# Patient Record
Sex: MALE | Race: WHITE | Employment: OTHER | URBAN - METROPOLITAN AREA
[De-identification: names, ages, dates, MRNs, and addresses within clinical notes are randomized per-mention and may not be internally consistent; named-entity substitution may affect disease eponyms.]

---

## 2017-02-01 ENCOUNTER — CLINICAL SUPPORT (OUTPATIENT)
Dept: CARDIOLOGY CLINIC | Age: 82
End: 2017-02-01

## 2017-02-01 DIAGNOSIS — Z79.01 ENCOUNTER FOR MONITORING COUMADIN THERAPY: ICD-10-CM

## 2017-02-01 DIAGNOSIS — Z51.81 ENCOUNTER FOR MONITORING COUMADIN THERAPY: ICD-10-CM

## 2017-02-01 DIAGNOSIS — I48.91 ATRIAL FIBRILLATION, UNSPECIFIED TYPE (HCC): Primary | ICD-10-CM

## 2017-02-01 LAB — INR, EXTERNAL: 1.8 (ref 2–3)

## 2017-02-01 NOTE — PROGRESS NOTES
A full discussion of the nature of anticoagulants has been carried out. A benefit risk analysis has been presented to the patient, so that they understand the justification for choosing anticoagulation at this time. The need for frequent and regular monitoring, precise dosage adjustment and compliance is stressed. Side effects of potential bleeding are discussed. The patient should avoid any OTC items containing aspirin or ibuprofen, and should avoid great swings in general diet. Avoid alcohol consumption. Call if any signs of abnormal bleeding. Next PT/INR test in 8 weeks; telephone follow up thereafter. ANNO Jeff Talavera RN. Mr Jagdish Hernandez will only come every 8 weeks. He no longer drives and has to pay a service to bring him.

## 2017-02-14 ENCOUNTER — OFFICE VISIT (OUTPATIENT)
Dept: FAMILY MEDICINE CLINIC | Age: 82
End: 2017-02-14

## 2017-02-14 VITALS
OXYGEN SATURATION: 96 % | RESPIRATION RATE: 12 BRPM | DIASTOLIC BLOOD PRESSURE: 84 MMHG | HEIGHT: 66 IN | WEIGHT: 234.8 LBS | BODY MASS INDEX: 37.73 KG/M2 | SYSTOLIC BLOOD PRESSURE: 183 MMHG | HEART RATE: 71 BPM | TEMPERATURE: 97.8 F

## 2017-02-14 DIAGNOSIS — Z00.00 MEDICARE ANNUAL WELLNESS VISIT, SUBSEQUENT: ICD-10-CM

## 2017-02-14 DIAGNOSIS — R60.9 PERIPHERAL EDEMA: ICD-10-CM

## 2017-02-14 DIAGNOSIS — Z71.89 ACP (ADVANCE CARE PLANNING): ICD-10-CM

## 2017-02-14 DIAGNOSIS — I48.20 CHRONIC ATRIAL FIBRILLATION (HCC): Primary | ICD-10-CM

## 2017-02-14 DIAGNOSIS — Z12.83 SCREENING FOR SKIN CANCER: ICD-10-CM

## 2017-02-14 DIAGNOSIS — I10 ESSENTIAL HYPERTENSION: ICD-10-CM

## 2017-02-14 DIAGNOSIS — E03.9 HYPOTHYROIDISM, UNSPECIFIED TYPE: ICD-10-CM

## 2017-02-14 RX ORDER — LOSARTAN POTASSIUM 50 MG/1
50 TABLET ORAL DAILY
Qty: 90 TAB | Refills: 1 | Status: SHIPPED | OUTPATIENT
Start: 2017-02-14 | End: 2017-09-26 | Stop reason: SDUPTHER

## 2017-02-14 NOTE — PROGRESS NOTES
HISTORY OF PRESENT ILLNESS  Garth Chavez is a 80 y.o. male. HPI   Cardiovascular Review:  The patient has hypertension, hyperlipidemia and Atrial Fibrillation. Anticoagulated on Warfarin which is managed by cardiology. Patient reports having a h/o TIA in 1997. Diet and Lifestyle: generally follows a low fat low cholesterol diet, generally follows a low sodium diet, sedentary, nonsmoker  Home BP Monitoring: is not measured at home. Pertinent ROS: taking medications as instructed, no medication side effects noted, no TIA's, no chest pain on exertion, no dyspnea on exertion, no swelling of ankles. Patient seen by Dr. Jena Richmond, podiatry recently for foot care. Requests referral to dermatology, Dr Estela Yang. Patient refuses lab work and immunizations. Review of Systems   Constitutional: Negative for weight loss. Cardiovascular: Negative for leg swelling. Gastrointestinal: Negative for heartburn. Musculoskeletal: Negative for back pain, joint pain and myalgias. Neurological: Negative for weakness. Psychiatric/Behavioral: Negative for depression. Physical Exam   Constitutional: He is oriented to person, place, and time. He appears well-developed and well-nourished. Neck: Normal range of motion. Neck supple. No JVD present. Carotid bruit is not present. No thyromegaly present. Cardiovascular: Normal rate and intact distal pulses. An irregularly irregular rhythm present. Exam reveals no gallop and no friction rub. No murmur heard. Pulmonary/Chest: Effort normal and breath sounds normal. No respiratory distress. Musculoskeletal: He exhibits edema (2+ pitting edema). Lymphadenopathy:     He has no cervical adenopathy. Neurological: He is alert and oriented to person, place, and time. Psychiatric: He has a normal mood and affect. His behavior is normal.   Nursing note and vitals reviewed. ASSESSMENT and PLAN  Natalie Green was seen today for hypertension.     Diagnoses and all orders for this visit:    Chronic atrial fibrillation ([facility] [address]  Rate controlled, anticoagulated on Warfarin. Managed by cardiology    Hypothyroidism, unspecified type  TSH presumed stable, labs refused. Essential hypertension  Not to goal, resume Losartan daily. Begin home monitoring and call for reading >150/90  -     losartan (COZAAR) 50 mg tablet; Take 1 Tab by mouth daily. For blood pressure    Peripheral edema  Likely venous insufficiency. Reccommended low sodium diet, elevation of legs and compression stockings. Screening for skin cancer  -     REFERRAL TO DERMATOLOGY    Medicare annual wellness visit, subsequent    ACP (advance care planning)  Discussed importance of living will; paperwork provided on Honoring Choices      I have discussed the diagnosis with the patient and the intended plan as seen in the above orders. The patient has received an after-visit summary along with patient information handout. I have discussed medication side effects and warnings with the patient as well. Follow-up Disposition:  Return in about 6 months (around 8/14/2017) for blood pressure.

## 2017-02-14 NOTE — PROGRESS NOTES
1. Have you been to the ER, urgent care clinic since your last visit? Hospitalized since your last visit? No    2. Have you seen or consulted any other health care providers outside of the [address] [place] [address] since your last visit? Include any pap smears or colon screening.  No       Chief Complaint   Patient presents with    Hypertension     follow up

## 2017-02-14 NOTE — PATIENT INSTRUCTIONS
Advance Directives: Care Instructions  Your Care Instructions  An advance directive is a legal way to state your wishes at the end of your life. It tells your family and your doctor what to do if you can no longer say what you want. There are two main types of advance directives. You can change them any time that your wishes change. · A living will tells your family and your doctor your wishes about life support and other treatment. · A medical power of  lets you name a person to make treatment decisions for you when you can't speak for yourself. This person is called a health care agent. If you do not have an advance directive, decisions about your medical care may be made by a doctor or a  who doesn't know you. It may help to think of an advance directive as a gift to the people who care for you. If you have one, they won't have to make tough decisions by themselves. Follow-up care is a key part of your treatment and safety. Be sure to make and go to all appointments, and call your doctor if you are having problems. It's also a good idea to know your test results and keep a list of the medicines you take. How can you care for yourself at home? · Discuss your wishes with your loved ones and your doctor. This way, there are no surprises. · Many states have a unique form. Or you might use a universal form that has been approved by many states. This kind of form can sometimes be completed and stored online. Your electronic copy will then be available wherever you have a connection to the Internet. In most cases, doctors will respect your wishes even if you have a form from a different state. · You don't need a  to do an advance directive. But you may want to get legal advice. · Think about these questions when you prepare an advance directive:  ¨ Who do you want to make decisions about your medical care if you are not able to?  Many people choose a family member, close friend, or doctor. ¨ Do you know enough about life support methods that might be used? If not, talk to your doctor so you understand. ¨ What are you most afraid of that might happen? You might be afraid of having pain, losing your independence, or being kept alive by machines. ¨ Where would you prefer to die? Choices include your home, a hospital, or a nursing home. ¨ Would you like to have information about hospice care to support you and your family? ¨ Do you want to donate organs when you die? ¨ Do you want certain Yazdanism practices performed before you die? If so, put your wishes in the advance directive. · Read your advance directive every year, and make changes as needed. When should you call for help? Be sure to contact your doctor if you have any questions. Where can you learn more? Go to http://luyc-ramos.info/. Enter R264 in the search box to learn more about \"Advance Directives: Care Instructions. \"  Current as of: February 24, 2016  Content Version: 11.1  © 5448-0387 [employer]. Care instructions adapted under license by [employer] (which disclaims liability or warranty for this information). If you have questions about a medical condition or this instruction, always ask your healthcare professional. [address] any warranty or liability for your use of this information.

## 2017-02-14 NOTE — PROGRESS NOTES
Gray Osorio is a 65 [facility] y.o. male and presents for annual Medicare Wellness Visit. Problem List: Reviewed with patient and discussed risk factors. 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    ACP (advance care planning) Z71.89    Peripheral edema R60.9       Current medical providers:  Patient Care Team:  Jose Davison MD as PCP - General (Family Practice)    PSH: Reviewed with patient  Past Surgical History   Procedure Laterality Date    Hx hip replacement  2008     right hip, [facility]    Hx heent       detached retina        SH: Reviewed with patient  Social History   Substance Use Topics    Smoking status: Light Tobacco Smoker     Packs/day: 0.25     Types: Cigars     Last attempt to quit: 9/27/1978    Smokeless tobacco: Never Used    Alcohol use 8.4 oz/week     14 Shots of liquor per week      Comment: 2 drinks a day. FH: Reviewed with patient  Family History   Problem Relation Age of Onset    Lung Disease Father     Cancer Father      lung    Coronary Artery Disease Neg Hx        Medications/Allergies: Reviewed with patient  Current Outpatient Prescriptions on File Prior to Visit   Medication Sig Dispense Refill    levothyroxine (SYNTHROID) 50 mcg tablet TAKE 1 TABLET EVERY DAY BEFORE BREAKFAST 90 Tab 1    warfarin (COUMADIN) 5 mg tablet TAKE 1 TABLET BY MOUTH DAILY 30 Tab 5    tretinoin (RETIN-A) 0.025 % topical cream Apply  to affected area nightly. 45 g 3    ciclopirox (PENLAC) 8 % solution Apply topically to nails nightly 6.6 mL 2    vit B Cmplx 3-FA-Vit C-Biotin (NEPHRO MANDO RX) 1- mg-mg-mcg tablet Take 1 tablet by mouth daily.  aspirin (ASPIRIN) 325 mg tablet Take 325 mg by mouth daily.  DOCOSAHEXANOIC ACID/EPA (FISH OIL PO) Take  by mouth.       zinc 50 mg capsule Take  by mouth daily.  vitamin E (AQUA GEMS) 400 unit capsule Take  by mouth daily.  ascorbic acid (VITAMIN C) 500 mg tablet Take  by mouth daily.  folic acid (FOLVITE) 1 mg tablet Take  by mouth daily.  LYSINE PO Take  by mouth daily.  FE/DOCUSATE NA/VIT C/B12/FA/MN (MULTI MINERALS-FE-FA-B12-C-DSS PO) Take  by mouth daily.  GLUCOSAMINE HCL/CHONDR MEDLEY A NA (GLUCOSAMINE-CHONDROITIN) 750-600 mg Tab Take  by mouth daily.  SAW PALMETTO PO Take  by mouth daily.  CALCIUM/MAGNESIUM (DOLOMITE PO) Take  by mouth.  multivitamin (ONE A DAY) tablet Take 1 Tab by mouth daily.  diphenhydrAMINE (BENADRYL) 25 mg capsule Take 50 mg by mouth daily.  furosemide (LASIX) 20 mg tablet Take 1 tablet by mouth daily. 30 tablet 0     No current facility-administered medications on file prior to visit. Allergies   Allergen Reactions    Hydrocodone Other (comments)     Unable to void, or have a bowel movement    Amlodipine Swelling and Other (comments)     Severe weeping from leg swelling    Poison Ivy Extract Itching       Objective:  Visit Vitals    /84 (BP 1 Location: Left arm, BP Patient Position: Sitting)    Pulse 71    Temp 97.8 °F (36.6 °C) (Oral)    Resp 12    Ht 5' 6\" (1.676 m)    Wt 234 lb 12.8 oz (106.5 kg)    SpO2 96%    BMI 37.9 kg/m2    Body mass index is 37.9 kg/(m^2). Assessment of cognitive impairment: Alert and oriented x 3    Depression Screen:   PHQ 2 / 9, over the last two weeks 2/14/2017   Little interest or pleasure in doing things Not at all   Feeling down, depressed or hopeless Not at all   Total Score PHQ 2 0       Fall Risk Assessment:    Fall Risk Assessment, last 12 mths 2/14/2017   Able to walk? Yes   Fall in past 12 months? No   Fall with injury? -   Number of falls in past 12 months -   Fall Risk Score -       Functional Ability:   Does the patient exhibit a steady gait?   yes   How long did it take the patient to get up and walk from a sitting position? 5 sec   Is the patient self reliant?  (ie can do own laundry, meals, household chores)  yes     Does the patient handle his/her own medications? yes     Does the patient handle his/her own money? yes     Is the patients home safe (ie good lighting, handrails on stairs and bath, etc.)? yes     Did you notice or did patient express any hearing difficulties? no     Did you notice or did patient express any vision difficulties? yes     Were distance and reading eye charts used? no       Advance Care Planning:   Patient was offered the opportunity to discuss advance care planning:  yes     Does patient have an Advance Directive:  no   If no, did you provide information on Caring Connections? yes       Plan:      Orders Placed This Encounter    REFERRAL TO DERMATOLOGY    losartan (COZAAR) 50 mg tablet       Health Maintenance   Topic Date Due    DTaP/Tdap/Td series (1 - Tdap) 07/11/1951    Pneumococcal 65+ High/Highest Risk (1 of 2 - PCV13) 07/11/1995    MEDICARE YEARLY EXAM  07/11/1995    INFLUENZA AGE 9 TO ADULT  08/01/2016    GLAUCOMA SCREENING Q2Y  08/15/2018    ZOSTER VACCINE AGE 60>  Addressed       *Patient verbalized understanding and agreement with the plan. A copy of the After Visit Summary with personalized health plan was given to the patient today.

## 2017-02-14 NOTE — MR AVS SNAPSHOT
Visit Information Date & Time Provider Department Dept. Phone Encounter #  
 2/14/2017 12:00 PM Zach Salmon NP [facility] [phone] [id number] Follow-up Instructions Return in about 6 months (around 8/14/2017) for blood pressure. Your Appointments 4/5/2017  1:00 PM  
COUMADIN CLINIC with [facility] ([facility] SCHEDULING) Appt Note: 8 weeks [address]  [address]  
[address] [id number] Upcoming Health Maintenance Date Due DTaP/Tdap/Td series (1 - Tdap) 7/11/1951 Pneumococcal 65+ High/Highest Risk (1 of 2 - PCV13) 7/11/1995 MEDICARE YEARLY EXAM 7/11/1995 INFLUENZA AGE 9 TO ADULT 8/1/2016 GLAUCOMA SCREENING Q2Y 8/15/2018 Allergies as of 2/14/2017  Review Complete On: 2/14/2017 By: Zach Salmon NP Severity Noted Reaction Type Reactions Hydrocodone High 08/10/2015    Other (comments) Unable to void, or have a bowel movement Amlodipine  08/10/2015    Swelling, Other (comments) Severe weeping from leg swelling Poison Ivy Extract  12/02/2010   Side Effect Itching Current Immunizations  Reviewed on 12/15/2014 Name Date Influenza Vaccine Whole 10/14/2011 Not reviewed this visit You Were Diagnosed With   
  
 Codes Comments Chronic atrial fibrillation (HCC)    -  Primary ICD-10-CM: I97.8 ICD-9-CM: 427.31 Hypothyroidism, unspecified type     ICD-10-CM: E03.9 ICD-9-CM: 244.9 Essential hypertension     ICD-10-CM: I10 
ICD-9-CM: 401.9 Peripheral edema     ICD-10-CM: R60.9 ICD-9-CM: 174. 3 Screening for skin cancer     ICD-10-CM: Z12.83 ICD-9-CM: V76.43 Medicare annual wellness visit, subsequent     ICD-10-CM: Z00.00 ICD-9-CM: V70.0 ACP (advance care planning)     ICD-10-CM: Z71.89 ICD-9-CM: V65.49 Vitals BP Pulse Temp Resp Height(growth percentile) Weight(growth percentile) 183/84 (BP 1 Location: Left arm, BP Patient Position: Sitting) 71 97.8 °F (36.6 °C) (Oral) 12 5' 6\" (1.676 m) 234 lb 12.8 oz (106.5 kg) SpO2 BMI Smoking Status 96% 37.9 kg/m2 Light Tobacco Smoker Vitals History BMI and BSA Data Body Mass Index Body Surface Area  
 37.9 kg/m 2 2.23 m 2 Preferred Pharmacy Pharmacy Name Phone [facility] [address] [place] [address] [facility] [address] [phone] Your Updated Medication List  
  
   
This list is accurate as of: 2/14/17 12:34 PM.  Always use your most recent med list.  
  
  
  
  
 aspirin 325 mg tablet Commonly known as:  ASPIRIN Take 325 mg by mouth daily. BENADRYL 25 mg capsule Generic drug:  diphenhydrAMINE Take 50 mg by mouth daily. ciclopirox 8 % solution Commonly known as:  PENLAC Apply topically to nails nightly DOLOMITE PO Take  by mouth. FISH OIL PO Take  by mouth. folic acid 1 mg tablet Commonly known as:  [employer] Take  by mouth daily. furosemide 20 mg tablet Commonly known as:  LASIX Take 1 tablet by mouth daily. glucosamine-chondroitin 750-600 mg Tab Take  by mouth daily. levothyroxine 50 mcg tablet Commonly known as:  SYNTHROID  
TAKE 1 TABLET EVERY DAY BEFORE BREAKFAST  
  
 losartan 50 mg tablet Commonly known as:  COZAAR Take 1 Tab by mouth daily. For blood pressure LYSINE PO Take  by mouth daily. MULTI MINERALS-FE-FA-B12-C-DSS PO Take  by mouth daily. multivitamin tablet Commonly known as:  ONE A DAY Take 1 Tab by mouth daily. SAW PALMETTO PO Take  by mouth daily. tretinoin 0.025 % topical cream  
Commonly known as:  RETIN-A Apply  to affected area nightly. vit B Cmplx 3-FA-Vit C-Biotin 1- mg-mg-mcg tablet Commonly known as:  NEPHRO MANDO RX Take 1 tablet by mouth daily. VITAMIN C 500 mg tablet Generic drug:  ascorbic acid (vitamin C) Take  by mouth daily. vitamin E 400 unit capsule Commonly known as:  [address] Take  by mouth daily. warfarin 5 mg tablet Commonly known as:  COUMADIN  
TAKE 1 TABLET BY MOUTH DAILY  
  
 zinc 50 mg capsule Take  by mouth daily. Prescriptions Sent to Pharmacy Refills  
 losartan (COZAAR) 50 mg tablet 1 Sig: Take 1 Tab by mouth daily. For blood pressure Class: Normal  
 Pharmacy: [address] [place] [address], [address] [place] [address] #: [phone] Route: Oral  
  
We Performed the Following REFERRAL TO DERMATOLOGY [REF19 Custom] Comments:  
 Please evaluate patient for skin cancer screening. Follow-up Instructions Return in about 6 months (around 8/14/2017) for blood pressure. Referral Information Referral ID Referred By Referred To  
  
 [id number] Viviane Jorgensen MD   
   [facility] Phone: [phone] Fax: [phone] Visits Status Start Date End Date 1 New Request 2/14/17 2/14/18 If your referral has a status of pending review or denied, additional information will be sent to support the outcome of this decision. Patient Instructions Advance Directives: Care Instructions Your Care Instructions An advance directive is a legal way to state your wishes at the end of your life. It tells your family and your doctor what to do if you can no longer say what you want. There are two main types of advance directives. You can change them any time that your wishes change. · A living will tells your family and your doctor your wishes about life support and other treatment. · A medical power of  lets you name a person to make treatment decisions for you when you can't speak for yourself.  This person is called a health care agent. If you do not have an advance directive, decisions about your medical care may be made by a doctor or a  who doesn't know you. It may help to think of an advance directive as a gift to the people who care for you. If you have one, they won't have to make tough decisions by themselves. Follow-up care is a key part of your treatment and safety. Be sure to make and go to all appointments, and call your doctor if you are having problems. It's also a good idea to know your test results and keep a list of the medicines you take. How can you care for yourself at home? · Discuss your wishes with your loved ones and your doctor. This way, there are no surprises. · Many states have a unique form. Or you might use a universal form that has been approved by many states. This kind of form can sometimes be completed and stored online. Your electronic copy will then be available wherever you have a connection to the Internet. In most cases, doctors will respect your wishes even if you have a form from a different state. · You don't need a  to do an advance directive. But you may want to get legal advice. · Think about these questions when you prepare an advance directive: ¨ Who do you want to make decisions about your medical care if you are not able to? Many people choose a family member, close friend, or doctor. ¨ Do you know enough about life support methods that might be used? If not, talk to your doctor so you understand. ¨ What are you most afraid of that might happen? You might be afraid of having pain, losing your independence, or being kept alive by machines. ¨ Where would you prefer to die? Choices include your home, a hospital, or a nursing home. ¨ Would you like to have information about hospice care to support you and your family? ¨ Do you want to donate organs when you die? ¨ Do you want certain Mu-ism practices performed before you die?  If so, put your wishes in the advance directive. · Read your advance directive every year, and make changes as needed. When should you call for help? Be sure to contact your doctor if you have any questions. Where can you learn more? Go to http://lucy-ramos.info/. Enter R264 in the search box to learn more about \"Advance Directives: Care Instructions. \" Current as of: February 24, 2016 Content Version: 11.1 © 2476-6721 [employer], Incorporated. Care instructions adapted under license by [employer] (which disclaims liability or warranty for this information). If you have questions about a medical condition or this instruction, always ask your healthcare professional. [address] any warranty or liability for your use of this information. Introducing [facility]! Dear Phyllis Mae: 
Thank you for requesting a nubelo account. Our records indicate that you already have an active nubelo account. You can access your account anytime at [email or web address] Did you know that you can access your hospital and ER discharge instructions at any time in nubelo? You can also review all of your test results from your hospital stay or ER visit. Additional Information If you have questions, please visit the Frequently Asked Questions section of the nubelo website at [email or web address] Remember, nubelo is NOT to be used for urgent needs. For medical emergencies, dial 911. Now available from your iPhone and Android! Please provide this summary of care documentation to your next provider. Your primary care clinician is listed as Olu Carreno. If you have any questions after today's visit, please call [phone].

## 2017-03-23 DIAGNOSIS — I48.91 ATRIAL FIBRILLATION, UNSPECIFIED TYPE (HCC): ICD-10-CM

## 2017-03-23 RX ORDER — WARFARIN SODIUM 5 MG/1
TABLET ORAL
Qty: 90 TAB | Refills: 1 | Status: SHIPPED | OUTPATIENT
Start: 2017-03-23 | End: 2017-03-23 | Stop reason: SDUPTHER

## 2017-03-23 RX ORDER — WARFARIN SODIUM 5 MG/1
TABLET ORAL
Qty: 30 TAB | Refills: 5 | Status: SHIPPED | OUTPATIENT
Start: 2017-03-23 | End: 2017-08-15 | Stop reason: SDUPTHER

## 2017-04-17 ENCOUNTER — CLINICAL SUPPORT (OUTPATIENT)
Dept: CARDIOLOGY CLINIC | Age: 82
End: 2017-04-17

## 2017-04-17 DIAGNOSIS — Z79.01 LONG TERM (CURRENT) USE OF ANTICOAGULANTS: ICD-10-CM

## 2017-04-17 DIAGNOSIS — I48.0 PAROXYSMAL ATRIAL FIBRILLATION (HCC): Primary | ICD-10-CM

## 2017-04-17 LAB
INR BLD: NORMAL
INR, EXTERNAL: 1.9 (ref 2–3)
PT POC: NORMAL SEC
VALID INTERNAL CONTROL?: YES

## 2017-04-21 ENCOUNTER — TELEPHONE (OUTPATIENT)
Dept: FAMILY MEDICINE CLINIC | Age: 82
End: 2017-04-21

## 2017-04-21 NOTE — TELEPHONE ENCOUNTER
Referral Request Telephone Call      Insurance Name:     Bart Ornelas ID:  [id number]   Specialist Name: Dr Slim Nuñez   Type of Specialty:  Dermatology    Address of Specialist:  [address], [address]   Phone/Fax Number of Specialist: Phone # [phone]  Fax # [phone]   Diagnosis: Z12.83   Appointment Date: May 10th   NPI    Tax ID

## 2017-06-07 ENCOUNTER — CLINICAL SUPPORT (OUTPATIENT)
Dept: CARDIOLOGY CLINIC | Age: 82
End: 2017-06-07

## 2017-06-07 DIAGNOSIS — Z79.01 LONG TERM (CURRENT) USE OF ANTICOAGULANTS: ICD-10-CM

## 2017-06-07 DIAGNOSIS — I48.0 PAROXYSMAL ATRIAL FIBRILLATION (HCC): Primary | ICD-10-CM

## 2017-06-07 LAB
INR BLD: NORMAL
INR, EXTERNAL: 2 (ref 2–3)
PT POC: NORMAL SECONDS
VALID INTERNAL CONTROL?: YES

## 2017-06-07 NOTE — PROGRESS NOTES

## 2017-07-27 ENCOUNTER — CLINICAL SUPPORT (OUTPATIENT)
Dept: CARDIOLOGY CLINIC | Age: 82
End: 2017-07-27

## 2017-07-27 DIAGNOSIS — Z51.81 ENCOUNTER FOR MONITORING COUMADIN THERAPY: ICD-10-CM

## 2017-07-27 DIAGNOSIS — Z79.01 ENCOUNTER FOR MONITORING COUMADIN THERAPY: ICD-10-CM

## 2017-07-27 DIAGNOSIS — I48.0 PAROXYSMAL ATRIAL FIBRILLATION (HCC): Primary | ICD-10-CM

## 2017-07-27 LAB
INR BLD: NORMAL
INR, EXTERNAL: 1.6 (ref 2–3)
PT POC: NORMAL SECONDS
VALID INTERNAL CONTROL?: YES

## 2017-07-27 NOTE — PROGRESS NOTES

## 2017-08-15 DIAGNOSIS — I48.91 ATRIAL FIBRILLATION, UNSPECIFIED TYPE (HCC): ICD-10-CM

## 2017-08-15 RX ORDER — WARFARIN SODIUM 5 MG/1
TABLET ORAL
Qty: 90 TAB | Refills: 1 | Status: SHIPPED | OUTPATIENT
Start: 2017-08-15 | End: 2017-09-26 | Stop reason: SDUPTHER

## 2017-09-14 ENCOUNTER — CLINICAL SUPPORT (OUTPATIENT)
Dept: CARDIOLOGY CLINIC | Age: 82
End: 2017-09-14

## 2017-09-14 DIAGNOSIS — I48.0 PAROXYSMAL ATRIAL FIBRILLATION (HCC): ICD-10-CM

## 2017-09-14 DIAGNOSIS — Z79.01 LONG TERM (CURRENT) USE OF ANTICOAGULANTS: Primary | ICD-10-CM

## 2017-09-14 LAB
INR BLD: NORMAL
INR, EXTERNAL: 2.6 (ref 2–3)
PT POC: NORMAL SECONDS
VALID INTERNAL CONTROL?: YES

## 2017-09-25 ENCOUNTER — TELEPHONE (OUTPATIENT)
Dept: CARDIOLOGY CLINIC | Age: 82
End: 2017-09-25

## 2017-09-25 NOTE — TELEPHONE ENCOUNTER
Spoke to patient. Identifiers x 2. Inquired regarding scheduled INR appointment this week. Per our discussion at last INR check, patient to start having INR checked at the office of Delisa Dudley NP. Patient has not been seen by Dr. Munira Diggs since 2014 and plans to be seen by PCP office on regular basis. Next INR check appointment canceled. Encouraged patient to call our office if he would like for us to follow INRs. Would need to be seen by Dr. Munira Diggs, also.

## 2017-09-26 ENCOUNTER — PATIENT MESSAGE (OUTPATIENT)
Dept: FAMILY MEDICINE CLINIC | Age: 82
End: 2017-09-26

## 2017-09-26 DIAGNOSIS — I48.91 ATRIAL FIBRILLATION, UNSPECIFIED TYPE (HCC): ICD-10-CM

## 2017-09-26 DIAGNOSIS — I10 ESSENTIAL HYPERTENSION: ICD-10-CM

## 2017-09-26 NOTE — TELEPHONE ENCOUNTER
From: Danielito Hernandez  To: Coreen Jin NP  Sent: 9/26/2017 3:01 PM EDT  Subject: Prescription Question    qian,  I AM OUT OF LOSARTAN, LOW ON WARFARIN, HAVE ENOUGH LEVOTHYROXIN. MIRZA IS OK ON Amlodipine, donepezil,and thyroid.   bill andrea

## 2017-09-27 RX ORDER — WARFARIN SODIUM 5 MG/1
TABLET ORAL
Qty: 90 TAB | Refills: 1 | Status: SHIPPED | OUTPATIENT
Start: 2017-09-27 | End: 2019-04-19 | Stop reason: ALTCHOICE

## 2017-09-27 RX ORDER — LEVOTHYROXINE SODIUM 50 UG/1
50 TABLET ORAL
Qty: 90 TAB | Refills: 1 | Status: SHIPPED | OUTPATIENT
Start: 2017-09-27 | End: 2018-08-23 | Stop reason: SDUPTHER

## 2017-09-27 RX ORDER — LOSARTAN POTASSIUM 50 MG/1
50 TABLET ORAL DAILY
Qty: 90 TAB | Refills: 1 | Status: SHIPPED | OUTPATIENT
Start: 2017-09-27 | End: 2018-08-23 | Stop reason: SDUPTHER

## 2018-03-20 ENCOUNTER — TELEPHONE (OUTPATIENT)
Dept: CARDIOLOGY CLINIC | Age: 83
End: 2018-03-20

## 2018-03-20 NOTE — TELEPHONE ENCOUNTER
ronnie called, they need to know the patients coumadin history.  They are checking his coumadin today   Phone: [phone]  Fax: [phone]

## 2018-03-20 NOTE — TELEPHONE ENCOUNTER
Spoke with representative at [email or web address] regarding Starling Ng. Advised per documentation, patient did not have INR check x 6 months. Also, he stated at that time that the PCP was following INR checks. I have advised the [employer] to check with PCP office eGovanni FISHMAN. The representative verbalized understanding and will call our office with any further questions or concerns.

## 2018-03-28 ENCOUNTER — OFFICE VISIT (OUTPATIENT)
Dept: FAMILY MEDICINE CLINIC | Age: 83
End: 2018-03-28

## 2018-03-28 VITALS
RESPIRATION RATE: 17 BRPM | SYSTOLIC BLOOD PRESSURE: 164 MMHG | BODY MASS INDEX: 38.25 KG/M2 | HEART RATE: 55 BPM | OXYGEN SATURATION: 97 % | WEIGHT: 238 LBS | DIASTOLIC BLOOD PRESSURE: 67 MMHG | TEMPERATURE: 98.4 F | HEIGHT: 66 IN

## 2018-03-28 DIAGNOSIS — I10 ESSENTIAL HYPERTENSION: ICD-10-CM

## 2018-03-28 DIAGNOSIS — E66.01 SEVERE OBESITY (BMI 35.0-39.9) WITH COMORBIDITY (HCC): ICD-10-CM

## 2018-03-28 DIAGNOSIS — I48.0 PAROXYSMAL ATRIAL FIBRILLATION (HCC): Primary | ICD-10-CM

## 2018-03-28 DIAGNOSIS — Z00.00 MEDICARE ANNUAL WELLNESS VISIT, SUBSEQUENT: ICD-10-CM

## 2018-03-28 DIAGNOSIS — E03.9 ACQUIRED HYPOTHYROIDISM: ICD-10-CM

## 2018-03-28 DIAGNOSIS — Z71.89 ACP (ADVANCE CARE PLANNING): ICD-10-CM

## 2018-03-28 NOTE — PROGRESS NOTES
Chief Complaint   Patient presents with   Garrick Mcneal Annual Wellness Visit     1. Have you been to the ER, urgent care clinic since your last visit? Hospitalized since your last visit? No    2. Have you seen or consulted any other health care providers outside of the [facility] [place] since your last visit? Include any pap smears or colon screening.  No

## 2018-03-28 NOTE — PATIENT INSTRUCTIONS
Advance Directives: Care Instructions  Your Care Instructions  An advance directive is a legal way to state your wishes at the end of your life. It tells your family and your doctor what to do if you can no longer say what you want. There are two main types of advance directives. You can change them any time that your wishes change. · A living will tells your family and your doctor your wishes about life support and other treatment. · A durable power of  for health care lets you name a person to make treatment decisions for you when you can't speak for yourself. This person is called a health care agent. If you do not have an advance directive, decisions about your medical care may be made by a doctor or a  who doesn't know you. It may help to think of an advance directive as a gift to the people who care for you. If you have one, they won't have to make tough decisions by themselves. Follow-up care is a key part of your treatment and safety. Be sure to make and go to all appointments, and call your doctor if you are having problems. It's also a good idea to know your test results and keep a list of the medicines you take. How can you care for yourself at home? · Discuss your wishes with your loved ones and your doctor. This way, there are no surprises. · Many states have a unique form. Or you might use a universal form that has been approved by many states. This kind of form can sometimes be completed and stored online. Your electronic copy will then be available wherever you have a connection to the Internet. In most cases, doctors will respect your wishes even if you have a form from a different state. · You don't need a  to do an advance directive. But you may want to get legal advice. · Think about these questions when you prepare an advance directive:  ¨ Who do you want to make decisions about your medical care if you are not able to?  Many people choose a family member or close friend. ¨ Do you know enough about life support methods that might be used? If not, talk to your doctor so you understand. ¨ What are you most afraid of that might happen? You might be afraid of having pain, losing your independence, or being kept alive by machines. ¨ Where would you prefer to die? Choices include your home, a hospital, or a nursing home. ¨ Would you like to have information about hospice care to support you and your family? ¨ Do you want to donate organs when you die? ¨ Do you want certain Orthodoxy practices performed before you die? If so, put your wishes in the advance directive. · Read your advance directive every year, and make changes as needed. When should you call for help? Be sure to contact your doctor if you have any questions. Where can you learn more? Go to http://lucy-ramos.info/. Enter R264 in the search box to learn more about \"Advance Directives: Care Instructions. \"  Current as of: September 24, 2016  Content Version: 11.4  © 5045-3275 Healthwise, Incorporated. Care instructions adapted under license by [employer] (which disclaims liability or warranty for this information). If you have questions about a medical condition or this instruction, always ask your healthcare professional. [address] any warranty or liability for your use of this information.

## 2018-03-28 NOTE — MR AVS SNAPSHOT
[address] 
[phone] Patient: Robert Ware MRN: [id number] Visit Information Date & Time Provider Department Dept. Phone Encounter #  
 3/28/2018  3:30 PM Diane Jimenez, [facility] [phone] [id number] Follow-up Instructions Return in about 6 months (around 9/28/2018) for blood pressure, disease management. Upcoming Health Maintenance Date Due Pneumococcal 65+ Low/Medium Risk (1 of 2 - PCV13) 7/11/1995 Influenza Age 5 to Adult 8/1/2017 MEDICARE YEARLY EXAM 3/14/2018 GLAUCOMA SCREENING Q2Y 8/21/2019 DTaP/Tdap/Td series (2 - Td) 2/14/2027 Allergies as of 3/28/2018  Review Complete On: 3/28/2018 By: Diane Jimenez NP Severity Noted Reaction Type Reactions Hydrocodone High 08/10/2015    Other (comments) Unable to void, or have a bowel movement Amlodipine  08/10/2015    Swelling, Other (comments) Severe weeping from leg swelling Poison Ivy Extract  12/02/2010   Side Effect Itching Current Immunizations  Reviewed on 3/28/2018 Name Date Influenza Vaccine Whole 10/14/2011 Reviewed by Diane Jimenez NP on 3/28/2018 at  4:06 PM  
You Were Diagnosed With   
  
 Codes Comments Paroxysmal atrial fibrillation (HCC)    -  Primary ICD-10-CM: I48.0 ICD-9-CM: 427.31 Essential hypertension     ICD-10-CM: I10 
ICD-9-CM: 401.9 Acquired hypothyroidism     ICD-10-CM: E03.9 ICD-9-CM: 244.9 Medicare annual wellness visit, subsequent     ICD-10-CM: Z00.00 ICD-9-CM: V70.0 ACP (advance care planning)     ICD-10-CM: Z71.89 ICD-9-CM: V65.49 Vitals BP Pulse Temp Resp Height(growth percentile) Weight(growth percentile) 164/67 (BP 1 Location: Left arm, BP Patient Position: Sitting) (!) 55 98.4 °F (36.9 °C) (Oral) 17 5' 6\" (1.676 m) 238 lb (108 kg) SpO2 BMI Smoking Status 97% 38.41 kg/m2 Light Tobacco Smoker Vitals History BMI and BSA Data Body Mass Index Body Surface Area  
 38.41 kg/m 2 2.24 m 2 Preferred Pharmacy Pharmacy Name Phone [facility] [phone] Your Updated Medication List  
  
   
This list is accurate as of 3/28/18  4:09 PM.  Always use your most recent med list.  
  
  
  
  
 aspirin 325 mg tablet Commonly known as:  ASPIRIN Take 325 mg by mouth daily. BENADRYL 25 mg capsule Generic drug:  diphenhydrAMINE Take 50 mg by mouth daily. ciclopirox 8 % solution Commonly known as:  PENLAC Apply topically to nails nightly DOLOMITE PO Take  by mouth. FISH OIL PO Take  by mouth. folic acid 1 mg tablet Commonly known as:  [employer] Take  by mouth daily. furosemide 20 mg tablet Commonly known as:  LASIX Take 1 tablet by mouth daily. glucosamine-chondroitin 750-600 mg Tab Take  by mouth daily. levothyroxine 50 mcg tablet Commonly known as:  SYNTHROID Take 1 Tab by mouth Daily (before breakfast). losartan 50 mg tablet Commonly known as:  COZAAR Take 1 Tab by mouth daily. For blood pressure LYSINE PO Take  by mouth daily. MULTI MINERALS-FE-FA-B12-C-DSS PO Take  by mouth daily. multivitamin tablet Commonly known as:  ONE A DAY Take 1 Tab by mouth daily. SAW PALMETTO PO Take  by mouth daily. tretinoin 0.025 % topical cream  
Commonly known as:  RETIN-A Apply  to affected area nightly. vit B Cmplx 3-FA-Vit C-Biotin 1- mg-mg-mcg tablet Commonly known as:  NEPHRO MANDO RX Take 1 tablet by mouth daily. VITAMIN C 500 mg tablet Generic drug:  ascorbic acid (vitamin C) Take  by mouth daily. vitamin E 400 unit capsule Commonly known as:  [address] Take  by mouth daily. warfarin 5 mg tablet Commonly known as:  COUMADIN  
 TAKE 1 TABLET BY MOUTH DAILY  
  
 zinc 50 mg capsule Take  by mouth daily. Follow-up Instructions Return in about 6 months (around 9/28/2018) for blood pressure, disease management. Patient Instructions Advance Directives: Care Instructions Your Care Instructions An advance directive is a legal way to state your wishes at the end of your life. It tells your family and your doctor what to do if you can no longer say what you want. There are two main types of advance directives. You can change them any time that your wishes change. · A living will tells your family and your doctor your wishes about life support and other treatment. · A durable power of  for health care lets you name a person to make treatment decisions for you when you can't speak for yourself. This person is called a health care agent. If you do not have an advance directive, decisions about your medical care may be made by a doctor or a  who doesn't know you. It may help to think of an advance directive as a gift to the people who care for you. If you have one, they won't have to make tough decisions by themselves. Follow-up care is a key part of your treatment and safety. Be sure to make and go to all appointments, and call your doctor if you are having problems. It's also a good idea to know your test results and keep a list of the medicines you take. How can you care for yourself at home? · Discuss your wishes with your loved ones and your doctor. This way, there are no surprises. · Many states have a unique form. Or you might use a universal form that has been approved by many states. This kind of form can sometimes be completed and stored online. Your electronic copy will then be available wherever you have a connection to the Internet. In most cases, doctors will respect your wishes even if you have a form from a different state. · You don't need a  to do an advance directive. But you may want to get legal advice. · Think about these questions when you prepare an advance directive: ¨ Who do you want to make decisions about your medical care if you are not able to? Many people choose a family member or close friend. ¨ Do you know enough about life support methods that might be used? If not, talk to your doctor so you understand. ¨ What are you most afraid of that might happen? You might be afraid of having pain, losing your independence, or being kept alive by machines. ¨ Where would you prefer to die? Choices include your home, a hospital, or a nursing home. ¨ Would you like to have information about hospice care to support you and your family? ¨ Do you want to donate organs when you die? ¨ Do you want certain Anabaptism practices performed before you die? If so, put your wishes in the advance directive. · Read your advance directive every year, and make changes as needed. When should you call for help? Be sure to contact your doctor if you have any questions. Where can you learn more? Go to http://lucy-ramos.info/. Enter R264 in the search box to learn more about \"Advance Directives: Care Instructions. \" Current as of: September 24, 2016 Content Version: 11.4 © 6294-0012 Healthwise, Incorporated. Care instructions adapted under license by [employer] (which disclaims liability or warranty for this information). If you have questions about a medical condition or this instruction, always ask your healthcare professional. [place] [address] any warranty or liability for your use of this information. Introducing [facility]! Dear Lauraine Brittle: 
Thank you for requesting a [employer] account. Our records indicate that you already have an active [employer] account. You can access your account anytime at [email or web address] Did you know that you can access your hospital and ER discharge instructions at any time in [employer]? You can also review all of your test results from your hospital stay or ER visit. Additional Information If you have questions, please visit the Frequently Asked Questions section of the [employer] website at [email or web address] Remember, [employer] is NOT to be used for urgent needs. For medical emergencies, dial 911. Now available from your iPhone and Android! Please provide this summary of care documentation to your next provider. Your primary care clinician is listed as Rachelle Chakraborty. If you have any questions after today's visit, please call [phone].

## 2018-03-28 NOTE — PROGRESS NOTES
[facility] Note    Gaurav Roland is a 80 y.o. male who was seen in clinic today (3/28/2018). Subjective:  Cardiovascular Review:  The patient has hypertension, hyperlipidemia and Atrial Fibrillation. Anticoagulated on Warfarin which is managed by cardiology. Patient reports having a h/o TIA in 1997. Diet and Lifestyle: generally follows a low fat low cholesterol diet, generally follows a low sodium diet, sedentary, nonsmoker  Home BP Monitoring: well controlled at 140/70s  Pertinent ROS: taking medications as instructed, no medication side effects noted, no TIA's, no chest pain on exertion, no dyspnea on exertion, no swelling of ankles. Patient is under [facility] plan where he receives foot care and labs. Seen by dermatology, Dr Billy Jay. Patient continues to refuse lab work and immunizations. Patient is considering switching to Xarelto if his insurance will cover the medication. Prior to Admission medications    Medication Sig Start Date End Date Taking? Authorizing Provider   warfarin (COUMADIN) 5 mg tablet TAKE 1 TABLET BY MOUTH DAILY 9/27/17  Yes Dannie Mark NP   levothyroxine (SYNTHROID) 50 mcg tablet Take 1 Tab by mouth Daily (before breakfast). 9/27/17  Yes Dannie Mark NP   losartan (COZAAR) 50 mg tablet Take 1 Tab by mouth daily. For blood pressure 9/27/17  Yes Dannie Mark NP   tretinoin (RETIN-A) 0.025 % topical cream Apply  to affected area nightly. 5/18/16  Yes Dannie Mark NP   ciclopirox (PENLAC) 8 % solution Apply topically to nails nightly 4/6/16  Yes Dannie Mark NP   vit B Cmplx 3-FA-Vit C-Biotin (NEPHRO MANDO RX) 1- mg-mg-mcg tablet Take 1 tablet by mouth daily. Yes Historical Provider   aspirin (ASPIRIN) 325 mg tablet Take 325 mg by mouth daily. Yes Historical Provider   DOCOSAHEXANOIC ACID/EPA (FISH OIL PO) Take  by mouth.    Yes Historical Provider   zinc 50 mg capsule Take  by mouth daily. Yes Historical Provider   vitamin E (AQUA GEMS) 400 unit capsule Take  by mouth daily. Yes Historical Provider   ascorbic acid (VITAMIN C) 500 mg tablet Take  by mouth daily. Yes Historical Provider   folic acid (FOLVITE) 1 mg tablet Take  by mouth daily. Yes Historical Provider   LYSINE PO Take  by mouth daily. Yes Historical Provider   FE/DOCUSATE NA/VIT C/B12/FA/MN (MULTI MINERALS-FE-FA-B12-C-DSS PO) Take  by mouth daily. Yes Historical Provider   GLUCOSAMINE HCL/CHONDR MEDLEY A NA (GLUCOSAMINE-CHONDROITIN) 750-600 mg Tab Take  by mouth daily. Yes Historical Provider   SAW PALMETTO PO Take  by mouth daily. Yes Historical Provider   CALCIUM/MAGNESIUM (DOLOMITE PO) Take  by mouth. Yes Historical Provider   multivitamin (ONE A DAY) tablet Take 1 Tab by mouth daily. Yes Historical Provider   diphenhydrAMINE (BENADRYL) 25 mg capsule Take 50 mg by mouth daily. 12/2/10  Yes Historical Provider   furosemide (LASIX) 20 mg tablet Take 1 tablet by mouth daily. 9/2/14   Mayuri Cordero MD          Allergies   Allergen Reactions    Hydrocodone Other (comments)     Unable to void, or have a bowel movement    Amlodipine Swelling and Other (comments)     Severe weeping from leg swelling    Poison Ivy Extract Itching        ROS  See HPI    Objective:   Physical Exam   Constitutional: He is oriented to person, place, and time. He appears well-developed and well-nourished. Neck: Normal range of motion. Neck supple. No JVD present. Carotid bruit is not present. No thyromegaly present. Cardiovascular: Normal rate and intact distal pulses. An irregularly irregular rhythm present. Exam reveals no gallop and no friction rub. No murmur heard. Pulmonary/Chest: Effort normal and breath sounds normal. No respiratory distress. Musculoskeletal: He exhibits no edema. Lymphadenopathy:     He has no cervical adenopathy. Neurological: He is alert and oriented to person, place, and time. Psychiatric: He has a normal mood and affect. His behavior is normal.   Nursing note and vitals reviewed. Visit Vitals    /67 (BP 1 Location: Left arm, BP Patient Position: Sitting)    Pulse (!) 55    Temp 98.4 °F (36.9 °C) (Oral)    Resp 17    Ht 5' 6\" (1.676 m)    Wt 238 lb (108 kg)    SpO2 97%    BMI 38.41 kg/m2       Assessment & Plan:  Diagnoses and all orders for this visit:    1. Paroxysmal atrial fibrillation (HCC)  Rate controlled. Managed by cardiology. No changes. 2. Essential hypertension  Not to goal. Medication change deferred by patient. Continue home monitoring and call for reading >140/90    3. Severe obesity (BMI 35.0-39. 9) with comorbidity ([address]  Discussed need for weight loss through diet and exercise. Reviewed decreased caloric intake and increased activity. 4. Acquired hypothyroidism  Presumed therapeutic. Patient refuses lab testing. 5. Medicare annual wellness visit, subsequent    6. ACP (advance care planning)  Discussed importance of living will; paperwork provided on [employer] Cho[employer]      I have discussed the diagnosis with the patient and the intended plan as seen in the above orders. The patient has received an after-visit summary along with patient information handout. I have discussed medication side effects and warnings with the patient as well. Follow-up Disposition:  Return in about 6 months (around 9/28/2018) for blood pressure, disease management.         Lam Silverio NP

## 2018-03-28 NOTE — PROGRESS NOTES
Agatha Kennedy is a 80 y.o. male and presents for annual Medicare Wellness Visit. Problem List: Reviewed with patient and discussed risk factors. Patient Active Problem List   Diagnosis Code    Essential hypertension I10    Hypothyroidism E03.9    Paroxysmal atrial fibrillation (HCC) I48.0    Encounter for monitoring coumadin therapy Z51.81, Z79.01    Right knee DJD M17.11    ACP (advance care planning) Z71.89    Peripheral edema R60.9       Current medical providers:  Patient Care Team:  Hahn Castaneda NP as PCP - General (Nurse Practitioner)    PSH: Reviewed with patient  Past Surgical History:   Procedure Laterality Date    HX HEENT      detached retina    HX HIP REPLACEMENT  2008    right hip, [facility]: Reviewed with patient  Social History   Substance Use Topics    Smoking status: Light Tobacco Smoker     Packs/day: 0.25     Types: Cigars     Last attempt to quit: 9/27/1978    Smokeless tobacco: Never Used    Alcohol use 8.4 oz/week     14 Shots of liquor per week      Comment: 2 drinks a day. FH: Reviewed with patient  Family History   Problem Relation Age of Onset    Lung Disease Father     Cancer Father      lung    Coronary Artery Disease Neg Hx        Medications/Allergies: Reviewed with patient  Current Outpatient Prescriptions on File Prior to Visit   Medication Sig Dispense Refill    warfarin (COUMADIN) 5 mg tablet TAKE 1 TABLET BY MOUTH DAILY 90 Tab 1    levothyroxine (SYNTHROID) 50 mcg tablet Take 1 Tab by mouth Daily (before breakfast). 90 Tab 1    losartan (COZAAR) 50 mg tablet Take 1 Tab by mouth daily. For blood pressure 90 Tab 1    tretinoin (RETIN-A) 0.025 % topical cream Apply  to affected area nightly. 45 g 3    ciclopirox (PENLAC) 8 % solution Apply topically to nails nightly 6.6 mL 2    vit B Cmplx 3-FA-Vit C-Biotin (NEPHRO MANDO RX) 1- mg-mg-mcg tablet Take 1 tablet by mouth daily.       furosemide (LASIX) 20 mg tablet Take 1 tablet by mouth daily. 30 tablet 0    aspirin (ASPIRIN) 325 mg tablet Take 325 mg by mouth daily.  DOCOSAHEXANOIC ACID/EPA (FISH OIL PO) Take  by mouth.  zinc 50 mg capsule Take  by mouth daily.  vitamin E (AQUA GEMS) 400 unit capsule Take  by mouth daily.  ascorbic acid (VITAMIN C) 500 mg tablet Take  by mouth daily.  folic acid (FOLVITE) 1 mg tablet Take  by mouth daily.  LYSINE PO Take  by mouth daily.  FE/DOCUSATE NA/VIT C/B12/FA/MN (MULTI MINERALS-FE-FA-B12-C-DSS PO) Take  by mouth daily.  GLUCOSAMINE HCL/CHONDR MEDLEY A NA (GLUCOSAMINE-CHONDROITIN) 750-600 mg Tab Take  by mouth daily.  SAW PALMETTO PO Take  by mouth daily.  CALCIUM/MAGNESIUM (DOLOMITE PO) Take  by mouth.  multivitamin (ONE A DAY) tablet Take 1 Tab by mouth daily.  diphenhydrAMINE (BENADRYL) 25 mg capsule Take 50 mg by mouth daily. No current facility-administered medications on file prior to visit. Allergies   Allergen Reactions    Hydrocodone Other (comments)     Unable to void, or have a bowel movement    Amlodipine Swelling and Other (comments)     Severe weeping from leg swelling    Poison Ivy Extract Itching       Objective:  Visit Vitals    Ht 5' 6\" (1.676 m)    There is no height or weight on file to calculate BMI. Assessment of cognitive impairment: Alert and oriented x 3    Depression Screen:   PHQ over the last two weeks 2/14/2017   Little interest or pleasure in doing things Not at all   Feeling down, depressed or hopeless Not at all   Total Score PHQ 2 0       Fall Risk Assessment:    Fall Risk Assessment, last 12 mths 2/14/2017   Able to walk? Yes   Fall in past 12 months? No   Fall with injury? -   Number of falls in past 12 months -   Fall Risk Score -       Functional Ability:   Does the patient exhibit a steady gait? yes   How long did it take the patient to get up and walk from a sitting position?  ***   Is the patient self reliant?  (ie can do own laundry, meals, household chores)  {gen no default/yes/free text:967338::\"yes\"}     Does the patient handle his/her own medications? {gen no default/yes/free text:093837::\"yes\"}     Does the patient handle his/her own money? {gen no default/yes/free text:736963::\"yes\"}     Is the patients home safe (ie good lighting, handrails on stairs and bath, etc.)? {gen no default/yes/free text:976852::\"yes\"}     Did you notice or did patient express any hearing difficulties? {gen no default/yes/free text:336987::\"yes\"}     Did you notice or did patient express any vision difficulties? {gen no default/yes/free text:330229::\"no\"}     Were distance and reading eye charts used? {gen no default/yes/free text:680257::\"yes\"}       Advance Care Planning:   Patient was offered the opportunity to discuss advance care planning:  {gen no default/yes/free text:755235::\"yes\"}     Does patient have an Advance Directive:  {gen no default/yes/free text:411384::\"yes\"}   If no, did you provide information on Caring Connections? {gen no default/yes/free text:785946::\"yes\"}       Plan:      No orders of the defined types were placed in this encounter. Health Maintenance   Topic Date Due    Pneumococcal 65+ Low/Medium Risk (1 of 2 - PCV13) 07/11/1995    Influenza Age 5 to Adult  08/01/2017    MEDICARE YEARLY EXAM  03/14/2018    GLAUCOMA SCREENING Q2Y  08/21/2019    DTaP/Tdap/Td series (2 - Td) 02/14/2027    ZOSTER VACCINE AGE 60>  Addressed       *Patient verbalized understanding and agreement with the plan. A copy of the After Visit Summary with personalized health plan was given to the patient today.

## 2018-03-30 NOTE — ASSESSMENT & PLAN NOTE
This condition is managed by Specialist.  Key CAD CHF Meds             warfarin (COUMADIN) 5 mg tablet  (Taking) TAKE 1 TABLET BY MOUTH DAILY    losartan (COZAAR) 50 mg tablet  (Taking) Take 1 Tab by mouth daily. For blood pressure    aspirin (ASPIRIN) 325 mg tablet  (Taking) Take 325 mg by mouth daily. DOCOSAHEXANOIC ACID/EPA (FISH OIL PO)  (Taking) Take  by mouth. furosemide (LASIX) 20 mg tablet Take 1 tablet by mouth daily.         [id number](BNP,BNPP,BNPPPOC,HSCRP,NA,NAPOC,K,KPOCT,CHOL,CHOLPOCT,HDL,HDLPOC,LDLCHOL,LDLPOCT,LDLCPOC,LDLC,LDL,LDLCEXT,TRIGL,TGLPOCT,INR,INREXT,INRPOC,PTP,PTINR,PTEXT,DIG)@

## 2018-03-30 NOTE — ACP (ADVANCE CARE PLANNING)
Advance Care Planning (ACP) Provider Conversation Snapshot    Date of ACP Conversation: 03/30/18  Persons included in Conversation:  patient and family  Length of ACP Conversation in minutes:  <16 minutes (Non-Billable)    Authorized Decision Maker (if patient is incapable of making informed decisions): This person is:   Healthcare Agent/Medical Power of  under Advance Directive          For Patients with Decision Making Capacity:   Values/Goals: Exploration of values, goals, and preferences if recovery is not expected, even with continued medical treatment in the event of:  Imminent death  Severe, permanent brain injury  \"In these circumstances, what matters most to you? \"  Care focused more on comfort or quality of life.     Conversation Outcomes / Follow-Up Plan:   Recommended completion of Advance Directive form after review of ACP materials and conversation with prospective healthcare agent

## 2018-03-30 NOTE — PROGRESS NOTES
Leonardo Schilder is a 80 y.o. male and presents for annual Medicare Wellness Visit. Problem List: Reviewed with patient and discussed risk factors. Patient Active Problem List   Diagnosis Code    Essential hypertension I10    Hypothyroidism E03.9    Paroxysmal atrial fibrillation (HCC) I48.0    Encounter for monitoring coumadin therapy Z51.81, Z79.01    Right knee DJD M17.11    ACP (advance care planning) Z71.89    Peripheral edema R60.9    Severe obesity (BMI 35.0-39. 9) with comorbidity ([address] E66.01       Current medical providers:  Patient Care Team:  Tawana Martin NP as PCP - General (Nurse Practitioner)    PSH: Reviewed with patient  Past Surgical History:   Procedure Laterality Date    HX HEENT      detached retina    HX HIP REPLACEMENT  2008    right hip, [employer]        SH: Reviewed with patient  Social History   Substance Use Topics    Smoking status: Light Tobacco Smoker     Packs/day: 0.25     Types: Cigars     Last attempt to quit: 9/27/1978    Smokeless tobacco: Never Used    Alcohol use 8.4 oz/week     14 Shots of liquor per week      Comment: 2 drinks a day. FH: Reviewed with patient  Family History   Problem Relation Age of Onset    Lung Disease Father     Cancer Father      lung    Coronary Artery Disease Neg Hx        Medications/Allergies: Reviewed with patient  Current Outpatient Prescriptions on File Prior to Visit   Medication Sig Dispense Refill    warfarin (COUMADIN) 5 mg tablet TAKE 1 TABLET BY MOUTH DAILY 90 Tab 1    levothyroxine (SYNTHROID) 50 mcg tablet Take 1 Tab by mouth Daily (before breakfast). 90 Tab 1    losartan (COZAAR) 50 mg tablet Take 1 Tab by mouth daily. For blood pressure 90 Tab 1    tretinoin (RETIN-A) 0.025 % topical cream Apply  to affected area nightly.  45 g 3    ciclopirox (PENLAC) 8 % solution Apply topically to nails nightly 6.6 mL 2    vit B Cmplx 3-FA-Vit C-Biotin (NEPHRO MANDO RX) 1- mg-mg-mcg tablet Take 1 tablet by mouth daily.  aspirin (ASPIRIN) 325 mg tablet Take 325 mg by mouth daily.  DOCOSAHEXANOIC ACID/EPA (FISH OIL PO) Take  by mouth.  zinc 50 mg capsule Take  by mouth daily.  vitamin E (AQUA GEMS) 400 unit capsule Take  by mouth daily.  ascorbic acid (VITAMIN C) 500 mg tablet Take  by mouth daily.  folic acid (FOLVITE) 1 mg tablet Take  by mouth daily.  LYSINE PO Take  by mouth daily.  FE/DOCUSATE NA/VIT C/B12/FA/MN (MULTI MINERALS-FE-FA-B12-C-DSS PO) Take  by mouth daily.  GLUCOSAMINE HCL/CHONDR MEDLEY A NA (GLUCOSAMINE-CHONDROITIN) 750-600 mg Tab Take  by mouth daily.  SAW PALMETTO PO Take  by mouth daily.  CALCIUM/MAGNESIUM (DOLOMITE PO) Take  by mouth.  multivitamin (ONE A DAY) tablet Take 1 Tab by mouth daily.  diphenhydrAMINE (BENADRYL) 25 mg capsule Take 50 mg by mouth daily.  furosemide (LASIX) 20 mg tablet Take 1 tablet by mouth daily. 30 tablet 0     No current facility-administered medications on file prior to visit. Allergies   Allergen Reactions    Hydrocodone Other (comments)     Unable to void, or have a bowel movement    Amlodipine Swelling and Other (comments)     Severe weeping from leg swelling    Poison Ivy Extract Itching       Objective:  Visit Vitals    /67 (BP 1 Location: Left arm, BP Patient Position: Sitting)    Pulse (!) 55    Temp 98.4 °F (36.9 °C) (Oral)    Resp 17    Ht 5' 6\" (1.676 m)    Wt 238 lb (108 kg)    SpO2 97%    BMI 38.41 kg/m2    Body mass index is 38.41 kg/(m^2). Assessment of cognitive impairment: Alert and oriented x 3    Depression Screen:   PHQ over the last two weeks 2/14/2017   Little interest or pleasure in doing things Not at all   Feeling down, depressed or hopeless Not at all   Total Score PHQ 2 0       Fall Risk Assessment:    Fall Risk Assessment, last 12 mths 2/14/2017   Able to walk? Yes   Fall in past 12 months?  No   Fall with injury? -   Number of falls in past 12 months -   Fall Risk Score -       Functional Ability:   Does the patient exhibit a steady gait? yes   How long did it take the patient to get up and walk from a sitting position? 2 sec   Is the patient self reliant?  (ie can do own laundry, meals, household chores)  yes     Does the patient handle his/her own medications? yes     Does the patient handle his/her own money? yes     Is the patients home safe (ie good lighting, handrails on stairs and bath, etc.)? yes     Did you notice or did patient express any hearing difficulties? Yes - wearing hearing aids     Did you notice or did patient express any vision difficulties?   no     Were distance and reading eye charts used? no       Advance Care Planning:   Patient was offered the opportunity to discuss advance care planning:  yes     Does patient have an Advance Directive:  no   If no, did you provide information on Caring Connections? yes       Plan:      No orders of the defined types were placed in this encounter. Health Maintenance   Topic Date Due    Pneumococcal 65+ Low/Medium Risk (1 of 2 - PCV13) 07/11/1995    Influenza Age 5 to Adult  08/01/2017    MEDICARE YEARLY EXAM  03/14/2018    GLAUCOMA SCREENING Q2Y  08/21/2019    DTaP/Tdap/Td series (2 - Td) 02/14/2027    ZOSTER VACCINE AGE 60>  Addressed       *Patient verbalized understanding and agreement with the plan. A copy of the After Visit Summary with personalized health plan was given to the patient today.

## 2018-03-30 NOTE — ASSESSMENT & PLAN NOTE
Uncontrolled, based on history, physical exam and review of pertinent labs, studies and medications; meds reconciled; lifestyle modifications recommended. Key Obesity Meds             levothyroxine (SYNTHROID) 50 mcg tablet  (Taking) Take 1 Tab by mouth Daily (before breakfast). furosemide (LASIX) 20 mg tablet Take 1 tablet by mouth daily.         No results found for: LEPTN, INSUL, HBA1C, GLU, CHOL, CHOLPOCT, HDL, LDLC, LDL, LDLCEXT, LDLCPOC, TRIGL, TGLPOCT, TSH, NA, NAPOC, K, KPOCT, GPT, ALTPOC, ALT, SGOT, ASTPOC, VITD3, CRP, [id number], TSHEXT

## 2018-08-23 DIAGNOSIS — I10 ESSENTIAL HYPERTENSION: ICD-10-CM

## 2018-08-23 NOTE — TELEPHONE ENCOUNTER
From: Raúl Funes  To: Brooke Cruz NP  Sent: 8/23/2018 2:05 PM EDT  Subject: Medication Renewal Request    Original authorizing provider: Vivien Horse, NP Jeannine Bloch Junius Stamp would like a refill of the following medications:  levothyroxine (SYNTHROID) 50 mcg tablet [Art Garcia NP]  losartan (COZAAR) 50 mg tablet [Art Mosley NP]    Preferred pharmacy: [address] [place] [address]:

## 2018-08-24 RX ORDER — LEVOTHYROXINE SODIUM 50 UG/1
50 TABLET ORAL
Qty: 90 TAB | Refills: 1 | Status: SHIPPED | OUTPATIENT
Start: 2018-08-24 | End: 2019-04-24 | Stop reason: SDUPTHER

## 2018-08-24 RX ORDER — LOSARTAN POTASSIUM 50 MG/1
50 TABLET ORAL DAILY
Qty: 90 TAB | Refills: 1 | Status: SHIPPED | OUTPATIENT
Start: 2018-08-24 | End: 2019-04-19 | Stop reason: SDDI

## 2018-11-20 ENCOUNTER — OFFICE VISIT (OUTPATIENT)
Dept: FAMILY MEDICINE CLINIC | Age: 83
End: 2018-11-20

## 2018-11-20 VITALS
DIASTOLIC BLOOD PRESSURE: 69 MMHG | OXYGEN SATURATION: 97 % | SYSTOLIC BLOOD PRESSURE: 144 MMHG | RESPIRATION RATE: 18 BRPM | HEIGHT: 66 IN | TEMPERATURE: 98 F | WEIGHT: 231 LBS | BODY MASS INDEX: 37.12 KG/M2 | HEART RATE: 63 BPM

## 2018-11-20 DIAGNOSIS — L02.91 ABSCESS: Primary | ICD-10-CM

## 2018-11-20 RX ORDER — CEPHALEXIN 500 MG/1
500 CAPSULE ORAL 3 TIMES DAILY
Qty: 21 CAP | Refills: 0 | Status: SHIPPED | OUTPATIENT
Start: 2018-11-20 | End: 2018-11-20 | Stop reason: SDUPTHER

## 2018-11-20 RX ORDER — DABIGATRAN ETEXILATE 75 MG/1
CAPSULE ORAL EVERY 12 HOURS
COMMUNITY
End: 2019-04-19 | Stop reason: ALTCHOICE

## 2018-11-20 RX ORDER — CEPHALEXIN 500 MG/1
500 CAPSULE ORAL 3 TIMES DAILY
Qty: 21 CAP | Refills: 0 | Status: SHIPPED | OUTPATIENT
Start: 2018-11-20 | End: 2018-11-27

## 2018-11-20 NOTE — PROGRESS NOTES
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 Subjective:  Skin Mass   Patient complains of a back soft tissue mass. The patient reports that the mass has been present for several years. Patient reports acute onset of pain and itching in last 4 days. Associated symptoms include pain, redness or discoloration of the skin, enlargement. Patient denies fevers. Prior treatments, if any: none. Prior to Admission medications    Medication Sig Start Date End Date Taking? Authorizing Provider   dabigatran etexilate (PRADAXA) 75 mg capsule Take  by mouth every twelve (12) hours. Yes Provider, Historical   cephALEXin (KEFLEX) 500 mg capsule Take 1 Cap by mouth three (3) times daily for 7 days. 11/20/18 11/27/18 Yes Bhupinedr Garcia NP   levothyroxine (SYNTHROID) 50 mcg tablet Take 1 Tab by mouth Daily (before breakfast). 8/24/18  Yes Amie Gautam NP   losartan (COZAAR) 50 mg tablet Take 1 Tab by mouth daily. For blood pressure 8/24/18  Yes Bhupinder Garcia NP   tretinoin (RETIN-A) 0.025 % topical cream Apply  to affected area nightly. 5/18/16  Yes Bhupinder Garcia NP   vit B Cmplx 3-FA-Vit C-Biotin (NEPHRO MANDO RX) 1- mg-mg-mcg tablet Take 1 tablet by mouth daily. Yes Provider, Historical   aspirin (ASPIRIN) 325 mg tablet Take 325 mg by mouth daily. Yes Provider, Historical   DOCOSAHEXANOIC ACID/EPA (FISH OIL PO) Take  by mouth. Yes Provider, Historical   zinc 50 mg capsule Take  by mouth daily. Yes Provider, Historical   vitamin E (AQUA GEMS) 400 unit capsule Take  by mouth daily. Yes Provider, Historical   ascorbic acid (VITAMIN C) 500 mg tablet Take  by mouth daily. Yes Provider, Historical   folic acid (FOLVITE) 1 mg tablet Take  by mouth daily. Yes Provider, Historical   LYSINE PO Take  by mouth daily.      Yes Provider, Historical   FE/DOCUSATE NA/VIT C/B12/FA/MN (Priyank Billings MINERALS-FE-FA-B12-C-DSS PO) Take  by mouth daily. Yes Provider, Historical   SAW PALMETTO PO Take  by mouth daily. Yes Provider, Historical   CALCIUM/MAGNESIUM (DOLOMITE PO) Take  by mouth. Yes Provider, Historical   multivitamin (ONE A DAY) tablet Take 1 Tab by mouth daily. Yes Provider, Historical   warfarin (COUMADIN) 5 mg tablet TAKE 1 TABLET BY MOUTH DAILY 9/27/17   Nasrin Michel NP   ciclopirox [facility]) 8 % solution Apply topically to nails nightly 4/6/16   Nasrin Michel NP   furosemide (LASIX) 20 mg tablet Take 1 tablet by mouth daily. 9/2/14   Charito Rossi MD   GLUCOSAMINE HCL/CHONDR MEDLEY A NA (GLUCOSAMINE-CHONDROITIN) 750-600 mg Tab Take  by mouth daily. Provider, Historical   diphenhydrAMINE (BENADRYL) 25 mg capsule Take 50 mg by mouth daily. 12/2/10   Provider, Historical          Allergies   Allergen Reactions    Hydrocodone Other (comments)     Unable to void, or have a bowel movement    Amlodipine Swelling and Other (comments)     Severe weeping from leg swelling    Poison Ivy Extract Itching         ROS  See HPI    Objective:   Physical Exam   Constitutional: He is oriented to person, place, and time. He appears well-developed and well-nourished. Cardiovascular: Normal rate, regular rhythm and intact distal pulses. Exam reveals no gallop and no friction rub. No murmur heard. Pulmonary/Chest: Effort normal and breath sounds normal. No respiratory distress. Neurological: He is alert and oriented to person, place, and time. Skin:        Psychiatric: He has a normal mood and affect. His speech is normal and behavior is normal. Thought content normal.   Nursing note and vitals reviewed.         Visit Vitals  /69 (BP 1 Location: Left arm, BP Patient Position: Sitting)   Pulse 63   Temp 98 °F (36.7 °C) (Oral)   Resp 18   Ht 5' 6\" (1.676 m)   Wt 231 lb (104.8 kg)   SpO2 97%   BMI 37.28 kg/m²       Assessment & Plan:  Diagnoses and all orders for this visit: 1. Abscess  I&D in clinic. See procedure note. Cover with Keflex. RTC in 1 day to remove packing.   -     cephALEXin (KEFLEX) 500 mg capsule; Take 1 Cap by mouth three (3) times daily for 7 days.  -     DRAIN SKIN ABSCESS SIMPLE      I have discussed the diagnosis with the patient and the intended plan as seen in the above orders. The patient has received an after-visit summary along with patient information handout. I have discussed medication side effects and warnings with the patient as well. Follow-up Disposition:  Return in about 1 year (around 11/20/2019) for wound check. Caio Carver NP       Incision/Drainage Procedure Note    Performed By:  Caio Carver NP     Assistant:  Ayanna Salgado LPN    Anesthesia: Local     Procedure Details: The risks,benefits and alternatives  were explained and consent was obtained for the procedure. RBAs also explained. The area was cleansed with Betadine and draped in the usual manner. Local anesthesia with 1% lidocaine with epinephrine was infiltrated into the skin overlying the abscess. An incision using a #10 was made. A Moderate amount of pus was obtained. A culture was not obtained. The loculations and crypts within the wound were broken up with hemostat. The wound was irrigated with isoto daphne saline. The wound was packed with iodoform gauze. A sterile dressing was then applied. Estimated Blood Loss:  Minimal           Complications:  None; patient tolerated the procedure well.            Condition: stable           Signed By: Caio Carver NP

## 2018-11-20 NOTE — PATIENT INSTRUCTIONS
Skin Abscess: Care Instructions  Your Care Instructions    A skin abscess is a bacterial infection that forms a pocket of pus. A boil is a kind of skin abscess. The doctor may have cut an opening in the abscess so that the pus can drain out. You may have gauze in the cut so that the abscess will stay open and keep draining. You may need antibiotics. You will need to follow up with your doctor to make sure the infection has gone away. The doctor has checked you carefully, but problems can develop later. If you notice any problems or new symptoms, get medical treatment right away. Follow-up care is a key part of your treatment and safety. Be sure to make and go to all appointments, and call your doctor if you are having problems. It's also a good idea to know your test results and keep a list of the medicines you take. How can you care for yourself at home? · Apply warm and dry compresses, a heating pad set on low, or a hot water bottle 3 or 4 times a day for pain. Keep a cloth between the heat source and your skin. · If your doctor prescribed antibiotics, take them as directed. Do not stop taking them just because you feel better. You need to take the full course of antibiotics. · Take pain medicines exactly as directed. ? If the doctor gave you a prescription medicine for pain, take it as prescribed. ? If you are not taking a prescription pain medicine, ask your doctor if you can take an over-the-counter medicine. · Keep your bandage clean and dry. Change the bandage whenever it gets wet or dirty, or at least one time a day. · If the abscess was packed with gauze:  ? Keep follow-up appointments to have the gauze changed or removed. If the doctor instructed you to remove the gauze, follow the instructions you were given for how to remove it. ? After the gauze is removed, soak the area in warm water for 15 to 20 minutes 2 times a day, until the wound closes. When should you call for help?   Call your doctor now or seek immediate medical care if:    · You have signs of worsening infection, such as:  ? Increased pain, swelling, warmth, or redness. ? Red streaks leading from the infected skin. ? Pus draining from the wound. ? A fever.    Watch closely for changes in your health, and be sure to contact your doctor if:    · You do not get better as expected. Where can you learn more? Go to http://lucy-ramos.info/. Enter T939 in the search box to learn more about \"Skin Abscess: Care Instructions. \"  Current as of: April 18, 2018  Content Version: 11.8  © 2904-5559 [employer]. Care instructions adapted under license by [employer] (which disclaims liability or warranty for this information). If you have questions about a medical condition or this instruction, always ask your healthcare professional. [address] any warranty or liability for your use of this information.

## 2018-11-20 NOTE — PROGRESS NOTES
Chief Complaint   Patient presents with    Cyst     patient states that a cyst he has had on his back for years \"popped\" hannah. 4 days ago and patient has had pain since      1. Have you been to the ER, urgent care clinic since your last visit? Hospitalized since your last visit? No    2. Have you seen or consulted any other health care providers outside of the [address] [place] [address] since your last visit? Include any pap smears or colon screening.  No

## 2018-11-21 ENCOUNTER — OFFICE VISIT (OUTPATIENT)
Dept: FAMILY MEDICINE CLINIC | Age: 83
End: 2018-11-21

## 2018-11-21 VITALS
SYSTOLIC BLOOD PRESSURE: 132 MMHG | WEIGHT: 231 LBS | DIASTOLIC BLOOD PRESSURE: 80 MMHG | BODY MASS INDEX: 37.12 KG/M2 | TEMPERATURE: 97.9 F | OXYGEN SATURATION: 97 % | HEART RATE: 55 BPM | HEIGHT: 66 IN | RESPIRATION RATE: 18 BRPM

## 2018-11-21 DIAGNOSIS — L21.9 SEBORRHEIC DERMATITIS: Primary | ICD-10-CM

## 2018-11-21 RX ORDER — KETOCONAZOLE 20 MG/G
CREAM TOPICAL DAILY
Qty: 60 G | Refills: 1 | Status: SHIPPED | OUTPATIENT
Start: 2018-11-21 | End: 2019-11-26

## 2018-11-21 NOTE — PROGRESS NOTES
Subjective:      Radha Lawson is a 80 y.o. male who presents today for wound check. He has a I&D (11/20/18) site wound which is located on the upper back. Current symptoms: erythema: mild, drainage: scant. Interventions to date: packing removed. Objective:     Visit Vitals  /76 (BP 1 Location: Left arm, BP Patient Position: Sitting)   Pulse (!) 55   Temp 97.9 °F (36.6 °C) (Oral)   Resp 18   Ht 5' 6\" (1.676 m)   Wt 231 lb (104.8 kg)   SpO2 97%   BMI 37.28 kg/m²       Wound:   appears improved compared to last recheck  no exudate     Assessment:     Wound check. Interventions today removal of existing dressing  packing removed  application of clean dressing. Plan:     1. Discussed appropriate home care of this wound. , Wound redressed. , Antibiotics per orders. 2. Patient instructions were given. 3. Follow up: PRN.

## 2018-11-21 NOTE — PROGRESS NOTES
Chief Complaint   Patient presents with    Follow-up     wound check for abscess of upper back      1. Have you been to the ER, urgent care clinic since your last visit? Hospitalized since your last visit? No    2. Have you seen or consulted any other health care providers outside of the [address] [place] [address] since your last visit? Include any pap smears or colon screening.  No

## 2018-11-21 NOTE — PATIENT INSTRUCTIONS
Wound Care: After Your Visit  Your Care Instructions  Taking good care of your wound at home will help it heal quickly and reduce your chance of infection. The doctor has checked you carefully, but problems can develop later. If you notice any problems or new symptoms, get medical treatment right away. Follow-up care is a key part of your treatment and safety. Be sure to make and go to all appointments, and call your doctor if you are having problems. It's also a good idea to know your test results and keep a list of the medicines you take. How can you care for yourself at home? · Clean the area with soap and water 2 times a day unless your doctor gives you different instructions. Don't use hydrogen peroxide or alcohol, which can slow healing. ¨ You may cover the wound with a thin layer of antibiotic ointment, such as bacitracin, and a nonstick bandage. ¨ Apply more ointment and replace the bandage as needed. · Take pain medicines exactly as directed. Some pain is normal with a wound, but do not ignore pain that is getting worse instead of better. You could have an infection. ¨ If the doctor gave you a prescription medicine for pain, take it as prescribed. ¨ If you are not taking a prescription pain medicine, ask your doctor if you can take an over-the-counter medicine. · Your doctor may have closed your wound with stitches (sutures), staples, or skin glue. ¨ If you have stitches, your doctor may remove them after several days to 2 weeks. Or you may have stitches that dissolve on their own. ¨ If you have staples, your doctor may remove them after 7 to 10 days. ¨ If your wound was closed with skin glue, the glue will wear off in a few days to 2 weeks. When should you call for help? Call your doctor now or seek immediate medical care if:  · You have signs of infection, such as:  ¨ Increased pain, swelling, warmth, or redness near the wound. ¨ Red streaks leading from the wound.   ¨ Pus draining from the wound. ¨ A fever. · You bleed so much from your incision that you soak one or more bandages over 2 to 4 hours. Watch closely for changes in your health, and be sure to contact your doctor if:  · The wound is not getting better each day. Where can you learn more? Go to [employer]  Enter M973 in the search box to learn more about \"Wound Care: After Your Visit. \"   © 4783-0376 [employer]. Care instructions adapted under license by [facility] (which disclaims liability or warranty for this information). This care instruction is for use with your licensed healthcare professional. If you have questions about a medical condition or this instruction, always ask your healthcare professional. [address] any warranty or liability for your use of this information. Content Version: 66.2.601992;  Last Revised: April 23, 2012

## 2018-12-06 RX ORDER — AMOXICILLIN AND CLAVULANATE POTASSIUM 875; 125 MG/1; MG/1
1 TABLET, FILM COATED ORAL EVERY 12 HOURS
Qty: 20 TAB | Refills: 0 | Status: SHIPPED | OUTPATIENT
Start: 2018-12-06 | End: 2018-12-16

## 2019-03-27 ENCOUNTER — TELEPHONE (OUTPATIENT)
Dept: FAMILY MEDICINE CLINIC | Age: 84
End: 2019-03-27

## 2019-03-27 NOTE — TELEPHONE ENCOUNTER
Pt is requesting a call back from the doctor or the nurse about the referral for the Wheel chair and other question that he has. Pt stated that it has been 3 weeks and no one responded back to him.

## 2019-03-29 NOTE — TELEPHONE ENCOUNTER
----- Message from [phone] sent at 3/29/2019 12:19 PM EDT -----  Regarding: GRAZYNA Garcia/Telephone  Contact: [phone]  Medallia request:    With Provider: Shane Cleveland NP [facility]    Preferred Date Range: 4/4/2019 - 4/12/2019    Preferred Times: Monday Afternoon, Tuesday Afternoon, Wednesday Afternoon, Thursday Afternoon, Friday Afternoon    Reason for visit: Request an Appointment    Comments:  look at wife while conscious and determine if she needs a wheelchair when unconscious.  unable to carry. Outbound call made. No answer. Spoke with the patient, was able to get the patient scheduled to be seen on 416/19.     Best callback:[phone]

## 2019-04-19 ENCOUNTER — OFFICE VISIT (OUTPATIENT)
Dept: FAMILY MEDICINE CLINIC | Age: 84
End: 2019-04-19

## 2019-04-19 VITALS
HEART RATE: 58 BPM | OXYGEN SATURATION: 96 % | HEIGHT: 66 IN | TEMPERATURE: 98 F | BODY MASS INDEX: 37.28 KG/M2 | SYSTOLIC BLOOD PRESSURE: 142 MMHG | WEIGHT: 232 LBS | DIASTOLIC BLOOD PRESSURE: 80 MMHG | RESPIRATION RATE: 19 BRPM

## 2019-04-19 DIAGNOSIS — Z01.00 ENCOUNTER FOR COMPLETE EYE EXAM: ICD-10-CM

## 2019-04-19 DIAGNOSIS — Z00.00 MEDICARE ANNUAL WELLNESS VISIT, SUBSEQUENT: ICD-10-CM

## 2019-04-19 DIAGNOSIS — Z71.89 ACP (ADVANCE CARE PLANNING): ICD-10-CM

## 2019-04-19 DIAGNOSIS — E66.01 SEVERE OBESITY (BMI 35.0-39.9) WITH COMORBIDITY (HCC): ICD-10-CM

## 2019-04-19 DIAGNOSIS — I10 ESSENTIAL HYPERTENSION: Primary | ICD-10-CM

## 2019-04-19 DIAGNOSIS — E03.9 ACQUIRED HYPOTHYROIDISM: ICD-10-CM

## 2019-04-19 DIAGNOSIS — I48.0 PAROXYSMAL ATRIAL FIBRILLATION (HCC): ICD-10-CM

## 2019-04-19 NOTE — PATIENT INSTRUCTIONS
High Blood Pressure: Care Instructions  Overview    It's normal for blood pressure to go up and down throughout the day. But if it stays up, you have high blood pressure. Another name for high blood pressure is hypertension. Despite what a lot of people think, high blood pressure usually doesn't cause headaches or make you feel dizzy or lightheaded. It usually has no symptoms. But it does increase your risk of stroke, heart attack, and other problems. You and your doctor will talk about your risks of these problems based on your blood pressure. Your doctor will give you a goal for your blood pressure. Your goal will be based on your health and your age. Lifestyle changes, such as eating healthy and being active, are always important to help lower blood pressure. You might also take medicine to reach your blood pressure goal.  Follow-up care is a key part of your treatment and safety. Be sure to make and go to all appointments, and call your doctor if you are having problems. It's also a good idea to know your test results and keep a list of the medicines you take. How can you care for yourself at home? Medical treatment  · If you stop taking your medicine, your blood pressure will go back up. You may take one or more types of medicine to lower your blood pressure. Be safe with medicines. Take your medicine exactly as prescribed. Call your doctor if you think you are having a problem with your medicine. · Talk to your doctor before you start taking aspirin every day. Aspirin can help certain people lower their risk of a heart attack or stroke. But taking aspirin isn't right for everyone, because it can cause serious bleeding. · See your doctor regularly. You may need to see the doctor more often at first or until your blood pressure comes down. · If you are taking blood pressure medicine, talk to your doctor before you take decongestants or anti-inflammatory medicine, such as ibuprofen.  Some of these medicines can raise blood pressure. · Learn how to check your blood pressure at home. Lifestyle changes  · Stay at a healthy weight. This is especially important if you put on weight around the waist. Losing even 10 pounds can help you lower your blood pressure. · If your doctor recommends it, get more exercise. Walking is a good choice. Bit by bit, increase the amount you walk every day. Try for at least 30 minutes on most days of the week. You also may want to swim, bike, or do other activities. · Avoid or limit alcohol. Talk to your doctor about whether you can drink any alcohol. · Try to limit how much sodium you eat to less than 2,300 milligrams (mg) a day. Your doctor may ask you to try to eat less than 1,500 mg a day. · Eat plenty of fruits (such as bananas and oranges), vegetables, legumes, whole grains, and low-fat dairy products. · Lower the amount of saturated fat in your diet. Saturated fat is found in animal products such as milk, cheese, and meat. Limiting these foods may help you lose weight and also lower your risk for heart disease. · Do not smoke. Smoking increases your risk for heart attack and stroke. If you need help quitting, talk to your doctor about stop-smoking programs and medicines. These can increase your chances of quitting for good. When should you call for help? Call 911 anytime you think you may need emergency care. This may mean having symptoms that suggest that your blood pressure is causing a serious heart or blood vessel problem. Your blood pressure may be over 180/120.   For example, call 911 if:    · You have symptoms of a heart attack. These may include:  ? Chest pain or pressure, or a strange feeling in the chest.  ? Sweating. ? Shortness of breath. ? Nausea or vomiting. ? Pain, pressure, or a strange feeling in the back, neck, jaw, or upper belly or in one or both shoulders or arms. ? Lightheadedness or sudden weakness.   ? A fast or irregular heartbeat.     · You have symptoms of a stroke. These may include:  ? Sudden numbness, tingling, weakness, or loss of movement in your face, arm, or leg, especially on only one side of your body. ? Sudden vision changes. ? Sudden trouble speaking. ? Sudden confusion or trouble understanding simple statements. ? Sudden problems with walking or balance. ? A sudden, severe headache that is different from past headaches.     · You have severe back or belly pain.    Do not wait until your blood pressure comes down on its own. Get help right away.   Call your doctor now or seek immediate care if:    · Your blood pressure is much higher than normal (such as 180/120 or higher), but you don't have symptoms.     · You think high blood pressure is causing symptoms, such as:  ? Severe headache.  ? Blurry vision.    Watch closely for changes in your health, and be sure to contact your doctor if:    · Your blood pressure measures higher than your doctor recommends at least 2 times. That means the top number is higher or the bottom number is higher, or both.     · You think you may be having side effects from your blood pressure medicine. Where can you learn more? Go to http://lucy-ramos.info/. Enter V521 in the search box to learn more about \"High Blood Pressure: Care Instructions. \"  Current as of: July 22, 2018  Content Version: 11.9  © 6962-1679 [employer]. Care instructions adapted under license by [employer] (which disclaims liability or warranty for this information). If you have questions about a medical condition or this instruction, always ask your healthcare professional. [place] [address] any warranty or liability for your use of this information.

## 2019-04-19 NOTE — PROGRESS NOTES
[facility] Note    Myrtle Bustos is a 80 y.o. male who was seen in clinic today (4/19/2019). Subjective:  Cardiovascular Review:  The patient has hypertension, hyperlipidemia and Atrial Fibrillation. Patient reports having a h/o TIA in 1997. Patient taking Pradaxa once daily. Diet and Lifestyle: generally follows a low fat low cholesterol diet, generally follows a low sodium diet, sedentary, nonsmoker  Home BP Monitoring: well controlled at 140/70s  Pertinent ROS: taking medications as instructed, no medication side effects noted, no TIA's, no chest pain on exertion, no dyspnea on exertion, no swelling of ankles. Patient is under [facility] where he receives foot care and labs. Seen by dermatology, Dr Elen Prakash. Patient continues to refuses lab work and immunizations. Prior to Admission medications    Medication Sig Start Date End Date Taking? Authorizing Provider   apixaban (ELIQUIS) 5 mg tablet Take 1 Tab by mouth two (2) times a day. For stroke prevention 4/19/19  Yes Cecilia Garcia NP   ketoconazole (NIZORAL) 2 % topical cream Apply  to affected area daily. 11/21/18  Yes Celeste Brenner NP   levothyroxine (SYNTHROID) 50 mcg tablet Take 1 Tab by mouth Daily (before breakfast). 8/24/18  Yes Cecilia Garcia NP   tretinoin (RETIN-A) 0.025 % topical cream Apply  to affected area nightly. 5/18/16  Yes Cecilia Garcia NP   vit B Cmplx 3-FA-Vit C-Biotin (NEPHRO MANDO RX) 1- mg-mg-mcg tablet Take 1 tablet by mouth daily. Yes Provider, Historical   aspirin (ASPIRIN) 325 mg tablet Take 325 mg by mouth daily. Yes Provider, Historical   DOCOSAHEXANOIC ACID/EPA (FISH OIL PO) Take  by mouth. Yes Provider, Historical   zinc 50 mg capsule Take  by mouth daily. Yes Provider, Historical   vitamin E (AQUA GEMS) 400 unit capsule Take  by mouth daily.      Yes Provider, Historical   ascorbic acid (VITAMIN C) 500 mg tablet Take  by mouth daily. Yes Provider, Historical   folic acid (FOLVITE) 1 mg tablet Take  by mouth daily. Yes Provider, Historical   SAW PALMETTO PO Take  by mouth daily. Yes Provider, Historical   CALCIUM/MAGNESIUM (DOLOMITE PO) Take  by mouth. Yes Provider, Historical          Allergies   Allergen Reactions    Hydrocodone Other (comments)     Unable to void, or have a bowel movement    Amlodipine Swelling and Other (comments)     Severe weeping from leg swelling    Poison Ivy Extract Itching           ROS  See HPI    Objective:   Physical Exam   Constitutional: He is oriented to person, place, and time. He appears well-developed and well-nourished. Neck: Normal range of motion. Neck supple. No JVD present. Carotid bruit is not present. No thyromegaly present. Cardiovascular: Normal rate and intact distal pulses. An irregularly irregular rhythm present. Exam reveals no gallop and no friction rub. No murmur heard. Pulmonary/Chest: Effort normal and breath sounds normal. No respiratory distress. Musculoskeletal: He exhibits no edema. Lymphadenopathy:     He has no cervical adenopathy. Neurological: He is alert and oriented to person, place, and time. Psychiatric: He has a normal mood and affect. His behavior is normal.   Nursing note and vitals reviewed. Visit Vitals  /80 (BP 1 Location: Left arm, BP Patient Position: Sitting)   Pulse (!) 58   Temp 98 °F (36.7 °C) (Oral)   Resp 19   Ht 5' 6\" (1.676 m)   Wt 232 lb (105.2 kg)   SpO2 96%   BMI 37.45 kg/m²       Assessment & Plan:  Diagnoses and all orders for this visit:    1. Essential hypertension  Stable, no changes to current therapy    2. Acquired hypothyroidism  Presumed at therapeutic level. 3. Paroxysmal atrial fibrillation (HCC)  Refill anticoagulant. Follow up with cardiology recommended. -     apixaban (ELIQUIS) 5 mg tablet; Take 1 Tab by mouth two (2) times a day. For stroke prevention    4. Severe obesity (BMI 35.0-39. 9) with comorbidity ([facility] [address]  Discussed need for weight loss through diet and exercise. Reviewed decreased caloric intake and increased activity. 5. Encounter for complete eye exam  -     REFERRAL TO OPHTHALMOLOGY        I have discussed the diagnosis with the patient and the intended plan as seen in the above orders. The patient has received an after-visit summary along with patient information handout. I have discussed medication side effects and warnings with the patient as well. Follow-up and Dispositions    · Return in about 6 months (around 10/19/2019) for disease management.            Dallin Jimenez NP

## 2019-04-19 NOTE — PROGRESS NOTES
Chief Complaint   Patient presents with   [facility] Annual Wellness Visit       Reviewed Record in preparation for visit and have obtained necessary documentation. Identified pt with two pt identifiers (Name @ )    Health Maintenance Due   Topic    Shingrix Vaccine Age 50> (1 of 2)    Pneumococcal 65+ years (1 of 2 - PCV13)    MEDICARE YEARLY EXAM          1. Have you been to the ER, urgent care clinic since your last visit? Hospitalized since your last visit? no    2. Have you seen or consulted any other health care providers outside of the [address] [place] [address] since your last visit? Include any pap smears or colon screening.  no

## 2019-04-23 NOTE — PROGRESS NOTES
This is the Subsequent Medicare Annual Wellness Exam, performed 12 months or more after the Initial AWV or the last Subsequent AWV    I have reviewed the patient's medical history in detail and updated the computerized patient record. History     Past Medical History:   Diagnosis Date    Arthritis     Contact dermatitis and other eczema, due to unspecified cause     Hypertension     Stroke ([address] 1997    TIA at [facility] Thyroid disease     Unspecified hypothyroidism       Past Surgical History:   Procedure Laterality Date    HX HEENT      detached retina    [facility]  [facility]     Current Outpatient Medications   Medication Sig Dispense Refill    apixaban (ELIQUIS) 5 mg tablet Take 1 Tab by mouth two (2) times a day. For stroke prevention 180 Tab 3    ketoconazole (NIZORAL) 2 % topical cream Apply  to affected area daily. 60 g 1    levothyroxine (SYNTHROID) 50 mcg tablet Take 1 Tab by mouth Daily (before breakfast). 90 Tab 1    tretinoin (RETIN-A) 0.025 % topical cream Apply  to affected area nightly. 45 g 3    vit B Cmplx 3-FA-Vit C-Biotin (NEPHRO MANDO RX) 1- mg-mg-mcg tablet Take 1 tablet by mouth daily.  aspirin (ASPIRIN) 325 mg tablet Take 325 mg by mouth daily.  DOCOSAHEXANOIC ACID/EPA (FISH OIL PO) Take  by mouth.  zinc 50 mg capsule Take  by mouth daily.  vitamin E (AQUA GEMS) 400 unit capsule Take  by mouth daily.  ascorbic acid (VITAMIN C) 500 mg tablet Take  by mouth daily.  folic acid (FOLVITE) 1 mg tablet Take  by mouth daily.  SAW PALMETTO PO Take  by mouth daily.  CALCIUM/MAGNESIUM (DOLOMITE PO) Take  by mouth.          Allergies   Allergen Reactions    Hydrocodone Other (comments)     Unable to void, or have a bowel movement    Amlodipine Swelling and Other (comments)     Severe weeping from leg swelling    Poison Ivy Extract Itching     Family History   Problem Relation Age of Onset    Lung Disease Father     Cancer Father         lung    Coronary Artery Disease Neg Hx      Social History     Tobacco Use    Smoking status: Light Tobacco Smoker     Packs/day: 0.25     Types: Cigars     Last attempt to quit: 1978     Years since quittin.5    Smokeless tobacco: Never Used    Tobacco comment: cigar on occ   Substance Use Topics    Alcohol use: Yes     Alcohol/week: 8.4 oz     Types: 14 Shots of liquor per week     Comment: 2 drinks a day. Patient Active Problem List   Diagnosis Code    Essential hypertension I10    Hypothyroidism E03.9    Paroxysmal atrial fibrillation (HCC) I48.0    Encounter for monitoring Coumadin therapy Z51.81, Z79.01    Right knee DJD M17.11    ACP (advance care planning) Z71.89    Peripheral edema R60.9    Severe obesity (BMI 35.0-39. 9) with comorbidity ([facility] [address] E66.01       Depression Risk Factor Screening:     3 most recent PHQ Screens 2019   Little interest or pleasure in doing things Not at all   Feeling down, depressed, irritable, or hopeless Not at all   Total Score PHQ 2 0     Alcohol Risk Factor Screening: On any occasion in the past three months you have had more than 7 drinks containing alcohol    Functional Ability and Level of Safety:   Hearing Loss  The patient wears hearing aids. Activities of Daily Living  The home contains: no safety equipment. Patient does total self care    Fall Risk  Fall Risk Assessment, last 12 mths 2019   Able to walk? Yes   Fall in past 12 months? Yes   Fall with injury?  No   Number of falls in past 12 months 3   Fall Risk Score 3       Abuse Screen  Patient is not abused    Cognitive Screening   Evaluation of Cognitive Function:  Has your family/caregiver stated any concerns about your memory: no  Normal    Patient Care Team   Patient Care Team:  Ari Zaragoza NP as PCP - General (Nurse Practitioner)    Assessment/Plan   Education and counseling provided:  Are appropriate based on today's review and evaluation    Diagnoses and all orders for this visit:    1. Essential hypertension    2. Acquired hypothyroidism    3. Paroxysmal atrial fibrillation (HCC)  -     apixaban (ELIQUIS) 5 mg tablet; Take 1 Tab by mouth two (2) times a day. For stroke prevention    4. Severe obesity (BMI 35.0-39. 9) with comorbidity ([facility] [address]    5. Encounter for complete eye exam  -     REFERRAL TO OPHTHALMOLOGY    6. Medicare annual wellness visit, subsequent    7.  ACP (advance care planning)        Health Maintenance Due   Topic Date Due    MEDICARE YEARLY EXAM  03/29/2019

## 2019-04-24 RX ORDER — LEVOTHYROXINE SODIUM 50 UG/1
TABLET ORAL
Qty: 90 TAB | Refills: 1 | Status: SHIPPED | OUTPATIENT
Start: 2019-04-24 | End: 2019-09-05 | Stop reason: SDUPTHER

## 2019-04-29 ENCOUNTER — TELEPHONE (OUTPATIENT)
Dept: FAMILY MEDICINE CLINIC | Age: 84
End: 2019-04-29

## 2019-04-29 NOTE — TELEPHONE ENCOUNTER
Catie Ervin from [employer] is calling stating that they need authorization on a walker/rest seat for the patient. Catie Ervin stated that the patient has been waiting for this for a while.          Best callback:  [phone]  ([facility])      LOV:  Friday, April 19, 2019

## 2019-04-29 NOTE — TELEPHONE ENCOUNTER
Called Carmelina at [facility]  I tallked with patient that has rx for walker with seat. She states needs documentation of why and last office note. Faxed same to her.

## 2019-05-03 ENCOUNTER — TELEPHONE (OUTPATIENT)
Dept: FAMILY MEDICINE CLINIC | Age: 84
End: 2019-05-03

## 2019-05-03 NOTE — TELEPHONE ENCOUNTER
----- Message from Ortiz Chaves [phone] sent at 5/3/2019 12:07 PM EDT -----  Regarding: GRAZYNA Garcia/ jamar Cintron, from [address], called regarding the pt's walker that is needed. The authorization is needed along with the actual prescription and supporting notes and medical records, and height and weight of pt.         Best contact number is [phone]; [phone]

## 2019-05-03 NOTE — TELEPHONE ENCOUNTER
Faxed prescription for walker, office notes, demographics to Nicolás at [address] [place] [address]

## 2019-06-17 ENCOUNTER — TELEPHONE (OUTPATIENT)
Dept: FAMILY MEDICINE CLINIC | Age: 84
End: 2019-06-17

## 2019-06-17 DIAGNOSIS — Z01.00 ENCOUNTER FOR EYE EXAM: Primary | ICD-10-CM

## 2019-06-17 NOTE — TELEPHONE ENCOUNTER
[phone] spoke to patient per patient he has tried to call Dr Omar Gaines office but they won't accommodate his request for appointment in the afternoon till August and he didn't want to wait that long.  Patient requesting another eye doctor per patient not [facility], [facility], [facility] eye I advised will send message to GRAZYNA Estes

## 2019-06-17 NOTE — TELEPHONE ENCOUNTER
----- Message from Leticia Nageotte sent at 6/17/2019  2:01 PM EDT -----  Regarding: GRAZYNA Garcia/Telephone  Pt would like a call back to discuss recommendations for an optometrist.  [facility] contact number [phone].

## 2019-06-18 NOTE — TELEPHONE ENCOUNTER
[phone] notified patient via VM of Dr Ulices Gates information and notified will send information via my chart as well and to call me back if he has question

## 2019-08-13 ENCOUNTER — TELEPHONE (OUTPATIENT)
Dept: FAMILY MEDICINE CLINIC | Age: 84
End: 2019-08-13

## 2019-08-13 NOTE — TELEPHONE ENCOUNTER
----- Message from Ap Puckett sent at 8/13/2019  2:06 PM EDT -----  Regarding: Dr. Nadege Person: [phone]  Pt requesting a referral for an Ophthalmologist. Best contact number [phone].

## 2019-08-13 NOTE — TELEPHONE ENCOUNTER
[phone] spoke to patient per patient had appointment with Dr. Malina Meyer 8/15/2019 and per patient she is on maternity leave so he needs another referral for eye exam.  Patient did not want [facility] eye exam, [facility]   I advised if he wants to try Dr. Douglas Harrison again and per patient he will call him I did advised to call me back or send me my chart message after he sets up appointment so we can do his referral for Oriana Solorio patient understand

## 2019-09-05 DIAGNOSIS — I48.0 PAROXYSMAL ATRIAL FIBRILLATION (HCC): ICD-10-CM

## 2019-09-05 RX ORDER — LEVOTHYROXINE SODIUM 50 UG/1
TABLET ORAL
Qty: 90 TAB | Refills: 1 | Status: SHIPPED | OUTPATIENT
Start: 2019-09-05 | End: 2019-11-26

## 2019-09-25 ENCOUNTER — OFFICE VISIT (OUTPATIENT)
Dept: FAMILY MEDICINE CLINIC | Age: 84
End: 2019-09-25

## 2019-09-25 VITALS
TEMPERATURE: 97.9 F | OXYGEN SATURATION: 97 % | SYSTOLIC BLOOD PRESSURE: 158 MMHG | RESPIRATION RATE: 18 BRPM | DIASTOLIC BLOOD PRESSURE: 60 MMHG | WEIGHT: 239.6 LBS | HEIGHT: 66 IN | HEART RATE: 62 BPM | BODY MASS INDEX: 38.51 KG/M2

## 2019-09-25 DIAGNOSIS — L71.9 ROSACEA: ICD-10-CM

## 2019-09-25 DIAGNOSIS — Z01.00 ENCOUNTER FOR EYE EXAM: ICD-10-CM

## 2019-09-25 DIAGNOSIS — L20.9 ATOPIC DERMATITIS, UNSPECIFIED TYPE: Primary | ICD-10-CM

## 2019-09-25 RX ORDER — TRIAMCINOLONE ACETONIDE 1 MG/G
OINTMENT TOPICAL 2 TIMES DAILY
Qty: 454 G | Refills: 0 | Status: SHIPPED | OUTPATIENT
Start: 2019-09-25 | End: 2019-11-26

## 2019-09-25 RX ORDER — DOXYCYCLINE 100 MG/1
100 CAPSULE ORAL 2 TIMES DAILY
Qty: 20 CAP | Refills: 0 | Status: SHIPPED | OUTPATIENT
Start: 2019-09-25 | End: 2019-10-05

## 2019-09-25 NOTE — PATIENT INSTRUCTIONS
Rosacea: Care Instructions  Your Care Instructions  Rosacea (say \"Hubert\") is a skin condition that can cause redness, pimples, and red lines on the nose, cheeks, chin, and forehead. It is often mistaken for acne because it can cause outbreaks with bumps like pimples. Rosacea can also cause burning and soreness in your eyes. Rosacea is usually controlled by using medicine and avoiding alcohol, the sun, and other things that can make rosacea worse. Your doctor may have prescribed medicines or other treatment. If antibiotics do not control the rosacea, your doctor may try other medicines. Follow-up care is a key part of your treatment and safety. Be sure to make and go to all appointments, and call your doctor if you are having problems. It's also a good idea to know your test results and keep a list of the medicines you take. How can you care for yourself at home? · Take your medicines exactly as prescribed. Call your doctor if you think you are having a problem with your medicine. · If your doctor prescribed antibiotics, take them as directed. Do not stop taking them just because you feel better. You need to take the full course of antibiotics. · Always wear sunscreen on exposed skin. Make sure to use a broad-spectrum sunscreen that has a sun protection factor (SPF) of 30 or higher. Use it every day, even when it is cloudy. · Use soaps, lotions, and makeup made for sensitive skin that do not contain alcohol, are not abrasive, and will not clog pores. · Avoid rubbing or scrubbing your face. · Green-based makeup may help mask the redness of an outbreak. Your doctor may be able to refer you to someone who can help you use makeup to cover redness and bumps. · If you have rosacea on your eyelids, put a warm, wet towel, or compress, on your eyes several times a day. Gently wash your eyelids with a washcloth or an eyelid cleanser that is sold in [employer].  Use artificial tears if your eyes feel dry.  · Make a list or keep a diary of things that may trigger your rosacea. Use the diary every day for several weeks. Avoid whatever you find that makes your rosacea worse. These triggers may include:  ? Harsh weather. Wear a hat and scarf to shield your face from the cold and wind. Use a moisturizer during the winter to keep your face moist.  ? Stress. Eat a healthy diet and get plenty of exercise and sleep. ? Alcohol, spicy foods, or hot drinks. Avoid or limit these if they make your rosacea worse. ? Getting too hot when you exercise. Try working out for a shorter time. In the summer, exercise during the cool morning hours. ? Hot showers. Take warm or cool showers and avoid hot tubs and saunas. When should you call for help? Watch closely for changes in your health, and be sure to contact your doctor if:    · You do not get better as expected. Where can you learn more? Go to http://lucy-ramos.info/. Enter G998 in the search box to learn more about \"Rosacea: Care Instructions. \"  Current as of: April 1, 2019  Content Version: 12.2  © 8955-7844 [employer]. Care instructions adapted under license by [employer] (which disclaims liability or warranty for this information). If you have questions about a medical condition or this instruction, always ask your healthcare professional. [address] any warranty or liability for your use of this information.

## 2019-09-25 NOTE — PROGRESS NOTES
[facility] Note    Lawanda Carrel is a 80 y.o. male who was seen in clinic today (9/25/2019). Subjective:  Dermatitis  Patient is seen with complaint of dermatitis. Patient complains of a localized skin rash located on the face. Patient describes the rash as red and raised. The rash has been occurring/present for a few weeks and has been unchanged. Patient denies exposure to new soaps, new skin products, new hair products, new medications or new foods. Denies home treatments. Patient also reports dry skin of left arm which attributes to the rash spreading after sleeping on his arm. Prior to Admission medications    Medication Sig Start Date End Date Taking? Authorizing Provider   doxycycline (VIBRAMYCIN) 100 mg capsule Take 1 Cap by mouth two (2) times a day for 10 days. 9/25/19 10/5/19 Yes Aleja Garcia NP   triamcinolone acetonide (KENALOG) 0.1 % ointment Apply  to affected area two (2) times a day. use thin layer 9/25/19  Yes Aleja Garcia NP   apixaban (ELIQUIS) 5 mg tablet Take 1 Tab by mouth two (2) times a day. For stroke prevention  Patient taking differently: Take 5 mg by mouth two (2) times a day. For stroke prevention  Reports taking 1 tab every 2 days. 9/5/19  Yes Paulo Estrada NP   levothyroxine (SYNTHROID) 50 mcg tablet TAKE 1 TABLET DAILY BEFORE BREAKFAST 9/5/19  Yes Aleja Garcia NP   ketoconazole (NIZORAL) 2 % topical cream Apply  to affected area daily. 11/21/18  Yes Aleja Garcia NP   vit B Cmplx 3-FA-Vit C-Biotin (NEPHRO MANDO RX) 1- mg-mg-mcg tablet Take 1 tablet by mouth daily. Yes Provider, Historical   aspirin (ASPIRIN) 325 mg tablet Take 325 mg by mouth daily. Yes Provider, Historical   zinc 50 mg capsule Take 50 mg by mouth daily. Reports taking once a week. Yes Provider, Historical   vitamin E (AQUA GEMS) 400 unit capsule Take 400 Units by mouth daily. Reports taking 1-2 times a week.    Yes Provider, Historical   ascorbic acid (VITAMIN C) 500 mg tablet Take 500 mg by mouth daily. Yes Provider, Historical   folic acid (FOLVITE) 1 mg tablet Take  by mouth daily. Yes Provider, Historical   SAW PALMETTO PO Take  by mouth daily. Yes Provider, Historical   CALCIUM/MAGNESIUM (DOLOMITE PO) Take  by mouth. Yes Provider, Historical   tretinoin (RETIN-A) 0.025 % topical cream Apply  to affected area nightly. 5/18/16   Marquis Hamilton, GRAZYNA   DOCOSAHEXANOIC ACID/EPA (FISH OIL PO) Take  by mouth. Provider, Historical          Allergies   Allergen Reactions    Hydrocodone Other (comments)     Unable to void, or have a bowel movement    Amlodipine Swelling and Other (comments)     Severe weeping from leg swelling    Poison Ivy Extract Itching           ROS  See HPI    Objective:   Physical Exam   Constitutional: He is oriented to person, place, and time. He appears well-developed and well-nourished. HENT:   Head:       Cardiovascular: Normal rate, regular rhythm and intact distal pulses. Exam reveals no gallop and no friction rub. No murmur heard. Pulmonary/Chest: Effort normal and breath sounds normal. No respiratory distress. Neurological: He is alert and oriented to person, place, and time. Skin:        Psychiatric: He has a normal mood and affect. His speech is normal and behavior is normal. Thought content normal.   Nursing note and vitals reviewed. Visit Vitals  /60 (BP 1 Location: Left arm, BP Patient Position: Sitting)   Pulse 62   Temp 97.9 °F (36.6 °C) (Oral)   Resp 18   Ht 5' 6\" (1.676 m)   Wt 239 lb 9.6 oz (108.7 kg)   SpO2 97%   BMI 38.67 kg/m²       Assessment & Plan:  Diagnoses and all orders for this visit:    1. Atopic dermatitis, unspecified type  Recommended daily moisturizer  -     triamcinolone acetonide (KENALOG) 0.1 % ointment; Apply  to affected area two (2) times a day. use thin layer    2. Rosacea  Cover with doxycycline.  Dermatology referral as needed. -     doxycycline (VIBRAMYCIN) 100 mg capsule; Take 1 Cap by mouth two (2) times a day for 10 days. 3. Encounter for eye exam  -     REFERRAL TO OPHTHALMOLOGY        I have discussed the diagnosis with the patient and the intended plan as seen in the above orders. The patient has received an after-visit summary along with patient information handout. I have discussed medication side effects and warnings with the patient as well. Follow-up and Dispositions    · Return if symptoms worsen or fail to improve.            Falguni Jackson, NP

## 2019-09-25 NOTE — PROGRESS NOTES
Chief Complaint   Patient presents with    Rash     on face and left arm - reports having for 1 year - itchy       1. Have you been to the ER, urgent care clinic since your last visit? Hospitalized since your last visit? No    2. Have you seen or consulted any other health care providers outside of the [address] [place] [address] since your last visit? Include any pap smears or colon screening.  No

## 2019-10-12 ENCOUNTER — APPOINTMENT (OUTPATIENT)
Dept: MRI IMAGING | Age: 84
DRG: 066 | End: 2019-10-12
Attending: HOSPITALIST
Payer: MEDICARE

## 2019-10-12 ENCOUNTER — APPOINTMENT (OUTPATIENT)
Dept: CT IMAGING | Age: 84
DRG: 066 | End: 2019-10-12
Attending: STUDENT IN AN ORGANIZED HEALTH CARE EDUCATION/TRAINING PROGRAM
Payer: MEDICARE

## 2019-10-12 ENCOUNTER — HOSPITAL ENCOUNTER (INPATIENT)
Age: 84
LOS: 2 days | Discharge: HOME HEALTH | DRG: 066 | End: 2019-10-14
Attending: STUDENT IN AN ORGANIZED HEALTH CARE EDUCATION/TRAINING PROGRAM | Admitting: HOSPITALIST
Payer: MEDICARE

## 2019-10-12 DIAGNOSIS — R29.90 STROKE-LIKE SYMPTOMS: ICD-10-CM

## 2019-10-12 DIAGNOSIS — R47.01 EXPRESSIVE APHASIA: Primary | ICD-10-CM

## 2019-10-12 DIAGNOSIS — I63.412 CEREBROVASCULAR ACCIDENT (CVA) DUE TO EMBOLISM OF LEFT MIDDLE CEREBRAL ARTERY (HCC): ICD-10-CM

## 2019-10-12 PROBLEM — I63.9 STROKE (HCC): Status: ACTIVE | Noted: 2019-10-12

## 2019-10-12 LAB
ALBUMIN SERPL-MCNC: 3.8 G/DL (ref 3.5–5)
ALBUMIN/GLOB SERPL: 1.1 {RATIO} (ref 1.1–2.2)
ALP SERPL-CCNC: 50 U/L (ref 45–117)
ALT SERPL-CCNC: 91 U/L (ref 12–78)
ANION GAP SERPL CALC-SCNC: 4 MMOL/L (ref 5–15)
AST SERPL-CCNC: 63 U/L (ref 15–37)
ATRIAL RATE: 357 BPM
BASOPHILS # BLD: 0.2 K/UL (ref 0–0.1)
BASOPHILS NFR BLD: 2 % (ref 0–1)
BILIRUB SERPL-MCNC: 0.8 MG/DL (ref 0.2–1)
BUN SERPL-MCNC: 15 MG/DL (ref 6–20)
BUN/CREAT SERPL: 14 (ref 12–20)
CALCIUM SERPL-MCNC: 9 MG/DL (ref 8.5–10.1)
CALCULATED R AXIS, ECG10: -46 DEGREES
CALCULATED T AXIS, ECG11: 11 DEGREES
CHLORIDE SERPL-SCNC: 103 MMOL/L (ref 97–108)
CHOLEST SERPL-MCNC: 159 MG/DL
CO2 SERPL-SCNC: 29 MMOL/L (ref 21–32)
COMMENT, HOLDF: NORMAL
COMMENT, HOLDF: NORMAL
CREAT SERPL-MCNC: 1.11 MG/DL (ref 0.7–1.3)
DIAGNOSIS, 93000: NORMAL
DIFFERENTIAL METHOD BLD: ABNORMAL
EOSINOPHIL # BLD: 0 K/UL (ref 0–0.4)
EOSINOPHIL NFR BLD: 0 % (ref 0–7)
ERYTHROCYTE [DISTWIDTH] IN BLOOD BY AUTOMATED COUNT: 13 % (ref 11.5–14.5)
EST. AVERAGE GLUCOSE BLD GHB EST-MCNC: 120 MG/DL
GLOBULIN SER CALC-MCNC: 3.5 G/DL (ref 2–4)
GLUCOSE BLD STRIP.AUTO-MCNC: 117 MG/DL (ref 65–100)
GLUCOSE SERPL-MCNC: 111 MG/DL (ref 65–100)
HBA1C MFR BLD: 5.8 % (ref 4.2–6.3)
HCT VFR BLD AUTO: 36.5 % (ref 36.6–50.3)
HDLC SERPL-MCNC: 51 MG/DL
HDLC SERPL: 3.1 {RATIO} (ref 0–5)
HGB BLD-MCNC: 11.9 G/DL (ref 12.1–17)
IMM GRANULOCYTES # BLD AUTO: 0 K/UL
IMM GRANULOCYTES NFR BLD AUTO: 0 %
INR PPP: 1.2 (ref 0.9–1.1)
LDLC SERPL CALC-MCNC: 83.4 MG/DL (ref 0–100)
LIPID PROFILE,FLP: NORMAL
LYMPHOCYTES # BLD: 6.7 K/UL (ref 0.8–3.5)
LYMPHOCYTES NFR BLD: 55 % (ref 12–49)
MCH RBC QN AUTO: 31.8 PG (ref 26–34)
MCHC RBC AUTO-ENTMCNC: 32.6 G/DL (ref 30–36.5)
MCV RBC AUTO: 97.6 FL (ref 80–99)
MONOCYTES # BLD: 0.5 K/UL (ref 0–1)
MONOCYTES NFR BLD: 4 % (ref 5–13)
NEUTS BAND NFR BLD MANUAL: 1 % (ref 0–6)
NEUTS SEG # BLD: 4.7 K/UL (ref 1.8–8)
NEUTS SEG NFR BLD: 38 % (ref 32–75)
NRBC # BLD: 0 K/UL (ref 0–0.01)
NRBC BLD-RTO: 0 PER 100 WBC
PLATELET # BLD AUTO: 235 K/UL (ref 150–400)
PMV BLD AUTO: 10.7 FL (ref 8.9–12.9)
POTASSIUM SERPL-SCNC: 4 MMOL/L (ref 3.5–5.1)
PROT SERPL-MCNC: 7.3 G/DL (ref 6.4–8.2)
PROTHROMBIN TIME: 12.4 SEC (ref 9–11.1)
Q-T INTERVAL, ECG07: 438 MS
QRS DURATION, ECG06: 94 MS
QTC CALCULATION (BEZET), ECG08: 415 MS
RBC # BLD AUTO: 3.74 M/UL (ref 4.1–5.7)
RBC MORPH BLD: ABNORMAL
SAMPLES BEING HELD,HOLD: NORMAL
SAMPLES BEING HELD,HOLD: NORMAL
SERVICE CMNT-IMP: ABNORMAL
SODIUM SERPL-SCNC: 136 MMOL/L (ref 136–145)
TRIGL SERPL-MCNC: 123 MG/DL (ref ?–150)
VENTRICULAR RATE, ECG03: 54 BPM
VLDLC SERPL CALC-MCNC: 24.6 MG/DL
WBC # BLD AUTO: 12.1 K/UL (ref 4.1–11.1)

## 2019-10-12 PROCEDURE — 65660000000 HC RM CCU STEPDOWN

## 2019-10-12 PROCEDURE — 70544 MR ANGIOGRAPHY HEAD W/O DYE: CPT

## 2019-10-12 PROCEDURE — 70553 MRI BRAIN STEM W/O & W/DYE: CPT

## 2019-10-12 PROCEDURE — 0042T CT CODE NEURO PERF W CBF: CPT

## 2019-10-12 PROCEDURE — 93005 ELECTROCARDIOGRAM TRACING: CPT

## 2019-10-12 PROCEDURE — 82962 GLUCOSE BLOOD TEST: CPT

## 2019-10-12 PROCEDURE — 74011000258 HC RX REV CODE- 258: Performed by: STUDENT IN AN ORGANIZED HEALTH CARE EDUCATION/TRAINING PROGRAM

## 2019-10-12 PROCEDURE — 85025 COMPLETE CBC W/AUTO DIFF WBC: CPT

## 2019-10-12 PROCEDURE — 74011250637 HC RX REV CODE- 250/637: Performed by: HOSPITALIST

## 2019-10-12 PROCEDURE — 70496 CT ANGIOGRAPHY HEAD: CPT

## 2019-10-12 PROCEDURE — 80053 COMPREHEN METABOLIC PANEL: CPT

## 2019-10-12 PROCEDURE — 99285 EMERGENCY DEPT VISIT HI MDM: CPT

## 2019-10-12 PROCEDURE — 80061 LIPID PANEL: CPT

## 2019-10-12 PROCEDURE — 94762 N-INVAS EAR/PLS OXIMTRY CONT: CPT

## 2019-10-12 PROCEDURE — 85610 PROTHROMBIN TIME: CPT

## 2019-10-12 PROCEDURE — 83036 HEMOGLOBIN GLYCOSYLATED A1C: CPT

## 2019-10-12 PROCEDURE — 70450 CT HEAD/BRAIN W/O DYE: CPT

## 2019-10-12 PROCEDURE — 74011250636 HC RX REV CODE- 250/636: Performed by: STUDENT IN AN ORGANIZED HEALTH CARE EDUCATION/TRAINING PROGRAM

## 2019-10-12 PROCEDURE — 36415 COLL VENOUS BLD VENIPUNCTURE: CPT

## 2019-10-12 PROCEDURE — 74011636320 HC RX REV CODE- 636/320: Performed by: STUDENT IN AN ORGANIZED HEALTH CARE EDUCATION/TRAINING PROGRAM

## 2019-10-12 PROCEDURE — 74011250636 HC RX REV CODE- 250/636: Performed by: HOSPITALIST

## 2019-10-12 PROCEDURE — A9575 INJ GADOTERATE MEGLUMI 0.1ML: HCPCS | Performed by: STUDENT IN AN ORGANIZED HEALTH CARE EDUCATION/TRAINING PROGRAM

## 2019-10-12 RX ORDER — GADOTERATE MEGLUMINE 376.9 MG/ML
20 INJECTION INTRAVENOUS
Status: COMPLETED | OUTPATIENT
Start: 2019-10-12 | End: 2019-10-12

## 2019-10-12 RX ORDER — SODIUM CHLORIDE 0.9 % (FLUSH) 0.9 %
5-40 SYRINGE (ML) INJECTION EVERY 8 HOURS
Status: DISCONTINUED | OUTPATIENT
Start: 2019-10-12 | End: 2019-10-14 | Stop reason: HOSPADM

## 2019-10-12 RX ORDER — SODIUM CHLORIDE 9 MG/ML
75 INJECTION, SOLUTION INTRAVENOUS CONTINUOUS
Status: DISCONTINUED | OUTPATIENT
Start: 2019-10-12 | End: 2019-10-14 | Stop reason: HOSPADM

## 2019-10-12 RX ORDER — SODIUM CHLORIDE 0.9 % (FLUSH) 0.9 %
5-40 SYRINGE (ML) INJECTION AS NEEDED
Status: DISCONTINUED | OUTPATIENT
Start: 2019-10-12 | End: 2019-10-14 | Stop reason: HOSPADM

## 2019-10-12 RX ORDER — SODIUM CHLORIDE 0.9 % (FLUSH) 0.9 %
10 SYRINGE (ML) INJECTION
Status: COMPLETED | OUTPATIENT
Start: 2019-10-12 | End: 2019-10-12

## 2019-10-12 RX ORDER — ASPIRIN 81 MG/1
81 TABLET ORAL DAILY
Status: DISCONTINUED | OUTPATIENT
Start: 2019-10-12 | End: 2019-10-14 | Stop reason: HOSPADM

## 2019-10-12 RX ORDER — LEVOTHYROXINE SODIUM 50 UG/1
50 TABLET ORAL
Status: DISCONTINUED | OUTPATIENT
Start: 2019-10-12 | End: 2019-10-14 | Stop reason: HOSPADM

## 2019-10-12 RX ORDER — ATORVASTATIN CALCIUM 20 MG/1
40 TABLET, FILM COATED ORAL DAILY
Status: DISCONTINUED | OUTPATIENT
Start: 2019-10-12 | End: 2019-10-14

## 2019-10-12 RX ORDER — ASPIRIN 325 MG
325 TABLET ORAL DAILY
Status: DISCONTINUED | OUTPATIENT
Start: 2019-10-12 | End: 2019-10-12

## 2019-10-12 RX ADMIN — GADOTERATE MEGLUMINE 20 ML: 376.9 INJECTION INTRAVENOUS at 12:36

## 2019-10-12 RX ADMIN — ASPIRIN 81 MG: 81 TABLET, COATED ORAL at 10:16

## 2019-10-12 RX ADMIN — Medication 10 ML: at 08:50

## 2019-10-12 RX ADMIN — LEVOTHYROXINE SODIUM 50 MCG: 50 TABLET ORAL at 14:23

## 2019-10-12 RX ADMIN — Medication 10 ML: at 14:23

## 2019-10-12 RX ADMIN — SODIUM CHLORIDE 75 ML/HR: 900 INJECTION, SOLUTION INTRAVENOUS at 10:15

## 2019-10-12 RX ADMIN — ATORVASTATIN CALCIUM 40 MG: 20 TABLET, FILM COATED ORAL at 10:15

## 2019-10-12 RX ADMIN — APIXABAN 5 MG: 5 TABLET, FILM COATED ORAL at 22:48

## 2019-10-12 RX ADMIN — Medication 10 ML: at 12:36

## 2019-10-12 RX ADMIN — APIXABAN 5 MG: 5 TABLET, FILM COATED ORAL at 14:23

## 2019-10-12 RX ADMIN — SODIUM CHLORIDE 100 ML: 900 INJECTION, SOLUTION INTRAVENOUS at 08:50

## 2019-10-12 RX ADMIN — IOPAMIDOL 120 ML: 755 INJECTION, SOLUTION INTRAVENOUS at 08:50

## 2019-10-12 NOTE — CONSULTS
Consult dictated. 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 Agree with restarting Eliquis and discussed compliance with patient. Continue aspirin and atorvastatin. Speech therapy and discharge planning.   Emma Jennings MD

## 2019-10-12 NOTE — CONSULTS
[address]    Name:  Cheo Jackson  MR#:  [id number]  :  1930  ACCOUNT #:  [de-identified]  DATE OF SERVICE:  10/12/2019    REQUESTING PHYSICIAN:  Dr. Jhoana Stokes EVALUATION:  Stroke. HISTORY OF PRESENT ILLNESS:  The patient is a 80-year-old male with past medical history of paroxysmal atrial fibrillation, thyroid disease, hypertension, who says that he had a remote history of stroke in  which did not leave any residual deficits. He was diagnosed with atrial fibrillation about 5 years ago and was put on Coumadin first, which was later switched to apixaban. He has been only taking 5 mg tablet a few days per week. He comes in today because he suddenly developed difficulties with speech. He could not express himself or had difficulty finding words. He was brought into the hospital and had an MRI scan of the brain which showed an acute infarction in the left middle cerebral artery distribution. Denies any focal motor sensory deficits in the extremities. No headache, changes in vision or swallowing ability. Denies any changes in his balance. PAST MEDICAL HISTORY:  As mentioned above. FAMILY HISTORY:  Noncontributory. SOCIAL HISTORY:  The patient is . Smokes cigars. Drinks one to two alcoholic drinks per night. ALLERGIES:  HYDROCODONE, AMLODIPINE, AND POISON IVY EXTRACT. REVIEW OF SYSTEMS:  As mentioned above. HOME MEDICATIONS:  Multivitamins, apixaban, Synthroid, aspirin. PHYSICAL EXAMINATION:  GENERAL:  The patient is alert, fully oriented. VITAL SIGNS:  Blood pressure 181/65, temperature 98. 3. NEUROLOGIC:  His speech is slurred and he has mild expressive aphasia/word-finding difficulty. Comprehension is intact. Pupils are equal, round and reactive. Extraocular movements are full. Face is symmetric. Tongue is midline. Hearing is baseline.   Muscle tone and bulk normal.  Strength normal in both upper and lower extremities. Deep tendon reflexes are 2/2 symmetric. Toes downgoing. Sensation impaired to vibration at the ankles. Gait exam is deferred. HEART:  Regular rate. CHEST:  Clear. ABDOMEN:  Soft, nontender. Positive bowel sounds. EXTREMITIES:  No edema. LABORATORY DATA:  CBC with WBC 12.1, hemoglobin 11.9, hematocrit 36.5, platelets 529. Chemistries are unremarkable. Lipid profile, triglycerides 123, HDL 51, LDL 83.4. Hemoglobin A1c is 5.8. CTA of the head and neck showed left MCA branch occlusion. MRI of the brain images were reviewed and it shows multiple acute left parietal infarcts and infarct in the left posterior insular cortex. Electrocardiogram shows atrial fibrillation. ASSESSMENT AND PLAN:  An 51-year-old male with known history of atrial fibrillation who is admitted with an embolic appearing infarct in the left middle cerebral artery distribution. It has caused expressive aphasia. He has not been taking Eliquis as advised and was only taking 5 mg every other day or so. Agree with restarting it at 5 mg twice daily. Continue with aspirin and atorvastatin. He will need speech therapy and we can initiate discharge planning.       MD ANDRÉS [id number]  D:  10/12/2019 14:11  T:  10/12/2019 19:21  JOB #:  [id number]

## 2019-10-12 NOTE — PROGRESS NOTES
Primary Nurse Maurisio Malcolm RN and Chiquita RN performed a dual skin assessment on this patient. Bilateral justen lower extremities with scabbing.

## 2019-10-12 NOTE — PROGRESS NOTES
TRANSFER - IN REPORT:    Verbal report received from Kannan RN (name) on Nimisha Verduzco  being received from ED (unit) for routine progression of care      Report consisted of patients Situation, Background, Assessment and   Recommendations(SBAR). Information from the following report(s) SBAR was reviewed with the receiving nurse. Opportunity for questions and clarification was provided. Assessment completed upon patients arrival to unit and care assumed.

## 2019-10-12 NOTE — ED PROVIDER NOTES
80 y.o. male with past medical history significant for stroke, paroxysmal atrial fibrillation, thyroid disease, and HTN who presents from home with chief complaint of aphasia. Patient's son states last known well was 1430 ~1 day ago (~20 hours ago). Patient had sudden onset of aphasia ~1 day ago described by son as \"difficulty finding words, slowed speech, and halting between words. \" Patient states he did take his Eliquis last night, however he usually takes it every other day. Patient endorses taking 650 mg Aspirin this morning at 0600. Patient presents to [facility] ED with mild aphasia. Patient notes he has a chronic right sided facial droop due to an \"old surgery. \" Patient also endorses chronic bilateral leg numbness. Patient denies headache. There are no other acute medical concerns at this time. Social hx: (+) Tobacco use (Light smoker), (+) EtOH use, No illicit drug use. PCP: Jose Shaw NP    Note written by Wallace Vincent, as dictated by Shikha Little MD 8:45 AM     The history is provided by the patient and a relative. No  was used.         Past Medical History:   Diagnosis Date    Arthritis     Contact dermatitis and other eczema, due to unspecified cause     Hypertension     Stroke ([facility] [address] 1997    TIA at [facility] Thyroid disease     Unspecified hypothyroidism        Past Surgical History:   Procedure Laterality Date    HX HEENT      detached retina    HX HIP REPLACEMENT  2008    right hip, [facility]         Family History:   Problem Relation Age of Onset    Lung Disease Father     Cancer Father         lung    Coronary Artery Disease Neg Hx        Social History     Socioeconomic History    Marital status:      Spouse name: Not on file    Number of children: Not on file    Years of education: Not on file    Highest education level: Not on file   Occupational History    Not on file   Social Needs    Financial resource strain: Not on file   [place] insecurity:     Worry: Not on file     Inability: Not on file    Transportation needs:     Medical: Not on file     Non-medical: Not on file   Tobacco Use    Smoking status: Light Tobacco Smoker     Packs/day: 0.25     Types: Cigars     Last attempt to quit: 1978     Years since quittin.0    Smokeless tobacco: Never Used    Tobacco comment: cigar on occ   Substance and Sexual Activity    Alcohol use: Yes     Alcohol/week: 14.0 standard drinks     Types: 14 Shots of liquor per week     Comment: 2 drinks a day.  Drug use: No    Sexual activity: Yes     Partners: Female   Lifestyle    Physical activity:     Days per week: Not on file     Minutes per session: Not on file    Stress: Not on file   Relationships    Social connections:     Talks on phone: Not on file     Gets together: Not on file     Attends [employer] service: Not on file     Active member of club or organization: Not on file     Attends meetings of clubs or organizations: Not on file     Relationship status: Not on file    Intimate partner violence:     Fear of current or ex partner: Not on file     Emotionally abused: Not on file     Physically abused: Not on file     Forced sexual activity: Not on file   Other Topics Concern     Service Yes    Blood Transfusions No    Caffeine Concern No    Occupational Exposure No    Hobby Hazards No    Sleep Concern Not Asked    Stress Concern No    Weight Concern Yes    Special Diet No    Back Care No    Exercise Yes    Bike Helmet No    Seat Belt Yes    Self-Exams Yes   Social History Narrative    Not on file         ALLERGIES: Hydrocodone; Amlodipine; and Poison ivy extract    Review of Systems   Constitutional: Negative. Negative for chills, fatigue and fever. HENT: Negative. Negative for ear pain, sore throat and trouble swallowing. Eyes: Negative for visual disturbance. Respiratory: Negative. Negative for cough and shortness of breath.     Cardiovascular: Negative for chest pain. Gastrointestinal: Negative. Negative for abdominal pain. Genitourinary: Negative for dysuria. Musculoskeletal: Negative. Negative for back pain. Skin: Negative for rash. Neurological: Positive for speech difficulty. Negative for dizziness, syncope, weakness, light-headedness and headaches. Psychiatric/Behavioral: Negative for confusion. All other systems reviewed and are negative. There were no vitals filed for this visit. Physical Exam   Constitutional: He appears well-developed and well-nourished. No distress. HENT:   Head: Normocephalic and atraumatic. Eyes: Pupils are equal, round, and reactive to light. EOM are normal.   Neck: Normal range of motion. Neck supple. No neck rigidity. Cardiovascular: Normal rate, regular rhythm and intact distal pulses. Exam reveals no gallop and no friction rub. No murmur heard. Pulmonary/Chest: Effort normal and breath sounds normal. No respiratory distress. He has no wheezes. Abdominal: Soft. He exhibits no distension. There is no tenderness. Musculoskeletal: Normal range of motion. He exhibits no edema, tenderness or deformity. Neurological: He is alert. He has normal strength. No cranial nerve deficit or sensory deficit. Mild right facial droop. No pronator drift. Symmetric strength bilaterally. Normal sensation to light touch. Mild expressive aphasia. Skin: Skin is warm. He is not diaphoretic. Psychiatric: He has a normal mood and affect. Nursing note and vitals reviewed. Note written by Wallace Heredia, as dictated by Xenia Calzada MD 8:45 AM      MDM  Number of Diagnoses or Management Options  Expressive aphasia:   Stroke-like symptoms:   Diagnosis management comments: Cody Mustafa is a 80 y.o. male presenting with expressive aphasia. Differential includes TIA, stroke, dementia, less likely CNS infection, hemorrhage. Course:  Activated as a level 2 stroke alert, CT angiogram, noncontrast CT, CT perfusion study performed. No evidence of large vessel occlusion. Likely will require admission for further work-up, assessment of risk factors for further stroke, anticoagulant choice. Dispo: Admission. 9:58 AM D/w Dr. Virginia Zazueta of neuro interventional.  L m3/m4   rec Aspirin 650mg, continue anticoagulation. Critical Care  Total time providing critical care: 30-74 minutes (35)      Procedures   ED EKG interpretation:  Rhythm: atrial fib; and irregular. Rate (approx.): 54 bpm; Axis: left axis deviation; ST/T wave: No ST wave changes. Note written by Wallace Garcias, as dictated by Dante Perry MD 9:45 AM     PROGRESS NOTE:  8:54 AM  Provider spoke to Dr. Alexis Green who will evaluate. Hospitalist Pj for Admission  9:40 AM    ED Room Number: ER04/04  Patient Name and age:  Pierce Schooling 80 y.o.  male  Working Diagnosis:   1. Expressive aphasia    2. Stroke-like symptoms      Readmission: no  Isolation Requirements:  no  Recommended Level of Care:  telemetry  Code Status:  Full Code  Department:[facility] Adult ED - 21   Other:        EKG: A. fib, ventricular rate 54, left axis deviation, no ST or T wave changes. PROGRESS NOTE:  10:07 AM  Dr. Alexis Green agrees with Providers plan of admission for pt, agrees pt had a small stroke, sees no other neurological deficits.

## 2019-10-12 NOTE — H&P
[address]  HISTORY AND PHYSICAL    Name:  Ariella Alegria  MR#:  [id number]  :  1930  ACCOUNT #:  [de-identified]  ADMIT DATE:  10/12/2019    PRIMARY CARE PHYSICIAN:  Addy Carvalho MD    History obtained from the patient and mostly from the chart as the patient was having trouble talking. CHIEF COMPLAINT:  Aphasia. HISTORY OF PRESENT ILLNESS:  The patient is an 80-year-old male with past medical history significant for stroke, paroxysmal AFib, thyroid disease and hypertension, presents with the complaints of aphasia, last known well was 20 hours ago. The patient had difficulty finding words, slurred speech. The patient states that he is noncompliant with his Eliquis last night and few days before as well. He takes aspirin in the morning when he feels like he is having a stroke, presented with mild aphasia and a code stroke was called in the ED.  CT, CTA was done, and was run through the teleneurologist as well as Neurointerventional and the patient was admitted for further management. PAST MEDICAL HISTORY:  1. Arthritis. 2.  Hypertension. 3.  [place] [facility] 4.  Thyroid disease. 5.  Hypothyroidism. PAST SURGICAL HISTORY:  1. ENT surgery. 2.  Hip replacement, right hip in  at [facility]. ALLERGIES:  1. HYDROCODONE. 2.  AMLODIPINE. 3.  POISON IVY. CURRENT MEDICATIONS:  Reviewed. The patient is a poor historian  1. Thyroid medication 50 mcg daily. 2.  Eliquis, irregular. 3.  Taking losartan from his wife's medication, unknown why.  4.  Lots of multivitamins. 5.  [facility]in. [facility]. SOCIAL HISTORY:  The patient lives with spouse. Previous smoker,  Currently drinking two drinks a day as listed. FAMILY HISTORY:  Positive for lung cancer in father, coronary artery disease in other family members. REVIEW OF SYSTEMS:  Positive for speech difficulty.     Negative for dizziness, syncope, weakness, fever, chills. Negative for ear pain, eye or throat swelling. Negative for visual disturbance, negative for respiratory problem, negative for cardiovascular symptoms. No tachycardia or chest pain. Negative for dysuria. Negative for any confusion. PHYSICAL EXAMINATION:  GENERAL:  Obese elderly male, lying in bed, not in acute distress, can speak and I could understand what he is saying. Aphasia kind of resolved. VITAL SIGNS:  On arrival to the ER, blood pressure 178/86, pulse 67, respiratory rate 23, saturation 98% in room air. HEENT:  Normocephalic, atraumatic. Eyes:  Pupils equally reactive to light and accommodation. NECK:  Supple. JVD not raised. CARDIOVASCULAR:  Normal rate and rhythm. High blood pressure on the monitor. PULMONARY:  Chest clear to auscultation, bilateral.  ABDOMEN:  Soft, obese. Bowel sounds positive. MUSCULOSKELETAL:  Normal range of motion. NEUROLOGIC:  Mild right facial droop. No pronator drift. Symmetric strength bilaterally. Mild expressive aphasia. PSYCHIATRIC:  Normal mood and affect. LABORATORY DATA:  CBC count:  WBC 12.1, hemoglobin of 11.9, hematocrit 36.5, platelet count 963. Coags:  INR 1.2, prothrombin time 12.4. Chemistry:  Electrolytes normal.  Anion gap 4, glucose 111, BUN 15, creatinine 1. 11. GFR more than 60. ALT 91, AST 63, alkaline phosphatase 50. CT code neuro head:  Dominant left vertebral artery, minimal opacification of the proximal right vertebral artery, no flow within the mid right vertebral artery, minimal flow within the diminutive distal right vertebral artery, patent basilar artery, no intracranial large vessel occlusion, bovine arch, atherosclerotic plaque and moderate narrowing of the left and right carotid bodies. CT head; no acute intracranial hemorrhage, mass or infarct. EKG showing atrial fibrillation with premature ventricular or aberrantly conducted complexes. ASSESSMENT AND PLAN:  1.   Expressive aphasia, POA, possible stroke. -The patient admitted to Telemetry/Neurology. Neuro check q. 4 hours as per schedule. - Aspirin 325 now. - The patient was irregular with his Eliquis, will resume.  -Teleneurology and Neurointerventional Surgery consulted. No active intervention right now. - Get MRI/MRA. -Routine Neurology consult. - Lipid panel, HbA1C, chemistry and CBC. 2.  Uncontrolled hypertension. We will start the patient on losartan 50 mg daily from tomorrow. 3.  Cardiovascular. The patient has history of atrial fibrillation, currently rate controlled. The patient was seen previously by [facility]. The patient has a pacemaker. Monitor on telemetry. Continue Eliquis 5 mg b.i.d.    4..  Hypothyroidism. Continue Synthroid. 5.  Speech and swallow evaluation. 6.  Deep venous thrombosis prophylaxis with sequential compression dressings. Baseline ambulating  7. Full code.       Chace Christensen MD      [id number]  D:  10/12/2019 10:31  T:  10/12/2019 15:06  JOB #:  [id number]

## 2019-10-12 NOTE — ED TRIAGE NOTES
Pt ambulatory with cane with family member into the ER. States that ever since \"eating a lot of meat\" at 2:30 yesterday afternoon, he has had trouble thought-finding and family member reports aphasia, halting speech. Code S called; pt to CT.

## 2019-10-12 NOTE — CONSULTS
Left M3/M4 branch occlusion. No role for neurointervention at this time. 650 mg aspirin. Continue Eliquis. Medical management of stroke per Neurology.   D/w Dr. Aline Jackson

## 2019-10-12 NOTE — ROUTINE PROCESS
TRANSFER - OUT REPORT:    Verbal report given to BRANDEE Polk(name) on Sebastián Mays  being transferred [phone]  for routine progression of care       Report consisted of patients Situation, Background, Assessment and   Recommendations(SBAR). Information from the following report(s) SBAR and Kardex was reviewed with the receiving nurse. Lines:   Peripheral IV 10/12/19 Right Antecubital (Active)   Site Assessment Clean, dry, & intact 10/12/2019  8:45 AM   Phlebitis Assessment 0 10/12/2019  8:45 AM   Infiltration Assessment 0 10/12/2019  8:45 AM   Dressing Status Clean, dry, & intact 10/12/2019  8:45 AM   Hub Color/Line Status Pink 10/12/2019  8:45 AM        Opportunity for questions and clarification was provided.       Patient transported with:   [email or web address]

## 2019-10-12 NOTE — H&P
Dictation ID :[id number]    2. Expressive aphasia  2. A fib non complaint to eliquis  3.  HTN    Nimo Lira MD [id number]

## 2019-10-12 NOTE — PROGRESS NOTES
Bedside and Verbal shift change report given to Chiquita RN  (oncoming nurse) by Mary Morris RN  (offgoing nurse). Report included the following information SBAR.

## 2019-10-13 ENCOUNTER — HOME HEALTH ADMISSION (OUTPATIENT)
Dept: HOME HEALTH SERVICES | Facility: HOME HEALTH | Age: 84
End: 2019-10-13
Payer: MEDICARE

## 2019-10-13 PROCEDURE — 97116 GAIT TRAINING THERAPY: CPT

## 2019-10-13 PROCEDURE — 65660000000 HC RM CCU STEPDOWN

## 2019-10-13 PROCEDURE — 74011250636 HC RX REV CODE- 250/636: Performed by: HOSPITALIST

## 2019-10-13 PROCEDURE — 97161 PT EVAL LOW COMPLEX 20 MIN: CPT

## 2019-10-13 PROCEDURE — 74011250636 HC RX REV CODE- 250/636: Performed by: NURSE PRACTITIONER

## 2019-10-13 PROCEDURE — 74011250637 HC RX REV CODE- 250/637: Performed by: HOSPITALIST

## 2019-10-13 RX ORDER — HYDRALAZINE HYDROCHLORIDE 20 MG/ML
10 INJECTION INTRAMUSCULAR; INTRAVENOUS
Status: DISCONTINUED | OUTPATIENT
Start: 2019-10-13 | End: 2019-10-14 | Stop reason: HOSPADM

## 2019-10-13 RX ADMIN — Medication 10 ML: at 17:10

## 2019-10-13 RX ADMIN — HYDRALAZINE HYDROCHLORIDE 10 MG: 20 INJECTION INTRAMUSCULAR; INTRAVENOUS at 03:21

## 2019-10-13 RX ADMIN — LEVOTHYROXINE SODIUM 50 MCG: 50 TABLET ORAL at 07:08

## 2019-10-13 RX ADMIN — APIXABAN 5 MG: 5 TABLET, FILM COATED ORAL at 21:17

## 2019-10-13 RX ADMIN — SODIUM CHLORIDE 75 ML/HR: 900 INJECTION, SOLUTION INTRAVENOUS at 17:09

## 2019-10-13 RX ADMIN — APIXABAN 5 MG: 5 TABLET, FILM COATED ORAL at 09:44

## 2019-10-13 RX ADMIN — ASPIRIN 81 MG: 81 TABLET, COATED ORAL at 09:44

## 2019-10-13 RX ADMIN — ATORVASTATIN CALCIUM 40 MG: 20 TABLET, FILM COATED ORAL at 09:43

## 2019-10-13 NOTE — PROGRESS NOTES
PHYSICAL THERAPY EVALUATION  Patient: Lewis Koenig (30 y.o. male)  Date: 10/13/2019  Primary Diagnosis: Stroke [facility]) [I63.9]        Precautions: fall risk         ASSESSMENT  Based on the objective data described below, the patient presents with deficits in all areas of functional mobility, decreased activity tolerance, poor standing balance, decreased BLE strength and at an increased risk to fall. Patient requires min assist for bed mobility, cga for transfers and gait. Patient able to ambulate approx. 150 ft with RW cga and max cues for safety. Patient with definite path deviations and periods of right neglect during standing mobility. Patient will need further rehab before returning home as he is the primary caregiver for his wife has who has dementia and he requires physical assistance for mobility now. Current Level of Function Impacting Discharge (mobility/balance): requires cga-min a for basic mobility     Functional Outcome Measure: The patient scored 0 on the 30 second chair rise test outcome measure which is indicative of decreased BLE strength and increased risk to fall. Other factors to consider for discharge: Patient is the primary caregiver for his wife who has dementia. Patient will benefit from skilled therapy intervention to address the above noted impairments. PLAN :  Recommendations and Planned Interventions: bed mobility training, transfer training, gait training, therapeutic exercises, neuromuscular re-education, patient and family training/education and therapeutic activities      Frequency/Duration: Patient will be followed by physical therapy:  daily to address goals.     Recommendation for discharge: (in order for the patient to meet his/her long term goals)  Therapy up to 5 days/week in SNF setting    This discharge recommendation:  Has not yet been discussed the attending provider and/or case management, nurse is aware of PT recommendation     Equipment recommendations for successful discharge (if) home: none- pt has RW and rollator at home          SUBJECTIVE:   Patient stated I really hope I can go home but I guess we shall see what happens.     OBJECTIVE DATA SUMMARY:   HISTORY:    Past Medical History:   Diagnosis Date    Arthritis     Contact dermatitis and other eczema, due to unspecified cause     Hypertension     Stroke ([facility] [address]     TIA at [address]     Thyroid disease     Unspecified hypothyroidism      Past Surgical History:   Procedure Laterality Date    HX HEENT      detached retina    HX HIP REPLACEMENT      right hip, [facility]       Personal factors and/or comorbidities impacting plan of care: Patient used rollator for mobility, was independent with mobility and ADLs    Home Situation  Home Environment: Private residence  One/Two Story Residence: One story  Living Alone: No  Support Systems: Family member(s), Spouse/Significant Other/Partner  Patient Expects to be Discharged to[de-identified] Private residence  Current DME Used/Available at Home: [place], straight, Shower chair, Walker    EXAMINATION/PRESENTATION/DECISION MAKING:   Critical Behavior:  Neurologic State: Alert  Orientation Level: Oriented X4  Cognition: Appropriate decision making, Appropriate for age attention/concentration, Appropriate safety awareness     Hearing: Auditory  Auditory Impairment: Hard of hearing, bilateral  Hearing Aids/Status: At bedside, Left  Skin:    Edema:   Range Of Motion:                          Strength:                          Tone & Sensation:                                  Coordination:     Vision:      Functional Mobility:  Bed Mobility:     Supine to Sit: Minimum assistance        Transfers:  Sit to Stand: Contact guard assistance                          Balance:      Ambulation/Gait Trainin ft   Assistive Device: Walker, rolling;Gait belt  Ambulation - Level of Assistance: Contact guard assistance     Gait Description (WDL): Exceptions to [facility] Abnormalities: Path deviations;Decreased step clearance              Speed/[place]: Pace decreased (<100 feet/min)  Step Length: Right shortened;Left shortened                     Stairs: Therapeutic Exercises:       Functional Measure:  30 second chair rise score of 0       Physical Therapy Evaluation Charge Determination   History Examination Presentation Decision-Making   MEDIUM  Complexity : 1-2 comorbidities / personal factors will impact the outcome/ POC  LOW Complexity : 1-2 Standardized tests and measures addressing body structure, function, activity limitation and / or participation in recreation  LOW Complexity : Stable, uncomplicated  LOW Complexity : FOTO score of       Based on the above components, the patient evaluation is determined to be of the following complexity level: LOW     Pain Rating:      Activity Tolerance:   Fair  Please refer to the flowsheet for vital signs taken during this treatment. After treatment patient left in no apparent distress:   Sitting in chair, Call bell within reach and Bed / chair alarm activated    COMMUNICATION/EDUCATION:   The patients plan of care was discussed with: Registered Nurse. Fall prevention education was provided and the patient/caregiver indicated understanding. and Patient/family agree to work toward stated goals and plan of care.     Thank you for this referral.  Bhupinder Schafer, PT   Time Calculation: 23 mins

## 2019-10-13 NOTE — PROGRESS NOTES
Problem: Falls - Risk of  Goal: *Absence of Falls  Description  Document Woody García Fall Risk and appropriate interventions in the flowsheet. Outcome: Progressing Towards Goal  Note:   Fall Risk Interventions:  Mobility Interventions: PT Consult for assist device competence, PT Consult for mobility concerns, Patient to call before getting OOB, Strengthening exercises (ROM-active/passive), Communicate number of staff needed for ambulation/transfer              Elimination Interventions: Call light in reach, Patient to call for help with toileting needs, Toilet paper/wipes in reach, Toileting schedule/hourly rounds              Problem: Pressure Injury - Risk of  Goal: *Prevention of pressure injury  Description  Document Sea Scale and appropriate interventions in the flowsheet. Outcome: Progressing Towards Goal  Note:   Pressure Injury Interventions: Activity Interventions: PT/OT evaluation, Increase time out of bed    Mobility Interventions: HOB 30 degrees or less, PT/OT evaluation, Assess need for specialty bed    Nutrition Interventions: Document food/fluid/supplement intake                     Problem: TIA/CVA Stroke: 0-24 hours  Goal: Nutrition/Diet  Outcome: Progressing Towards Goal  Goal: Discharge Planning  Outcome: Progressing Towards Goal  Goal: Medications  Outcome: Progressing Towards Goal     Problem: Pain  Goal: *Control of Pain  Outcome: Progressing Towards Goal     Problem: Pressure Injury - Risk of  Goal: *Prevention of pressure injury  Description  Document Sea Scale and appropriate interventions in the flowsheet. Outcome: Progressing Towards Goal  Note:   Pressure Injury Interventions:             Activity Interventions: PT/OT evaluation, Increase time out of bed    Mobility Interventions: HOB 30 degrees or less, PT/OT evaluation, Assess need for specialty bed    Nutrition Interventions: Document food/fluid/supplement intake

## 2019-10-13 NOTE — PROGRESS NOTES
Hospitalist Progress Note  Rommel Caldera MD  Answering service: [phone] from in house phone        Date of Service:  10/13/2019  NAME:  Velvet Nevarez  :  1930  MRN:  [id number]      Admission Summary:     The patient is an 80-year-old male with past medical history significant for stroke, paroxysmal AFib, thyroid disease and hypertension, presents with the complaints of aphasia, last known well was 20 hours ago. The patient had difficulty finding words, slurred speech. The patient states that he is noncompliant with his Eliquis last night and few days before as well. He takes aspirin in the morning when he feels like he is having a stroke, presented with mild aphasia and a code stroke was called in the ED.  CT, CTA was done, and was run through the teleneurologist as well as Neurointerventional and the patient was admitted for further management.     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 which diffusely diseased   -CT head no acute intracranial hemorrhage, mass or infarct  -PT/OT and speech therapy  -Neurology on board    Left M3/M4 branch occlusion   -on aspirin, lipitor and eliquis  -seen by NIS and no intervention at this time     A Fib, rate controlled  -on eliquis  -EKG a fib with premature ventricular or aberrantly conducted complex, left axis deviation    HTN uncontrolled   -BP improving, on prn hydralazine     Hypothyroidism   -continue synthroid       Code status: Full Code  DVT prophylaxis: SNF    Care Plan discussed with: Patient/Family, Nurse and   Disposition: SNF     Hospital Problems  Date Reviewed: 2019 Codes Class Noted POA    * (Principal) Stroke [facility]) ICD-10-CM: I63.9  ICD-9-CM: 434.91  10/12/2019 Unknown                Vital Signs:    Last 24hrs VS reviewed since prior progress note. Most recent are:  Visit Vitals  /55 (BP 1 Location: Right arm, BP Patient Position: At rest)   Pulse 60   Temp 98.5 °F (36.9 °C)   Resp 17   Ht 5' 6\" (1.676 m)   Wt 107 kg (236 lb)   SpO2 96%   BMI 38.09 kg/m²         Intake/Output Summary (Last 24 hours) at 10/13/2019 1258  Last data filed at 10/13/2019 0725  Gross per 24 hour   Intake --   Output 1120 ml   Net -1120 ml        Physical Examination:             Constitutional:  No acute distress, cooperative, pleasant    ENT:  Oral mucous moist, oropharynx benign. Resp:  CTA bilaterally. No wheezing/rhonchi/rales. No accessory muscle use   CV:  Irregular rhythm, normal rate, no murmurs, gallops, rubs    GI:  Soft, non distended, non tender. normoactive bowel sounds, no hepatosplenomegaly     Musculoskeletal:  No edema    Neurologic:  Moves all extremities. AAOx3, CN II-XII reviewed     Skin:  dry, bruises lower extremities       Data Review:    Review and/or order of clinical lab test  Review and/or order of tests in the radiology section of CPT  Review and/or order of tests in the medicine section of CPT      Labs:     Recent Labs     10/12/19  0855   WBC 12.1*   HGB 11.9*   HCT 36.5*        Recent Labs     10/12/19  0855      K 4.0      CO2 29   BUN 15   CREA 1.11   *   CA 9.0     Recent Labs     10/12/19  0855   SGOT 63*   ALT 91*   AP 50   TBILI 0.8   TP 7.3   ALB 3.8   GLOB 3.5     Recent Labs     10/12/19  0855   INR 1.2*   PTP 12.4*      No results for input(s): FE, TIBC, PSAT, FERR in the last 72 hours. No results found for: FOL, RBCF   No results for input(s): PH, PCO2, PO2 in the last 72 hours. No results for input(s): CPK, CKNDX, TROIQ in the last 72 hours.     No lab exists for component: CPKMB  Lab Results   Component Value Date/Time    Cholesterol, total 159 10/12/2019 08:55 AM    HDL Cholesterol 51 10/12/2019 08:55 AM    LDL, calculated 83.4 10/12/2019 08:55 AM    Triglyceride 123 10/12/2019 08:55 AM    CHOL/HDL Ratio 3.1 10/12/2019 08:55 AM     Lab Results   Component Value Date/Time    Glucose (POC) 117 (H) 10/12/2019 08:41 AM     No results found for: COLOR, APPRN, SPGRU, REFSG, MICKEY, PROTU, GLUCU, KETU, BILU, UROU, JABIER, LEUKU, GLUKE, EPSU, BACTU, WBCU, RBCU, CASTS, UCRY      Medications Reviewed:     Current Facility-Administered Medications   Medication Dose Route Frequency    hydrALAZINE (APRESOLINE) 20 mg/mL injection 10 mg  10 mg IntraVENous Q6H PRN    influenza vaccine 2019-20 (6 mos+)(PF) (FLUARIX/FLULAVAL/FLUZONE QUAD) injection 0.5 mL  0.5 mL IntraMUSCular PRIOR TO DISCHARGE    sodium chloride (NS) flush 5-40 mL  5-40 mL IntraVENous Q8H    sodium chloride (NS) flush 5-40 mL  5-40 mL IntraVENous PRN    0.9% sodium chloride infusion  75 mL/hr IntraVENous CONTINUOUS    aspirin delayed-release tablet 81 mg  81 mg Oral DAILY    atorvastatin (LIPITOR) tablet 40 mg  40 mg Oral DAILY    apixaban (ELIQUIS) tablet 5 mg  5 mg Oral Q12H    levothyroxine (SYNTHROID) tablet 50 mcg  50 mcg Oral ACB     ______________________________________________________________________  EXPECTED LENGTH OF STAY: - - -  ACTUAL LENGTH OF STAY:          1                 Erika Shukla MD

## 2019-10-13 NOTE — PROGRESS NOTES
Bedside shift change report given to BRANDEE Porras (oncoming nurse) by Bayron Horn RN (offgoing nurse). Report included the following information SBAR, Kardex and MAR.

## 2019-10-13 NOTE — PROGRESS NOTES
Problem: Falls - Risk of  Goal: *Absence of Falls  Description  Document Yaritza Davies Fall Risk and appropriate interventions in the flowsheet. Outcome: Progressing Towards Goal  Note:   Fall Risk Interventions:  Mobility Interventions: Bed/chair exit alarm, Communicate number of staff needed for ambulation/transfer, PT Consult for mobility concerns, Patient to call before getting OOB         Medication Interventions: Bed/chair exit alarm, Evaluate medications/consider consulting pharmacy, Patient to call before getting OOB, Teach patient to arise slowly    Elimination Interventions: Bed/chair exit alarm, Call light in reach, Urinal in reach              Problem: Pressure Injury - Risk of  Goal: *Prevention of pressure injury  Description  Document Sea Scale and appropriate interventions in the flowsheet. Outcome: Progressing Towards Goal  Note:   Pressure Injury Interventions:  Sensory Interventions: Assess changes in LOC, Discuss PT/OT consult with provider    Moisture Interventions: Check for incontinence Q2 hours and as needed, Limit adult briefs    Activity Interventions: PT/OT evaluation, Increase time out of bed    Mobility Interventions: PT/OT evaluation, Assess need for specialty bed    Nutrition Interventions: Document food/fluid/supplement intake                     Problem: TIA/CVA Stroke: Day 2 Until Discharge  Goal: Diagnostic Test/Procedures  Outcome: Progressing Towards Goal  Goal: Nutrition/Diet  Outcome: Progressing Towards Goal  Goal: Discharge Planning  Outcome: Progressing Towards Goal     Problem: Pain  Goal: *Control of Pain  Outcome: Progressing Towards Goal     Problem: Pressure Injury - Risk of  Goal: *Prevention of pressure injury  Description  Document Sea Scale and appropriate interventions in the flowsheet.   Outcome: Progressing Towards Goal  Note:   Pressure Injury Interventions:  Sensory Interventions: Assess changes in LOC, Discuss PT/OT consult with provider    Moisture Interventions: Check for incontinence Q2 hours and as needed, Limit adult briefs    Activity Interventions: PT/OT evaluation, Increase time out of bed    Mobility Interventions: PT/OT evaluation, Assess need for specialty bed    Nutrition Interventions: Document food/fluid/supplement intake

## 2019-10-13 NOTE — PROGRESS NOTES
Bedside and Verbal shift change report given to BRANDEE Zamarripa (oncoming nurse) by Gabbie Moore (offgoing nurse). Report included the following information SBAR, Kardex, [facility] and Cardiac Rhythm AFib.

## 2019-10-13 NOTE — PROGRESS NOTES
CM was asked to discuss SNF placement as an option with the patient. CM met with pt, pt's son, and pt's wife at the bedside to discuss SNFs. CM provided a list of SNFs for pt to review, will likely choose [place] if SNF is decided upon. Pt and family is stating that pt should be ready for discharge tomorrow and will likely return home with [address]. as originally planned. Will continue to follow tomorrow to see if pt can return home tomorrow or if he will need to go to [facility] for rehab.   Argelia Arzola, BSW, ACM

## 2019-10-13 NOTE — PROGRESS NOTES
Problem: Falls - Risk of  Goal: *Absence of Falls  Description  Document Devere [place] Fall Risk and appropriate interventions in the flowsheet. Outcome: Progressing Towards Goal  Note:   Fall Risk Interventions:  Mobility Interventions: Bed/chair exit alarm              Elimination Interventions: Bed/chair exit alarm, Call light in reach, Urinal in reach              Problem: Pressure Injury - Risk of  Goal: *Prevention of pressure injury  Description  Document Sea Scale and appropriate interventions in the flowsheet.   Outcome: Progressing Towards Goal  Note:   Pressure Injury Interventions:  Sensory Interventions: Assess changes in LOC, Discuss PT/OT consult with provider    Moisture Interventions: Offer toileting Q_hr, Limit adult briefs    Activity Interventions: PT/OT evaluation    Mobility Interventions: PT/OT evaluation    Nutrition Interventions: Document food/fluid/supplement intake                     Problem: TIA/CVA Stroke: 0-24 hours  Goal: Activity/Safety  Outcome: Progressing Towards Goal  Goal: Consults, if ordered  Outcome: Progressing Towards Goal  Goal: Diagnostic Test/Procedures  Outcome: Progressing Towards Goal  Goal: Nutrition/Diet  Outcome: Progressing Towards Goal  Goal: Discharge Planning  Outcome: Progressing Towards Goal  Goal: Medications  Outcome: Progressing Towards Goal  Goal: Respiratory  Outcome: Progressing Towards Goal  Goal: Treatments/Interventions/Procedures  Outcome: Progressing Towards Goal  Goal: Psychosocial  Outcome: Progressing Towards Goal  Goal: *Hemodynamically stable  Outcome: Progressing Towards Goal  Goal: *Neurologically stable  Description  Absence of additional neurological deficits    Outcome: Progressing Towards Goal  Goal: *Ability to perform ADLs and demonstrates progressive mobility and function  Outcome: Progressing Towards Goal  Goal: *Stroke education started(Stroke Metric)  Outcome: Progressing Towards Goal  Goal: *Dysphagia screen performed(Stroke Metric)  Outcome: Progressing Towards Goal  Goal: *Rehab consulted(Stroke Metric)  Outcome: Progressing Towards Goal     Problem: Pain  Goal: *Control of Pain  Outcome: Progressing Towards Goal

## 2019-10-14 VITALS
DIASTOLIC BLOOD PRESSURE: 59 MMHG | HEIGHT: 66 IN | HEART RATE: 62 BPM | TEMPERATURE: 98.3 F | OXYGEN SATURATION: 98 % | WEIGHT: 236.11 LBS | RESPIRATION RATE: 17 BRPM | BODY MASS INDEX: 37.95 KG/M2 | SYSTOLIC BLOOD PRESSURE: 170 MMHG

## 2019-10-14 PROCEDURE — 74011250636 HC RX REV CODE- 250/636: Performed by: PSYCHIATRY & NEUROLOGY

## 2019-10-14 PROCEDURE — 90471 IMMUNIZATION ADMIN: CPT

## 2019-10-14 PROCEDURE — 74011250636 HC RX REV CODE- 250/636: Performed by: NURSE PRACTITIONER

## 2019-10-14 PROCEDURE — 74011250637 HC RX REV CODE- 250/637: Performed by: HOSPITALIST

## 2019-10-14 PROCEDURE — 90686 IIV4 VACC NO PRSV 0.5 ML IM: CPT | Performed by: PSYCHIATRY & NEUROLOGY

## 2019-10-14 PROCEDURE — 97116 GAIT TRAINING THERAPY: CPT

## 2019-10-14 PROCEDURE — 92523 SPEECH SOUND LANG COMPREHEN: CPT | Performed by: SPEECH-LANGUAGE PATHOLOGIST

## 2019-10-14 PROCEDURE — 92610 EVALUATE SWALLOWING FUNCTION: CPT | Performed by: SPEECH-LANGUAGE PATHOLOGIST

## 2019-10-14 PROCEDURE — 74011250636 HC RX REV CODE- 250/636: Performed by: HOSPITALIST

## 2019-10-14 RX ORDER — LOSARTAN POTASSIUM 50 MG/1
25 TABLET ORAL DAILY
Qty: 30 TAB | Refills: 0 | Status: SHIPPED | OUTPATIENT
Start: 2019-10-14 | End: 2020-01-09 | Stop reason: SDUPTHER

## 2019-10-14 RX ORDER — ATORVASTATIN CALCIUM 20 MG/1
80 TABLET, FILM COATED ORAL DAILY
Status: DISCONTINUED | OUTPATIENT
Start: 2019-10-15 | End: 2019-10-14 | Stop reason: HOSPADM

## 2019-10-14 RX ORDER — ATORVASTATIN CALCIUM 80 MG/1
80 TABLET, FILM COATED ORAL DAILY
Qty: 30 TAB | Refills: 0 | Status: SHIPPED | OUTPATIENT
Start: 2019-10-15 | End: 2019-11-26

## 2019-10-14 RX ORDER — FAMOTIDINE 20 MG/1
20 TABLET, FILM COATED ORAL EVERY 12 HOURS
Status: DISCONTINUED | OUTPATIENT
Start: 2019-10-14 | End: 2019-10-14 | Stop reason: HOSPADM

## 2019-10-14 RX ADMIN — SODIUM CHLORIDE 75 ML/HR: 900 INJECTION, SOLUTION INTRAVENOUS at 08:21

## 2019-10-14 RX ADMIN — LEVOTHYROXINE SODIUM 50 MCG: 50 TABLET ORAL at 06:55

## 2019-10-14 RX ADMIN — ATORVASTATIN CALCIUM 40 MG: 20 TABLET, FILM COATED ORAL at 09:10

## 2019-10-14 RX ADMIN — ASPIRIN 81 MG: 81 TABLET, COATED ORAL at 09:10

## 2019-10-14 RX ADMIN — HYDRALAZINE HYDROCHLORIDE 10 MG: 20 INJECTION INTRAMUSCULAR; INTRAVENOUS at 17:20

## 2019-10-14 RX ADMIN — Medication 10 ML: at 14:01

## 2019-10-14 RX ADMIN — INFLUENZA VIRUS VACCINE 0.5 ML: 15; 15; 15; 15 SUSPENSION INTRAMUSCULAR at 16:39

## 2019-10-14 RX ADMIN — APIXABAN 5 MG: 5 TABLET, FILM COATED ORAL at 09:10

## 2019-10-14 RX ADMIN — FAMOTIDINE 20 MG: 20 TABLET ORAL at 11:01

## 2019-10-14 NOTE — PROGRESS NOTES
Problem: Communication Impaired (Adult)  Goal: *Acute Goals and Plan of Care (Insert Text)  Description  Speech Pathology Goals Initiated 10/14/19:  1. Patient will use compensatory word retrieval strategies with min cues   2. Patient will name complex pictures/objects with 90%  3. Patient will perform complex naming tasks (synonyms, antonyms) with 90%   Outcome: Progressing Towards Goal     SPEECH LANGUAGE PATHOLOGY EVALUATION  Patient: Soni Atkinson (89 y.o. male)  Date: 10/14/2019  Primary Diagnosis: Stroke [facility]) [I63.9]        Precautions: Fall    ASSESSMENT :  Based on the objective data described below, the patient presents with mild anomic expressive aphasia, as characterized by mild word retrieval delays and errors. He has awareness of errors and is mostly able to correct them. He is [place] and has visual deficits, but was able to participate in testing. He reports that his deficits have greatly improved since onset of symptoms. Patient will benefit from skilled intervention to address the above impairments. Patients rehabilitation potential is considered to be Excellent  Factors which may influence rehabilitation potential include:   ? None noted  ? Mental ability/status  ? Medical condition  ? Home/family situation and support systems  ? Safety awareness  ? Pain tolerance/management  ? Other:      PLAN :  Recommendations and Planned Interventions:  SLP tx for aphasia  Frequency/Duration: Patient will be followed by speech-language pathology 2 times a week to address goals. Discharge Recommendations: Home Health per patient request     SUBJECTIVE:   Patient stated It has improved so much.     OBJECTIVE:     Past Medical History:   Diagnosis Date    Arthritis     Contact dermatitis and other eczema, due to unspecified cause     Hypertension     Stroke ([facility] [address] 1997    TIA at [facility]     Thyroid disease Unspecified hypothyroidism      Past Surgical History:   Procedure Laterality Date    HX HEENT      detached retina    HX HIP REPLACEMENT  2008    right hip, [facility]     Prior Level of Function/Home Situation:   Home Situation  Home Environment: Private residence  One/Two Story Residence: One story  Living Alone: No  Support Systems: Family member(s), Spouse/Significant Other/Partner  Patient Expects to be Discharged to[de-identified] Private residence  Current DME Used/Available at Home: [place], straight, Shower chair, Walker  Mental Status:  Neurologic State: Alert  Orientation Level: Oriented X4  Cognition: Appropriate decision making, Appropriate for age attention/concentration, Follows commands     Perseveration: No perseveration noted  Safety/Judgement: Awareness of environment  Motor Speech:  Oral-Motor Structure/Motor Speech  Labial: No impairment  Dentition: Natural;Limited  Oral Hygiene: (moist oral mucosa)  Lingual: Other (comment)(possible slight deviation to R; adequate ROM/strength)  Velum: No impairment  Mandible: No impairment  Language Comprehension and Expression:  Auditory Comprehension  Auditory Impairment: No (Bedside WAB Auditory y/n 10/10; sequential commands 10'/10)  Hearing Aid: Bilateral  Verbal Expression  Primary Mode of Expression: Verbal  Initiation: No impairment  Automatic Speech Task: No impairment  Repetition: No impairment(Bedside WAB Repetition 9/10 (may have been due to [facility]))  Naming: Impaired(Bedside WAB Object Naming 10/10)  Sentence Formulation: No impairment(Bedside WAB Content 10/10;  Fluency 10/10)  Conversation: Fluent  Speech Characteristics: Word retrieval(mild)  Effective Techniques: Provide extra time  Overall Impairment: Mild        Neuro-Linguistics:                                                  Pragmatics:        Voice:                 Vocal Quality: (harsh)                                 NOMS: The NOMS functional outcome measure was used to quantify this patient's level of expressive language impairment. Based on the NOMS, the patient was determined to be at level 6 for spoken language expression. NOMS Spoken Language Expression:  Level 1 (CN): Verbalizations not meaningful to anyone. Level 2 (CM): Few attempts accurate/appropriate. Max cues to elicit automatic/imitative words/phrases. Level 3 (CL): Communication partner responsible for communication; Mod cues for words/phrases meaningful in context  Level 4 (CK): Initiate during simple, routine activities w/familiar partner. Mod cues to produce simple sentences  Level 5 (Ania Corona): Initiates communication with familiar and unfamiliar partners. Min cues for complex sentences. Level 6 (CI): Communicates for most activities. Some limitations still present for vocational/social activities. Rarely cued for complex info  Level 7 [facility]): Independent communication. MADIHA. (2003). National Outcomes Measurement System (NOMS): Adult Speech-Language Pathology User's Guide. Pain:  Pain Scale 1: Numeric (0 - 10)  Pain Intensity 1: 0     After treatment:   ?              Patient left in no apparent distress sitting up in chair  ? Patient left in no apparent distress in bed  ? Call bell left within reach  ? Nursing notified  ? Caregiver present  ? Bed alarm activated    COMMUNICATION/EDUCATION:   The patients plan of care including recommendations and planned interventions was discussed with: Registered Nurse. Patient was educated regarding His deficit(s) of aphasia as this relates to His diagnosis of CVA. He demonstrated Excellent understanding as evidenced by verbalization. ? Patient/family have participated as able in goal setting and plan of care. ?  Patient/family agree to work toward stated goals and plan of care. ?  Patient understands intent and goals of therapy, but is neutral about his/her participation. ?   Patient is unable to participate in goal setting and plan of care.     Thank you for this referral.  Hiwot Garcia, SLP  Time Calculation: 25 mins

## 2019-10-14 NOTE — DISCHARGE SUMMARY
Discharge Summary       PATIENT ID: Leyla Moore  MRN: [id number]   YOB: 1930    DATE OF ADMISSION: 10/12/2019  8:40 AM    DATE OF DISCHARGE: 10/14/2019   PRIMARY CARE PROVIDER: Malachi Chun NP     ATTENDING PHYSICIAN: Ar Larkin MD  DISCHARGING PROVIDER: Konstantin Pinedo MD    To contact this individual call [phone] and ask the  to page. If unavailable ask to be transferred the Adult Hospitalist Department. CONSULTATIONS: IP CONSULT TO NEUROINTERVENTIONAL SURGERY  IP CONSULT TO NEUROLOGY    PROCEDURES/SURGERIES: * No surgery found *    [address] COURSE:     The patient is an 80-year-old male with past medical history significant for stroke, paroxysmal AFib, thyroid disease and hypertension, presents with the complaints of aphasia, last known well was 20 hours ago.  The patient had difficulty finding words, slurred speech.  The patient states that he is noncompliant with his Eliquis last night and few days before as well. Stevie Head takes aspirin in the morning when he feels like he is having a stroke, presented with mild aphasia and a code stroke was called in the ED.  CT, CTA was done, and was run through the teleneurologist as well as Neurointerventional and the patient was admitted for further management.     Expressive aphasia due to acute stroke  -Continue on aspirin, eliquis and lipitor 80 mg  -CT head focal occlusion of distal left posterior M2 branch, no perfusion abnormality, severe focal stenosis in the mid basilar artery which diffusely diseased   -CT head no acute intracranial hemorrhage, mass or infarct  -MRI several small acute lacunar infarction in the left parietal white matter as well as a small cortical infarct in the left insula. Otherwise negative.   -PT/OT and speech therapy  -Neurology on board  Left M3/M4 branch occlusion   -continue aspirin, lipitor and eliquis  -seen by NIS and no intervention at this time   A Fib, rate controlled  -continue eliquis  -EKG a fib with premature ventricular or aberrantly conducted complex, left axis deviation  HTN uncontrolled   -BP improving, add losartan 50 mg po q daily, on prn hydralazine   Hypothyroidism   -continue synthroid         Code status: Full Code  DVT prophylaxis: SNF    DISCHARGE DIAGNOSES / PLAN:      Expressive aphasia due to acute stroke  -Continue on aspirin, eliquis and lipitor 80 mg  -home PT/OT  -outpatient follow up with Neurology  Left M3/M4 branch occlusion   -continue aspirin, lipitor and eliquis  A Fib, rate controlled  -continue eliquis  HTN uncontrolled   -continue losartan 50 mg po daily  Hypothyroidism   -continue synthroid     PENDING TEST RESULTS:   At the time of discharge the following test results are still pending: None    FOLLOW UP APPOINTMENTS:    Follow-up Information     Follow up With Specialties Details Why Contact Rusty    [address]  [place] [address] Se[address] Mahoney, NP Nurse Practitioner On 10/18/2019 Hospital f/u PCP appointment Friday, 10/18/19 @ 10:30 a.m. [address] [place] [address] [id number]               DIET: Cardiac Diet    ACTIVITY: Activity as tolerated    WOUND CARE: None    EQUIPMENT needed: None      DISCHARGE MEDICATIONS:  Discharge Medication List as of 10/14/2019  4:53 PM      START taking these medications    Details   atorvastatin (LIPITOR) 80 mg tablet Take 1 Tab by mouth daily. , Print, Disp-30 Tab, R-0         CONTINUE these medications which have NOT CHANGED    Details   triamcinolone acetonide (KENALOG) 0.1 % ointment Apply  to affected area two (2) times a day. use thin layer, Normal, Disp-454 g, R-0      apixaban (ELIQUIS) 5 mg tablet Take 1 Tab by mouth two (2) times a day.  For stroke prevention, Normal, Disp-180 Tab, R-1      levothyroxine (SYNTHROID) 50 mcg tablet TAKE 1 TABLET DAILY BEFORE BREAKFAST, Normal, Disp-90 Tab, R-1 ketoconazole (NIZORAL) 2 % topical cream Apply  to affected area daily. , Normal, Disp-60 g, R-1      tretinoin (RETIN-A) 0.025 % topical cream Apply  to affected area nightly. , Print, Disp-45 g, R-3      vit B Cmplx 3-FA-Vit C-Biotin (NEPHRO MANDO RX) 1- mg-mg-mcg tablet Take 1 tablet by mouth daily. , Historical Med      aspirin (ASPIRIN) 325 mg tablet Take 325 mg by mouth daily. , Historical Med      DOCOSAHEXANOIC ACID/EPA (FISH OIL PO) Take  by mouth., Historical Med      zinc 50 mg capsule Take 50 mg by mouth daily. Reports taking once a week., Historical Med      vitamin E (AQUA GEMS) 400 unit capsule Take 400 Units by mouth daily. Reports taking 1-2 times a week., Historical Med      ascorbic acid (VITAMIN C) 500 mg tablet Take 500 mg by mouth daily. , Historical Med      folic acid (FOLVITE) 1 mg tablet Take  by mouth daily. , Historical Med      SAW PALMETTO PO Take  by mouth daily. , Historical Med      CALCIUM/MAGNESIUM (DOLOMITE PO) Take  by mouth.  , Historical Med               NOTIFY YOUR PHYSICIAN FOR ANY OF THE FOLLOWING:   Fever over 101 degrees for 24 hours. Chest pain, shortness of breath, fever, chills, nausea, vomiting, diarrhea, change in mentation, falling, weakness, bleeding. Severe pain or pain not relieved by medications. Or, any other signs or symptoms that you may have questions about.     DISPOSITION:   x Home With:  x OT x PT  HH  RN       Long term SNF/Inpatient Rehab    Independent/assisted living    Hospice    Other:       PATIENT CONDITION AT DISCHARGE:     Functional status    Poor    x Deconditioned     Independent      Cognition    x Lucid     Forgetful     Dementia      Catheters/lines (plus indication)    Iniguez     PICC     PEG    x None      Code status   x  Full code     DNR      PHYSICAL EXAMINATION AT DISCHARGE:    Patient Vitals for the past 24 hrs:   Temp Pulse Resp BP SpO2   10/14/19 1818 -- 62 -- 170/59 --   10/14/19 1720 -- 61 -- -- --   10/14/19 1710 98.3 °F (36.8 °C) -- 17 186/65 98 %   10/14/19 1346 97.7 °F (36.5 °C) 61 19 163/70 98 %   10/14/19 0959 98.8 °F (37.1 °C) 70 17 166/69 97 %   10/14/19 0948 98.2 °F (36.8 °C) 63 28 172/50 --   10/14/19 0549 97.7 °F (36.5 °C) (!) 55 22 166/61 96 %   10/14/19 0215 98.8 °F (37.1 °C) 60 20 157/72 97 %   10/13/19 2210 98.2 °F (36.8 °C) 62 17 154/52 97 %     General:          Alert, cooperative, no distress, appears stated age. HEENT:           Atraumatic, anicteric sclerae, pink conjunctivae                          No oral ulcers, mucosa moist, throat clear, dentition fair  Neck:               Supple, symmetrical  Lungs:             Clear to auscultation bilaterally. No Wheezing or Rhonchi. No rales. Chest wall:      No tenderness  No Accessory muscle use. Heart:              Regular  rhythm,  No  murmur   No edema  Abdomen:        Soft, non-tender. Not distended. Bowel sounds normal  Extremities:     No cyanosis. No clubbing,                            Skin turgor normal, Capillary refill normal  Skin:                Not pale. Not Jaundiced  No rashes   Psych:             Not anxious or agitated. Neurologic:      Alert, moves all extremities, answers questions appropriately and responds to commands       No results found for this or any previous visit (from the past 24 hour(s)). CHRONIC MEDICAL DIAGNOSES:  Problem List as of 10/14/2019 Date Reviewed: 9/28/2019          Codes Class Noted - Resolved    * (Principal) Stroke [facility]) ICD-10-CM: I63.9  ICD-9-CM: 434.91  10/12/2019 - Present        Severe obesity (BMI 35.0-39. 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

## 2019-10-14 NOTE — PROGRESS NOTES
Problem: Falls - Risk of  Goal: *Absence of Falls  Description  Document Lianna [place]aven Fall Risk and appropriate interventions in the flowsheet. Outcome: Progressing Towards Goal  Note:   Fall Risk Interventions:  Mobility Interventions: Bed/chair exit alarm         Medication Interventions: Bed/chair exit alarm    Elimination Interventions: Bed/chair exit alarm              Problem: Pressure Injury - Risk of  Goal: *Prevention of pressure injury  Description  Document Sea Scale and appropriate interventions in the flowsheet. Outcome: Progressing Towards Goal  Note:   Pressure Injury Interventions:  Sensory Interventions: Assess changes in LOC    Moisture Interventions: Check for incontinence Q2 hours and as needed    Activity Interventions: PT/OT evaluation    Mobility Interventions: PT/OT evaluation    Nutrition Interventions: Document food/fluid/supplement intake                     Problem: Patient Education: Go to Patient Education Activity  Goal: Patient/Family Education  Outcome: Progressing Towards Goal     Problem: TIA/CVA Stroke: Day 2 Until Discharge  Goal: Diagnostic Test/Procedures  Outcome: Progressing Towards Goal  Goal: Nutrition/Diet  Outcome: Progressing Towards Goal  Goal: Discharge Planning  Outcome: Progressing Towards Goal  Goal: Respiratory  Outcome: Progressing Towards Goal  Goal: Treatments/Interventions/Procedures  Outcome: Progressing Towards Goal  Goal: Psychosocial  Outcome: Progressing Towards Goal  Goal: *Verbalizes anxiety and depression are reduced or absent  Outcome: Progressing Towards Goal  Goal: *Absence of aspiration  Outcome: Progressing Towards Goal     Problem: Pain  Goal: *Control of Pain  Outcome: Progressing Towards Goal     Problem: Pressure Injury - Risk of  Goal: *Prevention of pressure injury  Description  Document Sea Scale and appropriate interventions in the flowsheet.   Outcome: Progressing Towards Goal  Note:   Pressure Injury Interventions:  Sensory Interventions: Assess changes in LOC    Moisture Interventions: Check for incontinence Q2 hours and as needed    Activity Interventions: PT/OT evaluation    Mobility Interventions: PT/OT evaluation    Nutrition Interventions: Document food/fluid/supplement intake                     Problem: Patient Education: Go to Patient Education Activity  Goal: Patient/Family Education  Outcome: Progressing Towards Goal

## 2019-10-14 NOTE — PROGRESS NOTES
Hospitalist Progress Note  Senthil Schofield MD  Answering service: [phone] OR [phone] from in house phone        Date of Service:  10/14/2019  NAME:  Markus Burnett  :  1930  MRN:  [id number]      Admission Summary:     The patient is an 63-year-old male with past medical history significant for stroke, paroxysmal AFib, thyroid disease and hypertension, presents with the complaints of aphasia, last known well was 20 hours ago. The patient had difficulty finding words, slurred speech. The patient states that he is noncompliant with his Eliquis last night and few days before as well. He takes aspirin in the morning when he feels like he is having a stroke, presented with mild aphasia and a code stroke was called in the ED.  CT, CTA was done, and was run through the teleneurologist as well as Neurointerventional and the patient was admitted for further management. Interval history / Subjective:     He said he feels better, no chest pain or shortness of breath, patient doesn't want to go rehab, he said his sone live with him and will take care of him     Assessment & Plan:     Expressive aphasia due to acute stroke  -on aspirin, eliquis and lipitor 80 mg  -CT head focal occlusion of distal left posterior M2 branch, no perfusion abnormality, severe focal stenosis in the mid basilar artery which diffusely diseased   -CT head no acute intracranial hemorrhage, mass or infarct  -MRI several small acute lacunar infarction in the left parietal white matter as well as a small cortical infarct in the left insula. Otherwise negative.   -PT/OT and speech therapy  -Neurology on board    Left M3/M4 branch occlusion   -on aspirin, lipitor and eliquis  -seen by NIS and no intervention at this time     A Fib, rate controlled  -on eliquis  -EKG a fib with premature ventricular or aberrantly conducted complex, left axis deviation    HTN uncontrolled   -BP improving, on prn hydralazine     Hypothyroidism   -continue synthroid       Code status: Full Code  DVT prophylaxis: SNF    Care Plan discussed with: Patient/Family, Nurse and   Disposition: Home with home health      Hospital Problems  Date Reviewed: 9/28/2019          Codes Class Noted POA    * (Principal) Stroke [facility]) ICD-10-CM: I63.9  ICD-9-CM: 434.91  10/12/2019 Unknown                Vital Signs:    Last 24hrs VS reviewed since prior progress note. Most recent are:  Visit Vitals  /70 (BP 1 Location: Left arm, BP Patient Position: At rest)   Pulse 61   Temp 97.7 °F (36.5 °C)   Resp 19   Ht 5' 6\" (1.676 m)   Wt 107 kg (236 lb)   SpO2 98%   BMI 38.09 kg/m²         Intake/Output Summary (Last 24 hours) at 10/14/2019 1436  Last data filed at 10/14/2019 1409  Gross per 24 hour   Intake 480 ml   Output 1475 ml   Net -995 ml        Physical Examination:             Constitutional:  No acute distress, cooperative, pleasant    ENT:  Oral mucous moist, oropharynx benign. Resp:  CTA bilaterally. No wheezing/rhonchi/rales. No accessory muscle use   CV:  Irregular rhythm, normal rate, no murmurs, gallops, rubs    GI:  Soft, non distended, non tender. normoactive bowel sounds, no hepatosplenomegaly     Musculoskeletal:  No edema    Neurologic:  Moves all extremities.   AAOx3, CN II-XII reviewed     Skin:  dry, bruises lower extremities       Data Review:    Review and/or order of clinical lab test  Review and/or order of tests in the radiology section of CPT  Review and/or order of tests in the medicine section of CPT      Labs:     Recent Labs     10/12/19  0855   WBC 12.1*   HGB 11.9*   HCT 36.5*        Recent Labs     10/12/19  0855      K 4.0      CO2 29   BUN 15   CREA 1.11   *   CA 9.0     Recent Labs     10/12/19  0855   SGOT 63*   ALT 91*   AP 50   TBILI 0.8   TP 7.3   ALB 3.8   GLOB 3.5     Recent Labs     10/12/19  0855   INR 1.2*   PTP 12.4*      No results for input(s): FE, TIBC, PSAT, FERR in the last 72 hours. No results found for: FOL, RBCF   No results for input(s): PH, PCO2, PO2 in the last 72 hours. No results for input(s): CPK, CKNDX, TROIQ in the last 72 hours.     No lab exists for component: CPKMB  Lab Results   Component Value Date/Time    Cholesterol, total 159 10/12/2019 08:55 AM    HDL Cholesterol 51 10/12/2019 08:55 AM    LDL, calculated 83.4 10/12/2019 08:55 AM    Triglyceride 123 10/12/2019 08:55 AM    CHOL/HDL Ratio 3.1 10/12/2019 08:55 AM     Lab Results   Component Value Date/Time    Glucose (POC) 117 (H) 10/12/2019 08:41 AM     No results found for: COLOR, APPRN, SPGRU, REFSG, MICKEY, PROTU, GLUCU, KETU, BILU, UROU, JABIER, LEUKU, GLUKE, EPSU, BACTU, WBCU, RBCU, CASTS, UCRY      Medications Reviewed:     Current Facility-Administered Medications   Medication Dose Route Frequency    famotidine (PEPCID) tablet 20 mg  20 mg Oral Q12H    hydrALAZINE (APRESOLINE) 20 mg/mL injection 10 mg  10 mg IntraVENous Q6H PRN    influenza vaccine 2019-20 (6 mos+)(PF) (FLUARIX/FLULAVAL/FLUZONE QUAD) injection 0.5 mL  0.5 mL IntraMUSCular PRIOR TO DISCHARGE    sodium chloride (NS) flush 5-40 mL  5-40 mL IntraVENous Q8H    sodium chloride (NS) flush 5-40 mL  5-40 mL IntraVENous PRN    0.9% sodium chloride infusion  75 mL/hr IntraVENous CONTINUOUS    aspirin delayed-release tablet 81 mg  81 mg Oral DAILY    atorvastatin (LIPITOR) tablet 40 mg  40 mg Oral DAILY    apixaban (ELIQUIS) tablet 5 mg  5 mg Oral Q12H    levothyroxine (SYNTHROID) tablet 50 mcg  50 mcg Oral ACB     ______________________________________________________________________  EXPECTED LENGTH OF STAY: 2d 2h  ACTUAL LENGTH OF STAY:          2                 Ursula Taylor MD

## 2019-10-14 NOTE — PROGRESS NOTES
Problem: Falls - Risk of  Goal: *Absence of Falls  Description  Document Tim Dawson Fall Risk and appropriate interventions in the flowsheet. Outcome: Progressing Towards Goal  Note:   Fall Risk Interventions:  Mobility Interventions: Assess mobility with egress test, Bed/chair exit alarm, Patient to call before getting OOB         Medication Interventions: Patient to call before getting OOB, Teach patient to arise slowly    Elimination Interventions: Call light in reach, Toilet paper/wipes in reach, Toileting schedule/hourly rounds              Problem: Pressure Injury - Risk of  Goal: *Prevention of pressure injury  Description  Document Sea Scale and appropriate interventions in the flowsheet.   Outcome: Progressing Towards Goal  Note:   Pressure Injury Interventions:  Sensory Interventions: Assess changes in LOC, Keep linens dry and wrinkle-free, Maintain/enhance activity level, Minimize linen layers    Moisture Interventions: Minimize layers, Check for incontinence Q2 hours and as needed    Activity Interventions: Increase time out of bed    Mobility Interventions: HOB 30 degrees or less, Float heels    Nutrition Interventions: Document food/fluid/supplement intake, Offer support with meals,snacks and hydration                     Problem: TIA/CVA Stroke: Day 2 Until Discharge  Goal: Diagnostic Test/Procedures  Outcome: Progressing Towards Goal  Goal: Nutrition/Diet  Outcome: Progressing Towards Goal  Goal: Discharge Planning  Outcome: Progressing Towards Goal  Goal: Respiratory  Outcome: Progressing Towards Goal  Goal: Treatments/Interventions/Procedures  Outcome: Progressing Towards Goal  Goal: *Verbalizes anxiety and depression are reduced or absent  Outcome: Progressing Towards Goal  Goal: *Absence of aspiration  Outcome: Progressing Towards Goal     Problem: Pain  Goal: *Control of Pain  Outcome: Progressing Towards Goal     Problem: Pressure Injury - Risk of  Goal: *Prevention of pressure injury  Description  Document Sea Scale and appropriate interventions in the flowsheet.   Outcome: Progressing Towards Goal  Note:   Pressure Injury Interventions:  Sensory Interventions: Assess changes in LOC, Keep linens dry and wrinkle-free, Maintain/enhance activity level, Minimize linen layers    Moisture Interventions: Minimize layers, Check for incontinence Q2 hours and as needed    Activity Interventions: Increase time out of bed    Mobility Interventions: HOB 30 degrees or less, Float heels    Nutrition Interventions: Document food/fluid/supplement intake, Offer support with meals,snacks and hydration

## 2019-10-14 NOTE — PROGRESS NOTES
HEYDI    Home today with [facility]. Confirmed discharge plan with [facility] Liaison. Patient to receive [place] PT/OT, SN and SLP. Patient is in agreement with plan. Called patient's sonLeona Crew [phone] to confirm discharge plan. No answer. Left voice message. Family will transport. 16:15PM- Placed 2nd call to patient's sonLeona Crew P# [phone] to confirm plan. No answer. Left voice message. Notified RN. CM available in case needs arise.  Gita Menchaca, MSW,CRM

## 2019-10-14 NOTE — PROGRESS NOTES
Bedside shift change report given to Gopal Mustafa RN (oncoming nurse) by Thierry Martinez RN (offgoing nurse). Report included the following information SBAR, Kardex and MAR.

## 2019-10-14 NOTE — PROGRESS NOTES
Bedside and Verbal shift change report given to Marilou RN (oncoming nurse) by Zechariah Licea RN (offgoing nurse). Report included the following information SBAR, Kardex, MAR and Cardiac Rhythm A Fib.

## 2019-10-14 NOTE — PROGRESS NOTES
Hospital follow-up PCP transitional care appointment has been scheduled with Dr. Germaine Razo for Friday, 10/18/19 at 10:30 a.m. Pending patient discharge.   Gene Godinez, Care Management Specialist.

## 2019-10-14 NOTE — PROGRESS NOTES
Problem: Mobility Impaired (Adult and Pediatric)  Goal: *Acute Goals and Plan of Care (Insert Text)  Description  FUNCTIONAL STATUS PRIOR TO ADMISSION: Patient was modified independent using a Rollator for functional mobility. HOME SUPPORT PRIOR TO ADMISSION: The patient lived with wife but did not require assist. 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 Patient will move from supine to sit and sit to supine  in bed with modified independence within 7 day(s). 2.  Patient will transfer from bed to chair and chair to bed with modified independence using the least restrictive device within 7 day(s). 3.  Patient will perform sit to stand with modified independence within 7 day(s). 4.  Patient will ambulate with modified independence for 400 feet with the least restrictive device within 7 day(s). Outcome: Progressing Towards Goal   PHYSICAL THERAPY TREATMENT  Patient: Velvet Nevarez (33 y.o. male)  Date: 10/14/2019  Diagnosis: Stroke [facility]) [I63.9] Stroke [facility])       Precautions: Fall  Chart, physical therapy assessment, plan of care and goals were reviewed. ASSESSMENT  Patient continues with skilled PT services and is progressing towards goals. Patient continues to require frequent cues for safe use of RW during transfers (hand placement) and during gait (advances device too far at times, veers to the right and does not attend fully to right side). Patient did not exhibit any overt LOB but diminished safety awareness. Discussed home situation, patient notes that he does not wish to d/c to a SNF, prefers to go home. Reports that his son lives with him and his wife and has been helping to care for them for the past year or more. Relayed this information to the CM, previously believed patient and his wife (who has dementia) lived alone.   Recommend if patient is to d/c home versus SNF that son be able to provide 24/7 assistance and supervision due to intermittent safety issues with mobility as well as cognitive safety concerns. Current Level of Function Impacting Discharge (mobility/balance): CGA for transfers and gait with RW x 300'    Other factors to consider for discharge: he is CG for his wife who has dementia, son is present (does not work) most of the time to assist         PLAN :  Patient continues to benefit from skilled intervention to address the above impairments. Continue treatment per established plan of care. to address goals. Recommendation for discharge: (in order for the patient to meet his/her long term goals)  Physical therapy at least 2 days/week in the home     This discharge recommendation:  Has been made in collaboration with the attending provider and/or case management    Equipment recommendations for successful discharge (if) home: patient owns DME required for discharge       SUBJECTIVE:   Patient stated I want to go home, I can get rehab there    OBJECTIVE DATA SUMMARY:   Critical Behavior:  Neurologic State: Alert  Orientation Level: Oriented X4  Cognition: Appropriate decision making, Appropriate for age attention/concentration, Follows commands  Safety/Judgement: Awareness of environment  Functional Mobility Training:  Bed Mobility:     Supine to Sit: Stand-by assistance; Adaptive equipment  Sit to Supine: Stand-by assistance  Scooting: Stand-by assistance        Transfers:  Sit to Stand: Contact guard assistance  Stand to Sit: Contact guard assistance        Bed to Chair: Contact guard assistance                    Balance:  Sitting: Intact; Without support  Standing: Intact; With support  Ambulation/Gait Training:     Assistive Device: Gait belt;Walker, rolling  Ambulation - Level of Assistance: Contact guard assistance        Gait Abnormalities: Path deviations;Decreased step clearance(occasionally veers to right)              Speed/[place]: Pace decreased (<100 feet/min)  Step Length: Right shortened;Left shortened               Activity Tolerance:   Fair  Please refer to the flowsheet for vital signs taken during this treatment.     After treatment patient left in no apparent distress:   Supine in bed, Call bell within reach and Bed / chair alarm activated    COMMUNICATION/COLLABORATION:   The patients plan of care was discussed with: Occupational Therapist, Registered Nurse, Physician and     Nicholas Germain, PT   Time Calculation: 18 mins

## 2019-10-14 NOTE — DISCHARGE INSTRUCTIONS
Discharge Instructions       PATIENT ID: Aristides Osman  MRN: [id number]   YOB: 1930    DATE OF ADMISSION: 10/12/2019  8:40 AM    DATE OF DISCHARGE: 10/14/2019    PRIMARY CARE PROVIDER: Nesha Travis NP     ATTENDING PHYSICIAN: No att. providers found  DISCHARGING PROVIDER: Cesar Pride MD    To contact this individual call [phone] and ask the  to page. If unavailable ask to be transferred the Adult Hospitalist Department. 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  Left M3/M4 branch occlusion   A Fib, rate controlled  HTN uncontrolled   Hypothyroidism     CONSULTATIONS: IP CONSULT TO NEUROINTERVENTIONAL SURGERY  IP CONSULT TO NEUROLOGY    PROCEDURES/SURGERIES: * No surgery found *    PENDING TEST RESULTS:   At the time of discharge the following test results are still pending: none     FOLLOW UP APPOINTMENTS:   Follow-up Information     Follow up With Specialties Details Why Contact Info    [facility] [place] [address] [phone]    Nesha Travis NP Nurse Practitioner In one week   [address] [phone]             ADDITIONAL CARE RECOMMENDATIONS:     DIET: Cardiac Diet    ACTIVITY: Activity as tolerated    WOUND CARE: None    EQUIPMENT needed: None      DISCHARGE MEDICATIONS:   See Medication Reconciliation Form    · It is important that you take the medication exactly as they are prescribed. · Keep your medication in the bottles provided by the pharmacist and keep a list of the medication names, dosages, and times to be taken in your wallet. · Do not take other medications without consulting your doctor. NOTIFY YOUR PHYSICIAN FOR ANY OF THE FOLLOWING:   Fever over 101 degrees for 24 hours. Chest pain, shortness of breath, fever, chills, nausea, vomiting, diarrhea, change in mentation, falling, weakness, bleeding.  Severe pain or pain not relieved by medications. Or, any other signs or symptoms that you may have questions about.       DISPOSITION:  x  Home With:  x OT x PT  HH  RN       SNF/Inpatient Rehab/LTAC    Independent/assisted living    Hospice    Other:     CDMP Checked:   Yes x     PROBLEM LIST Updated:  Yes x       Signed:   Erika Shukla MD  10/14/2019  2:52 PM

## 2019-10-14 NOTE — PROGRESS NOTES
Spiritual Care Partner Volunteer visited patient in room 678 on 10.14.19. Documented by: : Rev. Nish Garcia.  Tayla Hart; [facility], to contact [address] Vivek Hernandez call: [phone]

## 2019-10-14 NOTE — ROUTINE PROCESS
Stroke Education documented in Patient Education: YES  Core Measures Documented in Connect Care:  Risk Factors: YES  Warning signs of stroke: YES  When to Activate 911: YES  Medication Education for Risk Factors: YES  Smoking cessation if applicable: YES  Written Education Given:  YES    Discharge NIH Completed: YES  Score: 3    BRAINS: YES    Follow Up Appointment Made: NO  Date/Time if applicable: lakia         Bedside RN performed patient education and medication education. Discharge concerns initiated and discussed with patient and patient's son (will be his caregiver), including clarification on \"who\" assists the patient at their home and instructions for when the home going patient should call their provider after discharge. Opportunity for questions and clarification was provided. Patient receptive to education: YES  Patient stated: \"I never take a statin, I drink red wine\"  Barriers to Education: none  Diagnosis Education given:  YES    Length of stay: 2  Expected Day of Discharge: 2  Ask if they have \"Help at Home\" & add to white board?   YES    Education Day #: 2    Medication Education Given:  YES  M in the box Medication name: lipitor    Pt aware of HCAHPS survey: YES

## 2019-10-14 NOTE — PROGRESS NOTES
Speech Pathology bedside swallow evaluation/discharge  Patient: Lydia Jeter (1 y.o. male)  Date: 10/14/2019  Primary Diagnosis: Stroke [facility]) [I63.9]       Precautions:   Fall    ASSESSMENT :  Based on the objective data described below, the patient presents with normal oropharyngeal swallowing skills. He reports a globus sensation ever since onset of stroke symptoms. He frequently and harshly clears his throat, seemingly unrelated to PO. He reports that this globus sensation does not worsen with PO. He occasionally cleared his throat after a short delay after drinking thin liquids, but breath sounds were clear/+expiratory puff per cervical auscultation, adequate hyolaryngeal excursion, adequate timing of swallow initiation. Most of his throat clearing was done prior to PO and long after PO trials. Patient also frequently belches after swallowing and reports a hiatal hernia. He reported feeling uncomfortable with his positioning in bed while eating due to the hiatal hernia/reflux issues. SLP notified RN and will notify MD of patient's complaints. He may benefit from intervention for possible acid reflux. Skilled acute therapy provided by a speech-language pathologist is not indicated at this time for swallowing. He will be evaluated for speech/language. PLAN :  Recommendations:  Regular diet/thin liquids  Sit upright 90 degrees during and 45 min after PO  Reflux precautions  Discharge Recommendations: None     SUBJECTIVE:   Patient stated It started with my stroke.     OBJECTIVE:     Past Medical History:   Diagnosis Date    Arthritis     Contact dermatitis and other eczema, due to unspecified cause     Hypertension     Stroke ([address] 1997    TIA at [facility] Thyroid disease     Unspecified hypothyroidism      Past Surgical History:   Procedure Laterality Date    HX HEENT      detached retina    HX HIP REPLACEMENT  2008    right hip, [facility] [place]     Prior Level of Function/Home Situation:   Home Situation  Home Environment: Private residence  One/Two Story Residence: One story  Living Alone: No  Support Systems: Family member(s), Spouse/Significant Other/Partner  Patient Expects to be Discharged to[de-identified] Private residence  Current DME Used/Available at Home: Anabel Ruiz, straight, Shower chair, Walker  Diet prior to admission: regular/thin  Current Diet:  Regular/thin  Cognitive and Communication Status:  Neurologic State: Alert  Orientation Level: Oriented X4  Cognition: Appropriate decision making, Appropriate for age attention/concentration, Follows commands     Perseveration: No perseveration noted  Safety/Judgement: Awareness of environment  Oral Assessment:  Oral Assessment  Labial: No impairment  Dentition: Natural;Limited  Oral Hygiene: (moist oral mucosa)  Lingual: Other (comment)(possible slight deviation to R; adequate ROM/strength)  Velum: No impairment  Mandible: No impairment  P.O. Trials:  Patient Position: (upright in bed)  Vocal quality prior to P.O.: (harsh)  Consistency Presented: Solid; Thin liquid; Nectar thick liquid  How Presented: Self-fed/presented;Cup/sip; Successive swallows;Straw     Bolus Acceptance: No impairment  Bolus Formation/Control: No impairment     Propulsion: No impairment  Oral Residue: None  Initiation of Swallow: No impairment  Laryngeal Elevation: Functional  Aspiration Signs/Symptoms: (inconsistent delayed throat clear seems not related to PO)  Pharyngeal Phase Characteristics: Foreign body sensation(globus sensation seemingly unrelated to PO; belching )             Oral Phase Severity: No impairment  Pharyngeal Phase Severity : No impairment  NOMS:   The NOMS functional outcome measure was used to quantify this patient's level of swallowing impairment.   Based on the NOMS, the patient was determined to be at level 6 for swallow function     NOMS Swallowing Levels:  Level 1 (CN): NPO  Level 2 (CM): NPO but takes consistency in therapy  Level 3 (CL): Takes less than 50% of nutrition p.o. and continues with nonoral feedings; and/or safe with mod cues; and/or max diet restriction  Level 4 (CK): Safe swallow but needs mod cues; and/or mod diet restriction; and/or still requires some nonoral feeding/supplements  Level 5 (CJ): Safe swallow with min diet restriction; and/or needs min cues  Level 6 (CI): Independent with p.o.; rare cues; usually self cues; may need to avoid some foods or needs extra time  Level 7 ([address] [place] [address]): Independent for all p.o.  MADIHA. (2003). National Outcomes Measurement System (NOMS): Adult Speech-Language Pathology User's Guide. Pain:  Pain Scale 1: Numeric (0 - 10)  Pain Intensity 1: 0     After treatment:   [] Patient left in no apparent distress sitting up in chair  [x] Patient left in no apparent distress in bed  [x] Call bell left within reach  [x] Nursing notified  [] Caregiver present  [] Bed alarm activated    COMMUNICATION/EDUCATION:   The patients plan of care including findings, recommendations, and recommended diet changes were discussed with: Registered Nurse and Physician. [] Posted safety precautions in patient's room. [x] Patient/family have participated as able and agree with findings and recommendations. [] Patient is unable to participate in plan of care at this time.     Thank you for this referral.  Jae Christian, SLP  Time Calculation: 25 mins

## 2019-10-15 ENCOUNTER — PATIENT OUTREACH (OUTPATIENT)
Dept: CASE MANAGEMENT | Age: 84
End: 2019-10-15

## 2019-10-15 ENCOUNTER — HOME CARE VISIT (OUTPATIENT)
Dept: SCHEDULING | Facility: HOME HEALTH | Age: 84
End: 2019-10-15
Payer: MEDICARE

## 2019-10-15 PROCEDURE — 400013 HH SOC

## 2019-10-15 PROCEDURE — G0299 HHS/HOSPICE OF RN EA 15 MIN: HCPCS

## 2019-10-15 NOTE — LETTER
10/15/2019 12:07 PM 
 
Mr. Lewis Koenig [address]  [id number] Yariel Hand MD 
[place]rolog[place] in [email or web address] Address: [address] [place] [address] Phone: [phone] Sincerely, Parker Good RN

## 2019-10-15 NOTE — ACP (ADVANCE CARE PLANNING)
Nurse navigator note - cardiology  Pt does not have ACP - will provide information and encourage pt to discuss with pcp -   pcp appt 10/18 Lelo Vital NP

## 2019-10-15 NOTE — PROGRESS NOTES
Hospital Discharge Follow-Up      Date/Time:  10/15/2019 11:25 AM    Patient was admitted to [facility] on 10/12 and discharged on 10/14/19  for CVA - with reported medication non compliance. The physician discharge summary was available at the time of outreach. Patient was contacted within 1  business days of discharge. Call to and reached pt - and home health - Walker Monge - was in the home at the time of the call. Top Challenges reviewed with the provider   CVA - reported non compliance with Eliquis at admission   Advance Care Planning:   Does patient have an Advance Directive:  not on file; education provided        Method of communication with provider :chart routing, staff message, phone    Inpatient RRAT score: 13  Was this a readmission? no   Patient stated reason for the readmission: n/a    Care Transition Nurse (CTN) contacted the patient by telephone to perform post hospital discharge assessment. Verified name and  with patient as identifiers. Provided introduction to self, and explanation of the CTN role. Patient received hospital discharge instructions. CTN reviewed discharge instructions and red flags with patient who verbalized understanding. Patient given an opportunity to ask questions and does not have any further questions or concerns at this time. The patient agrees to contact the PCP office for questions related to their healthcare. CTN provided contact information for future reference.     Disease Specific:   CVA    Patients top risk factors for readmission:  functional cognitive ability, ineffective coping, lack of knowledge about disease, medical condition, medication management, utilization of services    Home Health orders at discharge: PT, OT, SN (speech through OT)  [address]: [facility]  Date of initial visit: 10/15/19    [address] ordered at discharge: none  [address]: none/  [facility] received: n/a    Medication(s):   New Medications at Discharge: Lipitor 80 mg hs, every day; Losartan 50 mg - 1/2 tab every day,   Changed Medications at Discharge: none  Discontinued Medications at Discharge: none  Pt had not been taking his Eliquis daily or properly -   NN asked [facility] nurse to monitor his med system - to make sure his compliant/ reliable. Verified that Losartan was at the [address] to be picked up -     Medication reconciliation was performed with caregiver, who verbalizes understanding of administration of home medications. There were no barriers to obtaining medications identified at this time. Referral to Pharm D needed: no     [facility] follow up appointment(s):   Future Appointments   Date Time Provider Jose Samson   10/17/2019 To Be Determined JARRED Celaya [facility]   10/18/2019 10:30 AM Eran Garcia, NP [facility] VICKI LEE      Non-[facility] follow up appointment(s): follow up neurology - to be determined. Expressive aphasia due to acute stroke  -Continue on aspirin, eliquis and lipitor 80 mg  -home PT/OT  -outpatient follow up with Neurology-seen in hospital - seen by Tiffani Cunha - neurology.     Tiffani Cunha MD  Neurologist in [place], [place]  Address: [address] [place] [address], [address]  Phone: [phone] Health:  information provided as a resource       Linda Maya RN , [facility], [facility]  Transition care nurse/ [facility]  [phone]

## 2019-10-16 ENCOUNTER — HOME CARE VISIT (OUTPATIENT)
Dept: SCHEDULING | Facility: HOME HEALTH | Age: 84
End: 2019-10-16
Payer: MEDICARE

## 2019-10-16 ENCOUNTER — TELEPHONE (OUTPATIENT)
Dept: FAMILY MEDICINE CLINIC | Age: 84
End: 2019-10-16

## 2019-10-16 VITALS
SYSTOLIC BLOOD PRESSURE: 138 MMHG | TEMPERATURE: 98.4 F | RESPIRATION RATE: 18 BRPM | HEART RATE: 65 BPM | DIASTOLIC BLOOD PRESSURE: 76 MMHG | OXYGEN SATURATION: 98 %

## 2019-10-16 VITALS
SYSTOLIC BLOOD PRESSURE: 124 MMHG | HEART RATE: 73 BPM | OXYGEN SATURATION: 98 % | TEMPERATURE: 98 F | RESPIRATION RATE: 17 BRPM | DIASTOLIC BLOOD PRESSURE: 79 MMHG

## 2019-10-16 PROCEDURE — G0151 HHCP-SERV OF PT,EA 15 MIN: HCPCS

## 2019-10-16 PROCEDURE — G0153 HHCP-SVS OF S/L PATH,EA 15MN: HCPCS

## 2019-10-16 NOTE — TELEPHONE ENCOUNTER
----- Message from Shankar Castelan sent at 10/16/2019  8:35 AM EDT -----  Regarding: GRAZYNA wong/ telephone   General Message/Vendor Calls    Caller's first and last name:  Pedro Moncada ([facility])  Reason for call:    Would like to inform that pt is under home care from discharge from stroke  Callback required yes/no and why:  No     Best contact number(s):    [phone]  Details to clarify the request:      Shankar Castelan

## 2019-10-17 ENCOUNTER — HOME CARE VISIT (OUTPATIENT)
Dept: SCHEDULING | Facility: HOME HEALTH | Age: 84
End: 2019-10-17
Payer: MEDICARE

## 2019-10-17 VITALS
SYSTOLIC BLOOD PRESSURE: 130 MMHG | HEART RATE: 55 BPM | RESPIRATION RATE: 18 BRPM | OXYGEN SATURATION: 98 % | DIASTOLIC BLOOD PRESSURE: 68 MMHG | TEMPERATURE: 97.5 F

## 2019-10-17 VITALS
TEMPERATURE: 97.8 F | RESPIRATION RATE: 18 BRPM | HEART RATE: 77 BPM | SYSTOLIC BLOOD PRESSURE: 132 MMHG | DIASTOLIC BLOOD PRESSURE: 80 MMHG | OXYGEN SATURATION: 97 %

## 2019-10-17 PROCEDURE — G0299 HHS/HOSPICE OF RN EA 15 MIN: HCPCS

## 2019-10-18 ENCOUNTER — OFFICE VISIT (OUTPATIENT)
Dept: FAMILY MEDICINE CLINIC | Age: 84
End: 2019-10-18

## 2019-10-18 ENCOUNTER — HOME CARE VISIT (OUTPATIENT)
Dept: HOME HEALTH SERVICES | Facility: HOME HEALTH | Age: 84
End: 2019-10-18
Payer: MEDICARE

## 2019-10-18 VITALS
SYSTOLIC BLOOD PRESSURE: 150 MMHG | HEART RATE: 57 BPM | OXYGEN SATURATION: 98 % | WEIGHT: 240 LBS | BODY MASS INDEX: 38.57 KG/M2 | HEIGHT: 66 IN | DIASTOLIC BLOOD PRESSURE: 70 MMHG | TEMPERATURE: 98.2 F | RESPIRATION RATE: 18 BRPM

## 2019-10-18 DIAGNOSIS — I10 ESSENTIAL HYPERTENSION: ICD-10-CM

## 2019-10-18 DIAGNOSIS — I63.412 CEREBROVASCULAR ACCIDENT (CVA) DUE TO EMBOLISM OF LEFT MIDDLE CEREBRAL ARTERY (HCC): Primary | ICD-10-CM

## 2019-10-18 DIAGNOSIS — I48.0 PAROXYSMAL ATRIAL FIBRILLATION (HCC): ICD-10-CM

## 2019-10-18 DIAGNOSIS — E03.9 ACQUIRED HYPOTHYROIDISM: ICD-10-CM

## 2019-10-18 DIAGNOSIS — E66.9 OBESITY (BMI 30-39.9): ICD-10-CM

## 2019-10-18 NOTE — PATIENT INSTRUCTIONS
High Blood Pressure: Care Instructions  Overview    It's normal for blood pressure to go up and down throughout the day. But if it stays up, you have high blood pressure. Another name for high blood pressure is hypertension. Despite what a lot of people think, high blood pressure usually doesn't cause headaches or make you feel dizzy or lightheaded. It usually has no symptoms. But it does increase your risk of stroke, heart attack, and other problems. You and your doctor will talk about your risks of these problems based on your blood pressure. Your doctor will give you a goal for your blood pressure. Your goal will be based on your health and your age. Lifestyle changes, such as eating healthy and being active, are always important to help lower blood pressure. You might also take medicine to reach your blood pressure goal.  Follow-up care is a key part of your treatment and safety. Be sure to make and go to all appointments, and call your doctor if you are having problems. It's also a good idea to know your test results and keep a list of the medicines you take. How can you care for yourself at home? Medical treatment  · If you stop taking your medicine, your blood pressure will go back up. You may take one or more types of medicine to lower your blood pressure. Be safe with medicines. Take your medicine exactly as prescribed. Call your doctor if you think you are having a problem with your medicine. · Talk to your doctor before you start taking aspirin every day. Aspirin can help certain people lower their risk of a heart attack or stroke. But taking aspirin isn't right for everyone, because it can cause serious bleeding. · See your doctor regularly. You may need to see the doctor more often at first or until your blood pressure comes down. · If you are taking blood pressure medicine, talk to your doctor before you take decongestants or anti-inflammatory medicine, such as ibuprofen.  Some of these medicines can raise blood pressure. · Learn how to check your blood pressure at home. Lifestyle changes  · Stay at a healthy weight. This is especially important if you put on weight around the waist. Losing even 10 pounds can help you lower your blood pressure. · If your doctor recommends it, get more exercise. Walking is a good choice. Bit by bit, increase the amount you walk every day. Try for at least 30 minutes on most days of the week. You also may want to swim, bike, or do other activities. · Avoid or limit alcohol. Talk to your doctor about whether you can drink any alcohol. · Try to limit how much sodium you eat to less than 2,300 milligrams (mg) a day. Your doctor may ask you to try to eat less than 1,500 mg a day. · Eat plenty of fruits (such as bananas and oranges), vegetables, legumes, whole grains, and low-fat dairy products. · Lower the amount of saturated fat in your diet. Saturated fat is found in animal products such as milk, cheese, and meat. Limiting these foods may help you lose weight and also lower your risk for heart disease. · Do not smoke. Smoking increases your risk for heart attack and stroke. If you need help quitting, talk to your doctor about stop-smoking programs and medicines. These can increase your chances of quitting for good. When should you call for help? Call  911 anytime you think you may need emergency care. This may mean having symptoms that suggest that your blood pressure is causing a serious heart or blood vessel problem. Your blood pressure may be over 180/120.   For example, call  911 if:    · You have symptoms of a heart attack. These may include:  ? Chest pain or pressure, or a strange feeling in the chest.  ? Sweating. ? Shortness of breath. ? Nausea or vomiting. ? Pain, pressure, or a strange feeling in the back, neck, jaw, or upper belly or in one or both shoulders or arms. ? Lightheadedness or sudden weakness.   ? A fast or irregular heartbeat.     · You have symptoms of a stroke. These may include:  ? Sudden numbness, tingling, weakness, or loss of movement in your face, arm, or leg, especially on only one side of your body. ? Sudden vision changes. ? Sudden trouble speaking. ? Sudden confusion or trouble understanding simple statements. ? Sudden problems with walking or balance. ? A sudden, severe headache that is different from past headaches.     · You have severe back or belly pain.    Do not wait until your blood pressure comes down on its own. Get help right away.   Call your doctor now or seek immediate care if:    · Your blood pressure is much higher than normal (such as 180/120 or higher), but you don't have symptoms.     · You think high blood pressure is causing symptoms, such as:  ? Severe headache.  ? Blurry vision.    Watch closely for changes in your health, and be sure to contact your doctor if:    · Your blood pressure measures higher than your doctor recommends at least 2 times. That means the top number is higher or the bottom number is higher, or both.     · You think you may be having side effects from your blood pressure medicine. Where can you learn more? Go to http://lucy-ramos.info/. Enter I978 in the search box to learn more about \"High Blood Pressure: Care Instructions. \"  Current as of: April 9, 2019  Content Version: 12.2  © 9717-0973 [employer] 2000[employer]. Care instructions adapted under license by [employer] (which disclaims liability or warranty for this information). If you have questions about a medical condition or this instruction, always ask your healthcare professional. [address] [place] [address] any warranty or liability for your use of this information.

## 2019-10-18 NOTE — PROGRESS NOTES
Chief Complaint   Patient presents with   [facility] Follow Up     [facility] 10/12-10/14 for Stroke        Reviewed Record in preparation for visit and have obtained necessary documentation. Identified pt with two pt identifiers (Name @ )    Health Maintenance Due   Topic    GLAUCOMA SCREENING Q2Y      States not on Lipitor , prescribed when in hospital. States does not need. 1. Have you been to the ER, urgent care clinic since your last visit? Hospitalized since your last visit? See todays note    2. Have you seen or consulted any other health care providers outside of the [address] [place] [address] Omar since your last visit? Include any pap smears or colon screening.  no

## 2019-10-18 NOTE — PROGRESS NOTES
[facility] Note    Mr. Adela Huerta is a 80y.o. year old male, he is seen today for Transition of Care services following a hospital discharge for CVA on 10/14/19. Our office Nurse Navigator performed an outreach to Mr. Ivet Skinner on 10/15/19 (within 2 business days of discharge) to complete medication reconciliation and a telephonic assessment of his condition. Subjective:  Hospital follow up  Patient seen for follow up of CVA. Patient presented to [facility] ED with the complaints of aphasia. The patient admits to noncompliance with his Maurita Pickerel was admitted for further management under [facility] protocol. MRI/MRA brain showed \"several small acute lacunar infarction in the left parietal white matter as well as a small cortical infarct in the left insula. Severe stenosis mid left basilar artery. \" Neurology was consulted - recommended continue aspirin and Eliquis and add Lipitor. Patient seen by home health, PT and speech therapy. Has been discharged by speech therapy. Patient refuses to take Lipitor states \"I don't have high cholesterol and I drink red wine nightly. \"    Prior to Admission medications    Medication Sig Start Date End Date Taking? Authorizing Provider   zinc (CHELATED ZINC) 50 mg tab tablet Take 50 mg by mouth daily. Yes Provider, Historical   lutein 20 mg cap Take 1 Cap by mouth daily. Yes Provider, Historical   coenzyme Q-10 (CO Q-10) 30 mg capsule Take 30 mg by mouth daily. Yes Provider, Historical   lecithin/soybean oil (SOYA LECITHIN PO) Take 1 Tab by mouth daily. Yes Provider, Historical   multivit-min/FA/lycopen/lutein (SENTRY SENIOR PO) Take 1 Tab by mouth daily. Yes Provider, Historical   losartan (COZAAR) 50 mg tablet Take 0.5 Tabs by mouth daily. 10/14/19  Yes Ge Davis MD   triamcinolone acetonide (KENALOG) 0.1 % ointment Apply  to affected area two (2) times a day.  use thin layer 9/25/19  Yes Malachi Chun NP apixaban (ELIQUIS) 5 mg tablet Take 1 Tab by mouth two (2) times a day. For stroke prevention  Patient taking differently: Take 5 mg by mouth two (2) times a day. For stroke prevention  Reports taking 1 tab every 2 days. 9/5/19  Yes Hillary Bajwa NP   levothyroxine (SYNTHROID) 50 mcg tablet TAKE 1 TABLET DAILY BEFORE BREAKFAST 9/5/19  Yes Donny Garcia NP   ketoconazole (NIZORAL) 2 % topical cream Apply  to affected area daily. 11/21/18  Yes Donny Garcia NP   tretinoin (RETIN-A) 0.025 % topical cream Apply  to affected area nightly. 5/18/16  Yes Hillary Bajwa NP   aspirin (ASPIRIN) 325 mg tablet Take 325 mg by mouth daily. Yes Provider, Historical   vitamin E (AQUA GEMS) 400 unit capsule Take 400 Units by mouth daily. Reports taking 1-2 times a week. Yes Provider, Historical   ascorbic acid (VITAMIN C) 500 mg tablet Take 500 mg by mouth daily. Yes Provider, Historical   SAW PALMETTO PO Take  by mouth daily. Yes Provider, Historical   CALCIUM/MAGNESIUM (DOLOMITE PO) Take 1 Tab by mouth daily. Yes Provider, Historical   atorvastatin (LIPITOR) 80 mg tablet Take 1 Tab by mouth daily. 10/15/19   Kayla Warner MD          Allergies   Allergen Reactions    Hydrocodone Other (comments)     Unable to void, or have a bowel movement    Amlodipine Swelling and Other (comments)     Severe weeping from leg swelling    Poison Ivy Extract Itching           ROS  See HPI    Objective:   Physical Exam   Constitutional: He is oriented to person, place, and time. He appears well-developed and well-nourished. Neck: Normal range of motion. Neck supple. No JVD present. Carotid bruit is not present. No thyromegaly present. Cardiovascular: Normal rate, regular rhythm and intact distal pulses. Exam reveals no gallop and no friction rub. No murmur heard. Pulmonary/Chest: Effort normal and breath sounds normal. No respiratory distress. Musculoskeletal: He exhibits no edema. Lymphadenopathy:     He has no cervical adenopathy. Neurological: He is alert and oriented to person, place, and time. Trace weakness noted of left sided   Psychiatric: He has a normal mood and affect. His behavior is normal.   Nursing note and vitals reviewed. Visit Vitals  /70 (BP 1 Location: Right arm, BP Patient Position: Sitting)   Pulse (!) 57   Temp 98.2 °F (36.8 °C) (Oral)   Resp 18   Ht 5' 6\" (1.676 m)   Wt 240 lb (108.9 kg)   SpO2 98%   BMI 38.74 kg/m²       Assessment & Plan:  Diagnoses and all orders for this visit:    1. Cerebrovascular accident (CVA) due to embolism of left middle cerebral artery (HCC)  Recommended Lipitor given CVA and recurrent CVA risk - deferred by patient. 2. Paroxysmal atrial fibrillation ([address]  Rate controlled, managed by cardiology. Discussed importance of Eliquis compliance. 3. Essential hypertension  Elevated, increase Losartan to 50 mg daily. 4. Acquired hypothyroidism  TSH within therapeutic range, no dosage change. 5. Obesity (BMI 30-39. 9)  Discussed need for weight loss through diet and exercise. Reviewed decreased caloric intake and increased activity. I have discussed the diagnosis with the patient and the intended plan as seen in the above orders. The patient has received an after-visit summary along with patient information handout. I have discussed medication side effects and warnings with the patient as well. Follow-up and Dispositions    · Return in about 3 months (around 1/18/2020) for blood pressure.            Vicente Madrid NP

## 2019-10-19 ENCOUNTER — HOME CARE VISIT (OUTPATIENT)
Dept: SCHEDULING | Facility: HOME HEALTH | Age: 84
End: 2019-10-19
Payer: MEDICARE

## 2019-10-19 PROCEDURE — G0300 HHS/HOSPICE OF LPN EA 15 MIN: HCPCS

## 2019-10-22 ENCOUNTER — HOME CARE VISIT (OUTPATIENT)
Dept: SCHEDULING | Facility: HOME HEALTH | Age: 84
End: 2019-10-22
Payer: MEDICARE

## 2019-10-22 ENCOUNTER — HOME CARE VISIT (OUTPATIENT)
Dept: HOME HEALTH SERVICES | Facility: HOME HEALTH | Age: 84
End: 2019-10-22
Payer: MEDICARE

## 2019-10-22 VITALS
HEART RATE: 82 BPM | OXYGEN SATURATION: 98 % | SYSTOLIC BLOOD PRESSURE: 148 MMHG | TEMPERATURE: 97.6 F | RESPIRATION RATE: 18 BRPM | DIASTOLIC BLOOD PRESSURE: 86 MMHG

## 2019-10-22 VITALS
BODY MASS INDEX: 38.58 KG/M2 | DIASTOLIC BLOOD PRESSURE: 78 MMHG | HEART RATE: 56 BPM | OXYGEN SATURATION: 98 % | SYSTOLIC BLOOD PRESSURE: 142 MMHG | TEMPERATURE: 98 F | WEIGHT: 239 LBS

## 2019-10-22 PROCEDURE — G0299 HHS/HOSPICE OF RN EA 15 MIN: HCPCS

## 2019-10-23 ENCOUNTER — HOME CARE VISIT (OUTPATIENT)
Dept: SCHEDULING | Facility: HOME HEALTH | Age: 84
End: 2019-10-23
Payer: MEDICARE

## 2019-10-23 PROCEDURE — G0151 HHCP-SERV OF PT,EA 15 MIN: HCPCS

## 2019-10-25 ENCOUNTER — HOME CARE VISIT (OUTPATIENT)
Dept: SCHEDULING | Facility: HOME HEALTH | Age: 84
End: 2019-10-25
Payer: MEDICARE

## 2019-10-25 PROCEDURE — G0300 HHS/HOSPICE OF LPN EA 15 MIN: HCPCS

## 2019-10-26 VITALS
DIASTOLIC BLOOD PRESSURE: 77 MMHG | OXYGEN SATURATION: 98 % | RESPIRATION RATE: 18 BRPM | TEMPERATURE: 97.8 F | SYSTOLIC BLOOD PRESSURE: 140 MMHG | HEART RATE: 81 BPM

## 2019-10-28 ENCOUNTER — HOME CARE VISIT (OUTPATIENT)
Dept: HOME HEALTH SERVICES | Facility: HOME HEALTH | Age: 84
End: 2019-10-28
Payer: MEDICARE

## 2019-10-28 ENCOUNTER — HOME CARE VISIT (OUTPATIENT)
Dept: SCHEDULING | Facility: HOME HEALTH | Age: 84
End: 2019-10-28
Payer: MEDICARE

## 2019-10-28 PROCEDURE — G0300 HHS/HOSPICE OF LPN EA 15 MIN: HCPCS

## 2019-10-29 VITALS
HEART RATE: 51 BPM | RESPIRATION RATE: 18 BRPM | SYSTOLIC BLOOD PRESSURE: 110 MMHG | DIASTOLIC BLOOD PRESSURE: 64 MMHG | OXYGEN SATURATION: 98 % | TEMPERATURE: 98 F

## 2019-11-01 ENCOUNTER — HOME CARE VISIT (OUTPATIENT)
Dept: SCHEDULING | Facility: HOME HEALTH | Age: 84
End: 2019-11-01
Payer: MEDICARE

## 2019-11-01 VITALS
RESPIRATION RATE: 18 BRPM | DIASTOLIC BLOOD PRESSURE: 70 MMHG | OXYGEN SATURATION: 97 % | TEMPERATURE: 97.7 F | SYSTOLIC BLOOD PRESSURE: 102 MMHG | HEART RATE: 80 BPM

## 2019-11-01 VITALS
BODY MASS INDEX: 38.41 KG/M2 | WEIGHT: 238 LBS | TEMPERATURE: 98 F | HEART RATE: 51 BPM | DIASTOLIC BLOOD PRESSURE: 70 MMHG | OXYGEN SATURATION: 98 % | RESPIRATION RATE: 18 BRPM | SYSTOLIC BLOOD PRESSURE: 110 MMHG

## 2019-11-01 PROCEDURE — G0151 HHCP-SERV OF PT,EA 15 MIN: HCPCS

## 2019-11-01 PROCEDURE — G0300 HHS/HOSPICE OF LPN EA 15 MIN: HCPCS

## 2019-11-05 ENCOUNTER — HOME CARE VISIT (OUTPATIENT)
Dept: SCHEDULING | Facility: HOME HEALTH | Age: 84
End: 2019-11-05
Payer: MEDICARE

## 2019-11-05 PROCEDURE — G0300 HHS/HOSPICE OF LPN EA 15 MIN: HCPCS

## 2019-11-06 ENCOUNTER — TELEPHONE (OUTPATIENT)
Dept: FAMILY MEDICINE CLINIC | Age: 84
End: 2019-11-06

## 2019-11-06 ENCOUNTER — HOME CARE VISIT (OUTPATIENT)
Dept: SCHEDULING | Facility: HOME HEALTH | Age: 84
End: 2019-11-06
Payer: MEDICARE

## 2019-11-06 VITALS
RESPIRATION RATE: 18 BRPM | HEART RATE: 65 BPM | OXYGEN SATURATION: 99 % | SYSTOLIC BLOOD PRESSURE: 130 MMHG | TEMPERATURE: 97.4 F | DIASTOLIC BLOOD PRESSURE: 68 MMHG

## 2019-11-06 VITALS
HEART RATE: 70 BPM | DIASTOLIC BLOOD PRESSURE: 70 MMHG | RESPIRATION RATE: 17 BRPM | TEMPERATURE: 98 F | OXYGEN SATURATION: 96 % | SYSTOLIC BLOOD PRESSURE: 130 MMHG

## 2019-11-06 PROCEDURE — G0151 HHCP-SERV OF PT,EA 15 MIN: HCPCS

## 2019-11-06 RX ORDER — CANE TIPS
EACH MISCELLANEOUS
COMMUNITY
End: 2019-11-06 | Stop reason: SDUPTHER

## 2019-11-06 NOTE — TELEPHONE ENCOUNTER
----- Message from Lelo Berger sent at 11/6/2019 11:47 AM EST -----  Regarding: GRAZYNA Garcia/telephone  General Message/Vendor Calls    Caller's first and last name:      Reason for call:  Pt would like a cane    Callback required yes/no and why:  yes      Best contact number(s):  [phone]      Details to clarify the request:      Lelo Berger

## 2019-11-07 RX ORDER — CANE TIPS
EACH MISCELLANEOUS
Qty: 1 EACH | Refills: 0 | Status: SHIPPED | OUTPATIENT
Start: 2019-11-07 | End: 2019-11-26

## 2019-11-07 NOTE — TELEPHONE ENCOUNTER
[phone] notified patient prescription for his cane was sent to pharmacy as requested patient understand

## 2019-11-08 ENCOUNTER — HOME CARE VISIT (OUTPATIENT)
Dept: HOME HEALTH SERVICES | Facility: HOME HEALTH | Age: 84
End: 2019-11-08
Payer: MEDICARE

## 2019-11-08 ENCOUNTER — HOME CARE VISIT (OUTPATIENT)
Dept: HOME HEALTH SERVICES | Facility: HOME HEALTH | Age: 84
End: 2019-11-08

## 2019-11-12 ENCOUNTER — HOME CARE VISIT (OUTPATIENT)
Dept: SCHEDULING | Facility: HOME HEALTH | Age: 84
End: 2019-11-12
Payer: MEDICARE

## 2019-11-12 VITALS
DIASTOLIC BLOOD PRESSURE: 66 MMHG | OXYGEN SATURATION: 95 % | HEART RATE: 70 BPM | TEMPERATURE: 97.5 F | RESPIRATION RATE: 18 BRPM | SYSTOLIC BLOOD PRESSURE: 130 MMHG | BODY MASS INDEX: 38.58 KG/M2 | WEIGHT: 239 LBS

## 2019-11-12 PROCEDURE — G0300 HHS/HOSPICE OF LPN EA 15 MIN: HCPCS

## 2019-11-13 ENCOUNTER — TELEPHONE (OUTPATIENT)
Dept: FAMILY MEDICINE CLINIC | Age: 84
End: 2019-11-13

## 2019-11-13 NOTE — TELEPHONE ENCOUNTER
Pt is calling requesting Sandrageneva Acuñanatalya to call the [facility] to get authorization for his Adjustable Walking [employer] with 4 legs, as the pharmacy doesn't do medicaid. He got the Rx on 11/6/19.   He would like it to be done as soon as possible     ChristianaCareneena# He doesn't have the number     [facility]# [phone]

## 2019-11-14 NOTE — TELEPHONE ENCOUNTER
Per Derek Comes to fax order to [phone] attn: Mary Groves and Kalina Epps will take care of patients order     Order for cane, demographics faxed to [phone] attn: Mary Groves

## 2019-11-19 ENCOUNTER — HOME CARE VISIT (OUTPATIENT)
Dept: SCHEDULING | Facility: HOME HEALTH | Age: 84
End: 2019-11-19
Payer: MEDICARE

## 2019-11-19 VITALS
OXYGEN SATURATION: 97 % | DIASTOLIC BLOOD PRESSURE: 78 MMHG | TEMPERATURE: 97.7 F | SYSTOLIC BLOOD PRESSURE: 128 MMHG | HEART RATE: 84 BPM | RESPIRATION RATE: 18 BRPM

## 2019-11-19 PROCEDURE — G0299 HHS/HOSPICE OF RN EA 15 MIN: HCPCS

## 2019-11-21 ENCOUNTER — TELEPHONE (OUTPATIENT)
Dept: FAMILY MEDICINE CLINIC | Age: 84
End: 2019-11-21

## 2019-11-21 RX ORDER — TRAZODONE HYDROCHLORIDE 50 MG/1
50 TABLET ORAL
Qty: 30 TAB | Refills: 5 | Status: SHIPPED | OUTPATIENT
Start: 2019-11-21 | End: 2020-05-21

## 2019-11-21 NOTE — TELEPHONE ENCOUNTER
Best#[phone]  LOV 10/18/19      Pt is calling to see if the doctor can send something to the pharmacy to help him to sleep. He has not been sleeping well since he had a stroke. Not sleeping through the night.        Mauri Drugstorn[employer] ALDRICH Hernandes [address] [place] [address]  [phone]

## 2019-11-21 NOTE — TELEPHONE ENCOUNTER
Pt is calling to check on the status of his sleep medication, requesting to be sent as soon as possible   Pharmacy verified

## 2019-11-26 ENCOUNTER — APPOINTMENT (OUTPATIENT)
Dept: GENERAL RADIOLOGY | Age: 84
DRG: 377 | End: 2019-11-26
Attending: EMERGENCY MEDICINE
Payer: MEDICARE

## 2019-11-26 ENCOUNTER — HOME CARE VISIT (OUTPATIENT)
Dept: SCHEDULING | Facility: HOME HEALTH | Age: 84
End: 2019-11-26
Payer: MEDICARE

## 2019-11-26 ENCOUNTER — HOSPITAL ENCOUNTER (INPATIENT)
Age: 84
LOS: 6 days | Discharge: HOME HEALTH | DRG: 377 | End: 2019-12-04
Attending: EMERGENCY MEDICINE | Admitting: HOSPITALIST
Payer: MEDICARE

## 2019-11-26 ENCOUNTER — HOME CARE VISIT (OUTPATIENT)
Dept: HOME HEALTH SERVICES | Facility: HOME HEALTH | Age: 84
End: 2019-11-26
Payer: MEDICARE

## 2019-11-26 ENCOUNTER — APPOINTMENT (OUTPATIENT)
Dept: CT IMAGING | Age: 84
DRG: 377 | End: 2019-11-26
Attending: FAMILY MEDICINE
Payer: MEDICARE

## 2019-11-26 DIAGNOSIS — I50.21 ACUTE SYSTOLIC CONGESTIVE HEART FAILURE (HCC): ICD-10-CM

## 2019-11-26 DIAGNOSIS — R55 SYNCOPE AND COLLAPSE: Primary | ICD-10-CM

## 2019-11-26 DIAGNOSIS — D64.9 ACUTE ANEMIA: ICD-10-CM

## 2019-11-26 LAB
ALBUMIN SERPL-MCNC: 3.3 G/DL (ref 3.5–5)
ALBUMIN/GLOB SERPL: 1 {RATIO} (ref 1.1–2.2)
ALP SERPL-CCNC: 59 U/L (ref 45–117)
ALT SERPL-CCNC: 65 U/L (ref 12–78)
ANION GAP SERPL CALC-SCNC: 8 MMOL/L (ref 5–15)
AST SERPL-CCNC: 49 U/L (ref 15–37)
ATRIAL RATE: 39 BPM
BASOPHILS # BLD: 0.1 K/UL (ref 0–0.1)
BASOPHILS NFR BLD: 1 % (ref 0–1)
BILIRUB SERPL-MCNC: 0.6 MG/DL (ref 0.2–1)
BNP SERPL-MCNC: 381 PG/ML
BUN SERPL-MCNC: 21 MG/DL (ref 6–20)
BUN/CREAT SERPL: 16 (ref 12–20)
CALCIUM SERPL-MCNC: 8.7 MG/DL (ref 8.5–10.1)
CALCULATED R AXIS, ECG10: -44 DEGREES
CALCULATED T AXIS, ECG11: -28 DEGREES
CHLORIDE SERPL-SCNC: 103 MMOL/L (ref 97–108)
CO2 SERPL-SCNC: 24 MMOL/L (ref 21–32)
COMMENT, HOLDF: NORMAL
CREAT SERPL-MCNC: 1.33 MG/DL (ref 0.7–1.3)
DIAGNOSIS, 93000: NORMAL
DIFFERENTIAL METHOD BLD: ABNORMAL
EOSINOPHIL # BLD: 0.2 K/UL (ref 0–0.4)
EOSINOPHIL NFR BLD: 2 % (ref 0–7)
ERYTHROCYTE [DISTWIDTH] IN BLOOD BY AUTOMATED COUNT: 16.2 % (ref 11.5–14.5)
GLOBULIN SER CALC-MCNC: 3.4 G/DL (ref 2–4)
GLUCOSE SERPL-MCNC: 136 MG/DL (ref 65–100)
HCT VFR BLD AUTO: 23.2 % (ref 36.6–50.3)
HEMOCCULT STL QL: POSITIVE
HGB BLD-MCNC: 6.4 G/DL (ref 12.1–17)
HGB BLD-MCNC: 6.5 G/DL (ref 12.1–17)
HGB BLD-MCNC: 6.6 G/DL (ref 12.1–17)
IMM GRANULOCYTES # BLD AUTO: 0 K/UL (ref 0–0.04)
IMM GRANULOCYTES NFR BLD AUTO: 0 % (ref 0–0.5)
LYMPHOCYTES # BLD: 6 K/UL (ref 0.8–3.5)
LYMPHOCYTES NFR BLD: 49 % (ref 12–49)
MCH RBC QN AUTO: 25.4 PG (ref 26–34)
MCHC RBC AUTO-ENTMCNC: 28.4 G/DL (ref 30–36.5)
MCV RBC AUTO: 89.2 FL (ref 80–99)
MONOCYTES # BLD: 1 K/UL (ref 0–1)
MONOCYTES NFR BLD: 8 % (ref 5–13)
NEUTS SEG # BLD: 4.9 K/UL (ref 1.8–8)
NEUTS SEG NFR BLD: 40 % (ref 32–75)
NRBC # BLD: 0.03 K/UL (ref 0–0.01)
NRBC BLD-RTO: 0.2 PER 100 WBC
PLATELET # BLD AUTO: 284 K/UL (ref 150–400)
PMV BLD AUTO: 10.4 FL (ref 8.9–12.9)
POTASSIUM SERPL-SCNC: 3.8 MMOL/L (ref 3.5–5.1)
PROT SERPL-MCNC: 6.7 G/DL (ref 6.4–8.2)
Q-T INTERVAL, ECG07: 456 MS
QRS DURATION, ECG06: 96 MS
QTC CALCULATION (BEZET), ECG08: 403 MS
RBC # BLD AUTO: 2.6 M/UL (ref 4.1–5.7)
RBC MORPH BLD: ABNORMAL
SAMPLES BEING HELD,HOLD: NORMAL
SODIUM SERPL-SCNC: 135 MMOL/L (ref 136–145)
TROPONIN I SERPL-MCNC: <0.05 NG/ML
TROPONIN I SERPL-MCNC: <0.05 NG/ML
TSH SERPL DL<=0.05 MIU/L-ACNC: 2.14 UIU/ML (ref 0.36–3.74)
VENTRICULAR RATE, ECG03: 47 BPM
VIT B12 SERPL-MCNC: 540 PG/ML (ref 193–986)
WBC # BLD AUTO: 12.2 K/UL (ref 4.1–11.1)

## 2019-11-26 PROCEDURE — 84443 ASSAY THYROID STIM HORMONE: CPT

## 2019-11-26 PROCEDURE — 99285 EMERGENCY DEPT VISIT HI MDM: CPT

## 2019-11-26 PROCEDURE — 80053 COMPREHEN METABOLIC PANEL: CPT

## 2019-11-26 PROCEDURE — 84484 ASSAY OF TROPONIN QUANT: CPT

## 2019-11-26 PROCEDURE — 86900 BLOOD TYPING SEROLOGIC ABO: CPT

## 2019-11-26 PROCEDURE — 99218 HC RM OBSERVATION: CPT

## 2019-11-26 PROCEDURE — 36415 COLL VENOUS BLD VENIPUNCTURE: CPT

## 2019-11-26 PROCEDURE — 86920 COMPATIBILITY TEST SPIN: CPT

## 2019-11-26 PROCEDURE — 85025 COMPLETE CBC W/AUTO DIFF WBC: CPT

## 2019-11-26 PROCEDURE — 83880 ASSAY OF NATRIURETIC PEPTIDE: CPT

## 2019-11-26 PROCEDURE — 96374 THER/PROPH/DIAG INJ IV PUSH: CPT

## 2019-11-26 PROCEDURE — 95816 EEG AWAKE AND DROWSY: CPT | Performed by: FAMILY MEDICINE

## 2019-11-26 PROCEDURE — 36430 TRANSFUSION BLD/BLD COMPNT: CPT

## 2019-11-26 PROCEDURE — 93005 ELECTROCARDIOGRAM TRACING: CPT

## 2019-11-26 PROCEDURE — 77010033678 HC OXYGEN DAILY

## 2019-11-26 PROCEDURE — 96375 TX/PRO/DX INJ NEW DRUG ADDON: CPT

## 2019-11-26 PROCEDURE — 82607 VITAMIN B-12: CPT

## 2019-11-26 PROCEDURE — 71045 X-RAY EXAM CHEST 1 VIEW: CPT

## 2019-11-26 PROCEDURE — 74011250636 HC RX REV CODE- 250/636: Performed by: EMERGENCY MEDICINE

## 2019-11-26 PROCEDURE — P9016 RBC LEUKOCYTES REDUCED: HCPCS

## 2019-11-26 PROCEDURE — 82272 OCCULT BLD FECES 1-3 TESTS: CPT

## 2019-11-26 PROCEDURE — 70450 CT HEAD/BRAIN W/O DYE: CPT

## 2019-11-26 PROCEDURE — 85018 HEMOGLOBIN: CPT

## 2019-11-26 PROCEDURE — 71250 CT THORAX DX C-: CPT

## 2019-11-26 RX ORDER — LEVOTHYROXINE SODIUM 50 UG/1
50 TABLET ORAL
Status: DISCONTINUED | OUTPATIENT
Start: 2019-11-27 | End: 2019-12-04 | Stop reason: HOSPADM

## 2019-11-26 RX ORDER — IPRATROPIUM BROMIDE AND ALBUTEROL SULFATE 2.5; .5 MG/3ML; MG/3ML
3 SOLUTION RESPIRATORY (INHALATION)
Status: DISCONTINUED | OUTPATIENT
Start: 2019-11-26 | End: 2019-12-04

## 2019-11-26 RX ORDER — FUROSEMIDE 10 MG/ML
40 INJECTION INTRAMUSCULAR; INTRAVENOUS
Status: COMPLETED | OUTPATIENT
Start: 2019-11-26 | End: 2019-11-26

## 2019-11-26 RX ORDER — SODIUM CHLORIDE 0.9 % (FLUSH) 0.9 %
5-40 SYRINGE (ML) INJECTION EVERY 8 HOURS
Status: DISCONTINUED | OUTPATIENT
Start: 2019-11-26 | End: 2019-12-04 | Stop reason: HOSPADM

## 2019-11-26 RX ORDER — FUROSEMIDE 10 MG/ML
20 INJECTION INTRAMUSCULAR; INTRAVENOUS ONCE
Status: ACTIVE | OUTPATIENT
Start: 2019-11-26 | End: 2019-11-27

## 2019-11-26 RX ORDER — SODIUM CHLORIDE 0.9 % (FLUSH) 0.9 %
5-40 SYRINGE (ML) INJECTION AS NEEDED
Status: DISCONTINUED | OUTPATIENT
Start: 2019-11-26 | End: 2019-12-04 | Stop reason: HOSPADM

## 2019-11-26 RX ORDER — ONDANSETRON 2 MG/ML
4 INJECTION INTRAMUSCULAR; INTRAVENOUS
Status: DISCONTINUED | OUTPATIENT
Start: 2019-11-26 | End: 2019-12-04 | Stop reason: HOSPADM

## 2019-11-26 RX ORDER — SODIUM CHLORIDE 9 MG/ML
250 INJECTION, SOLUTION INTRAVENOUS AS NEEDED
Status: DISCONTINUED | OUTPATIENT
Start: 2019-11-26 | End: 2019-12-04 | Stop reason: HOSPADM

## 2019-11-26 RX ORDER — ONDANSETRON 2 MG/ML
4 INJECTION INTRAMUSCULAR; INTRAVENOUS
Status: COMPLETED | OUTPATIENT
Start: 2019-11-26 | End: 2019-11-26

## 2019-11-26 RX ORDER — ALBUMIN HUMAN 250 G/1000ML
12.5 SOLUTION INTRAVENOUS EVERY 6 HOURS
Status: DISPENSED | OUTPATIENT
Start: 2019-11-26 | End: 2019-11-27

## 2019-11-26 RX ADMIN — Medication 5 ML: at 18:00

## 2019-11-26 RX ADMIN — ONDANSETRON 4 MG: 2 INJECTION INTRAMUSCULAR; INTRAVENOUS at 12:27

## 2019-11-26 RX ADMIN — Medication 5 ML: at 22:00

## 2019-11-26 RX ADMIN — FUROSEMIDE 40 MG: 10 INJECTION, SOLUTION INTRAMUSCULAR; INTRAVENOUS at 12:57

## 2019-11-26 NOTE — PROGRESS NOTES
Spoke with Dr. Raymond Diaz. Patient to be NPO. Report from previous shift. Held Lasix, Protonix and Albumin to be given after administration of I unit of blood. Shift change report given to Sahra Alonzo (oncoming nurse) by Anabell Nation (offgoing nurse). Report included the following information SBAR, Kardex, ED Summary, Procedure Summary, Intake/Output, MAR, Accordion, Recent Results, Med Rec Status and Cardiac Rhythm a fib   .

## 2019-11-26 NOTE — ED TRIAGE NOTES
Pt arrives via ems pt was about to get hair cut when he experienced a possible syncopal episode. pt was awake and oriented x4 on arrival. Pt hx of stroke and afib and on eliquis. Incontinent episode. Pt reports feeling lethargic and sick like. Denies chest pain. . Pt denies abdominal and denies chest pain. 133/68 R 18 HR 54 98% RA. BP prior to arrival to ER was 110/49. EMS reports sinus jose luis and small/non-existant p-waves. Upon arrival pt has nausea and is sweaty. Pt reports some SOB.    Pt [employer] [facility]

## 2019-11-26 NOTE — PROGRESS NOTES
ADMISSION -  TRANSFER - IN REPORT:    Verbal report received from BRANDEE Rodriguez (name) on Dia Monaco  being received from ED (unit) for routine progression of care      Report consisted of patients Situation, Background, Assessment and   Recommendations(SBAR). Information from the following report(s) SBAR, Kardex, ED Summary, MAR, Accordion, Recent Results, Med Rec Status and Cardiac Rhythm A-Fib was reviewed with the receiving nurse. Opportunity for questions and clarification was provided. Assessment completed upon patients arrival to unit and care assumed. Primary Nurse Bryanna Rivas RN and Pancho Gomes RN performed a dual skin assessment on this patient No impairment noted  Sea score is as charted.

## 2019-11-26 NOTE — PROGRESS NOTES
Admission Medication Reconciliation:    Information obtained from:  [[facility]   RxQuery data available¹:  YES    Comments/Recommendations: Updated PTA meds/reviewed patient's allergies. Medication changes (since last review): Added  - none     Adjusted  - apixaban 5 mg daily (versus 5 mg every other day) >> discussed importance of taking this as prescribed (5 mg BID)    I was unable to complete my interview with Mr. Damaris Swanson because of his leg cramping. We only got through his prescription medications and about half of his dietary supplements. ¹RxQuery pharmacy benefit data reflects medications filled and processed through the patient's insurance, however   this data does NOT capture whether the medication was picked up or is currently being taken by the patient. Allergies:  Hydrocodone; Amlodipine; and Poison ivy extract    Significant PMH/Disease States:   Past Medical History:   Diagnosis Date    Arthritis     Contact dermatitis and other eczema, due to unspecified cause     Hypertension     Stroke [address] [address] [facility] 1997    TIA at [address]     Thyroid disease     Unspecified hypothyroidism      Chief Complaint for this Admission:    Chief Complaint   Patient presents with    Shortness of Breath    Syncope     Prior to Admission Medications:   Prior to Admission Medications   Prescriptions Last Dose Informant Patient Reported? Taking? CALCIUM/MAGNESIUM (DOLOMITE PO)   Yes Yes   Sig: Take 1 Tab by mouth daily. SAW PALMETTO PO   Yes Yes   Sig: Take 1 Tab by mouth daily. apixaban (ELIQUIS) 5 mg tablet   Yes Yes   Sig: Take 5 mg by mouth daily. ascorbic acid (VITAMIN C) 500 mg tablet   Yes Yes   Sig: Take 500 mg by mouth daily. aspirin (ASPIRIN) 325 mg tablet   Yes Yes   Sig: Take 325 mg by mouth daily. coenzyme Q-10 (CO Q-10) 30 mg capsule   Yes Yes   Sig: Take 30 mg by mouth daily. ketoconazole (NIZORAL) 2 % topical cream   Yes Yes   Sig: Apply 1 Each to affected area daily.  apply to nose/face lecithin/soybean oil (SOYA LECITHIN PO)   Yes Yes   Sig: Take 1 Tab by mouth daily. levothyroxine (SYNTHROID) 50 mcg tablet   Yes Yes   Sig: Take 50 mcg by mouth Daily (before breakfast). losartan (COZAAR) 50 mg tablet   No Yes   Sig: Take 0.5 Tabs by mouth daily. lutein 20 mg cap   Yes Yes   Sig: Take 1 Cap by mouth daily. multivit-min/FA/lycopen/lutein (SENTRY SENIOR PO)   Yes Yes   Sig: Take 1 Tab by mouth daily. traZODone (DESYREL) 50 mg tablet   No Yes   Sig: Take 1 Tab by mouth nightly. For sleep   tretinoin (RETIN-A) 0.025 % topical cream   Yes Yes   Sig: Apply 1 Each to affected area nightly. apply to face   triamcinolone acetonide (KENALOG) 0.1 % ointment   Yes Yes   Sig: Apply 1 Container to affected area two (2) times a day. use thin layer, apply to face   vitamin E (AQUA GEMS) 400 unit capsule   Yes Yes   Sig: Take 400 Units by mouth daily. Reports taking 1-2 times a week. zinc (CHELATED ZINC) 50 mg tab tablet   Yes No   Sig: Take 50 mg by mouth daily. Facility-Administered Medications: None       Please contact the main inpatient pharmacy with any questions or concerns at [phone] and we will direct you to the clinical pharmacist covering this patient's care while in-house.    Dinorah Pereyra, DANED

## 2019-11-26 NOTE — ROUTINE PROCESS
TRANSFER - OUT REPORT:    Verbal report given to Witget (name) on Chin Barrios  being transferred to [address] [place] [address] (unit) for routine progression of care       Report consisted of patients Situation, Background, Assessment and   Recommendations(SBAR). Information from the following report(s) SBAR, ED Summary, [facility] and Recent Results was reviewed with the receiving nurse. Lines:   Peripheral IV 11/26/19 Left Antecubital (Active)   Site Assessment Clean, dry, & intact 11/26/2019 12:17 PM   Phlebitis Assessment 0 11/26/2019 12:17 PM   Infiltration Assessment 0 11/26/2019 12:17 PM   Dressing Status Clean, dry, & intact 11/26/2019 12:17 PM   Dressing Type Transparent 11/26/2019 12:17 PM   Hub Color/Line Status Green;Flushed 11/26/2019 12:17 PM   Action Taken Blood drawn 11/26/2019 12:17 PM        Opportunity for questions and clarification was provided.       Patient transported with:   [employer]

## 2019-11-26 NOTE — ED PROVIDER NOTES
80 y.o. male with past medical history significant for A-fib, stroke, HTN, hypothyroidism, and arthritis who presents from EMS with chief complaint of syncope. Per EMS, Pt had a syncopal episode when sitting in a chair at the [facility] PTA. Pt was incontinent upon EMS arrival. Pt reports increased SOB and lightheadedness upon arrival to the [facility]. Pt also c/o nausea, which he states worsened after the ambulance ride. Pt notes chronic SOB at baseline. Pt's son states Pt's has SOB but reports worsening SOB since he was admitted for stroke on 10/12/19. EMS notes Pt stated he felt lethargic this morning. Per EMS, Pt's BP was 110/49 PTA to the ED. EMS reports Pt's HR was 54 BPM and . Pt is anticoagulated on Eliquis. Pt reports he started taking trazadone on 11/21/19; his last dose was last night. He notes missing one dose. Pt states he is hard of hearing and legally blind. EMS denies hx of seizure. Pt denies abdominal pain, lower back pain, diarrhea, headache, chest pain/pressure, black/bloody stools, or ill contacts. There are no other acute medical concerns at this time. PCP: Lenin Suazo NP    Old Chart Reviewed: EKG showing HR 54 BPM on 10/12/19. Pt was admitted at [facility] for stroke on 10/12/19. Note written by Wallace Roberto, as dictated by Jeff Mohr MD 12:01 PM             The history is provided by the EMS personnel and the patient. No  was used.         Past Medical History:   Diagnosis Date    Arthritis     Contact dermatitis and other eczema, due to unspecified cause     Hypertension     Stroke ([address] 1997    TIA at [facility] Thyroid disease     Unspecified hypothyroidism        Past Surgical History:   Procedure Laterality Date    HX HEENT      detached retina    HX HIP REPLACEMENT  2008    right hip, [facility]         Family History:   Problem Relation Age of Onset    Lung Disease Father     Cancer Father         lung    Coronary Artery Disease Neg Hx        Social History     Socioeconomic History    Marital status:      Spouse name: Not on file    Number of children: Not on file    Years of education: Not on file    Highest education level: Not on file   Occupational History    Not on file   Social Needs    Financial resource strain: Not on file    Food insecurity:     Worry: Not on file     Inability: Not on file    Transportation needs:     Medical: Not on file     Non-medical: Not on file   Tobacco Use    Smoking status: Light Tobacco Smoker     Packs/day: 0.25     Types: Cigars     Last attempt to quit: 1978     Years since quittin.1    Smokeless tobacco: Never Used    Tobacco comment: cigar on occ   Substance and Sexual Activity    Alcohol use: Yes     Alcohol/week: 14.0 standard drinks     Types: 14 Shots of liquor per week     Comment: 2 drinks a day.     Drug use: No    Sexual activity: Yes     Partners: Female   Lifestyle    Physical activity:     Days per week: Not on file     Minutes per session: Not on file    Stress: Not on file   Relationships    Social connections:     Talks on phone: Not on file     Gets together: Not on file     Attends Anabaptist service: Not on file     Active member of club or organization: Not on file     Attends meetings of clubs or organizations: Not on file     Relationship status: Not on file    Intimate partner violence:     Fear of current or ex partner: Not on file     Emotionally abused: Not on file     Physically abused: Not on file     Forced sexual activity: Not on file   Other Topics Concern     Service Yes    Blood Transfusions No    Caffeine Concern No    Occupational Exposure No    Hobby Hazards No    Sleep Concern Not Asked    Stress Concern No    Weight Concern Yes    Special Diet No    Back Care No    Exercise Yes    Bike Helmet No    Seat Belt Yes    Self-Exams Yes   Social History Narrative    Not on file         ALLERGIES: Hydrocodone; Amlodipine; and Poison ivy extract    Review of Systems   Respiratory: Positive for shortness of breath. Cardiovascular: Negative for chest pain. Gastrointestinal: Positive for nausea. Negative for abdominal pain, blood in stool and diarrhea. Musculoskeletal: Negative for back pain. Neurological: Positive for syncope and light-headedness. Negative for headaches. All other systems reviewed and are negative. There were no vitals filed for this visit. Physical Exam  Vitals signs and nursing note reviewed. Constitutional:       Appearance: He is well-developed. He is diaphoretic. HENT:      Head: Normocephalic and atraumatic. Eyes:      Conjunctiva/sclera: Conjunctivae normal.   Neck:      Musculoskeletal: Normal range of motion. Cardiovascular:      Heart sounds: Normal heart sounds. Comments: Bradycardic and irregular rhythm  Pulmonary:      Effort: Respiratory distress present. Breath sounds: No stridor. Rales present. No wheezing or rhonchi. Abdominal:      General: Bowel sounds are normal. There is no distension. Palpations: Abdomen is soft. There is no mass. Tenderness: There is no tenderness. There is no guarding. Hernia: No hernia is present. Comments: Brown stool no gross blood   Musculoskeletal: Normal range of motion. General: Swelling present. Comments: Bilateral lower extremity edema   Skin:     General: Skin is warm. Coloration: Skin is pale. Neurological:      Mental Status: He is alert and oriented to person, place, and time. Cranial Nerves: No cranial nerve deficit. Sensory: No sensory deficit. Motor: No weakness. Coordination: Coordination normal.          MDM  Number of Diagnoses or Management Options  Acute systolic congestive heart failure [facility]):   Syncope and collapse:   Diagnosis management comments: Syncope-check orthostatics. Also check for anemia given on Eliquis and patient is pale.   Will need admission for an echo given recent increasing shortness of breath. He may have CHF however it also be concerned for aortic stenosis. Given on Eliquis no concern for PE. Patient denies head injury, back pain, abdominal pain. No concern for intracranial hemorrhage or AAA       Amount and/or Complexity of Data Reviewed  Clinical lab tests: ordered and reviewed  Tests in the radiology section of CPT®: ordered and reviewed  Obtain history from someone other than the patient: yes (Son)  Review and summarize past medical records: yes  Discuss the patient with other providers: yes (Hospitalist)  Independent visualization of images, tracings, or specimens: yes (EKG)    Patient Progress  Patient progress: stable         Procedures    ED EKG interpretation:  Rhythm: atrial fib; and irregular. Rate (approx.): 50; Axis: left axis deviation;QRS interval: normal ; ST/T wave: non-specific changes  EKG documented by Melo Dalton MD, scribe, as interpreted by Yesy Maldonado MD, ED MD.    Hospitalist Perfect Serve for Admission  1:28 PM    ED Room Number: ER24/24  Patient Name and age:  Leeanne Gillis 80 y.o.  male  Working Diagnosis:   1. Syncope and collapse    2. Acute systolic congestive heart failure ([address]      Readmission: no  Isolation Requirements:  no  Recommended Level of Care:  telemetry  Code Status:    Department:[facility] Adult ED - 21   Other:  Syncope. Could have been from new addition of trazodone causing orthostasis though pt has new onset chf as well       1:48 PM  Spoke with Dr. Aranza Rodriguez, hospitalist via perfect serve and he will admit. Patient's hemoglobin has hemolyzed. I have resent it as well as stool for blood. If he is anemic he will likely need transfusion. Patient placed on nasal cannula and given IV diuretics.

## 2019-11-27 ENCOUNTER — APPOINTMENT (OUTPATIENT)
Dept: MRI IMAGING | Age: 84
DRG: 377 | End: 2019-11-27
Attending: FAMILY MEDICINE
Payer: MEDICARE

## 2019-11-27 ENCOUNTER — APPOINTMENT (OUTPATIENT)
Dept: NON INVASIVE DIAGNOSTICS | Age: 84
DRG: 377 | End: 2019-11-27
Attending: FAMILY MEDICINE
Payer: MEDICARE

## 2019-11-27 ENCOUNTER — APPOINTMENT (OUTPATIENT)
Dept: VASCULAR SURGERY | Age: 84
DRG: 377 | End: 2019-11-27
Attending: FAMILY MEDICINE
Payer: MEDICARE

## 2019-11-27 LAB
ABO + RH BLD: NORMAL
ANION GAP SERPL CALC-SCNC: 6 MMOL/L (ref 5–15)
BLD PROD TYP BPU: NORMAL
BLOOD GROUP ANTIBODIES SERPL: NORMAL
BPU ID: NORMAL
BUN SERPL-MCNC: 22 MG/DL (ref 6–20)
BUN/CREAT SERPL: 20 (ref 12–20)
CALCIUM SERPL-MCNC: 8.2 MG/DL (ref 8.5–10.1)
CHLORIDE SERPL-SCNC: 103 MMOL/L (ref 97–108)
CO2 SERPL-SCNC: 26 MMOL/L (ref 21–32)
CREAT SERPL-MCNC: 1.1 MG/DL (ref 0.7–1.3)
CROSSMATCH RESULT,%XM: NORMAL
GLUCOSE SERPL-MCNC: 98 MG/DL (ref 65–100)
HGB BLD-MCNC: 7.2 G/DL (ref 12.1–17)
HGB BLD-MCNC: 7.2 G/DL (ref 12.1–17)
POTASSIUM SERPL-SCNC: 4 MMOL/L (ref 3.5–5.1)
SODIUM SERPL-SCNC: 135 MMOL/L (ref 136–145)
SPECIMEN EXP DATE BLD: NORMAL
STATUS OF UNIT,%ST: NORMAL
UNIT DIVISION, %UDIV: 0

## 2019-11-27 PROCEDURE — 85018 HEMOGLOBIN: CPT

## 2019-11-27 PROCEDURE — 80048 BASIC METABOLIC PNL TOTAL CA: CPT

## 2019-11-27 PROCEDURE — 70551 MRI BRAIN STEM W/O DYE: CPT

## 2019-11-27 PROCEDURE — 36415 COLL VENOUS BLD VENIPUNCTURE: CPT

## 2019-11-27 PROCEDURE — 74011250637 HC RX REV CODE- 250/637: Performed by: HOSPITALIST

## 2019-11-27 PROCEDURE — 74011000250 HC RX REV CODE- 250: Performed by: FAMILY MEDICINE

## 2019-11-27 PROCEDURE — 74011250637 HC RX REV CODE- 250/637: Performed by: FAMILY MEDICINE

## 2019-11-27 PROCEDURE — P9047 ALBUMIN (HUMAN), 25%, 50ML: HCPCS | Performed by: FAMILY MEDICINE

## 2019-11-27 PROCEDURE — 93880 EXTRACRANIAL BILAT STUDY: CPT

## 2019-11-27 PROCEDURE — 74011250636 HC RX REV CODE- 250/636: Performed by: FAMILY MEDICINE

## 2019-11-27 PROCEDURE — 99218 HC RM OBSERVATION: CPT

## 2019-11-27 PROCEDURE — 96375 TX/PRO/DX INJ NEW DRUG ADDON: CPT

## 2019-11-27 PROCEDURE — C9113 INJ PANTOPRAZOLE SODIUM, VIA: HCPCS | Performed by: FAMILY MEDICINE

## 2019-11-27 PROCEDURE — 96365 THER/PROPH/DIAG IV INF INIT: CPT

## 2019-11-27 PROCEDURE — 96376 TX/PRO/DX INJ SAME DRUG ADON: CPT

## 2019-11-27 RX ORDER — ACETAMINOPHEN 325 MG/1
650 TABLET ORAL
Status: DISCONTINUED | OUTPATIENT
Start: 2019-11-27 | End: 2019-12-04 | Stop reason: HOSPADM

## 2019-11-27 RX ADMIN — Medication 10 ML: at 23:50

## 2019-11-27 RX ADMIN — SODIUM CHLORIDE 40 MG: 9 INJECTION INTRAMUSCULAR; INTRAVENOUS; SUBCUTANEOUS at 17:25

## 2019-11-27 RX ADMIN — SODIUM CHLORIDE 40 MG: 9 INJECTION INTRAMUSCULAR; INTRAVENOUS; SUBCUTANEOUS at 06:32

## 2019-11-27 RX ADMIN — ALBUMIN (HUMAN) 12.5 G: 0.25 INJECTION, SOLUTION INTRAVENOUS at 00:58

## 2019-11-27 RX ADMIN — Medication 10 ML: at 06:35

## 2019-11-27 RX ADMIN — Medication 10 ML: at 17:30

## 2019-11-27 RX ADMIN — ACETAMINOPHEN 650 MG: 325 TABLET, FILM COATED ORAL at 18:04

## 2019-11-27 NOTE — PROCEDURES
ELECTROENCEPHALOGRAM REPORT     Patient Name: Roseann Shahid  : 1930  Age: 80 y.o. Ordering physician: Rosie Shepherd MD  Date of EE2019  Interpreting physician: Martell Marin MD      PROCEDURE: EEG. CLINICAL INDICATION: The patient is a 80 y.o. male who is being evaluated for syncope    DESCRIPTION OF THE RECORD:             INTERPRETATION:     This is a normal electroencephalogram with the patient awake and asleep and drowsy, showing no clear focal abnormalities or epileptiform   activity. A normal EEG does not rule out seizures. Clinical correlation recommended.     Pippa Potts MD

## 2019-11-27 NOTE — CONSULTS
[address] [place] [address] NOTE  [facility]  [phone] NP in-hospital cell phone M-F until 4:30  After 5pm or on weekends, please call  for physician on call        NAME:  Oscar Calloway   :   1930   MRN:   [id number]       Referring Physician: Anastacia Zuniga Date: 2019 9:36 AM    Chief Complaint: UGI bleed     History of Present Illness:  Patient is a 80 y.o. who is seen in consultation at the request of Dr. Chiquita Newsome for UGI bleed. Medical history as listed below, includes stroke last month (on Eliquis). He reports syncopal episode yesterday with increased shortness of breath and dizziness. He denies any signs of GI blood loss. He does acknowledge hemorrhoids but he does not see obvious blood loss. He denies reflux, nausea, abdominal pain, change in bowel patten, melena, or hematochezia. He reports his stomach is as strong \"as a goat\". He reports on discharge last month after stroke he started taking Eliquis BID as directed but had significant epistaxis so he decreased to daily last week and bleeding stopped. He also notes some dysphagia to liquids that has been worse since his stroke. He has never had an EGD or colonoscopy, nor does he want them. He smokes some cigars. He is unclear about drinking - stated he cut back and drinks 2 glasses of red wine nightly, also said he drinks mugs of gin and tonic. No NSAID's. I have reviewed the emergency room note, hospital admission note, notes by all other clinicians who have seen the patient during this hospitalization to date. I have reviewed the problem list and the reason for this hospitalization. I have reviewed the allergies and the medications the patient was taking at home prior to this hospitalization.     PMH:  Past Medical History:   Diagnosis Date    Arthritis     Contact dermatitis and other eczema, due to unspecified cause     Hypertension     Stroke ([facility] [address] 1997    TIA at [facility] Thyroid disease     Unspecified hypothyroidism        PSH:  Past Surgical History:   Procedure Laterality Date    HX HEENT      detached retina    HX HIP REPLACEMENT  2008    right hip, [facility]       Allergies: Allergies   Allergen Reactions    Hydrocodone Other (comments)     Unable to void, or have a bowel movement    Amlodipine Swelling and Other (comments)     Severe weeping from leg swelling    Poison Ivy Extract Itching       Home Medications:  Prior to Admission Medications   Prescriptions Last Dose Informant Patient Reported? Taking? CALCIUM/MAGNESIUM (DOLOMITE PO)   Yes Yes   Sig: Take 1 Tab by mouth daily. SAW PALMETTO PO   Yes Yes   Sig: Take 1 Tab by mouth daily. apixaban (ELIQUIS) 5 mg tablet   Yes Yes   Sig: Take 5 mg by mouth daily. ascorbic acid (VITAMIN C) 500 mg tablet   Yes Yes   Sig: Take 500 mg by mouth daily. aspirin (ASPIRIN) 325 mg tablet   Yes Yes   Sig: Take 325 mg by mouth daily. coenzyme Q-10 (CO Q-10) 30 mg capsule   Yes Yes   Sig: Take 30 mg by mouth daily. ketoconazole (NIZORAL) 2 % topical cream   Yes Yes   Sig: Apply 1 Each to affected area daily. apply to nose/face   lecithin/soybean oil (SOYA LECITHIN PO)   Yes Yes   Sig: Take 1 Tab by mouth daily. levothyroxine (SYNTHROID) 50 mcg tablet   Yes Yes   Sig: Take 50 mcg by mouth Daily (before breakfast). losartan (COZAAR) 50 mg tablet   No Yes   Sig: Take 0.5 Tabs by mouth daily. lutein 20 mg cap   Yes Yes   Sig: Take 1 Cap by mouth daily. multivit-min/FA/lycopen/lutein (SENTRY SENIOR PO)   Yes Yes   Sig: Take 1 Tab by mouth daily. traZODone (DESYREL) 50 mg tablet   No Yes   Sig: Take 1 Tab by mouth nightly. For sleep   tretinoin (RETIN-A) 0.025 % topical cream   Yes Yes   Sig: Apply 1 Each to affected area nightly.  apply to face   triamcinolone acetonide (KENALOG) 0.1 % ointment   Yes Yes   Sig: Apply 1 Container to affected area two (2) times a day. use thin layer, apply to face   vitamin E (AQUA GEMS) 400 unit capsule   Yes Yes   Sig: Take 400 Units by mouth daily. Reports taking 1-2 times a week. zinc (CHELATED ZINC) 50 mg tab tablet   Yes No   Sig: Take 50 mg by mouth daily. Facility-Administered Medications: None       Hospital Medications:  Current Facility-Administered Medications   Medication Dose Route Frequency    levothyroxine (SYNTHROID) tablet 50 mcg  50 mcg Oral ACB    sodium chloride (NS) flush 5-40 mL  5-40 mL IntraVENous Q8H    sodium chloride (NS) flush 5-40 mL  5-40 mL IntraVENous PRN    pantoprazole (PROTONIX) 40 mg in 0.9% sodium chloride 10 mL injection  40 mg IntraVENous Q12H    ondansetron (ZOFRAN) injection 4 mg  4 mg IntraVENous Q4H PRN    0.9% sodium chloride infusion 250 mL  250 mL IntraVENous PRN    albuterol-ipratropium (DUO-NEB) 2.5 MG-0.5 MG/3 ML  3 mL Nebulization Q4H PRN       Social History:  Social History     Tobacco Use    Smoking status: Light Tobacco Smoker     Packs/day: 0.25     Types: Cigars     Last attempt to quit: 1978     Years since quittin.1    Smokeless tobacco: Never Used    Tobacco comment: cigar on occ   Substance Use Topics    Alcohol use: Yes     Alcohol/week: 14.0 standard drinks     Types: 14 Shots of liquor per week     Comment: 2 drinks a day.        Family History:  Family History   Problem Relation Age of Onset    Lung Disease Father     Cancer Father         lung    Coronary Artery Disease Neg Hx        Review of Systems:  Constitutional: negative fever, negative chills, negative weight loss  Eyes:   negative visual changes  ENT:   negative sore throat, tongue or lip swelling  Respiratory:  +cough, +dyspnea  Cards:  negative for chest pain, palpitations, +lower extremity edema  GI:   See HPI  :  negative for frequency, dysuria  Integument:  +for rash and pruritus  Heme:  +for easy bruising and gum/nose bleeding  Musculoskeletal:negative for myalgias, back pain and muscle weakness  Neuro:  negative for headaches, +dizziness, vertigo  Psych:  negative for feelings of anxiety, depression     Objective:     Patient Vitals for the past 8 hrs:   BP Temp Pulse Resp SpO2 Weight   11/27/19 0717 133/79 98.3 °F (36.8 °C) (!) 55 18 94 % --   11/27/19 0609 -- -- -- -- -- 109.3 kg (240 lb 15.4 oz)   11/27/19 0227 152/67 98 °F (36.7 °C) (!) 57 18 92 % --     No intake/output data recorded. 11/25 1901 - 11/27 0700  In: -   Out: 900 [Urine:900]    EXAM:     CONST:  Pleasant male lying in bed, no acute distress   NEURO:  alert and oriented x 3   HEENT: EOMI, no scleral icterus   LUNGS: clear to ausculation, upper wheeze   CARD:  S1 S2   ABD:  soft, obese, no tenderness, no rebound, bowel sounds (+) all 4 quadrants, no masses, non distended   EXT:  warm   PSYCH: full, not anxious     Data Review     Recent Labs     11/27/19  0401 11/26/19 2016 11/26/19  1219   WBC  --   --   --  12.2*   HGB 7.2* 6.5*   < > 6.6*   HCT  --   --   --  23.2*   PLT  --   --   --  284    < > = values in this interval not displayed. Recent Labs     11/27/19  0405 11/26/19  1219   * 135*   K 4.0 3.8    103   CO2 26 24   BUN 22* 21*   CREA 1.10 1.33*   GLU 98 136*   CA 8.2* 8.7     Recent Labs     11/26/19  1219   SGOT 49*   AP 59   TP 6.7   ALB 3.3*   GLOB 3.4     No results for input(s): INR, PTP, APTT, INREXT in the last 72 hours. Assessment:     · Anemia: hgb 6.6-->7.2 status post 1 unit pRBC's, with hemoccult positive stool.  No iron levels prior to transfusion  · Atrial Fibrillation: on Eliquis (only taking once daily due to epistaxis)   · Dysphagia: worse since stroke, mostly to liquids     Patient Active Problem List   Diagnosis Code    Essential hypertension I10    Hypothyroidism E03.9    Paroxysmal atrial fibrillation (HCC) I48.0    Encounter for monitoring Coumadin therapy Z51.81, Z79.01    Right knee DJD M17.11    ACP (advance care planning) Z71.89    Peripheral edema R60.9    Severe obesity (BMI 35.0-39. 9) with comorbidity ([address] E66.01    Stroke ([facility] [address] I63.9    Syncope R55     Plan:     · Long discussion with patient and son Dipika Vargas (on phone), recommended EGD but they are against procedures at this time  · Agree with BID PPI  · Consider iron supplementation   · Trend CBC, transfuse as necessary  · If pt begins to bleed briskly, order CTA GI bleed protocol and immediately consult IR for intervention if the CTA is positive  · Patient discussed with and will be seen by Dr. Harvey Singh  · Thank you for allowing me to participate in care of [place] [address] By: Sara Cisneros NP     11/27/2019  9:36 AM     Gastroenterology Attending Physician attestation statement and comments. This patient was seen and examined by me in a face-to-face visit today. I reviewed the medical record including lab work, imaging and other provider notes. I confirmed the history as described above. I spoke to the patient, reviewed the medical record including lab work, imaging and other provider notes. I discussed this case in detail with Gurdeep GERONIMO. I formulated an  assessment of this patient and developed a treatment plan. I agree with the above consultation note. I agree with the history, exam and assessment and plan as outlined in the note. I would like to add the following:    Abd: normoactive BS, nt, nd, no rebound/guarding. Progressive anemia with heme + stool on ASA/Eliquis. Differential broad including ulcers, erosions, AVMs, and malignancy. Pt doesn't wish to proceed with EGD. He is aware of the risk of an undiagnosed cancer or lesion which can only be treated by endoscopy. Serial CBCs, transfuse PRN. PPI BID. Will sign off. Please call with any questions.     Dr. Harvey Singh

## 2019-11-27 NOTE — PROGRESS NOTES
Problem: Anemia Care Plan (Adult and Pediatric)  Goal: *Labs within defined limits  Outcome: Progressing Towards Goal     Problem: Anemia Care Plan (Adult and Pediatric)  Goal: *Tolerates increased activity  Outcome: Progressing Towards Goal     Problem: Falls - Risk of  Goal: *Absence of Falls  Description  Document Prince Benitez Fall Risk and appropriate interventions in the flowsheet.   Outcome: Progressing Towards Goal  Note: Fall Risk Interventions:  Mobility Interventions: Patient to call before getting OOB              Elimination Interventions: Call light in reach

## 2019-11-27 NOTE — PROCEDURES
ELECTROENCEPHALOGRAM REPORT     Patient Name: Cody Mustafa  : 1930  Age: 80 y.o. Date of EE2019  Interpreting physician: Christopher Rosario MD    PROCEDURE: EEG. CLINICAL INDICATION: The patient is a 80 y.o. male who is being evaluated for syncope    DESCRIPTION OF THE RECORD:   During wakefulness there is a well-formed posterior dominant alpha rhythm of 8 to 9 Hz with a preserved anterior to posterior frequency/amplitude gradient. Transition of drowsiness is marked by fragmentation of the alpha background with progressive increase in theta, with sleep heralded by occurrence of vertex waves signifying N1 sleep. Deeper stages of sleep are not identified. Activity is present in environmental stimuli without hyperventilation, photic stimulation being performed. No focal slowing, interictal discharges or electrographic seizures are noted. Single-lead ECG demonstrates normal sinus rhythm. INTERPRETATION:     This is a normal electroencephalogram with the patient awake and asleep and drowsy, showing no clear focal abnormalities or epileptiform   activity. A normal EEG does not rule out seizures. Clinical correlation recommended.       Antonia Lyles MD

## 2019-11-27 NOTE — H&P
[address]  HISTORY AND PHYSICAL    Name:  Vonnie Horton  MR#:  [id number]  :  1930  ACCOUNT #:  [de-identified]  ADMIT DATE:  2019    CHIEF COMPLAINT:  Syncope. HISTORY OF PRESENT ILLNESS:  The patient is an 80-year-old gentleman with a past medical history of recent stroke, admitted to [facility] on 10/12/2019 and discharged on 10/14/2019. 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 MRI showed several small lacunar infarcts in the parietal white matter as well as a small cortical infarct in the left insula. The patient was seen by [facility], no further recommendations, and was discharged home. The patient reports since then he has had some issues swallowing, but has been doing well. Reports that he is on AFib and currently he is on aspirin and Eliquis. The patient reports that he was at a [facility] today when he had a sudden onset of syncope. The patient is not sure how long he was out for, but reports that he had some incontinence associated with syncope. Reports that since then he has also had some shortness of breath and lightheadedness since he arrived to the [facility], also had some nausea associated with his symptoms. The patient reports that he has chronic shortness of breath, but it is somewhat increased in the past few days. He reports that he has been more fatigued and lethargic in the last few days. The patient reports that he started taking trazodone on 2019 and had the dose last night. The patient denies any other complaints or problems. The patient was found to be in the ER and was found to have acute blood loss anemia and was requested to be admitted under the hospitalist service.   The patient denies any headache, blurry vision, sore throat, any chest pain, cough, fever, chills, abdominal pain, constipation, diarrhea, urinary symptoms, focal or generalized neurological weakness, recent travel, sick contacts, any falls, injuries, hematemesis, melena, hemoptysis, hematuria, or any other concerns or problems. PAST MEDICAL HISTORY:  See above. HOME MEDICATIONS:  1.  Trazodone 50 mg nightly. 2.  Eliquis 5 mg every other day. 3.  Levothyroxine 50 mcg daily. 4.  Nizoral shampoo. 5.  Retin-A topical cream.  6.   lutein 20 mg daily. 7.  Coenzyme Q10.  8.  Multivitamin one tablet daily. 9.  Losartan 25 mg daily. 10.  Aspirin 325 mg daily. 11.  Ascorbic acid 500 mg daily. 12.  Saw Palmetto. 13.  Calcium and magnesium. SOCIAL HISTORY:  Light tobacco, smokes cigars 1-2, quit in 1978. Occasional alcohol. No IV drug abuse. ALLERGIES:  TO HYDROCODONE, AMLODIPINE, AND POISON IVY. REVIEW OF SYMPTOMS:  All systems were reviewed and found to be essentially negative except for the symptoms mentioned above. FAMILY HISTORY:  Father has a history of lung disease, lung cancer, and coronary artery disease. PHYSICAL EXAMINATION:  VITAL SIGNS:  Temperature 97.6, pulse 63, respiratory rate 17, blood pressure 165/57, pulse oximetry 100% on 2 liters. GENERAL:  Alert x3, awake, mildly distressed, pleasant male, appears to be stated age. HEENT:  Pupils equal and reactive to light. Dry mucous membranes. Tympanic membranes clear. NECK:  Supple. CHEST:  Decreased basilar breath sounds. CORONARY:  S1 and S2 are heard. ABDOMEN:  Soft, nontender, nondistended. Bowel sounds are hypoactive. EXTREMITIES:  No clubbing, no cyanosis, no edema. Chronic venous changes were noted. NEURO/PSYCH:  Pleasant mood and affect. Cranial nerves II through XII grossly intact. No new focal neurological deficits were noted. SKIN:  Warm. LABORATORY DATA:  White count 12.2, hemoglobin 6.6, hematocrit 23.2, platelets 285. Sodium 135, potassium 3.8, chloride 103, bicarbonate 24, anion gap 8, glucose 136, BUN 21, creatinine 1.32, calcium 8.5. Bilirubin total 0.6, ALT 65, AST 49, alkaline phosphatase 59. Troponin less than 0.05. . Stool occult blood is positive. X-ray of the chest shows cardiomegaly with pulmonary interstitial edema. EKG shows AFib with bradycardia. ASSESSMENT AND PLAN:  1. Gastrointestinal bleed. The patient will be admitted on a telemetry bed. We will keep the patient n.p.o. for now. Gastroenterology consult. The patient has a history of hemorrhoids. I am not sure whether this is lower verus upper gastrointestinal bleed. We will get Gastroenterology consult, transfuse one unit PRBC, and further intervention per hospital course. Monitor H and H. If falls further, may consider further blood transfusion. We will hold aspirin and Eliquis for now and further intervention per hospital course. Continue to closely monitor. Reassess as needed. 2.  Acute blood loss anemia, likely secondary to gastrointestinal bleed. We will transfuse one unit PRBC, monitor H and H, transfuse further as needed, treat the underlying cause, and continue to closely monitor. Further intervention per hospital course. Reassess as needed. Hold Eliquis and aspirin for now. 3.  Syncope, likely secondary to gastrointestinal bleed and acute blood loss anemia. We will transfuse one unit PRBC, treat the underlying cause. We will get TSH, B12, folate levels, neurovascular checks, orthostatic vitals. The patient also was on trazodone and that could be contributing to his symptoms. We will also get CT of the head and CT of the chest to rule out any acute pathology, echocardiogram, get carotid duplex, and further intervention per hospital course. Continue to closely monitor. Reassess as needed. The patient had some incontinence associated with his symptoms, thus we will put the patient on seizure precautions and get an EEG. As said, has a history of extensive strokes. Continue to closely monitor. 4.  Pulmonary edema.   The patient with mild volume overload. We will put the patient on albumin. We will provide gentle IV diuresis, echocardiogram, strict I's and O's, daily weights, repeat labs in the morning, and continue to closely monitor. Further intervention per hospital course. May consider getting a Cardiology consult. Reassess as needed. 5.  History of stroke. We will hold Eliquis and aspirin for now. Once acute symptoms resolve, may resume. Continue to closely monitor. Reassess as needed. 6.  Hypertension. We will hold losartan for now and we will put on the patient on p.r.n. blood pressure medications in light of acute blood loss anemia and continue to monitor. Further intervention per hospital course. 7.  Atrial fibrillation. Hold Eliquis for now in light of gastrointestinal bleed. Currently, rate controlled. Continue to closely monitor. Reassess as needed. 8.  Hypothyroidism. Continue home medication. 9.  Gastrointestinal and deep venous thrombosis prophylaxis. The patient will be on sequential compression devices.       Amee Alonzo MD      [id number]  D:  11/26/2019 15:39  T:  11/26/2019 19:46  JOB #:  [id number]

## 2019-11-28 PROBLEM — D64.9 ACUTE ANEMIA: Status: ACTIVE | Noted: 2019-11-28

## 2019-11-28 LAB
APTT PPP: 28.7 SEC (ref 22.1–32)
HCT VFR BLD AUTO: 24.8 % (ref 36.6–50.3)
HGB BLD-MCNC: 7.4 G/DL (ref 12.1–17)
HGB BLD-MCNC: 7.9 G/DL (ref 12.1–17)
THERAPEUTIC RANGE,PTTT: NORMAL SECS (ref 58–77)

## 2019-11-28 PROCEDURE — 85730 THROMBOPLASTIN TIME PARTIAL: CPT

## 2019-11-28 PROCEDURE — 74011250637 HC RX REV CODE- 250/637: Performed by: HOSPITALIST

## 2019-11-28 PROCEDURE — 85018 HEMOGLOBIN: CPT

## 2019-11-28 PROCEDURE — 74011000250 HC RX REV CODE- 250: Performed by: FAMILY MEDICINE

## 2019-11-28 PROCEDURE — 74011250636 HC RX REV CODE- 250/636: Performed by: FAMILY MEDICINE

## 2019-11-28 PROCEDURE — 74011250637 HC RX REV CODE- 250/637: Performed by: FAMILY MEDICINE

## 2019-11-28 PROCEDURE — 36415 COLL VENOUS BLD VENIPUNCTURE: CPT

## 2019-11-28 PROCEDURE — 65660000000 HC RM CCU STEPDOWN

## 2019-11-28 PROCEDURE — 99218 HC RM OBSERVATION: CPT

## 2019-11-28 PROCEDURE — C9113 INJ PANTOPRAZOLE SODIUM, VIA: HCPCS | Performed by: FAMILY MEDICINE

## 2019-11-28 PROCEDURE — 74011250636 HC RX REV CODE- 250/636: Performed by: HOSPITALIST

## 2019-11-28 PROCEDURE — 96376 TX/PRO/DX INJ SAME DRUG ADON: CPT

## 2019-11-28 RX ORDER — SODIUM CHLORIDE 0.9 % (FLUSH) 0.9 %
5-40 SYRINGE (ML) INJECTION EVERY 8 HOURS
Status: DISCONTINUED | OUTPATIENT
Start: 2019-11-28 | End: 2019-12-04 | Stop reason: HOSPADM

## 2019-11-28 RX ORDER — TRAZODONE HYDROCHLORIDE 50 MG/1
50 TABLET ORAL
Status: DISCONTINUED | OUTPATIENT
Start: 2019-11-28 | End: 2019-12-04 | Stop reason: HOSPADM

## 2019-11-28 RX ORDER — HEPARIN SODIUM 10000 [USP'U]/100ML
9-25 INJECTION, SOLUTION INTRAVENOUS
Status: DISCONTINUED | OUTPATIENT
Start: 2019-11-28 | End: 2019-11-30

## 2019-11-28 RX ORDER — SODIUM CHLORIDE 0.9 % (FLUSH) 0.9 %
5-40 SYRINGE (ML) INJECTION AS NEEDED
Status: DISCONTINUED | OUTPATIENT
Start: 2019-11-28 | End: 2019-12-04 | Stop reason: HOSPADM

## 2019-11-28 RX ADMIN — TRAZODONE HYDROCHLORIDE 50 MG: 50 TABLET ORAL at 22:19

## 2019-11-28 RX ADMIN — SODIUM CHLORIDE 40 MG: 9 INJECTION INTRAMUSCULAR; INTRAVENOUS; SUBCUTANEOUS at 07:13

## 2019-11-28 RX ADMIN — LEVOTHYROXINE SODIUM 50 MCG: 50 TABLET ORAL at 07:13

## 2019-11-28 RX ADMIN — Medication 10 ML: at 14:00

## 2019-11-28 RX ADMIN — Medication 10 ML: at 17:26

## 2019-11-28 RX ADMIN — ACETAMINOPHEN 650 MG: 325 TABLET, FILM COATED ORAL at 12:38

## 2019-11-28 RX ADMIN — Medication 10 ML: at 12:38

## 2019-11-28 RX ADMIN — SODIUM CHLORIDE 40 MG: 9 INJECTION INTRAMUSCULAR; INTRAVENOUS; SUBCUTANEOUS at 18:37

## 2019-11-28 RX ADMIN — HEPARIN SODIUM AND DEXTROSE 9 UNITS/KG/HR: 10000; 5 INJECTION INTRAVENOUS at 18:43

## 2019-11-28 RX ADMIN — Medication 10 ML: at 22:19

## 2019-11-28 RX ADMIN — Medication 10 ML: at 07:13

## 2019-11-28 NOTE — CONSULTS
Consult Date: 11/28/2019    IP CONSULT TO NEUROLOGY  Consult performed by: Jose Roberto Vallejo MD  Consult ordered by: Libia Velazquez MD        History of present illness:    Lana Kaiser is a pleasant 80-year-old right-hand-dominant male with multiple vascular risk factors including atrial fibrillation and a recent stroke in October 2019 who presented to [facility] complaints of shortness of breath, fatigue and finally an episode of syncope of admission. He was found to have evidence for acute blood loss anemia during his initial evaluation. Hemoglobin at time of last admission was 11.9 and at presentation hemoglobin was approximately 6.5. He denied hematochezia melena hemoptysis at presentation but did mention several days of moderate epistaxis in the days prior. This occurred in the setting of recently taking his Eliquis as prescribed as before his stroke in October he was taking his Eliquis once daily and sometimes missing doses but after that admission had been taking it religiously twice daily. Following admission he underwent evaluation for syncope which included both an EEG and MRI. EEG was unremarkable, MRI demonstrated punctuate areas of diffusion restriction within his prior infarct bed. Given this and his acute blood loss anemia with need for anticoagulation neurology was consulted. He denies any new neurologic deficits rather describing systemic ones secondary to his blood loss anemia. Denies any prior history of syncope or seizure with his episode at presentation not consistent with an epileptic event in any regard.       Past Medical History:   Diagnosis Date    Arthritis     Contact dermatitis and other eczema, due to unspecified cause     Hypertension     Stroke ([facility] [address] 1997    TIA at [facility] Thyroid disease     Unspecified hypothyroidism       Past Surgical History:   Procedure Laterality Date    HX HEENT      detached retina    HX HIP REPLACEMENT  2008    right [facility]     Family History   Problem Relation Age of Onset    Lung Disease Father     Cancer Father         lung    Coronary Artery Disease Neg Hx       Social History     Tobacco Use    Smoking status: Light Tobacco Smoker     Packs/day: 0.25     Types: Cigars     Last attempt to quit: 1978     Years since quittin.1    Smokeless tobacco: Never Used    Tobacco comment: cigar on occ   Substance Use Topics    Alcohol use: Yes     Alcohol/week: 14.0 standard drinks     Types: 14 Shots of liquor per week     Comment: 2 drinks a day. Current Facility-Administered Medications   Medication Dose Route Frequency Provider Last Rate Last Dose    sodium chloride (NS) flush 5-40 mL  5-40 mL IntraVENous Q8H Arvin Chávez MD   10 mL at 19 1238    sodium chloride (NS) flush 5-40 mL  5-40 mL IntraVENous PRN Arvin Chávez MD        acetaminophen (TYLENOL) tablet 650 mg  650 mg Oral Q6H PRN Arvin Chávez MD   650 mg at 19 1238    levothyroxine (SYNTHROID) tablet 50 mcg  50 mcg Oral ACB Janine Duarte MD   50 mcg at 19 0713    sodium chloride (NS) flush 5-40 mL  5-40 mL IntraVENous Q8H Argenis Barreto MD   10 mL at 19 0713    sodium chloride (NS) flush 5-40 mL  5-40 mL IntraVENous PRN Janine Duarte MD        pantoprazole (PROTONIX) 40 mg in 0.9% sodium chloride 10 mL injection  40 mg IntraVENous Q12H Janine Duarte MD   40 mg at 19 0713    ondansetron (ZOFRAN) injection 4 mg  4 mg IntraVENous Q4H PRN Janine Duarte MD        0.9% sodium chloride infusion 250 mL  250 mL IntraVENous PRN Janine Duarte MD        albuterol-ipratropium (DUO-NEB) 2.5 MG-0.5 MG/3 ML  3 mL Nebulization Q4H PRN Janine Duarte MD             Review of Systems   Constitutional: Negative for chills, fever, malaise/fatigue and weight loss. HENT: Positive for nosebleeds. Negative for congestion, hearing loss, sinus pain and sore throat.     Eyes: Negative for blurred vision, double vision and photophobia. Respiratory: Positive for shortness of breath. Negative for cough and wheezing. Cardiovascular: Positive for palpitations. Negative for chest pain. Gastrointestinal: Negative for abdominal pain, blood in stool, diarrhea, heartburn, melena, nausea and vomiting. Genitourinary: Negative for dysuria. Musculoskeletal: Negative. Skin: Negative for rash. Neurological: Positive for loss of consciousness. Negative for dizziness, tingling, tremors, sensory change, speech change, focal weakness, seizures, weakness and headaches. Endo/Heme/Allergies: Does not bruise/bleed easily. Psychiatric/Behavioral: Negative. Negative for substance abuse. Objective     Vital signs for last 24 hours:  Visit Vitals  /63 (BP 1 Location: Left arm, BP Patient Position: At rest)   Pulse 65   Temp 98.1 °F (36.7 °C)   Resp 14   Ht 5' 8\" (1.727 m)   Wt 109 kg (240 lb 4.8 oz)   SpO2 92%   BMI 36.54 kg/m²     Data Review:   Recent Results (from the past 24 hour(s))   HEMOGLOBIN    Collection Time: 11/27/19  5:25 PM   Result Value Ref Range    HGB 7.2 (L) 12.1 - 17.0 g/dL   HGB & HCT    Collection Time: 11/28/19  4:56 AM   Result Value Ref Range    HGB 7.4 (L) 12.1 - 17.0 g/dL    HCT 24.8 (L) 36.6 - 50.3 %       Physical Exam    Pleasant male resting comfortably in bed appearing much younger than stated age. HEENT appears unremarkable other than element of conjunctival pallor. Oral cavity is moist.  Neck appears supple. Cardiovascular demonstrates constant S1/S2 regular rhythm. Pulmonary demonstrates regular rate and rhythm. Extremities are warm/dry without peripheral edema. Neurologically patient appears alert and oriented attention is intact. Speech is clear language is grossly fluent. Cranials 2 through 12 appear intact. [place] has normal bulk and tone has 5 out of 5 strength in upper and lower extremities.   Sensation is intact to fine touch/temperature in bilateral upper and lower extremities. Coordination is intact in upper extremities, reflexes are 1+ in upper extremities 2+ patella absent Achilles and plantar responses equivocal.  Gait/station and Romberg were deferred. Assessment and plan:    Moises Lane is a pleasant 80-year-old male presented with syncope in the setting of acute blood loss anemia complicated by extension of prior ischemic cerebral infarct.     Syncope, acute blood loss anemia and extension of prior infarct:  Clarification as discussed with family, likely timeline of patient's presentation is as such: Acute blood loss anemia (from unknown source although possibly epistaxis as noted below) led to hypovolemia and syncope, leading to minimal recurrent ischemic injury in prior infarct bed given tenuous blood supply here  As such the MRI which was obtained for indistinct reasons is a reflection of global cerebral hypoperfusion with selective injury of the vulnerable area rather than being indicative of a causative lesion or in fact truly a stroke in clinical terms  On review of existing data would agree with GI plan of resuming anticoagulation whilr awaiting their comfort in terms of timing of endoscopy  Would recommend repeating one more hemoglobin either this evening or tomorrow to ensure stability before resuming anticoagulation in the form of either heparin  Lovenox with decision rendered by hospitalist/GI service regarding which formulation  Other than resuming anticoagulation in the near future, patient may benefit from addition low-dose atorvastatin nightly given 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 and an LDL above 80 at last admission (please note this vertebral artery occlusion is not thought to be implicated in syncope given the rather obvious acute, symptomatic blood loss anemia)  If endoscopy is unrevealing source of his significant hemoglobin drop may be in fact epistaxis based on history at which point decision will be need to be made between patient and care team regarding risk of anticoagulation going forward  Did briefly mention possibility of atrial appendage closure as a nonpharmacologic measure given patient's age this may not be an ideal suggestion    Neurology will continue to follow to provide further recommendations after endoscopy is performed or sooner if needed.

## 2019-11-28 NOTE — PROGRESS NOTES
Bedside and Verbal shift change report given to [address] [place] [address] (oncoming nurse) by Lupe Olivas (offgoing nurse). Report included the following information SBAR, Kardex, ED Summary, Intake/Output, MAR, Recent Results and Cardiac Rhythm A fib.

## 2019-11-28 NOTE — PROGRESS NOTES
[facility]  Hospitalist Group                                                                                          Hospitalist Progress Note  Magda Ceballos MD  Answering service: [phone] OR 36 from in house phone        Date of Service:  2019  NAME:  Soni Atkinson  :  1930  MRN:  [id number]      Admission Summary:   80-year-old male with past medical history of stroke, atrial fibrillation, thyroid disease, arthritis was sent to the hospital from a facility because of a syncopal episode. in the ER he was found to have a hemoglobin of 6.2. Patient was admitted for further evaluation. Interval history / Subjective: Follow-up for anemia and syncopal episode    Patient states that he had nosebleeds for 5 days ago which has resolved without any kind of intervention. States that he has been taking Eliquis every day. Denied any blood or black-colored bowel movement. Assessment & Plan:     #Acute blood loss anemia:  -Patient states that he had some recent nosebleeds which has resolved without any intervention while on Eliquis. Hemoglobin in October was 11.2 which has dropped down to 6.4 yesterday. -GI was consulted for further evaluation-patient and his son declined EGD due to the risks. -I discussed with the patient later today-explained to him that due to his recent stroke if he needs to go back on Eliquis we need further evaluation of the anemia which includes endoscopy to rule out any GI bleed. Patient states that he wants to think about it and would like to avoid the procedure if possible.  -He is currently on twice daily PPI. -Will check hemoglobin every 12 hours. Status post 1 unit of PRBC transfusion at the time of admission-hemoglobin is 7.2.    -Syncope-  -likely related to acute anemia. MRI brain and carotid Dopplers pending. -CT head did not show any acute changes. #Atrial fibrillation-  -She is currently rate controlled.   Will hold off on Eliquis for now until his hemoglobin is stable.    -History of stroke in October 2019-  -patient states that he already gained almost 70 to 80% of his neurodeficits he had at the time of admission last time.  -On statin, hold off on anticoagulation until hemoglobin stabilizes     -Hypertension,  -Blood pressure within normal limits closely monitor.    -Hypothyroidism resume Synthroid. Code status: DNR  DVT prophylaxis: SCD    Care Plan discussed with: Patient, nurse, GI team  Disposition: To be determined     Hospital Problems  Date Reviewed: 10/18/2019          Codes Class Noted POA    Syncope ICD-10-CM: R55  ICD-9-CM: 780.2  11/26/2019 Unknown                Review of Systems:   As per HPI      Vital Signs:    Last 24hrs VS reviewed since prior progress note. Most recent are:  Visit Vitals  /55 (BP 1 Location: Left arm, BP Patient Position: At rest)   Pulse 60   Temp 98.2 °F (36.8 °C)   Resp 18   Ht 5' 8\" (1.727 m)   Wt 109.3 kg (240 lb 15.4 oz)   SpO2 94%   BMI 36.64 kg/m²         Intake/Output Summary (Last 24 hours) at 11/27/2019 2209  Last data filed at 11/27/2019 1249  Gross per 24 hour   Intake --   Output 450 ml   Net -450 ml        Physical Examination:             Constitutional:  Elderly patient, no acute distress    ENT:  Oral mucous moist, oropharynx benign. EOMI, anicteric sclera and normal conjunctiva   Resp:  CTA bilaterally. No wheezing/rhonchi/rales. CV:  Regular rhythm, normal rate    GI:  Soft, non distended, non tender. normoactive bowel sounds     Musculoskeletal:  No edema    Neurologic:  Alert and oriented x2-3, moves all extremities. Psych:   Not anxious nor agitated.        Data Review:    Review and/or order of clinical lab test  Review and/or order of tests in the radiology section of CPT  Review and/or order of tests in the medicine section of CPT      Labs:     Recent Labs     11/27/19  1725 11/27/19  0401  11/26/19  1219   WBC  --   --   --  12.2*   HGB 7.2* 7.2*   < > 6.6*   HCT  --   --   --  23.2*   PLT  --   --   --  284    < > = values in this interval not displayed. Recent Labs     11/27/19  0405 11/26/19  1219   * 135*   K 4.0 3.8    103   CO2 26 24   BUN 22* 21*   CREA 1.10 1.33*   GLU 98 136*   CA 8.2* 8.7     Recent Labs     11/26/19  1219   SGOT 49*   ALT 65   AP 59   TBILI 0.6   TP 6.7   ALB 3.3*   GLOB 3.4     No results for input(s): INR, PTP, APTT, INREXT in the last 72 hours. No results for input(s): FE, TIBC, PSAT, FERR in the last 72 hours. No results found for: FOL, RBCF   No results for input(s): PH, PCO2, PO2 in the last 72 hours.   Recent Labs     11/26/19 2016 11/26/19  1219   TROIQ <0.05 <0.05     Lab Results   Component Value Date/Time    Cholesterol, total 159 10/12/2019 08:55 AM    HDL Cholesterol 51 10/12/2019 08:55 AM    LDL, calculated 83.4 10/12/2019 08:55 AM    Triglyceride 123 10/12/2019 08:55 AM    CHOL/HDL Ratio 3.1 10/12/2019 08:55 AM     Lab Results   Component Value Date/Time    Glucose (POC) 117 (H) 10/12/2019 08:41 AM     No results found for: COLOR, APPRN, SPGRU, REFSG, MICKEY, PROTU, GLUCU, KETU, BILU, UROU, JABIER, LEUKU, GLUKE, EPSU, BACTU, WBCU, RBCU, CASTS, UCRY      Medications Reviewed:     Current Facility-Administered Medications   Medication Dose Route Frequency    acetaminophen (TYLENOL) tablet 650 mg  650 mg Oral Q6H PRN    levothyroxine (SYNTHROID) tablet 50 mcg  50 mcg Oral ACB    sodium chloride (NS) flush 5-40 mL  5-40 mL IntraVENous Q8H    sodium chloride (NS) flush 5-40 mL  5-40 mL IntraVENous PRN    pantoprazole (PROTONIX) 40 mg in 0.9% sodium chloride 10 mL injection  40 mg IntraVENous Q12H    ondansetron (ZOFRAN) injection 4 mg  4 mg IntraVENous Q4H PRN    0.9% sodium chloride infusion 250 mL  250 mL IntraVENous PRN    albuterol-ipratropium (DUO-NEB) 2.5 MG-0.5 MG/3 ML  3 mL Nebulization Q4H PRN     ______________________________________________________________________  EXPECTED LENGTH OF STAY: - - -  ACTUAL LENGTH OF STAY:          0                 Wilberto Fleming MD

## 2019-11-29 ENCOUNTER — APPOINTMENT (OUTPATIENT)
Dept: NON INVASIVE DIAGNOSTICS | Age: 84
DRG: 377 | End: 2019-11-29
Attending: FAMILY MEDICINE
Payer: MEDICARE

## 2019-11-29 LAB
ANION GAP SERPL CALC-SCNC: 5 MMOL/L (ref 5–15)
APTT PPP: 42.1 SEC (ref 22.1–32)
APTT PPP: 56.9 SEC (ref 22.1–32)
APTT PPP: 60.8 SEC (ref 22.1–32)
APTT PPP: 78.3 SEC (ref 22.1–32)
AV VELOCITY RATIO: 0.66
BUN SERPL-MCNC: 15 MG/DL (ref 6–20)
BUN/CREAT SERPL: 14 (ref 12–20)
CALCIUM SERPL-MCNC: 8.3 MG/DL (ref 8.5–10.1)
CHLORIDE SERPL-SCNC: 101 MMOL/L (ref 97–108)
CO2 SERPL-SCNC: 27 MMOL/L (ref 21–32)
COMMENT, HOLDF: NORMAL
CREAT SERPL-MCNC: 1.04 MG/DL (ref 0.7–1.3)
ECHO AO ROOT DIAM: 4.11 CM
ECHO AV AREA PEAK VELOCITY: 3.4 CM2
ECHO AV PEAK GRADIENT: 10.7 MMHG
ECHO AV PEAK VELOCITY: 163.27 CM/S
ECHO EST RA PRESSURE: 10 MMHG
ECHO LA AREA 4C: 35.1 CM2
ECHO LA MAJOR AXIS: 5.83 CM
ECHO LA TO AORTIC ROOT RATIO: 1.42
ECHO LA VOL 2C: 133.66 ML (ref 18–58)
ECHO LA VOL 4C: 112.59 ML (ref 18–58)
ECHO LA VOL BP: 135.36 ML (ref 18–58)
ECHO LA VOL/BSA BIPLANE: 60.86 ML/M2 (ref 16–28)
ECHO LA VOLUME INDEX A2C: 60.09 ML/M2 (ref 16–28)
ECHO LA VOLUME INDEX A4C: 50.62 ML/M2 (ref 16–28)
ECHO LV E' LATERAL VELOCITY: 4.68 CM/S
ECHO LV E' SEPTAL VELOCITY: 8.16 CM/S
ECHO LV INTERNAL DIMENSION DIASTOLIC: 5.02 CM (ref 4.2–5.9)
ECHO LV INTERNAL DIMENSION SYSTOLIC: 3.66 CM
ECHO LV IVSD: 1.58 CM (ref 0.6–1)
ECHO LV MASS 2D: 437.6 G (ref 88–224)
ECHO LV MASS INDEX 2D: 196.7 G/M2 (ref 49–115)
ECHO LV POSTERIOR WALL DIASTOLIC: 1.64 CM (ref 0.6–1)
ECHO LVOT DIAM: 2.58 CM
ECHO LVOT PEAK GRADIENT: 4.6 MMHG
ECHO LVOT PEAK VELOCITY: 107.7 CM/S
ECHO MV A VELOCITY: 36.18 CM/S
ECHO MV AREA PHT: 4.2 CM2
ECHO MV E DECELERATION TIME (DT): 179.9 MS
ECHO MV E VELOCITY: 116.06 CM/S
ECHO MV E/A RATIO: 3.21
ECHO MV E/E' LATERAL: 24.8
ECHO MV E/E' RATIO (AVERAGED): 19.51
ECHO MV E/E' SEPTAL: 14.22
ECHO MV PRESSURE HALF TIME (PHT): 52.2 MS
ECHO MV REGURGITANT PEAK GRADIENT: 81.1 MMHG
ECHO MV REGURGITANT PEAK VELOCITY: 450.21 CM/S
ECHO PULMONARY ARTERY SYSTOLIC PRESSURE (PASP): 58.5 MMHG
ECHO PV MAX VELOCITY: 85.01 CM/S
ECHO PV PEAK GRADIENT: 2.9 MMHG
ECHO RIGHT VENTRICULAR SYSTOLIC PRESSURE (RVSP): 58.5 MMHG
ECHO RV INTERNAL DIMENSION: 6.34 CM
ECHO RV TAPSE: 2.7 CM (ref 1.5–2)
ECHO TV REGURGITANT MAX VELOCITY: 348.13 CM/S
ECHO TV REGURGITANT PEAK GRADIENT: 48.5 MMHG
ERYTHROCYTE [DISTWIDTH] IN BLOOD BY AUTOMATED COUNT: 16.5 % (ref 11.5–14.5)
GLUCOSE SERPL-MCNC: 141 MG/DL (ref 65–100)
HCT VFR BLD AUTO: 24.9 % (ref 36.6–50.3)
HCT VFR BLD AUTO: 25.2 % (ref 36.6–50.3)
HGB BLD-MCNC: 7.3 G/DL (ref 12.1–17)
HGB BLD-MCNC: 7.3 G/DL (ref 12.1–17)
LVFS 2D: 27.03 %
MCH RBC QN AUTO: 25.9 PG (ref 26–34)
MCHC RBC AUTO-ENTMCNC: 29.3 G/DL (ref 30–36.5)
MCV RBC AUTO: 88.3 FL (ref 80–99)
MV DEC SLOPE: 6.45
NRBC # BLD: 0 K/UL (ref 0–0.01)
NRBC BLD-RTO: 0 PER 100 WBC
PLATELET # BLD AUTO: 254 K/UL (ref 150–400)
PMV BLD AUTO: 10.3 FL (ref 8.9–12.9)
POTASSIUM SERPL-SCNC: 3.6 MMOL/L (ref 3.5–5.1)
PULMONARY ARTERY END DIASTOLIC PRESSURE: 15.9 MMHG
PULMONARY ARTERY MEAN PRESURE: 30.1 MMHG
PV END DIASTOLIC VELOCITY: 1.2 MMHG
RBC # BLD AUTO: 2.82 M/UL (ref 4.1–5.7)
SAMPLES BEING HELD,HOLD: NORMAL
SODIUM SERPL-SCNC: 133 MMOL/L (ref 136–145)
THERAPEUTIC RANGE,PTTT: ABNORMAL SECS (ref 58–77)
WBC # BLD AUTO: 13.4 K/UL (ref 4.1–11.1)

## 2019-11-29 PROCEDURE — 93306 TTE W/DOPPLER COMPLETE: CPT

## 2019-11-29 PROCEDURE — 74011250637 HC RX REV CODE- 250/637: Performed by: PSYCHIATRY & NEUROLOGY

## 2019-11-29 PROCEDURE — 74011250637 HC RX REV CODE- 250/637: Performed by: FAMILY MEDICINE

## 2019-11-29 PROCEDURE — 85730 THROMBOPLASTIN TIME PARTIAL: CPT

## 2019-11-29 PROCEDURE — 85027 COMPLETE CBC AUTOMATED: CPT

## 2019-11-29 PROCEDURE — 85018 HEMOGLOBIN: CPT

## 2019-11-29 PROCEDURE — 65660000000 HC RM CCU STEPDOWN

## 2019-11-29 PROCEDURE — 74011250636 HC RX REV CODE- 250/636: Performed by: HOSPITALIST

## 2019-11-29 PROCEDURE — C9113 INJ PANTOPRAZOLE SODIUM, VIA: HCPCS | Performed by: FAMILY MEDICINE

## 2019-11-29 PROCEDURE — 74011000250 HC RX REV CODE- 250: Performed by: FAMILY MEDICINE

## 2019-11-29 PROCEDURE — 74011250637 HC RX REV CODE- 250/637: Performed by: HOSPITALIST

## 2019-11-29 PROCEDURE — 36415 COLL VENOUS BLD VENIPUNCTURE: CPT

## 2019-11-29 PROCEDURE — 74011250636 HC RX REV CODE- 250/636: Performed by: FAMILY MEDICINE

## 2019-11-29 PROCEDURE — 80048 BASIC METABOLIC PNL TOTAL CA: CPT

## 2019-11-29 RX ORDER — HEPARIN SODIUM 5000 [USP'U]/ML
2000 INJECTION, SOLUTION INTRAVENOUS; SUBCUTANEOUS ONCE
Status: COMPLETED | OUTPATIENT
Start: 2019-11-29 | End: 2019-11-29

## 2019-11-29 RX ORDER — LOSARTAN POTASSIUM 25 MG/1
25 TABLET ORAL DAILY
Status: DISCONTINUED | OUTPATIENT
Start: 2019-11-30 | End: 2019-11-29

## 2019-11-29 RX ORDER — ATORVASTATIN CALCIUM 20 MG/1
20 TABLET, FILM COATED ORAL DAILY
Status: DISCONTINUED | OUTPATIENT
Start: 2019-11-29 | End: 2019-12-04 | Stop reason: HOSPADM

## 2019-11-29 RX ORDER — LOSARTAN POTASSIUM 25 MG/1
25 TABLET ORAL DAILY
Status: DISCONTINUED | OUTPATIENT
Start: 2019-11-29 | End: 2019-11-30

## 2019-11-29 RX ORDER — HEPARIN SODIUM 1000 [USP'U]/ML
30 INJECTION, SOLUTION INTRAVENOUS; SUBCUTANEOUS ONCE
Status: DISCONTINUED | OUTPATIENT
Start: 2019-11-29 | End: 2019-11-29 | Stop reason: DRUGHIGH

## 2019-11-29 RX ADMIN — HEPARIN SODIUM AND DEXTROSE 10 UNITS/KG/HR: 10000; 5 INJECTION INTRAVENOUS at 19:03

## 2019-11-29 RX ADMIN — HEPARIN SODIUM 2000 UNITS: 5000 INJECTION INTRAVENOUS; SUBCUTANEOUS at 02:01

## 2019-11-29 RX ADMIN — LOSARTAN POTASSIUM 25 MG: 25 TABLET ORAL at 17:49

## 2019-11-29 RX ADMIN — ACETAMINOPHEN 650 MG: 325 TABLET, FILM COATED ORAL at 03:39

## 2019-11-29 RX ADMIN — Medication 10 ML: at 07:43

## 2019-11-29 RX ADMIN — LEVOTHYROXINE SODIUM 50 MCG: 50 TABLET ORAL at 07:42

## 2019-11-29 RX ADMIN — Medication 10 ML: at 14:00

## 2019-11-29 RX ADMIN — SODIUM CHLORIDE 40 MG: 9 INJECTION INTRAMUSCULAR; INTRAVENOUS; SUBCUTANEOUS at 07:42

## 2019-11-29 RX ADMIN — SODIUM CHLORIDE 40 MG: 9 INJECTION INTRAMUSCULAR; INTRAVENOUS; SUBCUTANEOUS at 17:49

## 2019-11-29 RX ADMIN — Medication 10 ML: at 22:17

## 2019-11-29 RX ADMIN — ATORVASTATIN CALCIUM 20 MG: 20 TABLET, FILM COATED ORAL at 09:15

## 2019-11-29 RX ADMIN — ACETAMINOPHEN 650 MG: 325 TABLET, FILM COATED ORAL at 22:17

## 2019-11-29 RX ADMIN — TRAZODONE HYDROCHLORIDE 50 MG: 50 TABLET ORAL at 22:17

## 2019-11-29 NOTE — PROGRESS NOTES
[facility]  Hospitalist Group                                                                                          Hospitalist Progress Note  Anita De La Fuente MD  Answering service: [phone] OR 36 from in house phone        Date of Service:  2019  NAME:  Nimisha Verduzco  :  1930  MRN:  [id number]      Admission Summary:   70-year-old male with past medical history of stroke, atrial fibrillation, thyroid disease, arthritis was sent to the hospital from a facility because of a syncopal episode. in the ER he was found to have a hemoglobin of 6.2. Patient was admitted for further evaluation. Interval history / Subjective: Follow-up for anemia and syncopal episode    Patient denies any nosebleed. He did not have any bowel movement yet-he is on liquid diet. -Denied any chest pain, nausea, vomiting. Assessment & Plan:     #Acute blood loss anemia:  -Patient states that he had some recent nosebleeds few days dana which has resolved without any intervention and continued to take Eliquis during that period. Hemoglobin in October was 11.2 which has dropped down to 6.4 at admission  -Stool occult blood was positive at admission. Anemia could be multifactorial -nose bleed vs GI loss. -GI was consulted for further evaluation-patient and his son declined EGD due to the risks.  -Discussed with Dr.Mc ho  -as MRI  showed new infarct in the left MCA territory, wants to hold off on the endoscopy now due to risks. -Consulted GI again for further evaluation of timing of endoscopy.  -He is currently on twice daily PPI. -s/p 1 U PRBC at the time of admission  -Hemoglobin has been stable between 7-8, no evidence of active bleeding while on heparin drip.  -Continue every 12 hours H&H    -Syncope at admission;  -likely related to acute blood loss anemia.     MRI brain showed left posterior MCA acute infarct -discussed with neurologist and reviewed notes - \"global cerebral hypoperfusion with selective injury of the vulnerable area rather than being indicative of a causative lesion or in fact truly a stroke in clinical terms\"    -History of stroke in October 2019-  -Hb has been stable so far without any active evidence of bleeding. Discussed with the patient and family(son,zulema) -they are aware of risks and benefits of anticoagulation.  -As patient is in persistent atrial fibrillation on tele -continue heparin drip, PTT therapeutic.  -Will probably needs endoscopy prior to switching to oral anticoagulants  -continue statin. #Persistent atrial Fib:  -HR controlled. Restarted anticoagulation.    -Hypertension,  -Blood pressure high, restart losartan. -Hypothyroidism resumed Synthroid. Code status: full  DVT prophylaxis: SCD    Care Plan discussed with: Patient/family, nurse, GI,neurology team  Disposition: To be determined     Hospital Problems  Date Reviewed: 10/18/2019          Codes Class Noted POA    Acute anemia ICD-10-CM: D64.9  ICD-9-CM: 285.9  11/28/2019 Unknown        Syncope ICD-10-CM: R55  ICD-9-CM: 780.2  11/26/2019 Unknown                Review of Systems:   As per HPI      Vital Signs:    Last 24hrs VS reviewed since prior progress note. Most recent are:  Visit Vitals  /61 (BP 1 Location: Left arm, BP Patient Position: At rest)   Pulse (!) 55   Temp 97.2 °F (36.2 °C)   Resp 18   Ht 5' 8\" (1.727 m)   Wt 110.7 kg (244 lb)   SpO2 96%   BMI 37.10 kg/m²         Intake/Output Summary (Last 24 hours) at 11/29/2019 1725  Last data filed at 11/29/2019 1306  Gross per 24 hour   Intake 1440 ml   Output 600 ml   Net 840 ml        Physical Examination:             Constitutional:  Elderly patient, no acute distress    ENT:  Oral mucous moist, oropharynx benign. EOMI, anicteric sclera and normal conjunctiva   Resp:  Clear to auscultation bilaterally. .   CV:  Irregular rhythm, normal heart rate    GI:  Soft, non distended, non tender.  normoactive bowel sounds Musculoskeletal:  No edema    Neurologic:  Alert and oriented x3, moves all extremities,strength wnl of all extremities     Psych:   Not anxious nor agitated. Data Review:    Review and/or order of clinical lab test  Review and/or order of tests in the radiology section of CPT  Review and/or order of tests in the medicine section of CPT      Labs:     Recent Labs     11/29/19  0914 11/29/19  0744   WBC 13.4*  --    HGB 7.3* 7.3*   HCT 24.9* 25.2*     --      Recent Labs     11/29/19  0914 11/27/19  0405   * 135*   K 3.6 4.0    103   CO2 27 26   BUN 15 22*   CREA 1.04 1.10   * 98   CA 8.3* 8.2*     No results for input(s): SGOT, GPT, ALT, AP, TBIL, TBILI, TP, ALB, GLOB, GGT, AML, LPSE in the last 72 hours. No lab exists for component: AMYP, HLPSE  Recent Labs     11/29/19  1531 11/29/19  0744 11/29/19  0044   APTT 56.9* 78.3* 42.1*      No results for input(s): FE, TIBC, PSAT, FERR in the last 72 hours. No results found for: FOL, RBCF   No results for input(s): PH, PCO2, PO2 in the last 72 hours.   Recent Labs     11/26/19 2016   TROIQ <0.05     Lab Results   Component Value Date/Time    Cholesterol, total 159 10/12/2019 08:55 AM    HDL Cholesterol 51 10/12/2019 08:55 AM    LDL, calculated 83.4 10/12/2019 08:55 AM    Triglyceride 123 10/12/2019 08:55 AM    CHOL/HDL Ratio 3.1 10/12/2019 08:55 AM     Lab Results   Component Value Date/Time    Glucose (POC) 117 (H) 10/12/2019 08:41 AM     No results found for: COLOR, APPRN, SPGRU, REFSG, MICKEY, PROTU, GLUCU, KETU, BILU, UROU, JABIER, LEUKU, GLUKE, EPSU, BACTU, WBCU, RBCU, CASTS, UCRY      Medications Reviewed:     Current Facility-Administered Medications   Medication Dose Route Frequency    atorvastatin (LIPITOR) tablet 20 mg  20 mg Oral DAILY    sodium chloride (NS) flush 5-40 mL  5-40 mL IntraVENous Q8H    sodium chloride (NS) flush 5-40 mL  5-40 mL IntraVENous PRN    heparin 25,000 units in D5W 250 ml infusion  9-25 Units/kg/hr IntraVENous TITRATE    traZODone (DESYREL) tablet 50 mg  50 mg Oral QHS    acetaminophen (TYLENOL) tablet 650 mg  650 mg Oral Q6H PRN    levothyroxine (SYNTHROID) tablet 50 mcg  50 mcg Oral ACB    sodium chloride (NS) flush 5-40 mL  5-40 mL IntraVENous Q8H    sodium chloride (NS) flush 5-40 mL  5-40 mL IntraVENous PRN    pantoprazole (PROTONIX) 40 mg in 0.9% sodium chloride 10 mL injection  40 mg IntraVENous Q12H    ondansetron (ZOFRAN) injection 4 mg  4 mg IntraVENous Q4H PRN    0.9% sodium chloride infusion 250 mL  250 mL IntraVENous PRN    albuterol-ipratropium (DUO-NEB) 2.5 MG-0.5 MG/3 ML  3 mL Nebulization Q4H PRN     ______________________________________________________________________  EXPECTED LENGTH OF STAY: - - -  ACTUAL LENGTH OF STAY:          1                 Sue Arita MD

## 2019-11-29 NOTE — PROGRESS NOTES
[facility] Adult  Hospitalist Group                                                                                          Hospitalist Progress Note  Lam Liang MD  Answering service: [phone] OR [phone] from in house phone        Date of Service:  2019  NAME:  Lewis Koenig  :  1930  MRN:  [id number]      Admission Summary:   80-year-old male with past medical history of stroke, atrial fibrillation, thyroid disease, arthritis was sent to the hospital from a facility because of a syncopal episode. in the ER he was found to have a hemoglobin of 6.2. Patient was admitted for further evaluation. Interval history / Subjective: Follow-up for anemia and syncopal episode    Patient denied any bowel movement in the last 24 hrs. Denied any dizziness,lightheadedness. Assessment & Plan:     #Acute blood loss anemia:  -Patient states that he had some recent nosebleeds few days dana which has resolved without any intervention and continued to take Eliquis during that period. Hemoglobin in October was 11.2 which has dropped down to 6.4 at admission  -Stool occult blood was positive at admission. Anemia could be multifactorial -nose bleed vs GI loss. -GI was consulted for further evaluation-patient and his son declined EGD due to the risks.  -Discussed with Dr.Mc ho  -as MRI overnight showed new infarct in the left MCA territory, wants to hold off on the endoscopy now due to risks. Recommended to start heparin drip if patient's Hb stable if anticoagulation is needed.  -He is currently on twice daily PPI. -s/p 1 U PRBC at the time of admission- Hb stable so far -7.8 in the evening. No evidence of active bleeding so far. -    -Syncope-  -likely related to acute blood loss anemia.     MRI brain showed left posterior MCA acute infarct -discussed with neurologist and reviewed notes - \"global cerebral hypoperfusion with selective injury of the vulnerable area rather than being indicative of a causative lesion or in fact truly a stroke in clinical terms\"    -History of stroke in October 2019-  -Hb has been stable so far without any active evidence of bleeding. Discussed with the patient and family(son,zulema) -they are aware of risks and benefits of anticoagulation.  -As patient is in persistent atrial fibrillation on tele -will start heparin drip today and monitor for signs of bleeding closely. Q 8 -12 hr H and H.  -Will probably needs endoscopy prior to switching to oral anticoagulants  -continue statin. #Persistent atrial Fib:  -HR controlled. Restarted anticoagulation.    -Hypertension,  -Blood pressure within normal limits closely monitor.    -Hypothyroidism resumed Synthroid. Code status: full  DVT prophylaxis: SCD    Care Plan discussed with: Patient/family, nurse, GI,neurology team  Disposition: To be determined     Hospital Problems  Date Reviewed: 10/18/2019          Codes Class Noted POA    Acute anemia ICD-10-CM: D64.9  ICD-9-CM: 285.9  11/28/2019 Unknown        Syncope ICD-10-CM: R55  ICD-9-CM: 780.2  11/26/2019 Unknown                Review of Systems:   As per HPI      Vital Signs:    Last 24hrs VS reviewed since prior progress note. Most recent are:  Visit Vitals  /77 (BP 1 Location: Left arm, BP Patient Position: At rest)   Pulse (!) 53   Temp 97.3 °F (36.3 °C)   Resp 18   Ht 5' 8\" (1.727 m)   Wt 109 kg (240 lb 4.8 oz)   SpO2 95%   BMI 36.54 kg/m²         Intake/Output Summary (Last 24 hours) at 11/28/2019 1952  Last data filed at 11/28/2019 1910  Gross per 24 hour   Intake 720 ml   Output 300 ml   Net 420 ml        Physical Examination:             Constitutional:  Elderly patient, no acute distress    ENT:  Oral mucous moist, oropharynx benign. EOMI, anicteric sclera and normal conjunctiva   Resp:  clear to auscultation b/l, No wheezing/rhonchi/rales. CV:  irregular rhythm, normal rate    GI:  Soft, non distended, non tender.  normoactive bowel sounds Musculoskeletal:  No edema    Neurologic:  Alert and oriented x2-3, moves all extremities,strength wnl of all extremities     Psych:   Not anxious nor agitated. Data Review:    Review and/or order of clinical lab test  Review and/or order of tests in the radiology section of CPT  Review and/or order of tests in the medicine section of CPT      Labs:     Recent Labs     11/28/19  1708 11/28/19  0456  11/26/19  1219   WBC  --   --   --  12.2*   HGB 7.9* 7.4*   < > 6.6*   HCT  --  24.8*  --  23.2*   PLT  --   --   --  284    < > = values in this interval not displayed. Recent Labs     11/27/19  0405 11/26/19  1219   * 135*   K 4.0 3.8    103   CO2 26 24   BUN 22* 21*   CREA 1.10 1.33*   GLU 98 136*   CA 8.2* 8.7     Recent Labs     11/26/19  1219   SGOT 49*   ALT 65   AP 59   TBILI 0.6   TP 6.7   ALB 3.3*   GLOB 3.4     Recent Labs     11/28/19  1840   APTT 28.7      No results for input(s): FE, TIBC, PSAT, FERR in the last 72 hours. No results found for: FOL, RBCF   No results for input(s): PH, PCO2, PO2 in the last 72 hours.   Recent Labs     11/26/19  2016 11/26/19  1219   TROIQ <0.05 <0.05     Lab Results   Component Value Date/Time    Cholesterol, total 159 10/12/2019 08:55 AM    HDL Cholesterol 51 10/12/2019 08:55 AM    LDL, calculated 83.4 10/12/2019 08:55 AM    Triglyceride 123 10/12/2019 08:55 AM    CHOL/HDL Ratio 3.1 10/12/2019 08:55 AM     Lab Results   Component Value Date/Time    Glucose (POC) 117 (H) 10/12/2019 08:41 AM     No results found for: COLOR, APPRN, SPGRU, REFSG, MICKEY, PROTU, GLUCU, KETU, BILU, UROU, JABIER, LEUKU, GLUKE, EPSU, BACTU, WBCU, RBCU, CASTS, UCRY      Medications Reviewed:     Current Facility-Administered Medications   Medication Dose Route Frequency    sodium chloride (NS) flush 5-40 mL  5-40 mL IntraVENous Q8H    sodium chloride (NS) flush 5-40 mL  5-40 mL IntraVENous PRN    heparin 25,000 units in D5W 250 ml infusion  9-25 Units/kg/hr IntraVENous TITRATE  acetaminophen (TYLENOL) tablet 650 mg  650 mg Oral Q6H PRN    levothyroxine (SYNTHROID) tablet 50 mcg  50 mcg Oral ACB    sodium chloride (NS) flush 5-40 mL  5-40 mL IntraVENous Q8H    sodium chloride (NS) flush 5-40 mL  5-40 mL IntraVENous PRN    pantoprazole (PROTONIX) 40 mg in 0.9% sodium chloride 10 mL injection  40 mg IntraVENous Q12H    ondansetron (ZOFRAN) injection 4 mg  4 mg IntraVENous Q4H PRN    0.9% sodium chloride infusion 250 mL  250 mL IntraVENous PRN    albuterol-ipratropium (DUO-NEB) 2.5 MG-0.5 MG/3 ML  3 mL Nebulization Q4H PRN     ______________________________________________________________________  EXPECTED LENGTH OF STAY: - - -  ACTUAL LENGTH OF STAY:          0                 Binta Montemayor MD

## 2019-11-29 NOTE — ACP (ADVANCE CARE PLANNING)
[facility] Adult  Hospitalist Group                                      Advance Care Planning Note    Name: Rebecca Reed  YOB: 1930  MRN: [id number]  Admission Date: 11/26/2019 12:01 PM    Date of discussion: 11/29/2019    Active Diagnoses:    Hospital Problems  Date Reviewed: 10/18/2019          Codes Class Noted POA    Acute anemia ICD-10-CM: D64.9  ICD-9-CM: 285.9  11/28/2019 Unknown        Syncope ICD-10-CM: R55  ICD-9-CM: 780.2  11/26/2019 Unknown            #History of stroke in October 2019  #Chronic atrial fibrillation      These active diagnoses are of sufficient risk that focused discussion on advance care planning is indicated in order to allow the patient to thoughtfully consider personal goals of care, and if situations arise that prevent the ability to personally give input, to ensure appropriate representation of their personal desires for different levels and aggressiveness of care. Discussion:     Persons present and participating in discussion: Grace Shrestha MD, patient's son-Thomas    Discussion: We discussed about acute and chronic medical conditions so far. Discussed about what resuscitation means in case of cardiac or respiratory arrest(chest compressions, shock, intubation etc.) and that we may or may not be successful due to comorbidities, sometimes patients may end up with poor quality of life post resuscitation etc.  Patient said that he wants to be resuscitated but does not want to live on machines for prolonged Of time if he does not recover. He wants his sons to be the medical power of . Time Spent:     Total time spent face-to-face in education and discussion: 18 minutes.      Conchis Mckeon MD  11/29/2019  5:30 PM

## 2019-11-29 NOTE — PROGRESS NOTES
Bedside and Verbal shift change report given to [address] [place] [address] (oncoming nurse) by Zenobia Sanchez (offgoing nurse). Report included the following information SBAR, Kardex, Procedure Summary, Intake/Output, MAR, Recent Results and Cardiac Rhythm A fib.

## 2019-11-29 NOTE — HOME CARE
Patient opened to [facility] and will need resumption order for [facility] if still applicable,  [employer] RN   [facility]

## 2019-11-29 NOTE — PROGRESS NOTES
Neurology Progress Note    Patient ID:  Moris Lowe  [id number]  80 y.o.  7/11/1930    Chief Complaint:    Subjective:   70-year-old male presented with syncope in the setting of acute blood loss anemia complicated by extension of prior ischemic cerebral infarct. Doing well today. HGB, 7.3. Per GI note patient refuse EGD. Patient Stated he wanted to wait unable he \"got stronger. \" heparin drip started yesterday     Objective: All records in [facility] reviewed and noted    ROS:  Per HPI  All other 12 pt ROS negative    Meds:  Current Facility-Administered Medications   Medication Dose Route Frequency    atorvastatin (LIPITOR) tablet 20 mg  20 mg Oral DAILY    sodium chloride (NS) flush 5-40 mL  5-40 mL IntraVENous Q8H    sodium chloride (NS) flush 5-40 mL  5-40 mL IntraVENous PRN    heparin 25,000 units in D5W 250 ml infusion  9-25 Units/kg/hr IntraVENous TITRATE    traZODone (DESYREL) tablet 50 mg  50 mg Oral QHS    acetaminophen (TYLENOL) tablet 650 mg  650 mg Oral Q6H PRN    levothyroxine (SYNTHROID) tablet 50 mcg  50 mcg Oral ACB    sodium chloride (NS) flush 5-40 mL  5-40 mL IntraVENous Q8H    sodium chloride (NS) flush 5-40 mL  5-40 mL IntraVENous PRN    pantoprazole (PROTONIX) 40 mg in 0.9% sodium chloride 10 mL injection  40 mg IntraVENous Q12H    ondansetron (ZOFRAN) injection 4 mg  4 mg IntraVENous Q4H PRN    0.9% sodium chloride infusion 250 mL  250 mL IntraVENous PRN    albuterol-ipratropium (DUO-NEB) 2.5 MG-0.5 MG/3 ML  3 mL Nebulization Q4H PRN       Imaging:  MRI Results (most recent):  Results from Hospital Encounter encounter on 11/26/19   MRI BRAIN WO CONT    Narrative *PRELIMINARY REPORT*    There is acute restricted diffusion in the left posterior insula and  periventricular white matter of the left parietal lobe. This is consistent with  acute infarct. This is in a similar location to the prior study, 10/12/2019. There is no midline shift or mass effect.     Preliminary report was provided by Dr. Justo Barreto, the on-call radiologist, at 5:39  PM    Final report to follow. *END PRELIMINARY REPORT*        Multi phase MRI of the brain performed without contrast.    COMPARISON: 10/12/2019. FINDINGS:    Acute restricted diffusion in left posterior MCA division is slightly larger  than on the prior examination and probably represents acute infarct superimposed  upon subacute infarct. Scattered foci of high signal in deep white matter consistent with chronic  ischemic change persists. No intracranial hemorrhage or mass. Signal void in the vessels at the base of  the brain is present and normal.      Impression IMPRESSION: Acute left posterior MCA infarct superimposed upon subacute infarct  in the same region which was present in October. No evidence for intracranial  hemorrhage or mass effect.                   Lab Review   Recent Results (from the past 24 hour(s))   HEMOGLOBIN    Collection Time: 11/28/19  5:08 PM   Result Value Ref Range    HGB 7.9 (L) 12.1 - 17.0 g/dL   PTT    Collection Time: 11/28/19  6:40 PM   Result Value Ref Range    aPTT 28.7 22.1 - 32.0 sec    aPTT, therapeutic range     58.0 - 77.0 SECS   PTT    Collection Time: 11/29/19 12:44 AM   Result Value Ref Range    aPTT 42.1 (H) 22.1 - 32.0 sec    aPTT, therapeutic range     58.0 - 77.0 SECS   HGB & HCT    Collection Time: 11/29/19  7:44 AM   Result Value Ref Range    HGB 7.3 (L) 12.1 - 17.0 g/dL    HCT 25.2 (L) 36.6 - 50.3 %   PTT    Collection Time: 11/29/19  7:44 AM   Result Value Ref Range    aPTT 78.3 (H) 22.1 - 32.0 sec    aPTT, therapeutic range     58.0 - 52.0 SECS   METABOLIC PANEL, BASIC    Collection Time: 11/29/19  9:14 AM   Result Value Ref Range    Sodium 133 (L) 136 - 145 mmol/L    Potassium 3.6 3.5 - 5.1 mmol/L    Chloride 101 97 - 108 mmol/L    CO2 27 21 - 32 mmol/L    Anion gap 5 5 - 15 mmol/L    Glucose 141 (H) 65 - 100 mg/dL    BUN 15 6 - 20 MG/DL    Creatinine 1.04 0.70 - 1.30 MG/DL BUN/Creatinine ratio 14 12 - 20      GFR est AA >60 >60 ml/min/1.73m2    GFR est non-AA >60 >60 ml/min/1.73m2    Calcium 8.3 (L) 8.5 - 10.1 MG/DL   CBC W/O DIFF    Collection Time: 11/29/19  9:14 AM   Result Value Ref Range    WBC 13.4 (H) 4.1 - 11.1 K/uL    RBC 2.82 (L) 4.10 - 5.70 M/uL    HGB 7.3 (L) 12.1 - 17.0 g/dL    HCT 24.9 (L) 36.6 - 50.3 %    MCV 88.3 80.0 - 99.0 FL    MCH 25.9 (L) 26.0 - 34.0 PG    MCHC 29.3 (L) 30.0 - 36.5 g/dL    RDW 16.5 (H) 11.5 - 14.5 %    PLATELET 503 665 - 020 K/uL    MPV 10.3 8.9 - 12.9 FL    NRBC 0.0 0  WBC    ABSOLUTE NRBC 0.00 0.00 - 0.01 K/uL       Exam:  Visit Vitals  /64 (BP 1 Location: Left arm, BP Patient Position: At rest)   Pulse 61   Temp 97.6 °F (36.4 °C)   Resp 20   Ht 5' 8\" (1.727 m)   Wt 110.8 kg (244 lb 4.3 oz)   SpO2 93%   BMI 37.14 kg/m²     Gen: Well developed  CV: RRR  Lungs: non labored breathing  Neuro: A&O x 3, no dysarthria or aphasia  CN II-XII:  face symmetric, tongue/palate midline  Motor: strength 5/5 all four ext  Gait: deferred     Assessment:     Patient Active Problem List   Diagnosis Code    Essential hypertension I10    Hypothyroidism E03.9    Paroxysmal atrial fibrillation (HCC) I48.0    Encounter for monitoring Coumadin therapy Z51.81, Z79.01    Right knee DJD M17.11    ACP (advance care planning) Z71.89    Peripheral edema R60.9    Severe obesity (BMI 35.0-39. 9) with comorbidity ([address] E66.01    Stroke ([address] I63.9    Syncope R55    Acute anemia D64.9       Plan:   Syncope, acute blood loss anemia and extension of prior infarct  - Hgb, stable 7.3. Given that we do not know the source of the bleed.  Patient was educated on the risk vs benefit of anticoagulation.  - On heparin drip now for A-fib  - LDL83.3 in Oct, continue Lipitor 20mg   Signed:  Elizabeth Nunezr, GRAYZNA  11/29/2019  10:14 AM

## 2019-11-29 NOTE — PROGRESS NOTES
CM at bedside to complete initial assessment. Pt sleeping at this time. CM to return.      Hazel Carlin RN, BSN  Care Management Department

## 2019-11-30 LAB
APTT PPP: 67.6 SEC (ref 22.1–32)
HCT VFR BLD AUTO: 24.3 % (ref 36.6–50.3)
HCT VFR BLD AUTO: 27 % (ref 36.6–50.3)
HGB BLD-MCNC: 7 G/DL (ref 12.1–17)
HGB BLD-MCNC: 7.9 G/DL (ref 12.1–17)
THERAPEUTIC RANGE,PTTT: ABNORMAL SECS (ref 58–77)

## 2019-11-30 PROCEDURE — 74011250637 HC RX REV CODE- 250/637: Performed by: HOSPITALIST

## 2019-11-30 PROCEDURE — 86923 COMPATIBILITY TEST ELECTRIC: CPT

## 2019-11-30 PROCEDURE — 36415 COLL VENOUS BLD VENIPUNCTURE: CPT

## 2019-11-30 PROCEDURE — 85018 HEMOGLOBIN: CPT

## 2019-11-30 PROCEDURE — 74011250636 HC RX REV CODE- 250/636: Performed by: HOSPITALIST

## 2019-11-30 PROCEDURE — 97161 PT EVAL LOW COMPLEX 20 MIN: CPT

## 2019-11-30 PROCEDURE — 74011250636 HC RX REV CODE- 250/636: Performed by: FAMILY MEDICINE

## 2019-11-30 PROCEDURE — 85730 THROMBOPLASTIN TIME PARTIAL: CPT

## 2019-11-30 PROCEDURE — 74011250637 HC RX REV CODE- 250/637: Performed by: PSYCHIATRY & NEUROLOGY

## 2019-11-30 PROCEDURE — 74011000250 HC RX REV CODE- 250: Performed by: HOSPITALIST

## 2019-11-30 PROCEDURE — 94640 AIRWAY INHALATION TREATMENT: CPT

## 2019-11-30 PROCEDURE — 86900 BLOOD TYPING SEROLOGIC ABO: CPT

## 2019-11-30 PROCEDURE — 74011250637 HC RX REV CODE- 250/637: Performed by: FAMILY MEDICINE

## 2019-11-30 PROCEDURE — C9113 INJ PANTOPRAZOLE SODIUM, VIA: HCPCS | Performed by: FAMILY MEDICINE

## 2019-11-30 PROCEDURE — 97116 GAIT TRAINING THERAPY: CPT

## 2019-11-30 PROCEDURE — 74011000250 HC RX REV CODE- 250: Performed by: FAMILY MEDICINE

## 2019-11-30 PROCEDURE — 65660000000 HC RM CCU STEPDOWN

## 2019-11-30 PROCEDURE — 77030029684 HC NEB SM VOL KT MONA -A

## 2019-11-30 RX ORDER — SODIUM CHLORIDE 9 MG/ML
250 INJECTION, SOLUTION INTRAVENOUS AS NEEDED
Status: DISCONTINUED | OUTPATIENT
Start: 2019-11-30 | End: 2019-12-04 | Stop reason: HOSPADM

## 2019-11-30 RX ORDER — HEPARIN SODIUM 10000 [USP'U]/100ML
9-25 INJECTION, SOLUTION INTRAVENOUS
Status: DISPENSED | OUTPATIENT
Start: 2019-11-30 | End: 2019-12-02

## 2019-11-30 RX ORDER — IPRATROPIUM BROMIDE AND ALBUTEROL SULFATE 2.5; .5 MG/3ML; MG/3ML
3 SOLUTION RESPIRATORY (INHALATION) 3 TIMES DAILY
Status: DISCONTINUED | OUTPATIENT
Start: 2019-11-30 | End: 2019-12-01

## 2019-11-30 RX ORDER — GUAIFENESIN 100 MG/5ML
81 LIQUID (ML) ORAL DAILY
Status: DISCONTINUED | OUTPATIENT
Start: 2019-12-01 | End: 2019-12-04 | Stop reason: HOSPADM

## 2019-11-30 RX ORDER — LOSARTAN POTASSIUM 50 MG/1
50 TABLET ORAL DAILY
Status: DISCONTINUED | OUTPATIENT
Start: 2019-12-01 | End: 2019-12-04 | Stop reason: HOSPADM

## 2019-11-30 RX ADMIN — SODIUM CHLORIDE 40 MG: 9 INJECTION INTRAMUSCULAR; INTRAVENOUS; SUBCUTANEOUS at 17:44

## 2019-11-30 RX ADMIN — LOSARTAN POTASSIUM 25 MG: 25 TABLET ORAL at 10:17

## 2019-11-30 RX ADMIN — Medication 5 ML: at 22:00

## 2019-11-30 RX ADMIN — LEVOTHYROXINE SODIUM 50 MCG: 50 TABLET ORAL at 07:08

## 2019-11-30 RX ADMIN — Medication 10 ML: at 07:09

## 2019-11-30 RX ADMIN — Medication 10 ML: at 14:00

## 2019-11-30 RX ADMIN — IPRATROPIUM BROMIDE AND ALBUTEROL SULFATE 3 ML: .5; 3 SOLUTION RESPIRATORY (INHALATION) at 18:40

## 2019-11-30 RX ADMIN — SODIUM CHLORIDE 40 MG: 9 INJECTION INTRAMUSCULAR; INTRAVENOUS; SUBCUTANEOUS at 07:09

## 2019-11-30 RX ADMIN — ATORVASTATIN CALCIUM 20 MG: 20 TABLET, FILM COATED ORAL at 10:17

## 2019-11-30 RX ADMIN — TRAZODONE HYDROCHLORIDE 50 MG: 50 TABLET ORAL at 21:56

## 2019-11-30 RX ADMIN — HEPARIN SODIUM AND DEXTROSE 10 UNITS/KG/HR: 10000; 5 INJECTION INTRAVENOUS at 20:03

## 2019-11-30 NOTE — PROGRESS NOTES
Patient's heart rate was dropping down when he was sleeping,its 38b/m. Informed the physician. If its happen again needs to inform the Doctor.

## 2019-11-30 NOTE — PROGRESS NOTES
[address]  [address] [facility] [address], [address]  [phone]                GI PROGRESS NOTE      NAME:   Sunny Warner   :    1930   MRN:    [id number]     Subjective:       Pt denies issues. Hb 7.     Objective:     VITALS:   Last 24hrs VS reviewed since prior hospitalist progress note. Most recent are:  Visit Vitals  /66 (BP 1 Location: Left arm, BP Patient Position: At rest)   Pulse 65   Temp 98 °F (36.7 °C)   Resp 18   Ht 5' 8\" (1.727 m)   Wt 108 kg (238 lb 1.6 oz)   SpO2 98%   BMI 36.20 kg/m²       Intake/Output Summary (Last 24 hours) at 2019 1137  Last data filed at 2019 0708  Gross per 24 hour   Intake 480 ml   Output 650 ml   Net -170 ml        PHYSICAL EXAM:  General   well developed, well nourished elderly white M  EENT  Normocephalic, Atraumatic, PERRLA, EOMI, sclera clear, nares clear, pharynx normal  Neck   Supple without nodes or mass. No thyromegaly or bruit  Respiratory   Clear To Auscultation bilaterally      Lab Data   Recent Results (from the past 12 hour(s))   HGB & HCT    Collection Time: 19  4:48 AM   Result Value Ref Range    HGB 7.0 (L) 12.1 - 17.0 g/dL    HCT 24.3 (L) 36.6 - 50.3 %   PTT    Collection Time: 19  4:48 AM   Result Value Ref Range    aPTT 67.6 (H) 22.1 - 32.0 sec    aPTT, therapeutic range     58.0 - 77.0 SECS       Medications: Reviewed    Assessment/Plan     Sunny Warner is a 80 y.o.  male with a-fib and stroke Oct 2019 on anti-coagulation who was admitted with anemia. Brain MRI  with concern for acute infarct, though it seems neuro thinks this may extension of prior ischemic cerebral infarct in setting of anemia/hypotension. He previously declined endoscopy, though as he required repeat transfusion over the weekend, he is now amenable.     NPO  midnight  Heparin gtt to be held 4 am Monday for EGD after 10 am if this is deemed acceptable by neuro team    Attending Physician: Zachary Medley Veronica Enamorado MD   Date/Time:  11/30/2019

## 2019-11-30 NOTE — PROGRESS NOTES
GI PROGRESS NOTE    NAME:             Sunny Warner   :              1930   MRN:              [id number]   Admit Date:     2019  Todays Date:  2019      Subjective:     Hepatin drio, anemia patient now agreeable to endoscopy     Abdominal pain: none  Nausea:none    Vomiting:none  No overt bleeding    Review of Systems - Respiratory ROS: no cough, shortness of breath, or wheezing  Cardiovascular ROS: no chest pain or dyspnea on exertion  Medications-reviewed     Current Facility-Administered Medications   Medication Dose Route Frequency    atorvastatin (LIPITOR) tablet 20 mg  20 mg Oral DAILY    losartan (COZAAR) tablet 25 mg  25 mg Oral DAILY    sodium chloride (NS) flush 5-40 mL  5-40 mL IntraVENous Q8H    sodium chloride (NS) flush 5-40 mL  5-40 mL IntraVENous PRN    heparin 25,000 units in D5W 250 ml infusion  9-25 Units/kg/hr IntraVENous TITRATE    traZODone (DESYREL) tablet 50 mg  50 mg Oral QHS    acetaminophen (TYLENOL) tablet 650 mg  650 mg Oral Q6H PRN    levothyroxine (SYNTHROID) tablet 50 mcg  50 mcg Oral ACB    sodium chloride (NS) flush 5-40 mL  5-40 mL IntraVENous Q8H    sodium chloride (NS) flush 5-40 mL  5-40 mL IntraVENous PRN    pantoprazole (PROTONIX) 40 mg in 0.9% sodium chloride 10 mL injection  40 mg IntraVENous Q12H    ondansetron (ZOFRAN) injection 4 mg  4 mg IntraVENous Q4H PRN    0.9% sodium chloride infusion 250 mL  250 mL IntraVENous PRN    albuterol-ipratropium (DUO-NEB) 2.5 MG-0.5 MG/3 ML  3 mL Nebulization Q4H PRN        Objective:     Patient Vitals for the past 8 hrs:   BP Temp Pulse Resp SpO2   19 2356 116/66 98.7 °F (37.1 °C) (!) 59 20 92 %   19 2033 148/74 97.8 °F (36.6 °C) (!) 54 20 97 %   19 1750 -- -- 64 -- --     1901 -  0700  In: -   Out: 200 [Urine:200]   07 - 1900  In: 1680 [P.O.:1680]  Out: 700 [Urine:700]    EXAM:    Visit Vitals  /66 (BP 1 Location: Left arm, BP Patient Position: At rest)   Pulse (!) 59   Temp 98.7 °F (37.1 °C)   Resp 20   Ht 5' 8\" (1.727 m)   Wt 110.7 kg (244 lb)   SpO2 92%   BMI 37.10 kg/m²        CONST:          Pleasant male lying in bed, no acute distress              NEURO:          alert and oriented x 3              HEENT:           EOMI, no scleral icterus              LUNGS:           clear to ausculation, upper wheeze              CARD:             S1 S2              ABD:                soft, obese, no tenderness, no rebound, bowel sounds (+) all 4 quadrants, no masses, non distended              EXT:                warm              PSYCH:           full, not anxious    Data Review     Recent Labs     11/29/19  0914 11/29/19  0744   WBC 13.4*  --    HGB 7.3* 7.3*   HCT 24.9* 25.2*     --      Recent Labs     11/29/19  0914 11/27/19  0405   * 135*   K 3.6 4.0    103   CO2 27 26   BUN 15 22*   CREA 1.04 1.10   * 98   CA 8.3* 8.2*     No results for input(s): SGOT, GPT, AP, TBIL, TP, ALB, GLOB, GGT, AML, LPSE in the last 72 hours. No lab exists for component: AMYP, HLPSE                           Assessment:   · Anemia: hgb 6.6-->7.2 status post 1 unit pRBC's, with hemoccult positive stool. Stable  · Atrial Fibrillation: on heparin drip  · Dysphagia: worse since stroke, mostly to liquids         Active Problems:    Syncope (11/26/2019)      Acute anemia (11/28/2019)        Plan:   1. EGD on Monday if negative colon after if neuro feels he can tolerate it  2.  For overt brisk bleed CTA with bleeding protocol         Che Srinivasan MD

## 2019-11-30 NOTE — PROGRESS NOTES
Occupational Therapy  Order received and chart reviewed, attempted to see however patient reported fatigue after working with PT and request to rest and visit with company. Will follow up tomorrow.    Humphrey Guy, OTR/L

## 2019-11-30 NOTE — PROGRESS NOTES
Problem: Mobility Impaired (Adult and Pediatric)  Goal: *Acute Goals and Plan of Care (Insert Text)  Description  FUNCTIONAL STATUS PRIOR TO ADMISSION: Patient was modified independent using a rolling walker for functional mobility. HOME SUPPORT PRIOR TO ADMISSION: The patient lived with son and wife. Physical Therapy Goals  Initiated 11/30/2019  1. Patient will move from supine to sit and sit to supine  in bed with independence within 7 day(s). 2.  Patient will transfer from bed to chair and chair to bed with modified independence using the least restrictive device within 7 day(s). 3.  Patient will perform sit to stand with modified independence within 7 day(s). 4.  Patient will ambulate with modified independence for 250 feet with the least restrictive device within 7 day(s). 5.  Patient will ascend/descend 4 stairs with 1 handrail(s) with modified independence within 7 day(s). Outcome: Progressing Towards Goal   PHYSICAL THERAPY EVALUATION  Patient: Rebecca Mehta (89 y.o. male)  Date: 11/30/2019  Primary Diagnosis: Syncope [R55]  Acute anemia [D64.9]        Precautions:          ASSESSMENT  Based on the objective data described below, the patient presents with decreased activity tolerance, generalized weakness, impaired balance and high fall risk following admission for syncope and acute anemia. Pt with a history of orthostatic hypotension(negative today), legal blindness and recent CVA. Noted possible extension of L posterior MCA infarct on imaging. Pt requiring CGA-Min A to safely mobilize with verbal cueing for safe RW management. Will need continued HHPT and assistance from son upon discharge home. Current Level of Function Impacting Discharge (mobility/balance): Min A to stand    Functional Outcome Measure: The patient scored 13/28 on the Tinetti outcome measure which is indicative of high fall risk.       Other factors to consider for discharge: recent admission     Patient will benefit from skilled therapy intervention to address the above noted impairments. PLAN :  Recommendations and Planned Interventions: bed mobility training, transfer training, gait training, therapeutic exercises, neuromuscular re-education, patient and family training/education, and therapeutic activities      Frequency/Duration: Patient will be followed by physical therapy:  5 times a week to address goals. Recommendation for discharge: (in order for the patient to meet his/her long term goals)  Physical therapy at least 2 days/week in the home AND ensure assist and/or supervision for safety with mobility and ADLs     This discharge recommendation:  A follow-up discussion with the attending provider and/or case management is planned    IF patient discharges home will need the following DME: patient owns DME required for discharge         SUBJECTIVE:   Patient stated Sometimes I get up and I just pass out.     OBJECTIVE DATA SUMMARY:   HISTORY:    Past Medical History:   Diagnosis Date    Arthritis     Contact dermatitis and other eczema, due to unspecified cause     Hypertension     Stroke ([facility] [address] 1997    TIA at [address]     Thyroid disease     Unspecified hypothyroidism      Past Surgical History:   Procedure Laterality Date    HX HEENT      detached retina    HX HIP REPLACEMENT  2008    right hip, [facility]       Personal factors and/or comorbidities impacting plan of care: stroke, arthritis, HTN    Home Situation  Home Environment: Private residence  # Steps to Enter: 4  Rails to Enter: Yes  One/Two Story Residence: One story  Living Alone: No  Support Systems: Spouse/Significant Other/Partner, Child(brandan)  Patient Expects to be Discharged to[de-identified] Private residence  Current DME Used/Available at Home: Walker, rolling    EXAMINATION/PRESENTATION/DECISION MAKING:   Critical Behavior:  Neurologic State: Appropriate for age  Orientation Level: Oriented X4  Cognition: Follows commands Hearing: Auditory  Auditory Impairment: Hard of hearing, bilateral  Skin:    Edema:   Range Of Motion:  AROM: Generally decreased, functional                       Strength:    Strength: Generally decreased, functional                    Tone & Sensation:   Tone: Normal                              Coordination:  Coordination: Within functional limits  Vision:      Functional Mobility:  Bed Mobility:  Rolling: Contact guard assistance  Supine to Sit: Contact guard assistance     Scooting: Contact guard assistance  Transfers:  Sit to Stand: Minimum assistance  Stand to Sit: Minimum assistance  Stand Pivot Transfers: Minimum assistance     Bed to Chair: Minimum assistance              Balance:   Sitting: Intact  Standing: Impaired  Standing - Static: Fair  Standing - Dynamic : Fair  Ambulation/Gait Training:  Distance (ft): 130 Feet (ft)  Assistive Device: Gait belt;Walker, rolling  Ambulation - Level of Assistance: Minimal assistance        Gait Abnormalities: Decreased step clearance; Altered arm swing;Hip Hike        Base of Support: Widened     Speed/[place]: Pace decreased (<100 feet/min)  Step Length: Right shortened;Left shortened  Swing Pattern: Left asymmetrical                   Functional Measure:  Tinetti test:    Sitting Balance: 1  Arises: 0  Attempts to Rise: 1  Immediate Standing Balance: 0  Standing Balance: 1  Nudged: 1  Eyes Closed: 0  Turn 360 Degrees - Continuous/Discontinuous: 1  Turn 360 Degrees - Steady/Unsteady: 1  Sitting Down: 1  Balance Score: 7 Balance total score  Indication of Gait: 1  R Step Length/Height: 1  L Step Length/Height: 1  R Foot Clearance: 1  L Foot Clearance: 1  Step Symmetry: 0  Step Continuity: 0  Path: 1  Trunk: 0  Walking Time: 0  Gait Score: 6 Gait total score  Total Score: 13/28 Overall total score         Tinetti Tool Score Risk of Falls  <19 = High Fall Risk  19-24 = Moderate Fall Risk  25-28 = Low Fall Risk  Kenjietti ME.  Performance-Oriented Assessment of Mobility Problems in Elderly Patients. [facility] 66; [id number]. (Scoring Description: PT Bulletin Feb. 10, 1993)    Older adults: Samira Mae et al, 2009; n = 1000 [facility] elderly evaluated with ABC, ALICIA, ADL, and IADL)  · Mean ALICIA score for males aged 69-68 years = 26.21(3.40)  · Mean ALICIA score for females age 69-68 years = 25.16(4.30)  · Mean ALICIA score for males over 80 years = 23.29(6.02)  · Mean ALICIA score for females over 80 years = 17.20(8.32)            Physical Therapy Evaluation Charge Determination   History Examination Presentation Decision-Making   Medium Medium LOW Complexity : Stable, uncomplicated  Other outcome measures Tinetti  LOW       Based on the above components, the patient evaluation is determined to be of the following complexity level: LOW       Activity Tolerance:   Good  Please refer to the flowsheet for vital signs taken during this treatment. After treatment patient left in no apparent distress:   Sitting in chair and Call bell within reach    COMMUNICATION/EDUCATION:   The patients plan of care was discussed with: Registered Nurse. Fall prevention education was provided and the patient/caregiver indicated understanding., Patient/family have participated as able in goal setting and plan of care. , and Patient/family agree to work toward stated goals and plan of care.     Thank you for this referral.  Ai Verdugo, PT   Time Calculation: 25 mins

## 2019-11-30 NOTE — PROGRESS NOTES
[address] [facility] Adult  Hospitalist Group                                                                                          Hospitalist Progress Note  Zak Mcdaniel MD  Answering service: [phone] or [phone] from in house phone        Date of Service:  2019  NAME:  Sebastián Mays  :  1930  MRN:  [id number]      Admission Summary:   80-year-old male with past medical history of stroke, atrial fibrillation, thyroid disease, arthritis was sent to the hospital from a facility because of a syncopal episode. in the ER he was found to have a hemoglobin of 6.2. Patient was admitted for further evaluation. Interval history / Subjective: Follow-up for anemia and syncopal episode    Patient denies any bowel movement, nosebleed or any other bleeding. Assessment & Plan:     #Acute blood loss anemia:  -Patient states that he had some recent nosebleeds few days dana which has resolved without any intervention and continued to take Eliquis during that period. Hemoglobin in October was 11.2 which has dropped down to 6.4 at admission  -Stool occult blood was positive at admission. Anemia could be multifactorial -nose bleed vs GI loss. -GI was consulted for further evaluation-patient and his son declined EGD due to the risks.  -He is currently on twice daily PPI. -s/p 1 U PRBC at the time of admission  -Hemoglobin has been stable between 7-8, no evidence of active bleeding while on heparin drip.  -Continue every 12 hours H&H  -GI team plans for endoscopy on Monday. Discussed with neurology, okay to undergo endoscopy but hypotension need to be prevented during the procedure.    -Syncope at admission;  -likely related to acute blood loss anemia and was on antihypertensives. Juan J Rook     MRI brain showed left posterior MCA acute infarct -discussed with neurologist and reviewed notes - \"global cerebral hypoperfusion with selective injury of the vulnerable area rather than being indicative of a causative lesion or in fact truly a stroke in clinical terms\"    -History of stroke in October 2019-  --As patient is in persistent atrial fibrillation on tele -continue heparin drip, PTT therapeutic.  -Plan for endoscopy on Monday and eventually switched to oral anticoagulants.  -continue aspirin and statin. #Persistent atrial Fib:  -HR controlled. Restarted anticoagulation.  -He occasionally has heart rate 40s to 50s while sleeping. Patient is asymptomatic. He is not on any rate controlling medications    -Hypertension,  -Blood pressure 160s, will increase the dose of losartan to 50 mg    -Hypothyroidism resumed Synthroid. He has bilateral minimal wheezing-we will start duo nebs. Patient was a former smoker, smoked for 30 years. Code status: full  DVT prophylaxis: Anticoagulated    Care Plan discussed with: Patient/family, nurse, GI,neurology team  Disposition: To be determined     Hospital Problems  Date Reviewed: 10/18/2019          Codes Class Noted POA    Acute anemia ICD-10-CM: D64.9  ICD-9-CM: 285.9  11/28/2019 Unknown        Syncope ICD-10-CM: R55  ICD-9-CM: 780.2  11/26/2019 Unknown                Review of Systems:   As per HPI      Vital Signs:    Last 24hrs VS reviewed since prior progress note. Most recent are:  Visit Vitals  /77 (BP 1 Location: Left arm, BP Patient Position: At rest)   Pulse 76   Temp 98.2 °F (36.8 °C)   Resp 16   Ht 5' 8\" (1.727 m)   Wt 108 kg (238 lb 1.6 oz)   SpO2 96%   BMI 36.20 kg/m²         Intake/Output Summary (Last 24 hours) at 11/30/2019 1508  Last data filed at 11/30/2019 0708  Gross per 24 hour   Intake --   Output 650 ml   Net -650 ml        Physical Examination:             Constitutional:  Elderly patient, no acute distress    ENT:  Oral mucous moist, oropharynx benign. EOMI, anicteric sclera and normal conjunctiva   Resp:  Minimal wheezing bilaterally. CV:  Irregular rhythm, normal heart rate    GI:  Soft, non distended, non tender.  normoactive bowel sounds Musculoskeletal:  No lower extremity edema    Neurologic:  Alert and oriented x3, moves all extremities,strength wnl of all extremities     Psych:   Not anxious nor agitated. Data Review:    Review and/or order of clinical lab test  Review and/or order of tests in the medicine section of [facility]      Labs:     Recent Labs     11/30/19  1245 11/30/19  0448 11/29/19  0914   WBC  --   --  13.4*   HGB 7.9* 7.0* 7.3*   HCT 27.0* 24.3* 24.9*   PLT  --   --  254     Recent Labs     11/29/19  0914   *   K 3.6      CO2 27   BUN 15   CREA 1.04   *   CA 8.3*     No results for input(s): SGOT, GPT, ALT, AP, TBIL, TBILI, TP, ALB, GLOB, GGT, AML, LPSE in the last 72 hours. No lab exists for component: AMYP, HLPSE  Recent Labs     11/30/19  0448 11/29/19  2229 11/29/19  1531   APTT 67.6* 60.8* 56.9*      No results for input(s): FE, TIBC, PSAT, FERR in the last 72 hours. No results found for: FOL, RBCF   No results for input(s): PH, PCO2, PO2 in the last 72 hours. No results for input(s): CPK, CKNDX, TROIQ in the last 72 hours.     No lab exists for component: CPKMB  Lab Results   Component Value Date/Time    Cholesterol, total 159 10/12/2019 08:55 AM    HDL Cholesterol 51 10/12/2019 08:55 AM    LDL, calculated 83.4 10/12/2019 08:55 AM    Triglyceride 123 10/12/2019 08:55 AM    CHOL/HDL Ratio 3.1 10/12/2019 08:55 AM     Lab Results   Component Value Date/Time    Glucose (POC) 117 (H) 10/12/2019 08:41 AM     No results found for: COLOR, APPRN, SPGRU, REFSG, MICKEY, PROTU, GLUCU, KETU, BILU, UROU, JABIER, LEUKU, GLUKE, EPSU, BACTU, WBCU, RBCU, CASTS, UCRY      Medications Reviewed:     Current Facility-Administered Medications   Medication Dose Route Frequency    [START ON 12/1/2019] losartan (COZAAR) tablet 50 mg  50 mg Oral DAILY    0.9% sodium chloride infusion 250 mL  250 mL IntraVENous PRN    heparin 25,000 units in D5W 250 ml infusion  9-25 Units/kg/hr IntraVENous TITRATE    atorvastatin (LIPITOR) tablet 20 mg  20 mg Oral DAILY    sodium chloride (NS) flush 5-40 mL  5-40 mL IntraVENous Q8H    sodium chloride (NS) flush 5-40 mL  5-40 mL IntraVENous PRN    traZODone (DESYREL) tablet 50 mg  50 mg Oral QHS    acetaminophen (TYLENOL) tablet 650 mg  650 mg Oral Q6H PRN    levothyroxine (SYNTHROID) tablet 50 mcg  50 mcg Oral ACB    sodium chloride (NS) flush 5-40 mL  5-40 mL IntraVENous Q8H    sodium chloride (NS) flush 5-40 mL  5-40 mL IntraVENous PRN    pantoprazole (PROTONIX) 40 mg in 0.9% sodium chloride 10 mL injection  40 mg IntraVENous Q12H    ondansetron (ZOFRAN) injection 4 mg  4 mg IntraVENous Q4H PRN    0.9% sodium chloride infusion 250 mL  250 mL IntraVENous PRN    albuterol-ipratropium (DUO-NEB) 2.5 MG-0.5 MG/3 ML  3 mL Nebulization Q4H PRN     ______________________________________________________________________  EXPECTED LENGTH OF STAY: 4d 12h  ACTUAL LENGTH OF STAY:          2                 Tiffanie Bowser MD

## 2019-11-30 NOTE — PROGRESS NOTES
Bedside and Verbal shift change report given to [address] [place] [address] (oncoming nurse) by Adrien San (offgoing nurse). Report included the following information SBAR, Kardex, ED Summary, Intake/Output, MAR, Recent Results and Cardiac Rhythm A fib.

## 2019-12-01 LAB
ANION GAP SERPL CALC-SCNC: 6 MMOL/L (ref 5–15)
APTT PPP: 64.3 SEC (ref 22.1–32)
BUN SERPL-MCNC: 11 MG/DL (ref 6–20)
BUN/CREAT SERPL: 10 (ref 12–20)
CALCIUM SERPL-MCNC: 8.2 MG/DL (ref 8.5–10.1)
CHLORIDE SERPL-SCNC: 98 MMOL/L (ref 97–108)
CO2 SERPL-SCNC: 28 MMOL/L (ref 21–32)
CREAT SERPL-MCNC: 1.12 MG/DL (ref 0.7–1.3)
GLUCOSE SERPL-MCNC: 151 MG/DL (ref 65–100)
HCT VFR BLD AUTO: 23.7 % (ref 36.6–50.3)
HCT VFR BLD AUTO: 24.3 % (ref 36.6–50.3)
HGB BLD-MCNC: 6.9 G/DL (ref 12.1–17)
HGB BLD-MCNC: 7.1 G/DL (ref 12.1–17)
LEFT CCA DIST DIAS: 34.1 CM/S
LEFT CCA DIST SYS: 152.4 CM/S
LEFT CCA PROX DIAS: 20.9 CM/S
LEFT CCA PROX SYS: 95.4 CM/S
LEFT ECA DIAS: 0 CM/S
LEFT ECA SYS: 120.8 CM/S
LEFT ICA DIST DIAS: 36.9 CM/S
LEFT ICA DIST SYS: 149.8 CM/S
LEFT ICA MID DIAS: 21.6 CM/S
LEFT ICA MID SYS: 149.8 CM/S
LEFT ICA PROX DIAS: 34.1 CM/S
LEFT ICA PROX SYS: 172.1 CM/S
LEFT ICA/CCA SYS: 1.13
LEFT VERTEBRAL DIAS: 15.26 CM/S
LEFT VERTEBRAL SYS: 52.6 CM/S
POTASSIUM SERPL-SCNC: 3.3 MMOL/L (ref 3.5–5.1)
RIGHT CCA DIST DIAS: 12.5 CM/S
RIGHT CCA DIST SYS: 81.7 CM/S
RIGHT CCA PROX DIAS: 17.7 CM/S
RIGHT CCA PROX SYS: 98.6 CM/S
RIGHT ECA DIAS: 5.94 CM/S
RIGHT ECA SYS: 256.5 CM/S
RIGHT ICA DIST DIAS: 31.2 CM/S
RIGHT ICA DIST SYS: 102.2 CM/S
RIGHT ICA MID DIAS: 37.9 CM/S
RIGHT ICA MID SYS: 177.2 CM/S
RIGHT ICA PROX DIAS: 34.9 CM/S
RIGHT ICA PROX SYS: 132 CM/S
RIGHT ICA/CCA SYS: 2.2
RIGHT VERTEBRAL DIAS: 0.87 CM/S
RIGHT VERTEBRAL SYS: 14.3 CM/S
SODIUM SERPL-SCNC: 132 MMOL/L (ref 136–145)
THERAPEUTIC RANGE,PTTT: ABNORMAL SECS (ref 58–77)

## 2019-12-01 PROCEDURE — 85730 THROMBOPLASTIN TIME PARTIAL: CPT

## 2019-12-01 PROCEDURE — 97165 OT EVAL LOW COMPLEX 30 MIN: CPT

## 2019-12-01 PROCEDURE — 74011250636 HC RX REV CODE- 250/636: Performed by: FAMILY MEDICINE

## 2019-12-01 PROCEDURE — 85018 HEMOGLOBIN: CPT

## 2019-12-01 PROCEDURE — 74011250637 HC RX REV CODE- 250/637: Performed by: FAMILY MEDICINE

## 2019-12-01 PROCEDURE — 94640 AIRWAY INHALATION TREATMENT: CPT

## 2019-12-01 PROCEDURE — 30233N1 TRANSFUSION OF NONAUTOLOGOUS RED BLOOD CELLS INTO PERIPHERAL VEIN, PERCUTANEOUS APPROACH: ICD-10-PCS | Performed by: HOSPITALIST

## 2019-12-01 PROCEDURE — 74011000250 HC RX REV CODE- 250: Performed by: FAMILY MEDICINE

## 2019-12-01 PROCEDURE — 36415 COLL VENOUS BLD VENIPUNCTURE: CPT

## 2019-12-01 PROCEDURE — 74011250636 HC RX REV CODE- 250/636: Performed by: HOSPITALIST

## 2019-12-01 PROCEDURE — 97535 SELF CARE MNGMENT TRAINING: CPT

## 2019-12-01 PROCEDURE — 80048 BASIC METABOLIC PNL TOTAL CA: CPT

## 2019-12-01 PROCEDURE — P9016 RBC LEUKOCYTES REDUCED: HCPCS

## 2019-12-01 PROCEDURE — 74011000250 HC RX REV CODE- 250: Performed by: HOSPITALIST

## 2019-12-01 PROCEDURE — C9113 INJ PANTOPRAZOLE SODIUM, VIA: HCPCS | Performed by: FAMILY MEDICINE

## 2019-12-01 PROCEDURE — 65660000000 HC RM CCU STEPDOWN

## 2019-12-01 PROCEDURE — 74011250637 HC RX REV CODE- 250/637: Performed by: HOSPITALIST

## 2019-12-01 PROCEDURE — 74011250637 HC RX REV CODE- 250/637: Performed by: PSYCHIATRY & NEUROLOGY

## 2019-12-01 PROCEDURE — 36430 TRANSFUSION BLD/BLD COMPNT: CPT

## 2019-12-01 RX ORDER — SODIUM CHLORIDE 9 MG/ML
250 INJECTION, SOLUTION INTRAVENOUS AS NEEDED
Status: DISCONTINUED | OUTPATIENT
Start: 2019-12-01 | End: 2019-12-04 | Stop reason: HOSPADM

## 2019-12-01 RX ORDER — IPRATROPIUM BROMIDE AND ALBUTEROL SULFATE 2.5; .5 MG/3ML; MG/3ML
3 SOLUTION RESPIRATORY (INHALATION)
Status: DISCONTINUED | OUTPATIENT
Start: 2019-12-01 | End: 2019-12-04

## 2019-12-01 RX ORDER — POTASSIUM CHLORIDE 750 MG/1
40 TABLET, FILM COATED, EXTENDED RELEASE ORAL
Status: COMPLETED | OUTPATIENT
Start: 2019-12-01 | End: 2019-12-01

## 2019-12-01 RX ADMIN — LOSARTAN POTASSIUM 50 MG: 50 TABLET, FILM COATED ORAL at 08:33

## 2019-12-01 RX ADMIN — SODIUM CHLORIDE 40 MG: 9 INJECTION INTRAMUSCULAR; INTRAVENOUS; SUBCUTANEOUS at 07:16

## 2019-12-01 RX ADMIN — TRAZODONE HYDROCHLORIDE 50 MG: 50 TABLET ORAL at 21:07

## 2019-12-01 RX ADMIN — LEVOTHYROXINE SODIUM 50 MCG: 50 TABLET ORAL at 07:16

## 2019-12-01 RX ADMIN — ASPIRIN 81 MG 81 MG: 81 TABLET ORAL at 08:34

## 2019-12-01 RX ADMIN — IPRATROPIUM BROMIDE AND ALBUTEROL SULFATE 3 ML: .5; 3 SOLUTION RESPIRATORY (INHALATION) at 01:20

## 2019-12-01 RX ADMIN — HEPARIN SODIUM AND DEXTROSE 10 UNITS/KG/HR: 10000; 5 INJECTION INTRAVENOUS at 06:21

## 2019-12-01 RX ADMIN — HEPARIN SODIUM AND DEXTROSE 10 UNITS/KG/HR: 10000; 5 INJECTION INTRAVENOUS at 16:14

## 2019-12-01 RX ADMIN — IPRATROPIUM BROMIDE AND ALBUTEROL SULFATE 3 ML: .5; 3 SOLUTION RESPIRATORY (INHALATION) at 19:10

## 2019-12-01 RX ADMIN — Medication 5 ML: at 06:00

## 2019-12-01 RX ADMIN — POTASSIUM CHLORIDE 40 MEQ: 750 TABLET, FILM COATED, EXTENDED RELEASE ORAL at 21:07

## 2019-12-01 RX ADMIN — Medication 10 ML: at 14:00

## 2019-12-01 RX ADMIN — Medication 10 ML: at 21:07

## 2019-12-01 RX ADMIN — Medication 10 ML: at 21:08

## 2019-12-01 RX ADMIN — SODIUM CHLORIDE 40 MG: 9 INJECTION INTRAMUSCULAR; INTRAVENOUS; SUBCUTANEOUS at 17:47

## 2019-12-01 RX ADMIN — ATORVASTATIN CALCIUM 20 MG: 20 TABLET, FILM COATED ORAL at 08:34

## 2019-12-01 NOTE — PROGRESS NOTES
Tabby Mendez has received complete bedside report, patient is in a fib/a flutter, all questions have been answered and care has been assumed by Tabby Mendez.

## 2019-12-01 NOTE — PROGRESS NOTES
Patient alert and orientated, no complaints of pain or SOB. Notified provider of low hgb and K+, clarified continuing heparin drip, will continue due to patient having afib and no active bleed. Unit of prbc started.

## 2019-12-01 NOTE — PROGRESS NOTES
Problem: Self Care Deficits Care Plan (Adult)  Goal: *Acute Goals and Plan of Care (Insert Text)  Description    FUNCTIONAL STATUS PRIOR TO ADMISSION: Patient was modified independent using a rolling walker and single point cane for functional mobility. HOME SUPPORT: The patient lived with spouse who has dementia and son who is caregiver for mother in additional to HH/hospice. Occupational Therapy Goals  Initiated 12/1/2019  1. Patient will perform bathing with modified independence within 7 day(s). 2.  Patient will perform grooming at sink with independence within 7 day(s). 3.  Patient will perform lower body dressing with modified independence within 7 day(s). 4.  Patient will perform toilet transfers with modified independence within 7 day(s). 5.  Patient will perform all aspects of toileting with modified independence within 7 day(s). 6.  Patient will utilize energy conservation techniques during functional activities with verbal cues within 7 day(s). Outcome: Progressing Towards Goal    OCCUPATIONAL THERAPY EVALUATION  Patient: Nidhi Hennessy (64 y.o. male)  Date: 12/1/2019  Primary Diagnosis: Syncope [R55]  Acute anemia [D64.9]        Precautions: fall       ASSESSMENT  Based on the objective data described below, the patient presents with minor balance, endurance, and ADL deficits. States he was receiving HH therapy and was up for discharge. Son performs household chores and helps care for his wife who has dementia. He is legally blind and everything appears blurry, but navigates relatively well with good lighting and verbal directions. Also sounds wheezy which he states is due to smoking for 30 years. Sats on room air 95% post activity with HR in the 70's. Anticipate discharge home with family assist and HH. Current Level of Function Impacting Discharge (ADLs/self-care): up to Min A    Functional Outcome Measure:   The patient scored Total: 60/100 on the Barthel Index outcome measure which is indicative of 40% impaired ability to care for basic self needs/dependency on others. Other factors to consider for discharge: none     Patient will benefit from skilled therapy intervention to address the above noted impairments. PLAN :  Recommendations and Planned Interventions: self care training, functional mobility training, therapeutic exercise, balance training, therapeutic activities, endurance activities, patient education, home safety training, and family training/education    Frequency/Duration: Patient will be followed by occupational therapy 4 times a week to address goals. Recommendation for discharge: (in order for the patient to meet his/her long term goals)  Occupational therapy at least 2 days/week in the home AND ensure assist and/or supervision for safety with IADLS     This discharge recommendation:  A follow-up discussion with the attending provider and/or case management is planned    IF patient discharges home will need the following DME: patient owns DME required for discharge       SUBJECTIVE:   Patient stated I can get up with you. Ranjith Loera    OBJECTIVE DATA SUMMARY:   HISTORY:   Past Medical History:   Diagnosis Date    Arthritis     Contact dermatitis and other eczema, due to unspecified cause     Hypertension     Stroke ([facility] [address] 1997    TIA at [address] [facility]     Thyroid disease     Unspecified hypothyroidism      Past Surgical History:   Procedure Laterality Date    HX HEENT      detached retina    HX HIP REPLACEMENT  2008    right hip, [address]       Expanded or extensive additional review of patient history:     Home Situation  Home Environment: Private residence  # Steps to Enter: 4  Rails to Enter: Yes  One/Two Story Residence: One story  Living Alone: No  Support Systems: Spouse/Significant Other/Partner, Child(brandan)  Patient Expects to be Discharged to[de-identified] Private residence  Current DME Used/Available at Home: Walker, rolling    Hand dominance: Right    [place] [address] PERFORMANCE DEFICITS:  Cognitive/Behavioral Status:  Neurologic State: Alert  Orientation Level: Oriented X4  Cognition: Appropriate safety awareness             Skin: intact where visible    Edema: none    Hearing: Auditory  Auditory Impairment: Hard of hearing, bilateral    Vision/Perceptual:                         Legally blind, describes everything as blurry            Range of Motion:    AROM: Generally decreased, functional                         Strength:    Strength: Generally decreased, functional                Coordination:  Coordination: Within functional limits  Fine Motor Skills-Upper: Left Intact; Right Intact    Gross Motor Skills-Upper: Left Intact; Right Intact    Tone & Sensation:    Tone: Normal                         Balance:  Sitting: Intact  Standing: Impaired  Standing - Static: Fair  Standing - Dynamic : Fair    Functional Mobility and Transfers for ADLs:  Bed Mobility:       Transfers:       ADL Assessment:  Feeding: Setup    Oral Facial Hygiene/Grooming: Setup    Bathing: Contact guard assistance    Upper Body Dressing: Setup    Lower Body Dressing: Minimum assistance    Toileting: Contact guard assistance                ADL Intervention and task modifications:                           Lower Body Dressing Assistance  Pants With Elastic Waist: Contact guard assistance    Toileting  Toileting Assistance: Contact guard assistance(standing to urinate)            Functional Measure:  Barthel Index:    Bathin  Bladder: 5  Bowels: 10  Groomin  Dressin  Feedin  Mobility: 10  Stairs: 5  Toilet Use: 5  Transfer (Bed to Chair and Back): 10  Total: 60/100        The Barthel ADL Index: Guidelines  1. The index should be used as a record of what a patient does, not as a record of what a patient could do. 2. The main aim is to establish degree of independence from any help, physical or verbal, however minor and for whatever reason.   3. The need for supervision renders the patient not independent. 4. A patient's performance should be established using the best available evidence. Asking the patient, friends/relatives and nurses are the usual sources, but direct observation and common sense are also important. However direct testing is not needed. 5. Usually the patient's performance over the preceding 24-48 hours is important, but occasionally longer periods will be relevant. 6. Middle categories imply that the patient supplies over 50 per cent of the effort. 7. Use of aids to be independent is allowed. Jaida Dillon., Barthel, D.W. (8432). Functional evaluation: the Barthel Index. [address] [facility] (14)2. Auther Rodríguez carmen DEQUAN Murcia, [place], Fran Levine, [employer], [address] (1999). Measuring the change indisability after inpatient rehabilitation; comparison of the responsiveness of the Barthel Index and Functional [place] Measure. Journal of Neurology, Neurosurgery, and Psychiatry, 66(4), 448-825. Jose Maria Marley, N.J.A, REG Marrufo, & Rustam Tavarez MLarissaA. (2004.) Assessment of post-stroke quality of life in cost-effectiveness studies: The usefulness of the Barthel Index and the EuroQoL-5D. Quality of Life Research, 15, 823-56         Occupational Therapy Evaluation Charge Determination   History Examination Decision-Making   LOW Complexity : Brief history review  LOW Complexity : 1-3 performance deficits relating to physical, cognitive , or psychosocial skils that result in activity limitations and / or participation restrictions  LOW Complexity : No comorbidities that affect functional and no verbal or physical assistance needed to complete eval tasks       Based on the above components, the patient evaluation is determined to be of the following complexity level: LOW   Pain Rating:  States he feels stiff from being in bed    Activity Tolerance:   SpO2 stable on RA and requires rest breaks  Please refer to the flowsheet for vital signs taken during this treatment.     After treatment patient left in no apparent distress:    Sitting in chair and Call bell within reach    COMMUNICATION/EDUCATION:   The patients plan of care was discussed with: Registered Nurse. Home safety education was provided and the patient/caregiver indicated understanding., Patient/family have participated as able in goal setting and plan of care. , and Patient/family agree to work toward stated goals and plan of care. This patients plan of care is appropriate for delegation to [facility] [place].     Thank you for this referral.  Abiel Negrete  Time Calculation: 28 mins

## 2019-12-02 ENCOUNTER — ANESTHESIA (OUTPATIENT)
Dept: ENDOSCOPY | Age: 84
DRG: 377 | End: 2019-12-02
Payer: MEDICARE

## 2019-12-02 ENCOUNTER — HOME CARE VISIT (OUTPATIENT)
Dept: HOME HEALTH SERVICES | Facility: HOME HEALTH | Age: 84
End: 2019-12-02
Payer: MEDICARE

## 2019-12-02 ENCOUNTER — ANESTHESIA EVENT (OUTPATIENT)
Dept: ENDOSCOPY | Age: 84
DRG: 377 | End: 2019-12-02
Payer: MEDICARE

## 2019-12-02 ENCOUNTER — APPOINTMENT (OUTPATIENT)
Dept: GENERAL RADIOLOGY | Age: 84
DRG: 377 | End: 2019-12-02
Attending: HOSPITALIST
Payer: MEDICARE

## 2019-12-02 LAB
ABO + RH BLD: NORMAL
ANION GAP SERPL CALC-SCNC: 5 MMOL/L (ref 5–15)
APPEARANCE UR: CLEAR
APTT PPP: 28.7 SEC (ref 22.1–32)
APTT PPP: 59.5 SEC (ref 22.1–32)
BACTERIA URNS QL MICRO: NEGATIVE /HPF
BASOPHILS # BLD: 0.1 K/UL (ref 0–0.1)
BASOPHILS NFR BLD: 1 % (ref 0–1)
BILIRUB UR QL: NEGATIVE
BLD PROD TYP BPU: NORMAL
BLOOD GROUP ANTIBODIES SERPL: NORMAL
BPU ID: NORMAL
BUN SERPL-MCNC: 11 MG/DL (ref 6–20)
BUN/CREAT SERPL: 10 (ref 12–20)
CALCIUM SERPL-MCNC: 8.7 MG/DL (ref 8.5–10.1)
CHLORIDE SERPL-SCNC: 103 MMOL/L (ref 97–108)
CO2 SERPL-SCNC: 28 MMOL/L (ref 21–32)
COLOR UR: ABNORMAL
CREAT SERPL-MCNC: 1.14 MG/DL (ref 0.7–1.3)
CROSSMATCH RESULT,%XM: NORMAL
DIFFERENTIAL METHOD BLD: ABNORMAL
EOSINOPHIL # BLD: 0.2 K/UL (ref 0–0.4)
EOSINOPHIL NFR BLD: 2 % (ref 0–7)
EPITH CASTS URNS QL MICRO: ABNORMAL /LPF
ERYTHROCYTE [DISTWIDTH] IN BLOOD BY AUTOMATED COUNT: 16.3 % (ref 11.5–14.5)
GLUCOSE SERPL-MCNC: 108 MG/DL (ref 65–100)
GLUCOSE UR STRIP.AUTO-MCNC: NEGATIVE MG/DL
HCT VFR BLD AUTO: 27.1 % (ref 36.6–50.3)
HCT VFR BLD AUTO: 27.6 % (ref 36.6–50.3)
HGB BLD-MCNC: 8.1 G/DL (ref 12.1–17)
HGB BLD-MCNC: 8.2 G/DL (ref 12.1–17)
HGB UR QL STRIP: NEGATIVE
HYALINE CASTS URNS QL MICRO: ABNORMAL /LPF (ref 0–5)
IMM GRANULOCYTES # BLD AUTO: 0.1 K/UL (ref 0–0.04)
IMM GRANULOCYTES NFR BLD AUTO: 0 % (ref 0–0.5)
KETONES UR QL STRIP.AUTO: NEGATIVE MG/DL
LEUKOCYTE ESTERASE UR QL STRIP.AUTO: ABNORMAL
LYMPHOCYTES # BLD: 6.6 K/UL (ref 0.8–3.5)
LYMPHOCYTES NFR BLD: 49 % (ref 12–49)
MCH RBC QN AUTO: 25.6 PG (ref 26–34)
MCHC RBC AUTO-ENTMCNC: 29.9 G/DL (ref 30–36.5)
MCV RBC AUTO: 85.8 FL (ref 80–99)
MONOCYTES # BLD: 1 K/UL (ref 0–1)
MONOCYTES NFR BLD: 8 % (ref 5–13)
NEUTS SEG # BLD: 5.3 K/UL (ref 1.8–8)
NEUTS SEG NFR BLD: 40 % (ref 32–75)
NITRITE UR QL STRIP.AUTO: NEGATIVE
NRBC # BLD: 0.02 K/UL (ref 0–0.01)
NRBC BLD-RTO: 0.2 PER 100 WBC
PH UR STRIP: 7.5 [PH] (ref 5–8)
PLATELET # BLD AUTO: 247 K/UL (ref 150–400)
PMV BLD AUTO: 10.2 FL (ref 8.9–12.9)
POTASSIUM SERPL-SCNC: 3.9 MMOL/L (ref 3.5–5.1)
PROCALCITONIN SERPL-MCNC: <0.05 NG/ML
PROT UR STRIP-MCNC: ABNORMAL MG/DL
RBC # BLD AUTO: 3.16 M/UL (ref 4.1–5.7)
RBC #/AREA URNS HPF: ABNORMAL /HPF (ref 0–5)
SODIUM SERPL-SCNC: 136 MMOL/L (ref 136–145)
SP GR UR REFRACTOMETRY: 1.01 (ref 1–1.03)
SPECIMEN EXP DATE BLD: NORMAL
STATUS OF UNIT,%ST: NORMAL
THERAPEUTIC RANGE,PTTT: ABNORMAL SECS (ref 58–77)
THERAPEUTIC RANGE,PTTT: NORMAL SECS (ref 58–77)
UNIT DIVISION, %UDIV: 0
UROBILINOGEN UR QL STRIP.AUTO: 1 EU/DL (ref 0.2–1)
WBC # BLD AUTO: 13.3 K/UL (ref 4.1–11.1)
WBC URNS QL MICRO: ABNORMAL /HPF (ref 0–4)

## 2019-12-02 PROCEDURE — 94640 AIRWAY INHALATION TREATMENT: CPT

## 2019-12-02 PROCEDURE — 85730 THROMBOPLASTIN TIME PARTIAL: CPT

## 2019-12-02 PROCEDURE — 65660000000 HC RM CCU STEPDOWN

## 2019-12-02 PROCEDURE — 74011250636 HC RX REV CODE- 250/636: Performed by: INTERNAL MEDICINE

## 2019-12-02 PROCEDURE — 74011250637 HC RX REV CODE- 250/637: Performed by: PSYCHIATRY & NEUROLOGY

## 2019-12-02 PROCEDURE — 81001 URINALYSIS AUTO W/SCOPE: CPT

## 2019-12-02 PROCEDURE — 88305 TISSUE EXAM BY PATHOLOGIST: CPT

## 2019-12-02 PROCEDURE — 74011250636 HC RX REV CODE- 250/636: Performed by: FAMILY MEDICINE

## 2019-12-02 PROCEDURE — 87040 BLOOD CULTURE FOR BACTERIA: CPT

## 2019-12-02 PROCEDURE — 71046 X-RAY EXAM CHEST 2 VIEWS: CPT

## 2019-12-02 PROCEDURE — 77030021593 HC FCPS BIOP ENDOSC BSC -A: Performed by: INTERNAL MEDICINE

## 2019-12-02 PROCEDURE — 0DB68ZX EXCISION OF STOMACH, VIA NATURAL OR ARTIFICIAL OPENING ENDOSCOPIC, DIAGNOSTIC: ICD-10-PCS | Performed by: INTERNAL MEDICINE

## 2019-12-02 PROCEDURE — 74011250636 HC RX REV CODE- 250/636: Performed by: NURSE ANESTHETIST, CERTIFIED REGISTERED

## 2019-12-02 PROCEDURE — 74011250637 HC RX REV CODE- 250/637: Performed by: HOSPITALIST

## 2019-12-02 PROCEDURE — 74011250636 HC RX REV CODE- 250/636: Performed by: HOSPITALIST

## 2019-12-02 PROCEDURE — 77030022875 HC PRB AR PLSM COAG ERBE -C: Performed by: INTERNAL MEDICINE

## 2019-12-02 PROCEDURE — 0DB58ZX EXCISION OF ESOPHAGUS, VIA NATURAL OR ARTIFICIAL OPENING ENDOSCOPIC, DIAGNOSTIC: ICD-10-PCS | Performed by: INTERNAL MEDICINE

## 2019-12-02 PROCEDURE — 36415 COLL VENOUS BLD VENIPUNCTURE: CPT

## 2019-12-02 PROCEDURE — C9113 INJ PANTOPRAZOLE SODIUM, VIA: HCPCS | Performed by: FAMILY MEDICINE

## 2019-12-02 PROCEDURE — 85025 COMPLETE CBC W/AUTO DIFF WBC: CPT

## 2019-12-02 PROCEDURE — 85018 HEMOGLOBIN: CPT

## 2019-12-02 PROCEDURE — 76040000019: Performed by: INTERNAL MEDICINE

## 2019-12-02 PROCEDURE — 84145 PROCALCITONIN (PCT): CPT

## 2019-12-02 PROCEDURE — 74011000250 HC RX REV CODE- 250: Performed by: FAMILY MEDICINE

## 2019-12-02 PROCEDURE — 76060000031 HC ANESTHESIA FIRST 0.5 HR: Performed by: INTERNAL MEDICINE

## 2019-12-02 PROCEDURE — 74011000250 HC RX REV CODE- 250: Performed by: NURSE ANESTHETIST, CERTIFIED REGISTERED

## 2019-12-02 PROCEDURE — 74011000250 HC RX REV CODE- 250: Performed by: HOSPITALIST

## 2019-12-02 PROCEDURE — 0W3P8ZZ CONTROL BLEEDING IN GASTROINTESTINAL TRACT, VIA NATURAL OR ARTIFICIAL OPENING ENDOSCOPIC: ICD-10-PCS | Performed by: INTERNAL MEDICINE

## 2019-12-02 PROCEDURE — 80048 BASIC METABOLIC PNL TOTAL CA: CPT

## 2019-12-02 PROCEDURE — 94760 N-INVAS EAR/PLS OXIMETRY 1: CPT

## 2019-12-02 PROCEDURE — 94664 DEMO&/EVAL PT USE INHALER: CPT

## 2019-12-02 RX ORDER — EPINEPHRINE 0.1 MG/ML
1 INJECTION INTRACARDIAC; INTRAVENOUS
Status: DISCONTINUED | OUTPATIENT
Start: 2019-12-02 | End: 2019-12-02 | Stop reason: HOSPADM

## 2019-12-02 RX ORDER — HEPARIN SODIUM 10000 [USP'U]/100ML
12-25 INJECTION, SOLUTION INTRAVENOUS
Status: DISCONTINUED | OUTPATIENT
Start: 2019-12-02 | End: 2019-12-02

## 2019-12-02 RX ORDER — PROPOFOL 10 MG/ML
INJECTION, EMULSION INTRAVENOUS AS NEEDED
Status: DISCONTINUED | OUTPATIENT
Start: 2019-12-02 | End: 2019-12-02 | Stop reason: HOSPADM

## 2019-12-02 RX ORDER — SODIUM CHLORIDE 0.9 % (FLUSH) 0.9 %
5-40 SYRINGE (ML) INJECTION AS NEEDED
Status: DISCONTINUED | OUTPATIENT
Start: 2019-12-02 | End: 2019-12-04 | Stop reason: HOSPADM

## 2019-12-02 RX ORDER — ATROPINE SULFATE 0.1 MG/ML
0.5 INJECTION INTRAVENOUS
Status: DISCONTINUED | OUTPATIENT
Start: 2019-12-02 | End: 2019-12-02 | Stop reason: HOSPADM

## 2019-12-02 RX ORDER — SODIUM CHLORIDE 9 MG/ML
INJECTION, SOLUTION INTRAVENOUS
Status: DISCONTINUED | OUTPATIENT
Start: 2019-12-02 | End: 2019-12-02 | Stop reason: HOSPADM

## 2019-12-02 RX ORDER — SODIUM CHLORIDE 9 MG/ML
150 INJECTION, SOLUTION INTRAVENOUS CONTINUOUS
Status: DISPENSED | OUTPATIENT
Start: 2019-12-02 | End: 2019-12-02

## 2019-12-02 RX ORDER — DEXTROMETHORPHAN/PSEUDOEPHED 2.5-7.5/.8
1.2 DROPS ORAL
Status: DISCONTINUED | OUTPATIENT
Start: 2019-12-02 | End: 2019-12-02 | Stop reason: HOSPADM

## 2019-12-02 RX ORDER — SODIUM CHLORIDE 0.9 % (FLUSH) 0.9 %
5-40 SYRINGE (ML) INJECTION EVERY 8 HOURS
Status: DISCONTINUED | OUTPATIENT
Start: 2019-12-02 | End: 2019-12-04 | Stop reason: HOSPADM

## 2019-12-02 RX ORDER — MIDAZOLAM HYDROCHLORIDE 1 MG/ML
.25-5 INJECTION, SOLUTION INTRAMUSCULAR; INTRAVENOUS
Status: DISCONTINUED | OUTPATIENT
Start: 2019-12-02 | End: 2019-12-02 | Stop reason: HOSPADM

## 2019-12-02 RX ORDER — FUROSEMIDE 10 MG/ML
40 INJECTION INTRAMUSCULAR; INTRAVENOUS ONCE
Status: COMPLETED | OUTPATIENT
Start: 2019-12-02 | End: 2019-12-02

## 2019-12-02 RX ORDER — HEPARIN SODIUM 10000 [USP'U]/100ML
12-25 INJECTION, SOLUTION INTRAVENOUS
Status: DISCONTINUED | OUTPATIENT
Start: 2019-12-02 | End: 2019-12-03

## 2019-12-02 RX ORDER — LIDOCAINE HYDROCHLORIDE 20 MG/ML
INJECTION, SOLUTION EPIDURAL; INFILTRATION; INTRACAUDAL; PERINEURAL AS NEEDED
Status: DISCONTINUED | OUTPATIENT
Start: 2019-12-02 | End: 2019-12-02 | Stop reason: HOSPADM

## 2019-12-02 RX ORDER — FENTANYL CITRATE 50 UG/ML
200 INJECTION, SOLUTION INTRAMUSCULAR; INTRAVENOUS
Status: DISCONTINUED | OUTPATIENT
Start: 2019-12-02 | End: 2019-12-02 | Stop reason: HOSPADM

## 2019-12-02 RX ADMIN — IPRATROPIUM BROMIDE AND ALBUTEROL SULFATE 3 ML: .5; 3 SOLUTION RESPIRATORY (INHALATION) at 22:14

## 2019-12-02 RX ADMIN — PROPOFOL 20 MG: 10 INJECTION, EMULSION INTRAVENOUS at 16:49

## 2019-12-02 RX ADMIN — Medication 10 ML: at 13:14

## 2019-12-02 RX ADMIN — SODIUM CHLORIDE 40 MG: 9 INJECTION INTRAMUSCULAR; INTRAVENOUS; SUBCUTANEOUS at 05:43

## 2019-12-02 RX ADMIN — Medication 10 ML: at 05:41

## 2019-12-02 RX ADMIN — Medication 10 ML: at 23:07

## 2019-12-02 RX ADMIN — SODIUM CHLORIDE: 900 INJECTION, SOLUTION INTRAVENOUS at 16:33

## 2019-12-02 RX ADMIN — HEPARIN SODIUM AND DEXTROSE 10 UNITS/KG/HR: 10000; 5 INJECTION INTRAVENOUS at 20:40

## 2019-12-02 RX ADMIN — Medication 10 ML: at 13:15

## 2019-12-02 RX ADMIN — PROPOFOL 20 MG: 10 INJECTION, EMULSION INTRAVENOUS at 16:37

## 2019-12-02 RX ADMIN — ASPIRIN 81 MG 81 MG: 81 TABLET ORAL at 08:27

## 2019-12-02 RX ADMIN — IPRATROPIUM BROMIDE AND ALBUTEROL SULFATE 3 ML: .5; 3 SOLUTION RESPIRATORY (INHALATION) at 07:17

## 2019-12-02 RX ADMIN — TRAZODONE HYDROCHLORIDE 50 MG: 50 TABLET ORAL at 23:07

## 2019-12-02 RX ADMIN — LIDOCAINE HYDROCHLORIDE 100 MG: 20 INJECTION, SOLUTION EPIDURAL; INFILTRATION; INTRACAUDAL; PERINEURAL at 16:35

## 2019-12-02 RX ADMIN — ATORVASTATIN CALCIUM 20 MG: 20 TABLET, FILM COATED ORAL at 08:27

## 2019-12-02 RX ADMIN — PROPOFOL 20 MG: 10 INJECTION, EMULSION INTRAVENOUS at 16:43

## 2019-12-02 RX ADMIN — LOSARTAN POTASSIUM 50 MG: 50 TABLET, FILM COATED ORAL at 08:27

## 2019-12-02 RX ADMIN — Medication 10 ML: at 05:42

## 2019-12-02 RX ADMIN — Medication 10 ML: at 17:59

## 2019-12-02 RX ADMIN — SODIUM CHLORIDE 150 ML/HR: 900 INJECTION, SOLUTION INTRAVENOUS at 17:59

## 2019-12-02 RX ADMIN — PROPOFOL 20 MG: 10 INJECTION, EMULSION INTRAVENOUS at 16:46

## 2019-12-02 RX ADMIN — PROPOFOL 20 MG: 10 INJECTION, EMULSION INTRAVENOUS at 16:36

## 2019-12-02 RX ADMIN — SODIUM CHLORIDE 40 MG: 9 INJECTION INTRAMUSCULAR; INTRAVENOUS; SUBCUTANEOUS at 17:55

## 2019-12-02 RX ADMIN — PROPOFOL 40 MG: 10 INJECTION, EMULSION INTRAVENOUS at 16:35

## 2019-12-02 RX ADMIN — PROPOFOL 20 MG: 10 INJECTION, EMULSION INTRAVENOUS at 16:40

## 2019-12-02 RX ADMIN — FUROSEMIDE 40 MG: 10 INJECTION, SOLUTION INTRAMUSCULAR; INTRAVENOUS at 17:57

## 2019-12-02 NOTE — PROGRESS NOTES
Physical Therapy    Attempted PT treatment. Pt requested defer session until after EGD scheduled for today (time unknown per RN). Will continue to follow.     Shelly Gallardo, PT, MPT

## 2019-12-02 NOTE — PROGRESS NOTES
[facility] Adult  Hospitalist Group                                                                                          Hospitalist Progress Note  Freedom Martins MD  Answering service: [phone] OR 36 from in house phone        Date of Service:  2019  NAME:  Terrance Johnston  :  1930  MRN:  [id number]      Admission Summary:   80-year-old male with past medical history of stroke, atrial fibrillation, thyroid disease, arthritis was sent to the hospital from a facility because of a syncopal episode. in the ER he was found to have a hemoglobin of 6.2. Patient was admitted for further evaluation. Interval history / Subjective: Follow-up for anemia and syncopal episode    No new complaints. Sitting up in the chair. Did not have bowel movement yet. Plan for EGD tomorrow. Assessment & Plan:     #Acute blood loss anemia:  -Patient states that he had some recent nosebleeds few days dana which has resolved without any intervention and continued to take Eliquis during that period. Hemoglobin in October was 11.2 which has dropped down to 6.4 at admission  -Stool occult blood was positive at admission. Anemia could be multifactorial -nose bleed vs GI loss. -GI was consulted for further evaluation-patient and his son declined EGD due to the risks.  -He is currently on twice daily PPI. -s/p 1 U PRBC at the time of admission  -GI team plans for endoscopy on Monday. Discussed with neurology, okay to undergo endoscopy but hypotension need to be prevented during the procedure.  -Hb 6.9 today -will give 1 U PRBC today    -Syncope at admission;  -likely related to acute blood loss anemia and was on antihypertensives. Ryan Khan     MRI brain showed left posterior MCA acute infarct -discussed with neurologist and reviewed notes - \"global cerebral hypoperfusion with selective injury of the vulnerable area rather than being indicative of a causative lesion or in fact truly a stroke in clinical terms\"    -History of stroke in October 2019-  --As patient is in persistent atrial fibrillation on tele -continue heparin drip, PTT therapeutic.  -Plan for endoscopy on Monday and eventually switched to oral anticoagulants.  -continue aspirin and statin. #Persistent atrial Fib:  -HR controlled. On anticoagulation.  -He occasionally has heart rate 40s to 50s while sleeping and returns to normal if he wakes up. Patient is asymptomatic. He is not on any rate controlling medications    -Hypertension,  -Blood pressure better now. Continue losartan    -Hypothyroidism resumed Synthroid. Code status: full  DVT prophylaxis: Anticoagulated    Care Plan discussed with: Patient/family, nurse,  Disposition: To be determined     Hospital Problems  Date Reviewed: 10/18/2019          Codes Class Noted POA    Acute anemia ICD-10-CM: D64.9  ICD-9-CM: 285.9  11/28/2019 Unknown        Syncope ICD-10-CM: R55  ICD-9-CM: 780.2  11/26/2019 Unknown                Review of Systems:   As per HPI      Vital Signs:    Last 24hrs VS reviewed since prior progress note. Most recent are:  Visit Vitals  /74 (BP 1 Location: Right arm, BP Patient Position: At rest)   Pulse 65   Temp 97.6 °F (36.4 °C)   Resp 16   Ht 5' 8\" (1.727 m)   Wt 109.4 kg (241 lb 2.9 oz)   SpO2 94%   BMI 36.67 kg/m²         Intake/Output Summary (Last 24 hours) at 12/1/2019 2028  Last data filed at 12/1/2019 1739  Gross per 24 hour   Intake 432.1 ml   Output 850 ml   Net -417.9 ml        Physical Examination:             Constitutional:  Elderly patient, no acute distress    ENT:  Oral mucous moist, oropharynx benign. EOMI, anicteric sclera and normal conjunctiva   Resp:  wheezing has improved,CTA b/l   CV:  Irregular rhythm, normal heart rate    GI:  Soft, non distended, non tender.  normoactive bowel sounds     Musculoskeletal:  1+ LE edema    Neurologic:  Alert and oriented x3, moves all extremities,strength wnl of all extremities     Psych:   Not anxious nor agitated. Data Review:    Review and/or order of clinical lab test  Review and/or order of tests in the medicine section of [facility]      Labs:     Recent Labs     12/01/19  1038 12/01/19  0115  11/29/19  0914   WBC  --   --   --  13.4*   HGB 6.9* 7.1*   < > 7.3*   HCT 23.7* 24.3*   < > 24.9*   PLT  --   --   --  254    < > = values in this interval not displayed. Recent Labs     12/01/19  1038 11/29/19  0914   * 133*   K 3.3* 3.6   CL 98 101   CO2 28 27   BUN 11 15   CREA 1.12 1.04   * 141*   CA 8.2* 8.3*     No results for input(s): SGOT, GPT, ALT, AP, TBIL, TBILI, TP, ALB, GLOB, GGT, AML, LPSE in the last 72 hours. No lab exists for component: AMYP, HLPSE  Recent Labs     12/01/19  0434 11/30/19  0448 11/29/19 2229   APTT 64.3* 67.6* 60.8*      No results for input(s): FE, TIBC, PSAT, FERR in the last 72 hours. No results found for: FOL, RBCF   No results for input(s): PH, PCO2, PO2 in the last 72 hours. No results for input(s): CPK, CKNDX, TROIQ in the last 72 hours.     No lab exists for component: CPKMB  Lab Results   Component Value Date/Time    Cholesterol, total 159 10/12/2019 08:55 AM    HDL Cholesterol 51 10/12/2019 08:55 AM    LDL, calculated 83.4 10/12/2019 08:55 AM    Triglyceride 123 10/12/2019 08:55 AM    CHOL/HDL Ratio 3.1 10/12/2019 08:55 AM     Lab Results   Component Value Date/Time    Glucose (POC) 117 (H) 10/12/2019 08:41 AM     No results found for: COLOR, APPRN, SPGRU, REFSG, MICKEY, PROTU, GLUCU, KETU, BILU, UROU, JABIER, LEUKU, GLUKE, EPSU, BACTU, WBCU, RBCU, CASTS, UCRY      Medications Reviewed:     Current Facility-Administered Medications   Medication Dose Route Frequency    0.9% sodium chloride infusion 250 mL  250 mL IntraVENous PRN    albuterol-ipratropium (DUO-NEB) 2.5 MG-0.5 MG/3 ML  3 mL Nebulization BID RT    potassium chloride SR (KLOR-CON 10) tablet 40 mEq  40 mEq Oral NOW    losartan (COZAAR) tablet 50 mg  50 mg Oral DAILY    0.9% sodium chloride infusion 250 mL  250 mL IntraVENous PRN    heparin 25,000 units in D5W 250 ml infusion  9-25 Units/kg/hr IntraVENous TITRATE    aspirin chewable tablet 81 mg  81 mg Oral DAILY    atorvastatin (LIPITOR) tablet 20 mg  20 mg Oral DAILY    sodium chloride (NS) flush 5-40 mL  5-40 mL IntraVENous Q8H    sodium chloride (NS) flush 5-40 mL  5-40 mL IntraVENous PRN    traZODone (DESYREL) tablet 50 mg  50 mg Oral QHS    acetaminophen (TYLENOL) tablet 650 mg  650 mg Oral Q6H PRN    levothyroxine (SYNTHROID) tablet 50 mcg  50 mcg Oral ACB    sodium chloride (NS) flush 5-40 mL  5-40 mL IntraVENous Q8H    sodium chloride (NS) flush 5-40 mL  5-40 mL IntraVENous PRN    pantoprazole (PROTONIX) 40 mg in 0.9% sodium chloride 10 mL injection  40 mg IntraVENous Q12H    ondansetron (ZOFRAN) injection 4 mg  4 mg IntraVENous Q4H PRN    0.9% sodium chloride infusion 250 mL  250 mL IntraVENous PRN    albuterol-ipratropium (DUO-NEB) 2.5 MG-0.5 MG/3 ML  3 mL Nebulization Q4H PRN     ______________________________________________________________________  EXPECTED LENGTH OF STAY: 4d 12h  ACTUAL LENGTH OF STAY:          3                 Alejandro Paz MD

## 2019-12-02 NOTE — H&P
[address], [address] [place] [address]          Pre-procedure History and Physical       NAME:  Gisele Ware   :   1930   MRN:   [id number]     CHIEF COMPLAINT/HPI: See procedure note    PMH:  Past Medical History:   Diagnosis Date    Arthritis     Contact dermatitis and other eczema, due to unspecified cause     Hypertension     Stroke ([address]     TIA at [facility] Thyroid disease     Unspecified hypothyroidism        PSH:  Past Surgical History:   Procedure Laterality Date    HX HEENT      detached retina    HX HIP REPLACEMENT      right hip, [facility]       Allergies: Allergies   Allergen Reactions    Hydrocodone Other (comments)     Unable to void, or have a bowel movement    Amlodipine Swelling and Other (comments)     Severe weeping from leg swelling    Poison Ivy Extract Itching       Home Medications:  Prior to Admission Medications   Prescriptions Last Dose Informant Patient Reported? Taking? CALCIUM/MAGNESIUM (DOLOMITE PO)   Yes Yes   Sig: Take 1 Tab by mouth daily. SAW PALMETTO PO   Yes Yes   Sig: Take 1 Tab by mouth daily. apixaban (ELIQUIS) 5 mg tablet   Yes Yes   Sig: Take 5 mg by mouth daily. ascorbic acid (VITAMIN C) 500 mg tablet   Yes Yes   Sig: Take 500 mg by mouth daily. aspirin (ASPIRIN) 325 mg tablet   Yes Yes   Sig: Take 325 mg by mouth daily. coenzyme Q-10 (CO Q-10) 30 mg capsule   Yes Yes   Sig: Take 30 mg by mouth daily. ketoconazole (NIZORAL) 2 % topical cream   Yes Yes   Sig: Apply 1 Each to affected area daily. apply to nose/face   lecithin/soybean oil (SOYA LECITHIN PO)   Yes Yes   Sig: Take 1 Tab by mouth daily. levothyroxine (SYNTHROID) 50 mcg tablet   Yes Yes   Sig: Take 50 mcg by mouth Daily (before breakfast). losartan (COZAAR) 50 mg tablet   No Yes   Sig: Take 0.5 Tabs by mouth daily. lutein 20 mg cap   Yes Yes   Sig: Take 1 Cap by mouth daily.    multivit-min/FA/lycopen/lutein (Brisas 0646 PO)   Yes Yes   Sig: Take 1 Tab by mouth daily. traZODone (DESYREL) 50 mg tablet   No Yes   Sig: Take 1 Tab by mouth nightly. For sleep   tretinoin (RETIN-A) 0.025 % topical cream   Yes Yes   Sig: Apply 1 Each to affected area nightly. apply to face   triamcinolone acetonide (KENALOG) 0.1 % ointment   Yes Yes   Sig: Apply 1 Container to affected area two (2) times a day. use thin layer, apply to face   vitamin E (AQUA GEMS) 400 unit capsule   Yes Yes   Sig: Take 400 Units by mouth daily. Reports taking 1-2 times a week. zinc (CHELATED ZINC) 50 mg tab tablet   Yes No   Sig: Take 50 mg by mouth daily.       Facility-Administered Medications: None       Hospital Medications:  Current Facility-Administered Medications   Medication Dose Route Frequency    furosemide (LASIX) injection 40 mg  40 mg IntraVENous ONCE    0.9% sodium chloride infusion 250 mL  250 mL IntraVENous PRN    albuterol-ipratropium (DUO-NEB) 2.5 MG-0.5 MG/3 ML  3 mL Nebulization BID RT    losartan (COZAAR) tablet 50 mg  50 mg Oral DAILY    0.9% sodium chloride infusion 250 mL  250 mL IntraVENous PRN    aspirin chewable tablet 81 mg  81 mg Oral DAILY    atorvastatin (LIPITOR) tablet 20 mg  20 mg Oral DAILY    sodium chloride (NS) flush 5-40 mL  5-40 mL IntraVENous Q8H    sodium chloride (NS) flush 5-40 mL  5-40 mL IntraVENous PRN    traZODone (DESYREL) tablet 50 mg  50 mg Oral QHS    acetaminophen (TYLENOL) tablet 650 mg  650 mg Oral Q6H PRN    levothyroxine (SYNTHROID) tablet 50 mcg  50 mcg Oral ACB    sodium chloride (NS) flush 5-40 mL  5-40 mL IntraVENous Q8H    sodium chloride (NS) flush 5-40 mL  5-40 mL IntraVENous PRN    pantoprazole (PROTONIX) 40 mg in 0.9% sodium chloride 10 mL injection  40 mg IntraVENous Q12H    ondansetron (ZOFRAN) injection 4 mg  4 mg IntraVENous Q4H PRN    0.9% sodium chloride infusion 250 mL  250 mL IntraVENous PRN    albuterol-ipratropium (DUO-NEB) 2.5 MG-0.5 MG/3 ML  3 mL Nebulization Q4H PRN Family History:  Family History   Problem Relation Age of Onset    Lung Disease Father     Cancer Father         lung    Coronary Artery Disease Neg Hx        Social History:  Social History     Tobacco Use    Smoking status: Light Tobacco Smoker     Packs/day: 0.25     Types: Cigars     Last attempt to quit: 1978     Years since quittin.2    Smokeless tobacco: Never Used    Tobacco comment: cigar on occ   Substance Use Topics    Alcohol use: Yes     Alcohol/week: 14.0 standard drinks     Types: 14 Shots of liquor per week     Comment: 2 drinks a day. PHYSICAL EXAM PRIOR TO SEDATION:  General: Alert, in no acute distress    Lungs:            Mild expiratory wheeze on the R side  Heart:  Normal S1, S2    Abdomen: Soft, Non distended, Non tender. Normoactive bowel sounds. Assessment:   Stable for sedation administration.     Plan:   · Endoscopic procedure with sedation     Signed By: Johnathan Gomez MD     2019  4:20 PM

## 2019-12-02 NOTE — PROGRESS NOTES
TRANSFER - OUT REPORT:    Verbal report given to RN(name) on Alok Castillo  being transferred to 359(unit) for routine progression of care       Report consisted of patients Situation, Background, Assessment and   Recommendations(SBAR). Information from the following report(s) Procedure Summary was reviewed with the receiving nurse. Lines:   Peripheral IV 11/28/19 Anterior;Distal;Right Forearm (Active)   Site Assessment Clean, dry, & intact 12/2/2019  3:54 AM   Phlebitis Assessment 0 12/2/2019  3:54 AM   Infiltration Assessment 0 12/2/2019  3:54 AM   Dressing Status Clean, dry, & intact 12/2/2019  3:54 AM   Dressing Type Transparent;Tape 12/2/2019  3:54 AM   Hub Color/Line Status Infusing;Pink 12/2/2019  3:54 AM   Action Taken Open ports on tubing capped 12/1/2019  9:00 PM   Alcohol Cap Used Yes 12/2/2019  3:54 AM       Peripheral IV 11/29/19 Distal;Left;Upper (Active)   Site Assessment Clean, dry, & intact 12/2/2019  3:54 AM   Phlebitis Assessment 0 12/2/2019  3:54 AM   Infiltration Assessment 0 12/2/2019  3:54 AM   Dressing Status Clean, dry, & intact 12/2/2019  3:54 AM   Dressing Type Transparent;Tape 12/2/2019  3:54 AM   Hub Color/Line Status Pink;Capped 12/2/2019  3:54 AM   Action Taken Open ports on tubing capped 12/1/2019  9:00 PM   Alcohol Cap Used Yes 12/2/2019  3:54 AM        Opportunity for questions and clarification was provided.       Patient transported with:

## 2019-12-02 NOTE — PROCEDURES
Elvira Jiang [address]zier M.D.  Abi Gutierrez, [facility]  [phone]               Esophagogastroduodenoscopy (EGD) Procedure Note    NAME: Nolan Albert  :  1930  MRN:  [id number]    Indications:  Occult blood in stool with possible UGI origin     : Asha Mahmood MD    Referring Provider:  Emilie Guerrier NP    Medicine:  MAC anesthesia      Procedure Details:  After informed consent was obtained with all risks and benefits of the procedure explained and preprocedure exam completed, the patient was placed in the left lateral decubitus position. Universal protocol for patient identification was performed and documented in the nursing notes. Throughout the procedure, the patient's blood pressure was monitored at least every five minutes; pulse, and oxygen saturations were monitored continuously. All vital signs were documented in the nursing notes. The endoscope was inserted into the mouth and advanced under direct vision to second portion of the duodenum. A careful inspection was made as the gastroscope was withdrawn, including a retroflexed view of the proximal stomach; findings and interventions are described below. 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  APC of the small intestine    Specimens:   ID Type Source Tests Collected by Time Destination   1 : Gaastric Preservative Gastric  Neeta Orona MD 2019 1644 Pathology   2 : lower esophagus Preservative Esophagus, Distal  Neeta Orona MD 2019 1654 Pathology        EBL: None          Complications:     No immediate complications Impression:  -See post-procedure diagnoses. Recommendations:  -GI lite diet  -Resume Heparin gtt in 2 hours-discussed with Dr. Gonzalez Robles who will place the order  -Serial CBCs, transfuse as needed  -PPI  -Hold on colonoscopy at this time    Signed by:  Elisha Villalba MD         12/2/2019 5:05 PM

## 2019-12-02 NOTE — ANESTHESIA PREPROCEDURE EVALUATION
Relevant Problems   No relevant active problems       Anesthetic History               Review of Systems / Medical History  Patient summary reviewed, nursing notes reviewed and pertinent labs reviewed    Pulmonary                   Neuro/Psych       CVA  TIA     Cardiovascular    Hypertension        Dysrhythmias : atrial fibrillation      Exercise tolerance: >4 METS     GI/Hepatic/Renal                Endo/Other      Hypothyroidism  Obesity and arthritis     Other Findings   Comments: H/o anticoag  Hb 8.2           Physical Exam    Airway  Mallampati: II           Cardiovascular    Rhythm: regular           Dental  No notable dental hx       Pulmonary  Breath sounds clear to auscultation               Abdominal         Other Findings            Anesthetic Plan    ASA: 3  Anesthesia type: MAC          Induction: Intravenous  Anesthetic plan and risks discussed with: Patient

## 2019-12-02 NOTE — PROGRESS NOTES
[address], [address] [place] [address], [facility] [address]  [phone]      Heparin has been off. NPO. Plan for EGD with Dr. Gordo Mcfarlane today at [facility], risks discussed and patient is agreeable.     Faustino Burrell, NP

## 2019-12-02 NOTE — PERIOP NOTES

## 2019-12-02 NOTE — PROGRESS NOTES
Problem: Anemia Care Plan (Adult and Pediatric)  Goal: *Labs within defined limits  Outcome: Progressing Towards Goal  Goal: *Tolerates increased activity  Outcome: Progressing Towards Goal

## 2019-12-02 NOTE — PERIOP NOTES
TRANSFER - IN REPORT:    Verbal report [place] [facility] [employer]  being received from 359(unit) for ordered procedure      Report consisted of patients Situation, Background, Assessment and   Recommendations(SBAR). Information from the following report(s) SBAR was reviewed with the receiving nurse. Opportunity for questions and clarification was provided. Assessment completed upon patients arrival to unit and care assumed.

## 2019-12-02 NOTE — PROGRESS NOTES
Bedside and Verbal shift change report given to Sam (oncoming nurse) by Vanessa Arroyo (offgoing nurse). Report included the following information SBAR, MAR and Recent Results.

## 2019-12-02 NOTE — PROGRESS NOTES
[facility] [facility]  Hospitalist Group                                                                                          Hospitalist Progress Note  Mari Swift MD  Answering service: [phone] OR [phone] from in house phone        Date of Service:  2019  NAME:  Juana Fuchs  :  1930  MRN:  [id number]      Admission Summary:   22-year-old male with past medical history of stroke, atrial fibrillation, thyroid disease, arthritis was sent to the hospital from a facility because of a syncopal episode. in the ER he was found to have a hemoglobin of 6.2. Patient was admitted for further evaluation. Interval history / Subjective: Follow-up for anemia and syncopal episode    Patient is comfortably sitting in the chair. Did not report any new episodes of bleeding. He will have EGD this evening. Assessment & Plan:     #Acute blood loss anemia:  -Patient states that he had some recent nosebleeds few days dana which has resolved without any intervention and continued to take Eliquis during that period. Hemoglobin in October was 11.2 which has dropped down to 6.4 at admission  -Stool occult blood was positive at admission. Anemia could be multifactorial -nose bleed vs GI loss. --He is currently on twice daily PPI. -s/p 2 U PRBC at the time of admission. Hemoglobin 8.2 today.  -Plan for endoscopy this evening. Acute cardiogenic pulmonary edema:  -will give one dose of lasix as CXR showed pulmonary edema likely due to blood transfusions   -Echo showed severe concentric hypertrophy of left  ventricle.    -Syncope at admission;  -likely related to acute blood loss anemia and was on antihypertensives. Deandra Kemp     MRI brain showed left posterior MCA acute infarct -discussed with neurologist and reviewed notes - \"global cerebral hypoperfusion with selective injury of the vulnerable area rather than being indicative of a causative lesion or in fact truly a stroke in clinical terms\"    -History of stroke in October 2019-  --Patient is in persistent atrial fibrillation on tele -we will continue anticoagulation after the procedure. If the hemoglobin remains stable and no more procedures -can switch to oral anticoagulants. #Persistent atrial Fib:  -HR controlled. On anticoagulation.  -He occasionally has heart rate 40s to 50s while sleeping and returns to normal if he wakes up. Patient is asymptomatic. He is not on any rate controlling medications    -Leukocytosis:  WBC 13 K. UA wnl. CXR did not show any infiltrates. Blood cultures pending.  -Procal wnl, afebrile -no indication for abx.    -Hypertension,  -Blood pressure better now. Continue losartan    -Hypothyroidism resumed Synthroid. Code status: full  DVT prophylaxis: Anticoagulated    Care Plan discussed with: Patient/family, nurse,  Disposition: To be determined     Hospital Problems  Date Reviewed: 10/18/2019          Codes Class Noted POA    Acute anemia ICD-10-CM: D64.9  ICD-9-CM: 285.9  11/28/2019 Unknown        Syncope ICD-10-CM: R55  ICD-9-CM: 780.2  11/26/2019 Unknown                Review of Systems:   As per HPI      Vital Signs:    Last 24hrs VS reviewed since prior progress note. Most recent are:  Visit Vitals  /65 (BP 1 Location: Right arm, BP Patient Position: Sitting)   Pulse 66   Temp 97.6 °F (36.4 °C)   Resp 16   Ht 5' 8\" (1.727 m)   Wt 106.7 kg (235 lb 3.7 oz)   SpO2 96%   BMI 35.77 kg/m²         Intake/Output Summary (Last 24 hours) at 12/2/2019 1559  Last data filed at 12/2/2019 0549  Gross per 24 hour   Intake 1328.72 ml   Output 675 ml   Net 653.72 ml        Physical Examination:             Constitutional:  Elderly patient, no acute distress    ENT:  Oral mucous moist, oropharynx benign. EOMI, anicteric sclera and normal conjunctiva   Resp:  CTA b/l   CV: Irregular rhythym,normal HR    GI:  Soft, non distended, non tender.  normoactive bowel sounds     Musculoskeletal:  1+ LE edema    Neurologic: Alert and oriented x3, moves all extremities,strength wnl of all extremities     Psych:   Not anxious nor agitated. Data Review:    Review and/or order of clinical lab test  Review and/or order of tests in the medicine section of [facility]      Labs:     Recent Labs     12/02/19  1327 12/02/19 0412   WBC  --  13.3*   HGB 8.2* 8.1*   HCT 27.6* 27.1*   PLT  --  247     Recent Labs     12/02/19  0412 12/01/19  1038    132*   K 3.9 3.3*    98   CO2 28 28   BUN 11 11   CREA 1.14 1.12   * 151*   CA 8.7 8.2*     No results for input(s): SGOT, GPT, ALT, AP, TBIL, TBILI, TP, ALB, GLOB, GGT, AML, LPSE in the last 72 hours. No lab exists for component: AMYP, HLPSE  Recent Labs     12/02/19  1327 12/02/19 0412 12/01/19  0434   APTT 28.7 59.5* 64.3*      No results for input(s): FE, TIBC, PSAT, FERR in the last 72 hours. No results found for: FOL, RBCF   No results for input(s): PH, PCO2, PO2 in the last 72 hours. No results for input(s): CPK, CKNDX, TROIQ in the last 72 hours.     No lab exists for component: CPKMB  Lab Results   Component Value Date/Time    Cholesterol, total 159 10/12/2019 08:55 AM    HDL Cholesterol 51 10/12/2019 08:55 AM    LDL, calculated 83.4 10/12/2019 08:55 AM    Triglyceride 123 10/12/2019 08:55 AM    CHOL/HDL Ratio 3.1 10/12/2019 08:55 AM     Lab Results   Component Value Date/Time    Glucose (POC) 117 (H) 10/12/2019 08:41 AM     Lab Results   Component Value Date/Time    Color YELLOW/STRAW 12/02/2019 01:27 PM    Appearance CLEAR 12/02/2019 01:27 PM    Specific gravity 1.014 12/02/2019 01:27 PM    pH (UA) 7.5 12/02/2019 01:27 PM    Protein TRACE (A) 12/02/2019 01:27 PM    Glucose NEGATIVE  12/02/2019 01:27 PM    Ketone NEGATIVE  12/02/2019 01:27 PM    Bilirubin NEGATIVE  12/02/2019 01:27 PM    Urobilinogen 1.0 12/02/2019 01:27 PM    Nitrites NEGATIVE  12/02/2019 01:27 PM    Leukocyte Esterase TRACE (A) 12/02/2019 01:27 PM    Epithelial cells FEW 12/02/2019 01:27 PM Bacteria NEGATIVE  12/02/2019 01:27 PM    WBC 0-4 12/02/2019 01:27 PM    RBC 0-5 12/02/2019 01:27 PM         Medications Reviewed:     Current Facility-Administered Medications   Medication Dose Route Frequency    0.9% sodium chloride infusion 250 mL  250 mL IntraVENous PRN    albuterol-ipratropium (DUO-NEB) 2.5 MG-0.5 MG/3 ML  3 mL Nebulization BID RT    losartan (COZAAR) tablet 50 mg  50 mg Oral DAILY    0.9% sodium chloride infusion 250 mL  250 mL IntraVENous PRN    aspirin chewable tablet 81 mg  81 mg Oral DAILY    atorvastatin (LIPITOR) tablet 20 mg  20 mg Oral DAILY    sodium chloride (NS) flush 5-40 mL  5-40 mL IntraVENous Q8H    sodium chloride (NS) flush 5-40 mL  5-40 mL IntraVENous PRN    traZODone (DESYREL) tablet 50 mg  50 mg Oral QHS    acetaminophen (TYLENOL) tablet 650 mg  650 mg Oral Q6H PRN    levothyroxine (SYNTHROID) tablet 50 mcg  50 mcg Oral ACB    sodium chloride (NS) flush 5-40 mL  5-40 mL IntraVENous Q8H    sodium chloride (NS) flush 5-40 mL  5-40 mL IntraVENous PRN    pantoprazole (PROTONIX) 40 mg in 0.9% sodium chloride 10 mL injection  40 mg IntraVENous Q12H    ondansetron (ZOFRAN) injection 4 mg  4 mg IntraVENous Q4H PRN    0.9% sodium chloride infusion 250 mL  250 mL IntraVENous PRN    albuterol-ipratropium (DUO-NEB) 2.5 MG-0.5 MG/3 ML  3 mL Nebulization Q4H PRN     ______________________________________________________________________  EXPECTED LENGTH OF STAY: 4d 12h  ACTUAL LENGTH OF STAY:          4                 Anil Mckee MD

## 2019-12-02 NOTE — PROGRESS NOTES
Occupational Therapy: Spoke with Physical Therapist who reports patient requested defer for session until after EGD which is scheduled for today. Noted EGD now scheduled for 16:00. Will follow.   SUSANA Fletcher/RE

## 2019-12-02 NOTE — ANESTHESIA POSTPROCEDURE EVALUATION
Procedure(s):  ESOPHAGOGASTRODUODENOSCOPY (EGD)  ESOPHAGOGASTRODUODENAL (EGD) BIOPSY  ENDOSCOPIC ARGON PLASMA COAGULATION. MAC    Anesthesia Post Evaluation        Patient location during evaluation: PACU  Patient participation: complete - patient participated  Level of consciousness: awake and alert  Pain management: adequate  Airway patency: patent  Anesthetic complications: no  Cardiovascular status: acceptable  Respiratory status: acceptable  Hydration status: acceptable  Comments: I have seen and evaluated the patient and is ready for discharge. Seven Marie MD    Post anesthesia nausea and vomiting:  none      No vitals data found for the desired time range.

## 2019-12-02 NOTE — ROUTINE PROCESS
Bedside and Verbal shift change report given to [facility] Deanne Meade (oncoming nurse) by Mele Parker RN (offgoing nurse). Report included the following information SBAR, Kardex, ED Summary, Intake/Output, MAR, Recent Results and Cardiac Rhythm AFIB.

## 2019-12-03 LAB
ANION GAP SERPL CALC-SCNC: 7 MMOL/L (ref 5–15)
APTT PPP: 39 SEC (ref 22.1–32)
APTT PPP: 99.9 SEC (ref 22.1–32)
ATRIAL RATE: 54 BPM
BUN SERPL-MCNC: 14 MG/DL (ref 6–20)
BUN/CREAT SERPL: 13 (ref 12–20)
CALCIUM SERPL-MCNC: 8.7 MG/DL (ref 8.5–10.1)
CALCULATED R AXIS, ECG10: -49 DEGREES
CALCULATED T AXIS, ECG11: -29 DEGREES
CHLORIDE SERPL-SCNC: 102 MMOL/L (ref 97–108)
CO2 SERPL-SCNC: 27 MMOL/L (ref 21–32)
CREAT SERPL-MCNC: 1.11 MG/DL (ref 0.7–1.3)
DIAGNOSIS, 93000: NORMAL
GLUCOSE SERPL-MCNC: 115 MG/DL (ref 65–100)
HCT VFR BLD AUTO: 27.6 % (ref 36.6–50.3)
HCT VFR BLD AUTO: 28.4 % (ref 36.6–50.3)
HGB BLD-MCNC: 8 G/DL (ref 12.1–17)
HGB BLD-MCNC: 8.4 G/DL (ref 12.1–17)
POTASSIUM SERPL-SCNC: 3.7 MMOL/L (ref 3.5–5.1)
Q-T INTERVAL, ECG07: 456 MS
QRS DURATION, ECG06: 98 MS
QTC CALCULATION (BEZET), ECG08: 432 MS
SODIUM SERPL-SCNC: 136 MMOL/L (ref 136–145)
THERAPEUTIC RANGE,PTTT: ABNORMAL SECS (ref 58–77)
THERAPEUTIC RANGE,PTTT: ABNORMAL SECS (ref 58–77)
VENTRICULAR RATE, ECG03: 54 BPM

## 2019-12-03 PROCEDURE — 74011000250 HC RX REV CODE- 250: Performed by: HOSPITALIST

## 2019-12-03 PROCEDURE — 74011250637 HC RX REV CODE- 250/637: Performed by: INTERNAL MEDICINE

## 2019-12-03 PROCEDURE — 74011250637 HC RX REV CODE- 250/637: Performed by: PSYCHIATRY & NEUROLOGY

## 2019-12-03 PROCEDURE — 85018 HEMOGLOBIN: CPT

## 2019-12-03 PROCEDURE — 97535 SELF CARE MNGMENT TRAINING: CPT

## 2019-12-03 PROCEDURE — 85730 THROMBOPLASTIN TIME PARTIAL: CPT

## 2019-12-03 PROCEDURE — 74011250636 HC RX REV CODE- 250/636: Performed by: FAMILY MEDICINE

## 2019-12-03 PROCEDURE — 74011000250 HC RX REV CODE- 250: Performed by: FAMILY MEDICINE

## 2019-12-03 PROCEDURE — 93005 ELECTROCARDIOGRAM TRACING: CPT

## 2019-12-03 PROCEDURE — 97530 THERAPEUTIC ACTIVITIES: CPT

## 2019-12-03 PROCEDURE — 97116 GAIT TRAINING THERAPY: CPT

## 2019-12-03 PROCEDURE — C9113 INJ PANTOPRAZOLE SODIUM, VIA: HCPCS | Performed by: FAMILY MEDICINE

## 2019-12-03 PROCEDURE — 65660000000 HC RM CCU STEPDOWN

## 2019-12-03 PROCEDURE — 94640 AIRWAY INHALATION TREATMENT: CPT

## 2019-12-03 PROCEDURE — 74011250637 HC RX REV CODE- 250/637: Performed by: HOSPITALIST

## 2019-12-03 PROCEDURE — 74011250637 HC RX REV CODE- 250/637: Performed by: FAMILY MEDICINE

## 2019-12-03 PROCEDURE — 36415 COLL VENOUS BLD VENIPUNCTURE: CPT

## 2019-12-03 PROCEDURE — 80048 BASIC METABOLIC PNL TOTAL CA: CPT

## 2019-12-03 RX ORDER — POLYETHYLENE GLYCOL 3350 17 G/17G
17 POWDER, FOR SOLUTION ORAL DAILY
Status: DISCONTINUED | OUTPATIENT
Start: 2019-12-04 | End: 2019-12-03

## 2019-12-03 RX ORDER — POLYETHYLENE GLYCOL 3350 17 G/17G
17 POWDER, FOR SOLUTION ORAL DAILY
Status: DISCONTINUED | OUTPATIENT
Start: 2019-12-03 | End: 2019-12-04 | Stop reason: HOSPADM

## 2019-12-03 RX ORDER — HEPARIN SODIUM 1000 [USP'U]/ML
4000 INJECTION, SOLUTION INTRAVENOUS; SUBCUTANEOUS ONCE
Status: COMPLETED | OUTPATIENT
Start: 2019-12-03 | End: 2019-12-03

## 2019-12-03 RX ORDER — ATROPINE SULFATE 1 MG/ML
0.5 INJECTION, SOLUTION INTRAVENOUS
Status: DISPENSED | OUTPATIENT
Start: 2019-12-03 | End: 2019-12-04

## 2019-12-03 RX ADMIN — Medication 10 ML: at 13:25

## 2019-12-03 RX ADMIN — Medication 10 ML: at 22:27

## 2019-12-03 RX ADMIN — LOSARTAN POTASSIUM 50 MG: 50 TABLET, FILM COATED ORAL at 08:48

## 2019-12-03 RX ADMIN — TRAZODONE HYDROCHLORIDE 50 MG: 50 TABLET ORAL at 22:28

## 2019-12-03 RX ADMIN — APIXABAN 2.5 MG: 2.5 TABLET, FILM COATED ORAL at 17:51

## 2019-12-03 RX ADMIN — HEPARIN SODIUM 4000 UNITS: 1000 INJECTION INTRAVENOUS; SUBCUTANEOUS at 08:00

## 2019-12-03 RX ADMIN — POLYETHYLENE GLYCOL 3350 17 G: 17 POWDER, FOR SOLUTION ORAL at 12:04

## 2019-12-03 RX ADMIN — ATORVASTATIN CALCIUM 20 MG: 20 TABLET, FILM COATED ORAL at 08:48

## 2019-12-03 RX ADMIN — Medication 10 ML: at 07:01

## 2019-12-03 RX ADMIN — LEVOTHYROXINE SODIUM 50 MCG: 50 TABLET ORAL at 06:58

## 2019-12-03 RX ADMIN — SODIUM CHLORIDE 40 MG: 9 INJECTION INTRAMUSCULAR; INTRAVENOUS; SUBCUTANEOUS at 17:51

## 2019-12-03 RX ADMIN — SODIUM CHLORIDE 40 MG: 9 INJECTION INTRAMUSCULAR; INTRAVENOUS; SUBCUTANEOUS at 06:58

## 2019-12-03 RX ADMIN — IPRATROPIUM BROMIDE AND ALBUTEROL SULFATE 3 ML: .5; 3 SOLUTION RESPIRATORY (INHALATION) at 21:24

## 2019-12-03 RX ADMIN — ASPIRIN 81 MG 81 MG: 81 TABLET ORAL at 08:48

## 2019-12-03 NOTE — PROGRESS NOTES
[facility]ist [facility]                                                                                          Hospitalist Progress Note  Garald Primrose, MD  Answering service: [phone] from in house phone        Date of Service:  12/3/2019  NAME:  Emma Fitch  :  1930  MRN:  [id number]      Admission Summary:   80-year-old male with past medical history of stroke, atrial fibrillation, thyroid disease, arthritis was sent to the hospital from a facility because of a syncopal episode. in the ER he was found to have a hemoglobin of 6.2. Patient was admitted for further evaluation. Interval history / Subjective: Follow-up for anemia and syncopal episode    Pt is lying in bed. Nurse called me for bradycardia, with HR in 30s on tele strips multiple times. HR in 50s with pt waking up. Breathing is better but wheezing always. Does not have any inhaler. Wants to go home. Assessment & Plan:     #Acute blood loss anemia:  -Patient states that he had some recent nosebleeds few days dana which has resolved without any intervention and continued to take Eliquis during that period. Hemoglobin in October was 11.2 which has dropped down to 6.4 at admission  -Stool occult blood was positive at admission. Anemia could be multifactorial -nose bleed vs GI loss.  -He is currently on twice daily PPI. -s/p 2 U PRBC at the time of admission. Hemoglobin 8.2 today.  -Pt does not want colonoscopy. GI recommendation noted for dc heparin gtt and resume eliquis.   -Hb stable this afternoon per GI recommendation. OK to dc from GI standpoint with outpt f/u. -Dose of eliquis was noted as 5mg daily. Seems inadequate for stroke prevention as I d/w Dr. Alvy Fleischer. D/w pharmacist, would start 2.5mg BID. Will request ok from GI for 5mg BID standard dosing of eliquis for the patient. # Bradycardia, HR in 30s, with tele strip showing Afib-flutter   -D/w Dr. Alvy Fleischer, cardiololgy.  Appreciate the help in evaluation. # Acute cardiogenic pulmonary edema:  -Better with dose of lasix 12/2  -Continue to monitor closely.  -Echo showed severe concentric hypertrophy of left  ventricle. # Syncope at admission:  -likely related to acute blood loss anemia and was on antihypertensives. [facility] MRI brain showed left posterior MCA acute infarct -discussed with neurologist and reviewed notes - \"global cerebral hypoperfusion with selective injury of the vulnerable area rather than being indicative of a causative lesion or in fact truly a stroke in clinical terms\"    # History of stroke in October 2019:  --Patient is in persistent atrial fibrillation on tele -we will continue anticoagulation after the procedure. If the hemoglobin remains stable and no more procedures -can switch to oral anticoagulants. # Persistent atrial Fib:  -HR controlled. On anticoagulation. Bradycardia as noted above.  -He occasionally has heart rate 40s to 50s while sleeping and returns to normal if he wakes up. Patient is asymptomatic. He is not on any rate controlling/ regis blocking medications. # Leukocytosis:  WBC 13 K. UA wnl. CXR did not show any infiltrates. Blood cultures pending.  -Procal wnl, afebrile -no indication for abx. # Hypertension,  -Blood pressure better now. Continue losartan    # Hypothyroidism resumed Synthroid. Code status: full  DVT prophylaxis: Anticoagulated    Care Plan discussed with: Patient/family, nurse,  Disposition: To be determined. Likely home with [facility] and 24hrs supervision. Son Yumiko Pham lives with the parents. Hospital Problems  Date Reviewed: 10/18/2019          Codes Class Noted POA    Acute anemia ICD-10-CM: D64.9  ICD-9-CM: 285.9  11/28/2019 Unknown        Syncope ICD-10-CM: R55  ICD-9-CM: 780.2  11/26/2019 Unknown                Review of Systems:   As per HPI      Vital Signs:    Last 24hrs VS reviewed since prior progress note.  Most recent are:  Visit Vitals  BP (!) 158/95   Pulse (!) 59   Temp 97.9 °F (36.6 °C)   Resp 15   Ht 5' 8\" (1.727 m)   Wt 108.5 kg (239 lb 3.2 oz)   SpO2 93%   BMI 36.37 kg/m²         Intake/Output Summary (Last 24 hours) at 12/3/2019 1623  Last data filed at 12/2/2019 2038  Gross per 24 hour   Intake 200 ml   Output 425 ml   Net -225 ml        Physical Examination:             Constitutional:  Elderly patient, no acute distress    ENT:  Oral mucous moist, oropharynx benign. EOMI, anicteric sclera and normal conjunctiva   Resp:  Bilateral mild wheezing allover, minimal basal crackles. No rhonchi. Air movement is symmetrical.   CV: Irregular rhythym,normal HR    GI:  Soft, non distended, non tender. normoactive bowel sounds     Musculoskeletal:  1+ LE edema    Neurologic:  Alert and oriented x3, moves all extremities,strength wnl of all extremities     Psych:   Not anxious nor agitated. Data Review:    Review and/or order of clinical lab test  Review and/or order of tests in the medicine section of [facility]      Labs:     Recent Labs     12/03/19  0309 12/02/19 1327 12/02/19 0412   WBC  --   --  13.3*   HGB 8.4* 8.2* 8.1*   HCT 28.4* 27.6* 27.1*   PLT  --   --  247     Recent Labs     12/03/19  0309 12/02/19 0412 12/01/19  1038    136 132*   K 3.7 3.9 3.3*    103 98   CO2 27 28 28   BUN 14 11 11   CREA 1.11 1.14 1.12   * 108* 151*   CA 8.7 8.7 8.2*     No results for input(s): SGOT, GPT, ALT, AP, TBIL, TBILI, TP, ALB, GLOB, GGT, AML, LPSE in the last 72 hours. No lab exists for component: AMYP, HLPSE  Recent Labs     12/03/19  0431 12/02/19 1327 12/02/19 0412   APTT 39.0* 28.7 59.5*      No results for input(s): FE, TIBC, PSAT, FERR in the last 72 hours. No results found for: FOL, RBCF   No results for input(s): PH, PCO2, PO2 in the last 72 hours. No results for input(s): CPK, CKNDX, TROIQ in the last 72 hours.     No lab exists for component: CPKMB  Lab Results   Component Value Date/Time    Cholesterol, total 159 10/12/2019 08:55 AM    HDL Cholesterol 51 10/12/2019 08:55 AM    LDL, calculated 83.4 10/12/2019 08:55 AM    Triglyceride 123 10/12/2019 08:55 AM    CHOL/HDL Ratio 3.1 10/12/2019 08:55 AM     Lab Results   Component Value Date/Time    Glucose (POC) 117 (H) 10/12/2019 08:41 AM     Lab Results   Component Value Date/Time    Color YELLOW/STRAW 12/02/2019 01:27 PM    Appearance CLEAR 12/02/2019 01:27 PM    Specific gravity 1.014 12/02/2019 01:27 PM    pH (UA) 7.5 12/02/2019 01:27 PM    Protein TRACE (A) 12/02/2019 01:27 PM    Glucose NEGATIVE  12/02/2019 01:27 PM    Ketone NEGATIVE  12/02/2019 01:27 PM    Bilirubin NEGATIVE  12/02/2019 01:27 PM    Urobilinogen 1.0 12/02/2019 01:27 PM    Nitrites NEGATIVE  12/02/2019 01:27 PM    Leukocyte Esterase TRACE (A) 12/02/2019 01:27 PM    Epithelial cells FEW 12/02/2019 01:27 PM    Bacteria NEGATIVE  12/02/2019 01:27 PM    WBC 0-4 12/02/2019 01:27 PM    RBC 0-5 12/02/2019 01:27 PM         Medications Reviewed:     Current Facility-Administered Medications   Medication Dose Route Frequency    polyethylene glycol (MIRALAX) packet 17 g  17 g Oral DAILY    atropine 1 mg/mL injection 0.5 mg  0.5 mg IntraVENous ONCE PRN    sodium chloride (NS) flush 5-40 mL  5-40 mL IntraVENous Q8H    sodium chloride (NS) flush 5-40 mL  5-40 mL IntraVENous PRN    heparin 25,000 units in D5W 250 ml infusion  12-25 Units/kg/hr IntraVENous TITRATE    0.9% sodium chloride infusion 250 mL  250 mL IntraVENous PRN    albuterol-ipratropium (DUO-NEB) 2.5 MG-0.5 MG/3 ML  3 mL Nebulization BID RT    losartan (COZAAR) tablet 50 mg  50 mg Oral DAILY    0.9% sodium chloride infusion 250 mL  250 mL IntraVENous PRN    aspirin chewable tablet 81 mg  81 mg Oral DAILY    atorvastatin (LIPITOR) tablet 20 mg  20 mg Oral DAILY    sodium chloride (NS) flush 5-40 mL  5-40 mL IntraVENous Q8H    sodium chloride (NS) flush 5-40 mL  5-40 mL IntraVENous PRN    traZODone (DESYREL) tablet 50 mg  50 mg Oral QHS    acetaminophen (TYLENOL) tablet 650 mg  650 mg Oral Q6H PRN    levothyroxine (SYNTHROID) tablet 50 mcg  50 mcg Oral ACB    sodium chloride (NS) flush 5-40 mL  5-40 mL IntraVENous Q8H    sodium chloride (NS) flush 5-40 mL  5-40 mL IntraVENous PRN    pantoprazole (PROTONIX) 40 mg in 0.9% sodium chloride 10 mL injection  40 mg IntraVENous Q12H    ondansetron (ZOFRAN) injection 4 mg  4 mg IntraVENous Q4H PRN    0.9% sodium chloride infusion 250 mL  250 mL IntraVENous PRN    albuterol-ipratropium (DUO-NEB) 2.5 MG-0.5 MG/3 ML  3 mL Nebulization Q4H PRN     ______________________________________________________________________  EXPECTED LENGTH OF STAY: 4d 12h  ACTUAL LENGTH OF STAY:          5                 Brittany De Leon MD

## 2019-12-03 NOTE — PROGRESS NOTES
Elvira Thackre [address] M.D.  [phone]               GASTROENTEROLOGY PROGRESS NOTE        NAME: Nolan Albert   :  1930   MRN:  [id number]       Subjective:   Had abdominal pain yesterday which resolved with bowel movement. Pt states he wants to be discharged today. Objective:   VITALS:   Last 24hrs VS reviewed. Most recent are:  Visit Vitals  /76 (BP 1 Location: Right arm, BP Patient Position: At rest)   Pulse 69   Temp 98.9 °F (37.2 °C)   Resp 18   Ht 5' 8\" (1.727 m)   Wt 108.5 kg (239 lb 3.2 oz)   SpO2 98%   BMI 36.37 kg/m²       Intake/Output Summary (Last 24 hours) at 12/3/2019 1121  Last data filed at 2019  Gross per 24 hour   Intake 200 ml   Output 425 ml   Net -225 ml       PHYSICAL EXAM:  General: Alert, in no acute distress    Lungs:            CTA Bilaterally  Heart:  Normal S1, S2    Abdomen: Soft, Non distended, Non tender. Normoactive bowel sounds, no rebound/guarding  MSK:   Normal muscle tone  Skin:   Warm to touch  Psych:   Good insight. Not anxious nor agitated. Lab Data Reviewed:   Recent Labs     19  03019  1327 19  0412   WBC  --   --  13.3*   HGB 8.4* 8.2* 8.1*   HCT 28.4* 27.6* 27.1*   PLT  --   --  247     Recent Labs     19  0309 19  0412    136   K 3.7 3.9    103   CO2 27 28   BUN 14 11   CREA 1.11 1.14   * 108*   CA 8.7 8.7     No results for input(s): SGOT, GPT, AP, TBIL, TP, ALB, GLOB, GGT, AML, LPSE in the last 72 hours. No lab exists for component: AMYP, HLPSE  Recent Labs     19  0431 19  1327   APTT 39.0* 28.7      No results for input(s): FE, TIBC, PSAT, FERR in the last 72 hours. No results for input(s): CPK, CKMB in the last 72 hours.     No lab exists for component: KEARA    See Electronic Medical Record for all procedure/radiology reports and details which were not copied into this note but were reviewed prior to the creation of the Plan.    Assessment:   · Anemia with heme + stool. 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 Patient Active Problem List   Diagnosis Code    Essential hypertension I10    Hypothyroidism E03.9    Paroxysmal atrial fibrillation (HCC) I48.0    Encounter for monitoring Coumadin therapy Z51.81, Z79.01    Right knee DJD M17.11    ACP (advance care planning) Z71.89    Peripheral edema R60.9    Severe obesity (BMI 35.0-39. 9) with comorbidity ([address] E66.01    Stroke ([facility] [address] I63.9    Syncope R55    Acute anemia D64.9       Plan:   · Patient wishes to proceed with conservative management without colonoscopy. He wishes to be discharged. Stop Heparin gtt. Switch to Eliquis. Recheck hemoglobin this afternoon. If stable, patient can be discharged. He should have CBC check with PCP within 1 week. PPI 40 mg daily. Iron supplementation BID. Miralax 17 g daily. Signed by:  Jean Claude Flower MD         12/3/2019  11:21 AM

## 2019-12-03 NOTE — PROGRESS NOTES
Problem: Mobility Impaired (Adult and Pediatric)  Goal: *Acute Goals and Plan of Care (Insert Text)  Description  FUNCTIONAL STATUS PRIOR TO ADMISSION: Patient was modified independent using a rolling walker for functional mobility. HOME SUPPORT PRIOR TO ADMISSION: The patient lived with son and wife. Physical Therapy Goals  Initiated 11/30/2019  1. Patient will move from supine to sit and sit to supine  in bed with independence within 7 day(s). 2.  Patient will transfer from bed to chair and chair to bed with modified independence using the least restrictive device within 7 day(s). 3.  Patient will perform sit to stand with modified independence within 7 day(s). 4.  Patient will ambulate with modified independence for 250 feet with the least restrictive device within 7 day(s). 5.  Patient will ascend/descend 4 stairs with 1 handrail(s) with modified independence within 7 day(s). Outcome: Progressing Towards Goal     PHYSICAL THERAPY TREATMENT  Patient: Sunny Warner (27 y.o. male)  Date: 12/3/2019  Diagnosis: Syncope [R55]  Acute anemia [D64.9]   <principal problem not specified>  Procedure(s) (LRB):  ESOPHAGOGASTRODUODENOSCOPY (EGD) (N/A)  ESOPHAGOGASTRODUODENAL (EGD) BIOPSY (N/A)  ENDOSCOPIC ARGON PLASMA COAGULATION (N/A) 1 Day Post-Op  Precautions:    Chart, physical therapy assessment, plan of care and goals were reviewed. ASSESSMENT  Patient continues with skilled PT services and is progressing towards goals. Patient received in chair, overall CGA for transfers with increased time. Requesting to use restroom. Ambulating with RW and CGA. Patient missed toilet when voiding but able to reposition to improve accuracy. Patient with slight LOB with stepping backwards especially in small space of restroom but able to self correct with CGA. Returned to sitting up in chair to change socks. Deferred further mobility as lunch arrived. HHPT with family support.     Current Level of Function Impacting Discharge (mobility/balance): CGA to SBA    Other factors to consider for discharge:          PLAN :  Patient continues to benefit from skilled intervention to address the above impairments. Continue treatment per established plan of care. to address goals. Recommendation for discharge: (in order for the patient to meet his/her long term goals)  Physical therapy at least 2 days/week in the home AND ensure assist and/or supervision for safety with moiblity    This discharge recommendation:  Has been made in collaboration with the attending provider and/or case management    IF patient discharges home will need the following DME: patient owns DME required for discharge       SUBJECTIVE:   Patient stated I like to cook now that my wife can't because she has dementia.     OBJECTIVE DATA SUMMARY:   Critical Behavior:  Neurologic State: Alert  Orientation Level: Oriented X4  Cognition: Follows commands, Appropriate for age attention/concentration  Safety/Judgement: Awareness of environment  Functional Mobility Training:  Bed Mobility:                    Transfers:  Sit to Stand: Contact guard assistance; Additional time  Stand to Sit: Supervision        Bed to Chair: Contact guard assistance                    Balance:  Sitting: Intact; With support  Standing: Impaired; With support  Standing - Static: Fair  Standing - Dynamic : Fair  Ambulation/Gait Training:  Distance (ft): 20 Feet (ft)  Assistive Device: Gait belt;Walker, rolling  Ambulation - Level of Assistance: Contact guard assistance        Gait Abnormalities: Decreased step clearance;Trunk sway increased        Base of Support: Widened     Speed/[place]: Pace decreased (<100 feet/min)  Step Length: Left shortened;Right shortened                   Stairs: Therapeutic Exercises:     Pain Rating:      Activity Tolerance:   Good  Please refer to the flowsheet for vital signs taken during this treatment.     After treatment patient left in no apparent distress:   Sitting in chair and Call bell within reach    COMMUNICATION/COLLABORATION:   The patients plan of care was discussed with: Certified Occupational Therapy Assistant and Registered Nurse    Yoko Delgadillo, PT   Time Calculation: 23 mins

## 2019-12-03 NOTE — PROGRESS NOTES
1800- Received report from [facility]. Pt returned to room A&Ox4 and no complaints. Will continue to monitor.

## 2019-12-03 NOTE — CONSULTS
Cardiology Consult    Patient: Rebecca Reed MRN: [id number]  SSN: [id number]    YOB: 1930  Age: 80 y.o. Sex: male       Subjective:      Date of  Admission: 2019     Admission type: Emergency    Rebecca Reed is a 80 y.o. male admitted for Syncope [R55]; Acute anemia [D64.9]. Reason: pauses, afib  Referring: Dr. Daniel Mcneill  Pt admit with GI bleed/anemia, h/o afib, h/o CVA on [address] [place] [address] Road. Colonoscopy was recommended by GI but pt wants conservative management. He was short of breath when he was more anemic; resolved after RBC transfusion. No chest pain. Some lightheadedness. Has had syncope but difficult to ascertain etiology in setting of severe anemia. Not on any rate control meds for afib given baseline low/normal HR    Primary Care Provider: Adriana Recinos NP  Past Medical History:   Diagnosis Date    Arthritis     Contact dermatitis and other eczema, due to unspecified cause     Hypertension     Stroke ([facility] [address]     TIA at [facility] Thyroid disease     Unspecified hypothyroidism       Past Surgical History:   Procedure Laterality Date    HX HEENT      detached retina    HX HIP REPLACEMENT      right hip, [facility]     Family History   Problem Relation Age of Onset    Lung Disease Father     Cancer Father         lung    Coronary Artery Disease Neg Hx       Social History     Tobacco Use    Smoking status: Light Tobacco Smoker     Packs/day: 0.25     Types: Cigars     Last attempt to quit: 1978     Years since quittin.2    Smokeless tobacco: Never Used    Tobacco comment: cigar on occ   Substance Use Topics    Alcohol use: Yes     Alcohol/week: 14.0 standard drinks     Types: 14 Shots of liquor per week     Comment: 2 drinks a day.       Current Facility-Administered Medications   Medication Dose Route Frequency    polyethylene glycol (MIRALAX) packet 17 g  17 g Oral DAILY    atropine 1 mg/mL injection 0.5 mg  0.5 mg IntraVENous ONCE PRN    apixaban (ELIQUIS) tablet 2.5 mg  2.5 mg Oral BID    sodium chloride (NS) flush 5-40 mL  5-40 mL IntraVENous Q8H    sodium chloride (NS) flush 5-40 mL  5-40 mL IntraVENous PRN    0.9% sodium chloride infusion 250 mL  250 mL IntraVENous PRN    albuterol-ipratropium (DUO-NEB) 2.5 MG-0.5 MG/3 ML  3 mL Nebulization BID RT    losartan (COZAAR) tablet 50 mg  50 mg Oral DAILY    0.9% sodium chloride infusion 250 mL  250 mL IntraVENous PRN    aspirin chewable tablet 81 mg  81 mg Oral DAILY    atorvastatin (LIPITOR) tablet 20 mg  20 mg Oral DAILY    sodium chloride (NS) flush 5-40 mL  5-40 mL IntraVENous Q8H    sodium chloride (NS) flush 5-40 mL  5-40 mL IntraVENous PRN    traZODone (DESYREL) tablet 50 mg  50 mg Oral QHS    acetaminophen (TYLENOL) tablet 650 mg  650 mg Oral Q6H PRN    levothyroxine (SYNTHROID) tablet 50 mcg  50 mcg Oral ACB    sodium chloride (NS) flush 5-40 mL  5-40 mL IntraVENous Q8H    sodium chloride (NS) flush 5-40 mL  5-40 mL IntraVENous PRN    pantoprazole (PROTONIX) 40 mg in 0.9% sodium chloride 10 mL injection  40 mg IntraVENous Q12H    ondansetron (ZOFRAN) injection 4 mg  4 mg IntraVENous Q4H PRN    0.9% sodium chloride infusion 250 mL  250 mL IntraVENous PRN    albuterol-ipratropium (DUO-NEB) 2.5 MG-0.5 MG/3 ML  3 mL Nebulization Q4H PRN        Allergies   Allergen Reactions    Hydrocodone Other (comments)     Unable to void, or have a bowel movement    Amlodipine Swelling and Other (comments)     Severe weeping from leg swelling    Poison Ivy Extract Itching        Review of Systems:  A comprehensive review of systems was negative except for that written in the History of Present Illness.        Subjective:     Visit Vitals  BP (!) 142/94 (BP 1 Location: Left arm, BP Patient Position: At rest)   Pulse 63   Temp 99 °F (37.2 °C)   Resp 18   Ht 5' 8\" (1.727 m)   Wt 239 lb 3.2 oz (108.5 kg)   SpO2 96%   BMI 36.37 kg/m²        Physical Exam:  Visit Vitals  BP (!) 142/94 (BP 1 Location: Left arm, BP Patient Position: At rest)   Pulse 63   Temp 99 °F (37.2 °C)   Resp 18   Ht 5' 8\" (1.727 m)   Wt 239 lb 3.2 oz (108.5 kg)   SpO2 96%   BMI 36.37 kg/m²     General Appearance:  Well developed, well nourished,alert and oriented x 3, and individual in no acute distress. Ears/Nose/Mouth/Throat:   Hearing grossly normal.         Neck: Supple. Chest:   Lungs clear to auscultation bilaterally. Cardiovascular:  Irregularly irregular, S1, S2 normal, I-II/VI systolic murmur LUSB. Abdomen:   Soft, non-tender, bowel sounds are active. Extremities: trace edema bilaterally. Skin: Warm and dry. Cardiographics:  Telemetry: AFIB  Echocardiogram: normal EF, LVH    Data Reviewed: All lab results for the last 24 hours reviewed. Assessment:         Hospital Problems  Date Reviewed: 10/18/2019          Codes Class Noted POA    Acute anemia ICD-10-CM: D64.9  ICD-9-CM: 285.9  11/28/2019 Unknown        Syncope ICD-10-CM: R55  ICD-9-CM: 780.2  11/26/2019 Unknown               Plan:   Afib. Having < 3 second asymptomatic pauses with his afib and not on regis agents. Doesn't meet the indication for pacemaker though probably has some degree of regis dysfunction. Suspect syncope he's had the last 1-2 years more related to anemia. Recommend and will set up outpt 1 month loop to evaluate for SSS. Continue without regis agents. [id number] [address] Quevedo for hospital d/c cardiac standpoint. Anemia. Difficult situation has pt has GI bleed but also needs [address] [place] [address] for afib with h/o CVA. On eliquis and agree with this. High chance of recurrent admit for bleeding if he doesn't address GI bleed. Will defer this to pt and GI.   H/o CVA

## 2019-12-03 NOTE — PROGRESS NOTES
0930 -  Transitions of Care (HEYDI):  Michelle Caruso  is a 80 y.o. male that lives in [facility]. Bedside assessment completed. Demographics and Primary Care Provider (PCP) Shade Weber NP verified and correct. CM will continue to follow discharge planning needs for continuum of care. Deon Hoang RN, CRM [phone]    His reason for admission is that he fell in the [employer] shop and lost consciousness. RRAT Score:  26 - high                Resources/supports as identified by patient/family:  Patient has (2) sons. Audie Davidson drives a truck and is in the road a lot. He is not the easiet to contact. Cassandra Fransisco Mahmood) Becky Milan  Is their caregiver. He sold all his possessions and takes care of his dad and mom. He is devoutly a [employer], knows the New Testament from front and back because he's read the Bible so many times (per his dad) but I also an alcoholic. Patient states he allows his son to have (1) beer a day. Per patient, he has been a Godsend . Patients wife of 76 years was diagnosed in 2011 with Alzheimer's. He promised he would always keep her home and safe. Top Challenges facing patient (as identified by patient/family and CM):  Helping to care for his wife and take care of daily household chores. Finances/Medication cost?   N/A                 Transportation? Son takes them to appointments as needed. Support system or lack thereof? Son                     Living arrangements? Live at home               Self-care/ADLs/Cognition? Per patient, is slower but their son helps them tremendously. Current Advanced Directive/Advance Care Plan:                            Plan for utilizing home health:   No. Sons will take care of decisions. Transition of Care Plan:  To go home as soon as possible. Care Management Interventions  PCP Verified by CM:  Yes  Mode of Transport at Discharge: Self(Son will transport home)  MyChart Signup: No  Discharge Durable Medical Equipment: No  Health Maintenance Reviewed: Yes  Physical Therapy Consult: No  Occupational Therapy Consult: No  Speech Therapy Consult: No  Current Support Network: Lives with Spouse, Lives with Caregiver  Confirm Follow Up Transport: Family  Plan discussed with Pt/Family/Caregiver: Yes  Discharge Location  Discharge Placement: Home                    .

## 2019-12-03 NOTE — PROGRESS NOTES
Bedside and Verbal shift change report given to [address] [place] [address] Deshaun (oncoming nurse) by Shekhar Rodney RN (offgoing nurse). Report included the following information SBAR, Kardex, Procedure Summary, MAR, Recent Results and Cardiac Rhythm AFIB.

## 2019-12-03 NOTE — PROGRESS NOTES
Apixaban Order Review  Indication: Afib, history of stroke, on Eliquis PTA    Current Labs:  Recent Labs     12/03/19  1557 12/03/19  0309 12/02/19  1327 12/02/19  0412 12/01/19  1038   CREA  --  1.11  --  1.14 1.12   HGB 8.0* 8.4* 8.2* 8.1* 6.9*   PLT  --   --   --  247  --      Age 81 YO  SCr 1.1  Wt: 108 kg    Plan: Physician would like to transition from heparin drip to home Eliquis. He has ordered 2.5 mg twice daily due to patient's advanced age and acute anemia. Please give first dose of Eliquis within 2 hours of stopping heparin infusion.

## 2019-12-03 NOTE — PROGRESS NOTES
Problem: Self Care Deficits Care Plan (Adult)  Goal: *Acute Goals and Plan of Care (Insert Text)  Description    FUNCTIONAL STATUS PRIOR TO ADMISSION: Patient was modified independent using a rolling walker and single point cane for functional mobility. HOME SUPPORT: The patient lived with spouse who has dementia and son who is caregiver for mother in additional to HH/hospice. Occupational Therapy Goals  Initiated 12/1/2019  1. Patient will perform bathing with modified independence within 7 day(s). 2.  Patient will perform grooming at sink with independence within 7 day(s). 3.  Patient will perform lower body dressing with modified independence within 7 day(s). 4.  Patient will perform toilet transfers with modified independence within 7 day(s). 5.  Patient will perform all aspects of toileting with modified independence within 7 day(s). 6.  Patient will utilize energy conservation techniques during functional activities with verbal cues within 7 day(s). Outcome: Progressing Towards Goal   OCCUPATIONAL THERAPY TREATMENT  Patient: Concha Lloyd (66 y.o. male)  Date: 12/3/2019  Diagnosis: Syncope [R55]  Acute anemia [D64.9]   <principal problem not specified>  Procedure(s) (LRB):  ESOPHAGOGASTRODUODENOSCOPY (EGD) (N/A)  ESOPHAGOGASTRODUODENAL (EGD) BIOPSY (N/A)  ENDOSCOPIC ARGON PLASMA COAGULATION (N/A) 1 Day Post-Op  Precautions:    Chart, occupational therapy assessment, plan of care, and goals were reviewed. ASSESSMENT  Patient continues with skilled OT services and is progressing towards goals. 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 Patient on room air with seated SATs 98% and HR 64 at rest. Post activity into bathroom and on room air, unable to get a reading with portable pulse ox. Patient's hands cold. After approximately 5 min, O2 SATs 97% and HR 64 on room air.      Current Level of Function Impacting Discharge (ADLs): UE self care with set up; LE self care with set up to mod assist; functional mobility to bathroom with RW with CGA. Other factors to consider for discharge: Lives with son and wife, but reports he does not get assist from son as he is \"too busy taking care of my wife. She has dementia. \"         PLAN :  Patient continues to benefit from skilled intervention to address the above impairments. Continue treatment per established plan of care. to address goals. Recommend with staff: Ludwin Vinson in chair for self care and meals, toileting in bathroom with BSC over toilet, RW and CGA, LE self care with set up to mod assist; UE self care with set up    Recommend next OT session: LE self care    Recommendation for discharge: (in order for the patient to meet his/her long term goals)  Occupational therapy at least 2 days/week in the home AND ensure assist and/or supervision for safety with mobility     This discharge recommendation:  Has been made in collaboration with the attending provider and/or case management    IF patient discharges home will need the following DME: states he used to have a sock aide, but doesn't know where it is now. Recommend getting a reacher and sock aide       SUBJECTIVE:   Patient stated My son has a full time job taking care of my wife. She has dementia.     OBJECTIVE DATA SUMMARY:   Cognitive/Behavioral Status:  Neurologic State: Alert  Orientation Level: Oriented X4  Cognition: Follows commands; Appropriate for age attention/concentration  Perception: Appears intact  Perseveration: No perseveration noted  Safety/Judgement: Awareness of environment    Functional Mobility and Transfers for ADLs:  Bed Mobility:       Transfers:  Sit to Stand: Contact guard assistance;Assist x1          Balance:  Sitting: Intact; Without support  Standing: Impaired; With support  Standing - Static: Fair  Standing - Dynamic : Fair    ADL Intervention:       Grooming  Position Performed: Standing(at sink)  Washing Hands: Supervision    Upper Body Bathing  Bathing Assistance: Set-up(in simulation)  Position Performed: Seated in chair    Lower Body Bathing  Lower Body : Minimum assistance  Position Performed: Seated in chair  Cues: Physical assistance         Lower Body Dressing Assistance  Socks: Moderate assistance(reports he doesn't wear socks at home; slips on shoes only per report)  Leg Crossed Method Used: No  Position Performed: Seated in chair;Standing  Adaptive Equipment Used: Walker    Toileting  Bladder Hygiene: Maximum assistance(patient missed toilet and needed assist to clean up floor and LEs)    Cognitive Retraining  Safety/Judgement: Awareness of environment      Activity Tolerance:   Fair  Please refer to the flowsheet for vital signs taken during this treatment.     After treatment patient left in no apparent distress:   Sitting in chair, Call bell within reach, and Nurse present     COMMUNICATION/COLLABORATION:   The patients plan of care was discussed with: Registered Nurse    SUSANA Mahmood  Time Calculation: 30 mins

## 2019-12-03 NOTE — PROGRESS NOTES
Spiritual Care Partner Volunteer visited patient in room 359 on 12.03.19. Documented by: : Rev. Christine Tee; to contact [id number] Vivek Hernandez call: [phone]

## 2019-12-04 VITALS
TEMPERATURE: 98 F | OXYGEN SATURATION: 95 % | HEIGHT: 68 IN | BODY MASS INDEX: 35.82 KG/M2 | WEIGHT: 236.33 LBS | SYSTOLIC BLOOD PRESSURE: 134 MMHG | RESPIRATION RATE: 18 BRPM | HEART RATE: 61 BPM | DIASTOLIC BLOOD PRESSURE: 62 MMHG

## 2019-12-04 PROCEDURE — 97116 GAIT TRAINING THERAPY: CPT

## 2019-12-04 PROCEDURE — 74011250636 HC RX REV CODE- 250/636: Performed by: FAMILY MEDICINE

## 2019-12-04 PROCEDURE — 74011250637 HC RX REV CODE- 250/637: Performed by: HOSPITALIST

## 2019-12-04 PROCEDURE — 74011250637 HC RX REV CODE- 250/637: Performed by: FAMILY MEDICINE

## 2019-12-04 PROCEDURE — 74011000250 HC RX REV CODE- 250: Performed by: FAMILY MEDICINE

## 2019-12-04 PROCEDURE — 74011250637 HC RX REV CODE- 250/637: Performed by: PSYCHIATRY & NEUROLOGY

## 2019-12-04 PROCEDURE — 74011250637 HC RX REV CODE- 250/637: Performed by: INTERNAL MEDICINE

## 2019-12-04 PROCEDURE — 94640 AIRWAY INHALATION TREATMENT: CPT

## 2019-12-04 PROCEDURE — C9113 INJ PANTOPRAZOLE SODIUM, VIA: HCPCS | Performed by: FAMILY MEDICINE

## 2019-12-04 PROCEDURE — 74011000250 HC RX REV CODE- 250: Performed by: HOSPITALIST

## 2019-12-04 RX ORDER — ATORVASTATIN CALCIUM 20 MG/1
20 TABLET, FILM COATED ORAL DAILY
Qty: 30 TAB | Refills: 1 | Status: SHIPPED | OUTPATIENT
Start: 2019-12-05 | End: 2019-12-05 | Stop reason: SDUPTHER

## 2019-12-04 RX ORDER — GUAIFENESIN 100 MG/5ML
81 LIQUID (ML) ORAL DAILY
Qty: 30 TAB | Refills: 1 | Status: SHIPPED | OUTPATIENT
Start: 2019-12-05 | End: 2021-07-06

## 2019-12-04 RX ORDER — FERROUS SULFATE 324(65)MG
324 TABLET, DELAYED RELEASE (ENTERIC COATED) ORAL
Qty: 30 TAB | Refills: 1 | Status: SHIPPED | OUTPATIENT
Start: 2019-12-04 | End: 2019-12-04 | Stop reason: SDUPTHER

## 2019-12-04 RX ORDER — PANTOPRAZOLE SODIUM 40 MG/1
40 TABLET, DELAYED RELEASE ORAL EVERY 12 HOURS
Qty: 60 TAB | Refills: 0 | Status: SHIPPED | OUTPATIENT
Start: 2019-12-04 | End: 2021-07-06

## 2019-12-04 RX ORDER — FERROUS SULFATE 324(65)MG
324 TABLET, DELAYED RELEASE (ENTERIC COATED) ORAL
Qty: 30 TAB | Refills: 1 | Status: SHIPPED | OUTPATIENT
Start: 2019-12-04 | End: 2019-12-12 | Stop reason: SINTOL

## 2019-12-04 RX ORDER — PANTOPRAZOLE SODIUM 40 MG/1
40 TABLET, DELAYED RELEASE ORAL EVERY 12 HOURS
Qty: 60 TAB | Refills: 0 | Status: SHIPPED | OUTPATIENT
Start: 2019-12-04 | End: 2019-12-04 | Stop reason: SDUPTHER

## 2019-12-04 RX ORDER — IPRATROPIUM BROMIDE AND ALBUTEROL SULFATE 2.5; .5 MG/3ML; MG/3ML
3 SOLUTION RESPIRATORY (INHALATION)
Status: DISCONTINUED | OUTPATIENT
Start: 2019-12-04 | End: 2019-12-04 | Stop reason: HOSPADM

## 2019-12-04 RX ADMIN — APIXABAN 2.5 MG: 2.5 TABLET, FILM COATED ORAL at 17:53

## 2019-12-04 RX ADMIN — Medication 10 ML: at 17:06

## 2019-12-04 RX ADMIN — APIXABAN 2.5 MG: 2.5 TABLET, FILM COATED ORAL at 09:29

## 2019-12-04 RX ADMIN — LOSARTAN POTASSIUM 50 MG: 50 TABLET, FILM COATED ORAL at 09:28

## 2019-12-04 RX ADMIN — POLYETHYLENE GLYCOL 3350 17 G: 17 POWDER, FOR SOLUTION ORAL at 09:29

## 2019-12-04 RX ADMIN — LEVOTHYROXINE SODIUM 50 MCG: 50 TABLET ORAL at 06:35

## 2019-12-04 RX ADMIN — Medication 10 ML: at 06:37

## 2019-12-04 RX ADMIN — SODIUM CHLORIDE 40 MG: 9 INJECTION INTRAMUSCULAR; INTRAVENOUS; SUBCUTANEOUS at 06:36

## 2019-12-04 RX ADMIN — ATORVASTATIN CALCIUM 20 MG: 20 TABLET, FILM COATED ORAL at 09:29

## 2019-12-04 RX ADMIN — ASPIRIN 81 MG 81 MG: 81 TABLET ORAL at 09:28

## 2019-12-04 RX ADMIN — SODIUM CHLORIDE 40 MG: 9 INJECTION INTRAMUSCULAR; INTRAVENOUS; SUBCUTANEOUS at 17:52

## 2019-12-04 RX ADMIN — IPRATROPIUM BROMIDE AND ALBUTEROL SULFATE 3 ML: .5; 3 SOLUTION RESPIRATORY (INHALATION) at 11:44

## 2019-12-04 RX ADMIN — Medication 10 ML: at 09:28

## 2019-12-04 RX ADMIN — Medication 10 ML: at 06:36

## 2019-12-04 NOTE — PROGRESS NOTES
[address] [facility] Adult  Hospitalist Group                                                                                          Hospitalist Progress Note  Idalmis Aguila MD  Answering service: [phone] OR 36 from in house phone        Date of Service:  2019  NAME:  Terrance Johnston  :  1930  MRN:  [id number]      Admission Summary:   80-year-old male with past medical history of stroke, atrial fibrillation, thyroid disease, arthritis was sent to the hospital from a facility because of a syncopal episode. in the ER he was found to have a hemoglobin of 6.2. Patient was admitted for further evaluation. Interval history / Subjective: Follow-up for anemia and syncopal episode    Pt is doing well. Eating dinner. Ready for DC. Cardiology and GI recommendations noted. Assessment & Plan:     #Acute blood loss anemia:  -Continue twice daily PPI. GI f/u on discharge  -s/p 2 U PRBC at the time of admission. Hemoglobin 8.2 yesterday, stable. Pt refused labs today on multiple efforts and occasions.  -Pt does not want colonoscopy. GI recommendation noted for resuming eliquis.   -Hb stable 12/3 afternoon after eliquis switch. OK to dc from GI standpoint with outpt f/u. -Dose of eliquis was noted as 5mg daily. Seems inadequate for stroke prevention as d/w Dr. Tamara Quintana. Pt gets nosebleed with 5mg BID dose. However tolerated 5mg daily better. Would discharge on 2.5mg BID. Pt is ok with the same.     # Bradycardia, HR in 30s, with tele strip showing Afib-flutter   -D/w Dr. Tamara Quintana, cardiololgy.   -Outpt f/u in 1m. Not a candidate for PPM currently as does not have any pauses >3sec.     # Persistent atrial Fib:  -HR controlled.  On anticoagulation. Bradycardia as noted above.  -He occasionally has heart rate 40s to 50s while sleeping and returns to normal if he wakes up.  Patient is asymptomatic.  He is not on any rate controlling/ regis blocking medications.      # Acute cardiogenic pulmonary edema:  -Better with dose of lasix 12/2  -Continue to monitor closely.  -Echo showed severe concentric hypertrophy of left  ventricle.     # Syncope at admission:  -likely related to acute blood loss anemia and was on antihypertensives. .    MRI brain showed left posterior MCA acute infarct -discussed with neurologist and reviewed notes - \"global cerebral hypoperfusion with selective injury of the vulnerable area rather than being indicative of a causative lesion or in fact truly a stroke in clinical terms\"     # History of stroke in October 2019:  --Patient is in persistent atrial fibrillation on tele -we will continue anticoagulation after the procedure.  If the hemoglobin remains stable and no more procedures -can switch to oral anticoagulants.     # Hypertension,  -Blood pressure better now. Continue losartan     # Hypothyroidism. Continue Synthroid.     Code status: full  DVT prophylaxis: Anticoagulated     Care Plan discussed with: Patient/family, nurse,  Disposition: Will dc patient home in stable and improved condition. Son Yumiko Chapman lives with the parents. Hospital Problems  Date Reviewed: 10/18/2019          Codes Class Noted POA    Acute anemia ICD-10-CM: D64.9  ICD-9-CM: 285.9  11/28/2019 Unknown        Syncope ICD-10-CM: R55  ICD-9-CM: 780.2  11/26/2019 Unknown                Review of Systems:   As per HPI      Vital Signs:    Last 24hrs VS reviewed since prior progress note. Most recent are:  Visit Vitals  /62 (BP 1 Location: Left arm, BP Patient Position: At rest)   Pulse 61   Temp 98 °F (36.7 °C)   Resp 18   Ht 5' 8\" (1.727 m)   Wt 107.2 kg (236 lb 5.3 oz)   SpO2 95%   BMI 35.93 kg/m²         Intake/Output Summary (Last 24 hours) at 12/4/2019 7579  Last data filed at 12/4/2019 0908  Gross per 24 hour   Intake 240 ml   Output --   Net 240 ml        Physical Examination:     Constitutional:  Elderly patient, no acute distress    ENT:  Oral mucous moist, oropharynx benign.   EOMI, anicteric sclera and normal conjunctiva   Resp:  Clear bilateral. No rhonchi. Air movement is symmetrical.   CV:  Irregular rhythym,normal HR    GI:  Soft, non distended, non tender. normoactive bowel sounds     Musculoskeletal:  1+ LE edema    Neurologic:  Alert and oriented x3, moves all extremities,strength wnl of all extremities                         Psych:   Not anxious nor agitated. Data Review:    Review and/or order of clinical lab test  Review and/or order of tests in the medicine section of [facility]      Labs:     Recent Labs     12/03/19  1557 12/03/19  0309  12/02/19  0412   WBC  --   --   --  13.3*   HGB 8.0* 8.4*   < > 8.1*   HCT 27.6* 28.4*   < > 27.1*   PLT  --   --   --  247    < > = values in this interval not displayed. Recent Labs     12/03/19  0309 12/02/19 0412    136   K 3.7 3.9    103   CO2 27 28   BUN 14 11   CREA 1.11 1.14   * 108*   CA 8.7 8.7     No results for input(s): SGOT, GPT, ALT, AP, TBIL, TBILI, TP, ALB, GLOB, GGT, AML, LPSE in the last 72 hours. No lab exists for component: AMYP, HLPSE  Recent Labs     12/03/19  1557 12/03/19  0431 12/02/19  1327   APTT 99.9* 39.0* 28.7      No results for input(s): FE, TIBC, PSAT, FERR in the last 72 hours. No results found for: FOL, RBCF   No results for input(s): PH, PCO2, PO2 in the last 72 hours. No results for input(s): CPK, CKNDX, TROIQ in the last 72 hours.     No lab exists for component: CPKMB  Lab Results   Component Value Date/Time    Cholesterol, total 159 10/12/2019 08:55 AM    HDL Cholesterol 51 10/12/2019 08:55 AM    LDL, calculated 83.4 10/12/2019 08:55 AM    Triglyceride 123 10/12/2019 08:55 AM    CHOL/HDL Ratio 3.1 10/12/2019 08:55 AM     Lab Results   Component Value Date/Time    Glucose (POC) 117 (H) 10/12/2019 08:41 AM     Lab Results   Component Value Date/Time    Color YELLOW/STRAW 12/02/2019 01:27 PM    Appearance CLEAR 12/02/2019 01:27 PM    Specific gravity 1.014 12/02/2019 01:27 PM    pH (UA) 7.5 12/02/2019 01:27 PM    Protein TRACE (A) 12/02/2019 01:27 PM    Glucose NEGATIVE  12/02/2019 01:27 PM    Ketone NEGATIVE  12/02/2019 01:27 PM    Bilirubin NEGATIVE  12/02/2019 01:27 PM    Urobilinogen 1.0 12/02/2019 01:27 PM    Nitrites NEGATIVE  12/02/2019 01:27 PM    Leukocyte Esterase TRACE (A) 12/02/2019 01:27 PM    Epithelial cells FEW 12/02/2019 01:27 PM    Bacteria NEGATIVE  12/02/2019 01:27 PM    WBC 0-4 12/02/2019 01:27 PM    RBC 0-5 12/02/2019 01:27 PM     Xr Chest Pa Lat    Result Date: 12/2/2019  IMPRESSION: 1. There is cardiomegaly mild interstitial edema and small pleural effusions consistent mild congestive heart failure. Mri Brain Wo Cont    Result Date: 11/28/2019  IMPRESSION: Acute left posterior MCA infarct superimposed upon subacute infarct in the same region which was present in October. No evidence for intracranial hemorrhage or mass effect. Ct Head Wo Cont    Result Date: 11/26/2019  . IMPRESSION: No acute intracranial process identified. There are mild changes small vessel disease in the left periventricular white matter which has progressed since 10/12/2019. Ct Chest Wo Cont    Result Date: 11/26/2019  IMPRESSION: 1. Mild interstitial edema and small right pleural effusion. 2. Cardiomegaly. 3. Atherosclerotic aorta with coronary artery calcification. 4. Anemia. 5. Calcified hepatic and splenic granulomas indicative of old granulomatous disease. Xr Chest Port    Result Date: 11/26/2019  IMPRESSION: 1. Cardiomegaly with pulmonary interstitial edema, small bilateral pleural effusions and bibasilar atelectasis.         Medications Reviewed:     Current Facility-Administered Medications   Medication Dose Route Frequency    polyethylene glycol (MIRALAX) packet 17 g  17 g Oral DAILY    apixaban (ELIQUIS) tablet 2.5 mg  2.5 mg Oral BID    sodium chloride (NS) flush 5-40 mL  5-40 mL IntraVENous Q8H    sodium chloride (NS) flush 5-40 mL  5-40 mL IntraVENous PRN    0.9% sodium chloride infusion 250 mL  250 mL IntraVENous PRN    albuterol-ipratropium (DUO-NEB) 2.5 MG-0.5 MG/3 ML  3 mL Nebulization BID RT    losartan (COZAAR) tablet 50 mg  50 mg Oral DAILY    0.9% sodium chloride infusion 250 mL  250 mL IntraVENous PRN    aspirin chewable tablet 81 mg  81 mg Oral DAILY    atorvastatin (LIPITOR) tablet 20 mg  20 mg Oral DAILY    sodium chloride (NS) flush 5-40 mL  5-40 mL IntraVENous Q8H    sodium chloride (NS) flush 5-40 mL  5-40 mL IntraVENous PRN    traZODone (DESYREL) tablet 50 mg  50 mg Oral QHS    acetaminophen (TYLENOL) tablet 650 mg  650 mg Oral Q6H PRN    levothyroxine (SYNTHROID) tablet 50 mcg  50 mcg Oral ACB    sodium chloride (NS) flush 5-40 mL  5-40 mL IntraVENous Q8H    sodium chloride (NS) flush 5-40 mL  5-40 mL IntraVENous PRN    pantoprazole (PROTONIX) 40 mg in 0.9% sodium chloride 10 mL injection  40 mg IntraVENous Q12H    ondansetron (ZOFRAN) injection 4 mg  4 mg IntraVENous Q4H PRN    0.9% sodium chloride infusion 250 mL  250 mL IntraVENous PRN    albuterol-ipratropium (DUO-NEB) 2.5 MG-0.5 MG/3 ML  3 mL Nebulization Q4H PRN     ______________________________________________________________________  EXPECTED LENGTH OF STAY: 4d 12h  ACTUAL LENGTH OF STAY:          6                 Mendy Plascencia MD

## 2019-12-04 NOTE — PROGRESS NOTES
Problem: Mobility Impaired (Adult and Pediatric)  Goal: *Acute Goals and Plan of Care (Insert Text)  Description  FUNCTIONAL STATUS PRIOR TO ADMISSION: Patient was modified independent using a rolling walker for functional mobility. HOME SUPPORT PRIOR TO ADMISSION: The patient lived with son and wife. Physical Therapy Goals  Initiated 11/30/2019  1. Patient will move from supine to sit and sit to supine  in bed with independence within 7 day(s). 2.  Patient will transfer from bed to chair and chair to bed with modified independence using the least restrictive device within 7 day(s). 3.  Patient will perform sit to stand with modified independence within 7 day(s). 4.  Patient will ambulate with modified independence for 250 feet with the least restrictive device within 7 day(s). 5.  Patient will ascend/descend 4 stairs with 1 handrail(s) with modified independence within 7 day(s). Outcome: Not Met   PHYSICAL THERAPY TREATMENT  Patient: Nolan Albert (60 y.o. male)  Date: 12/4/2019  Diagnosis: Syncope [R55]  Acute anemia [D64.9]   <principal problem not specified>  Procedure(s) (LRB):  ESOPHAGOGASTRODUODENOSCOPY (EGD) (N/A)  ESOPHAGOGASTRODUODENAL (EGD) BIOPSY (N/A)  ENDOSCOPIC ARGON PLASMA COAGULATION (N/A) 2 Days Post-Op  Precautions:  fall  Chart, physical therapy assessment, plan of care and goals were reviewed. ASSESSMENT  Patient continues with skilled PT services and is slowly progressing towards goals. Pt eager to mobilize, hopeful for d/c home today. Continues to require use of RW and CGA/ min A for balance. Barriers to indep with mobility remain general weakness, balance deficits, increased risk for fall. Pt will benefit from support from son in home environment and follow up [facility] PT at d/c.     Current Level of Function Impacting Discharge (mobility/balance): transfer CGA, amb with RW CGA/ min A    Other factors to consider for discharge: pt remains below modified indep functional baseline; good family suppport         PLAN :  Patient continues to benefit from skilled intervention to address the above impairments. Continue treatment per established plan of care. to address goals. Recommendation for discharge: (in order for the patient to meet his/her long term goals)  Physical therapy at least 2 days/week in the home AND ensure assist and/or supervision for safety with mobility     This discharge recommendation:  Has not yet been discussed the attending provider and/or case management    IF patient discharges home will need the following DME: patient owns DME required for discharge       SUBJECTIVE:   Patient stated I'm waiting for the doctor to say I can go home today.     OBJECTIVE DATA SUMMARY:   Critical Behavior:  Neurologic State: Alert  Orientation Level: Oriented X4  Cognition: Appropriate decision making, Appropriate for age attention/concentration, Appropriate safety awareness, Follows commands  Safety/Judgement: Awareness of environment  Functional Mobility Training:  Bed Mobility:      Not observed              Transfers:  Sit to Stand: Contact guard assistance(cues for hand placement)  Stand to Sit: Stand-by assistance                             Balance:  Sitting: Intact  Standing: Impaired  Standing - Static: Fair  Standing - Dynamic : Fair  Ambulation/Gait Training:  Distance (ft): 65 Feet (ft)  Assistive Device: Gait belt;Walker, rolling  Ambulation - Level of Assistance: Contact guard assistance;Minimal assistance(increased assist for balance while turning)        Gait Abnormalities: Decreased step clearance;Trunk sway increased        Base of Support: Widened     Speed/[place]: Pace decreased (<100 feet/min)  Step Length: Left shortened;Right shortened  Swing Pattern: Left asymmetrical     Pain Rating:  Pt denied c/o pain    Activity Tolerance:   Fair and wheezes with activity; pt reports as chronic x years   Please refer to the flowsheet for vital signs taken during this treatment.     After treatment patient left in no apparent distress:   Sitting in chair and Call bell within reach; RT in to administer breathing treatment    COMMUNICATION/COLLABORATION:   The patients plan of care was discussed with: Registered Nurse    Corazon Baig, PT   Time Calculation: 20 mins

## 2019-12-04 NOTE — PROGRESS NOTES
Patient refused am labs for night shift, attempted to draw labs this morning, refusing saying he was already approved to go home and does not want any more blood draws taken.

## 2019-12-04 NOTE — DISCHARGE INSTRUCTIONS
Discharge Instructions       PATIENT ID: Katerine Lindo  MRN: [id number]   YOB: 1930    DATE OF ADMISSION: 11/26/2019 12:01 PM    DATE OF DISCHARGE: 12/4/2019    PRIMARY CARE PROVIDER: Jace Mata NP     ATTENDING PHYSICIAN: Masha Mills MD  DISCHARGING PROVIDER: Zenobia Sanchez MD    To contact this individual call [phone] and ask the  to page. If unavailable ask to be transferred the Adult Hospitalist Department. DISCHARGE DIAGNOSES  Acute blood loss anemia  Afib, on eiliquis  Bradycardia  Acute cardiogenic pulmonary edema    CONSULTATIONS: IP CONSULT TO HOSPITALIST  IP CONSULT TO GASTROENTEROLOGY  IP CONSULT TO NEUROLOGY  IP CONSULT TO GASTROENTEROLOGY  IP CONSULT TO CARDIOLOGY    PROCEDURES/SURGERIES: Procedure(s):  ESOPHAGOGASTRODUODENOSCOPY (EGD)  ESOPHAGOGASTRODUODENAL (EGD) BIOPSY  ENDOSCOPIC ARGON PLASMA COAGULATION    PENDING TEST RESULTS:   At the time of discharge the following test results are still pending: None    FOLLOW UP APPOINTMENTS:   Follow-up Information     Follow up With Specialties Details Why Contact Info    Jace Mata NP Nurse Practitioner In 1 week  Cherise Huntley [address]      Miranda Willis MD Cardiology In 1 month for follow up low heart rate and Afib-Flutter, for loop recorder [address] [facility]  [address] [facility] [phone]             ADDITIONAL CARE RECOMMENDATIONS:   F/U WITH PCP IN 1WEEK  F/U WITH CARDIOLGY IN 1MONTH-AFIB/FLUTTER WITH BRADYCARDIA, FOR F/U AND CONSIDERATION OF LOOP RECORDER  F/U WITH GI IN 1MONTH-FOR FOLLOW UP OF GI BLEED    DIET: Cardiac Diet    ACTIVITY: Activity as tolerated and per PT recommendation based on Home Health      DISCHARGE MEDICATIONS:   See Medication Reconciliation Form    · It is important that you take the medication exactly as they are prescribed.    · Keep your medication in the bottles provided by the pharmacist and keep a list of the medication names, dosages, and times to be taken in your wallet. · Do not take other medications without consulting your doctor. NOTIFY YOUR PHYSICIAN FOR ANY OF THE FOLLOWING:   Fever over 101 degrees for 24 hours. Chest pain, shortness of breath, fever, chills, nausea, vomiting, diarrhea, change in mentation, falling, weakness, bleeding. Severe pain or pain not relieved by medications. Or, any other signs or symptoms that you may have questions about.       DISPOSITION:   x Home With:   OT  PT x HH  RN       SNF/Inpatient Rehab/LTAC    Independent/assisted living    Hospice    Other:     CDMP Checked:   Yes x     PROBLEM LIST Updated:  Yes x       Signed:   Olga Ocampo MD  12/4/2019  5:52 PM

## 2019-12-04 NOTE — PROGRESS NOTES
[address]  [address] [facility], [address] [facility]       GI PROGRESS NOTE  [facility]  [phone] NP in-hospital cell phone M-F until 4:30  After 5pm or on weekends, please call  for physician on call      NAME: Barry Solano   :  1930   MRN:  [id number]       Subjective:   Patient is seen today resting comfortably in his bed, alert and conversing with his family members. He states that he feels much better today after a good nights rest and a bowel movement yesterday. To his knowledge there was no blood in his stool. He denies nausea, vomiting and abdominal pain. He states that he is ready to go as soon as he can. Objective:     VITALS:   Last 24hrs VS reviewed since prior progress note. Most recent are:  Visit Vitals  /62 (BP 1 Location: Left arm, BP Patient Position: At rest)   Pulse 61   Temp 98 °F (36.7 °C)   Resp 18   Ht 5' 8\" (1.727 m)   Wt 107.2 kg (236 lb 5.3 oz)   SpO2 95%   BMI 35.93 kg/m²       PHYSICAL EXAM:  General: Cooperative, no acute distress    Neurologic:  Alert and oriented X 3. HEENT: EOMI, no scleral icterus   Lungs:  CTA bilaterally. No wheezing  Heart:  S1 S2   Abdomen: Soft, non-distended, no tenderness. +Bowel sounds  Extremities: No edema  Psych:   Good insight. Not anxious or agitated. Lab Data Reviewed:     No results found for this or any previous visit (from the past 24 hour(s)).          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 9) with comorbidity ([address] E66.01    Stroke ([facility] [address] I63.9    Syncope R55    Acute anemia D64.9     Plan:     · PPI and iron supplementation at discharge   · Stable for discharge from GI perspective: Hgb 8.0  · Instructed patient to follow-up with PCP for Hgb recheck next week     Signed By: Terri Shook     12/4/2019  4:33 PM       I reviewed records, evaluated patient, and developed plan with MARY Sullivan AGACNP       GI attending note:    Pt not examined, plan for discharge. Agree with plan. Hgb stable over the past few days.      Dr. Ivanna Polo

## 2019-12-04 NOTE — PROGRESS NOTES
Problem: Anemia Care Plan (Adult and Pediatric)  Goal: *Labs within defined limits  Outcome: Progressing Towards Goal  Pt last Hmg 8.0  Goal: *Tolerates increased activity  Outcome: Progressing Towards Goal   Pt being able to move and position change more independently. Problem: Syncope  Goal: *Absence of injury  Outcome: Progressing Towards Goal  Goal: Decrease or eliminate episodes of syncope  Outcome: Progressing Towards Goal   Pt denies any syncopal feelings while changing positions. Denies any dizzyness or pain. Pt not attempting to get OOB without staff present and remained free of any injury or harm     0400: Pt refused lab draws. Bedside shift change report given to Kiki Vasquez (oncoming nurse) by [facility] (offgoing nurse). Report included the following information SBAR, Kardex, ED Summary, Intake/Output, MAR, Accordion, Recent Results, Cardiac Rhythm Aflutter and Quality Measures.

## 2019-12-04 NOTE — DISCHARGE SUMMARY
Discharge Summary       PATIENT ID: Markus Burnett  MRN: [id number]   YOB: 1930    DATE OF ADMISSION: 11/26/2019 12:01 PM    DATE OF DISCHARGE: 12/4/19   PRIMARY CARE PROVIDER: Chad Boyce NP     ATTENDING PHYSICIAN: Santa Marquez MD  DISCHARGING PROVIDER: Santa Marquez MD    To contact this individual call [phone] and ask the  to page. If unavailable ask to be transferred the Adult Hospitalist Department. CONSULTATIONS: IP CONSULT TO HOSPITALIST  IP CONSULT TO GASTROENTEROLOGY  IP CONSULT TO NEUROLOGY  IP CONSULT TO GASTROENTEROLOGY  IP CONSULT TO CARDIOLOGY    PROCEDURES/SURGERIES: Procedure(s):  ESOPHAGOGASTRODUODENOSCOPY (EGD)  ESOPHAGOGASTRODUODENAL (EGD) BIOPSY  ENDOSCOPIC ARGON PLASMA COAGULATION     SUBJECTIVE:  Pt is doing well. Eating dinner. Ready for DC. Cardiology and GI recommendations noted. ADMITTING DIAGNOSES & HOSPITAL COURSE:   #Acute blood loss anemia:  -Continue twice daily PPI. GI f/u on discharge  -s/p 2 U PRBC at the time of admission. Hemoglobin 8.2 yesterday, stable. Pt refused labs today on multiple efforts and occasions.  -Pt does not want colonoscopy. GI recommendation noted for resuming eliquis.   -Hb stable 12/3 afternoon after eliquis switch. OK to dc from GI standpoint with outpt f/u. -Dose of eliquis was noted as 5mg daily. Seems inadequate for stroke prevention as d/w Dr. Sanjuana Fajardo. Pt gets nosebleed with 5mg BID dose. However tolerated 5mg daily better. Would discharge on 2.5mg BID. Pt is ok with the same.     # Bradycardia, HR in 30s, with tele strip showing Afib-flutter   -D/w Dr. Sanjuana Fajardo, cardiololgy.   -Outpt f/u in 1m. Not a candidate for PPM currently as does not have any pauses >3sec. # Persistent atrial Fib:  -HR controlled. On anticoagulation. Bradycardia as noted above.  -He occasionally has heart rate 40s to 50s while sleeping and returns to normal if he wakes up. Patient is asymptomatic.   He is not on any rate controlling/ regis blocking medications.      # Acute cardiogenic pulmonary edema:  -Better with dose of lasix 12/2  -Continue to monitor closely.  -Echo showed severe concentric hypertrophy of left  ventricle.     # Syncope at admission:  -likely related to acute blood loss anemia and was on antihypertensives. Geovanny Ace MRI brain showed left posterior MCA acute infarct -discussed with neurologist and reviewed notes - \"global cerebral hypoperfusion with selective injury of the vulnerable area rather than being indicative of a causative lesion or in fact truly a stroke in clinical terms\"     # History of stroke in October 2019:  --Patient is in persistent atrial fibrillation on tele -we will continue anticoagulation after the procedure. If the hemoglobin remains stable and no more procedures -can switch to oral anticoagulants.     # Hypertension,  -Blood pressure better now. Continue losartan     # Hypothyroidism. Continue Synthroid.     Code status: full  DVT prophylaxis: Anticoagulated     Care Plan discussed with: Patient/family, nurse,  Disposition: Will dc patient home in stable and improved condition. Son Yumiko Pham lives with the parents. Labs and diagnostics reviewed. No results found for this or any previous visit (from the past 24 hour(s)).     DISCHARGE DIAGNOSES / PLAN:      As noted above     ADDITIONAL CARE RECOMMENDATIONS:   F/U WITH PCP IN 1WEEK  F/U WITH CARDIOLGY IN 1MONTH-AFIB/FLUTTER WITH BRADYCARDIA, FOR F/U AND CONSIDERATION OF LOOP RECORDER  F/U WITH GI IN 1MONTH-FOR FOLLOW UP OF GI BLEED     DIET: Cardiac Diet     ACTIVITY: Activity as tolerated and per PT recommendation based on Home Health    PENDING TEST RESULTS:   At the time of discharge the following test results are still pending: none    FOLLOW UP APPOINTMENTS:    Follow-up Information     Follow up With Specialties Details Why Contact Info    Daniel Chen NP Nurse Practitioner In 1 week  [address] [facility] Leonel Geiger MD Cardiology In 1 month for follow up low heart rate and Afib-Flutter, for loop recorder [address] [facility]  [address] [facility]  [phone]             DISCHARGE MEDICATIONS:  Current Discharge Medication List      START taking these medications    Details   aspirin 81 mg chewable tablet Take 1 Tab by mouth daily. Qty: 30 Tab, Refills: 1      atorvastatin (LIPITOR) 20 mg tablet Take 1 Tab by mouth daily. Qty: 30 Tab, Refills: 1      pantoprazole (PROTONIX) 40 mg tablet Take 1 Tab by mouth every twelve (12) hours. Qty: 60 Tab, Refills: 0      albuterol sulfate 90 mcg/actuation aebs Take 1-2 Puffs by inhalation every six (6) hours as needed (cough, shortness of breath, wheezing). Qty: 1 Inhaler, Refills: 1      ferrous sulfate 324 mg (65 mg iron) tablet Take 1 Tab by mouth Daily (before breakfast). Qty: 30 Tab, Refills: 1         CONTINUE these medications which have CHANGED    Details   apixaban (ELIQUIS) 2.5 mg tablet Take 1 Tab by mouth two (2) times a day. Qty: 30 Tab, Refills: 1         CONTINUE these medications which have NOT CHANGED    Details   traZODone (DESYREL) 50 mg tablet Take 1 Tab by mouth nightly. For sleep  Qty: 30 Tab, Refills: 5      levothyroxine (SYNTHROID) 50 mcg tablet Take 50 mcg by mouth Daily (before breakfast). ketoconazole (NIZORAL) 2 % topical cream Apply 1 Each to affected area daily. apply to nose/face      tretinoin (RETIN-A) 0.025 % topical cream Apply 1 Each to affected area nightly. apply to face      triamcinolone acetonide (KENALOG) 0.1 % ointment Apply 1 Container to affected area two (2) times a day. use thin layer, apply to face      lutein 20 mg cap Take 1 Cap by mouth daily. coenzyme Q-10 (CO Q-10) 30 mg capsule Take 30 mg by mouth daily. lecithin/soybean oil (SOYA LECITHIN PO) Take 1 Tab by mouth daily. multivit-min/FA/lycopen/lutein (SENTRY SENIOR PO) Take 1 Tab by mouth daily.       losartan (COZAAR) 50 mg tablet Take 0.5 Tabs by mouth daily. Qty: 30 Tab, Refills: 0      vitamin E (AQUA GEMS) 400 unit capsule Take 400 Units by mouth daily. Reports taking 1-2 times a week. Associated Diagnoses: Irregular heart beat; Atrial fibrillation ([facility] [address] Unspecified essential hypertension; [facility]); Unstable angina ([facility] [address] Dyspnea on exertion; Abnormal ECG; History of tobacco use      ascorbic acid (VITAMIN C) 500 mg tablet Take 500 mg by mouth daily. Associated Diagnoses: Irregular heart beat; Atrial fibrillation ([facility] [address] Unspecified essential hypertension; Stroke [facility]); Unstable angina ([facility] [address] Dyspnea on exertion; Abnormal ECG; History of tobacco use      SAW PALMETTO PO Take 1 Tab by mouth daily. Associated Diagnoses: Irregular heart beat; Atrial fibrillation ([facility] [address] Unspecified essential hypertension; [facility]); Unstable angina ([facility] [address] Dyspnea on exertion; Abnormal ECG; History of tobacco use      CALCIUM/MAGNESIUM (DOLOMITE PO) Take 1 Tab by mouth daily. Associated Diagnoses: Irregular heart beat; Atrial fibrillation ([facility] [address] Unspecified essential hypertension; [facility]); Unstable angina ([facility] [address] Dyspnea on exertion; Abnormal ECG; History of tobacco use      zinc (CHELATED ZINC) 50 mg tab tablet Take 50 mg by mouth daily. STOP taking these medications       aspirin (ASPIRIN) 325 mg tablet Comments:   Reason for Stopping:                 NOTIFY YOUR PHYSICIAN FOR ANY OF THE FOLLOWING:   Fever over 101 degrees for 24 hours. Chest pain, shortness of breath, fever, chills, nausea, vomiting, diarrhea, change in mentation, falling, weakness, bleeding. Severe pain or pain not relieved by medications. Or, any other signs or symptoms that you may have questions about.     DISPOSITION:  x  Home With:   OT  PT x HH  RN       Long term SNF/Inpatient Rehab    Independent/assisted living    Hospice    Other:       PATIENT CONDITION AT DISCHARGE:     Functional status    Poor     Deconditioned    x Independent      Cognition    x Lucid     Forgetful     Dementia      Catheters/lines (plus indication)    Iniguez     PICC     PEG    x None      Code status    x Full code     DNR      PHYSICAL EXAMINATION AT DISCHARGE:  Constitutional:  Elderly patient, no acute distress    ENT:  Oral mucous moist, oropharynx benign. EOMI, anicteric sclera and normal conjunctiva   Resp:  Clear bilateral. No rhonchi. Air movement is symmetrical.   CV:  Irregular rhythym,normal HR    GI:  Soft, non distended, non tender. normoactive bowel sounds     Musculoskeletal:  1+ LE edema    Neurologic:  Alert and oriented x3, moves all extremities,strength wnl of all extremities                         Psych:   Not anxious nor agitated. CHRONIC MEDICAL DIAGNOSES:  Problem List as of 12/4/2019 Date Reviewed: 10/18/2019          Codes Class Noted - Resolved    Acute anemia ICD-10-CM: D64.9  ICD-9-CM: 285.9  11/28/2019 - Present        Syncope ICD-10-CM: R55  ICD-9-CM: 780.2  11/26/2019 - Present        Stroke ([address] ICD-10-CM: I63.9  ICD-9-CM: 434.91  10/12/2019 - Present        Severe obesity (BMI 35.0-39. 9) with comorbidity [facility]) ICD-10-CM: E66.01  ICD-9-CM: 278.01  3/28/2018 - Present        ACP (advance care planning) ICD-10-CM: Z71.89  ICD-9-CM: V65.49  2/14/2017 - Present        Peripheral edema ICD-10-CM: R60.9  ICD-9-CM: 782.3  2/14/2017 - Present        Right knee DJD ICD-10-CM: M17.11  ICD-9-CM: 715.96  12/15/2014 - Present        Encounter for monitoring Coumadin therapy ICD-10-CM: Z51.81, Z79.01  ICD-9-CM: V58.83, V58.61  7/29/2014 - Present        Paroxysmal atrial fibrillation (HCC) ICD-10-CM: I48.0  ICD-9-CM: 427.31  7/17/2014 - Present        Hypothyroidism ICD-10-CM: E03.9  ICD-9-CM: 244.9  1/8/2011 - Present        Essential hypertension ICD-10-CM: I10  ICD-9-CM: 401.9  12/2/2010 - Present              Greater than 40 minutes were spent with the patient on counseling and coordination of care    Signed:   Laura Pruitt MD  12/4/2019  5:56 PM

## 2019-12-04 NOTE — PROGRESS NOTES
Cardiology Progress Note      12/4/2019 5:09 PM    Admit Date: 11/26/2019    Admit Diagnosis: Syncope [R55]; Acute anemia [D64.9]      Subjective:     Prem Mckeon occasional pauses mostly <3 seconds; asymptomatic.  No chest pain, breathing ok    Visit Vitals  /62 (BP 1 Location: Left arm, BP Patient Position: At rest)   Pulse 61   Temp 98 °F (36.7 °C)   Resp 18   Ht 5' 8\" (1.727 m)   Wt 236 lb 5.3 oz (107.2 kg)   SpO2 95%   BMI 35.93 kg/m²     Current Facility-Administered Medications   Medication Dose Route Frequency    polyethylene glycol (MIRALAX) packet 17 g  17 g Oral DAILY    apixaban (ELIQUIS) tablet 2.5 mg  2.5 mg Oral BID    sodium chloride (NS) flush 5-40 mL  5-40 mL IntraVENous Q8H    sodium chloride (NS) flush 5-40 mL  5-40 mL IntraVENous PRN    0.9% sodium chloride infusion 250 mL  250 mL IntraVENous PRN    albuterol-ipratropium (DUO-NEB) 2.5 MG-0.5 MG/3 ML  3 mL Nebulization BID RT    losartan (COZAAR) tablet 50 mg  50 mg Oral DAILY    0.9% sodium chloride infusion 250 mL  250 mL IntraVENous PRN    aspirin chewable tablet 81 mg  81 mg Oral DAILY    atorvastatin (LIPITOR) tablet 20 mg  20 mg Oral DAILY    sodium chloride (NS) flush 5-40 mL  5-40 mL IntraVENous Q8H    sodium chloride (NS) flush 5-40 mL  5-40 mL IntraVENous PRN    traZODone (DESYREL) tablet 50 mg  50 mg Oral QHS    acetaminophen (TYLENOL) tablet 650 mg  650 mg Oral Q6H PRN    levothyroxine (SYNTHROID) tablet 50 mcg  50 mcg Oral ACB    sodium chloride (NS) flush 5-40 mL  5-40 mL IntraVENous Q8H    sodium chloride (NS) flush 5-40 mL  5-40 mL IntraVENous PRN    pantoprazole (PROTONIX) 40 mg in 0.9% sodium chloride 10 mL injection  40 mg IntraVENous Q12H    ondansetron (ZOFRAN) injection 4 mg  4 mg IntraVENous Q4H PRN    0.9% sodium chloride infusion 250 mL  250 mL IntraVENous PRN    albuterol-ipratropium (DUO-NEB) 2.5 MG-0.5 MG/3 ML  3 mL Nebulization Q4H PRN         Objective:      Physical Exam:  Visit Vitals  /62 (BP 1 Location: Left arm, BP Patient Position: At rest)   Pulse 61   Temp 98 °F (36.7 °C)   Resp 18   Ht 5' 8\" (1.727 m)   Wt 236 lb 5.3 oz (107.2 kg)   SpO2 95%   BMI 35.93 kg/m²     General Appearance:  Well developed, well nourished,alert and oriented x 3, and individual in no acute distress. Ears/Nose/Mouth/Throat:   Hearing grossly normal.         Neck: Supple. Chest:   Lungs clear to auscultation bilaterally. Cardiovascular:  Regular rate and rhythm, S1, S2 normal, no murmur. Abdomen:   Soft, non-tender, bowel sounds are active. Extremities: No edema bilaterally. Skin: Warm and dry. Data Review:   Labs:  No results found for this or any previous visit (from the past 24 hour(s)). Telemetry: AFIB      Assessment:     Active Problems:    Syncope (11/26/2019)      Acute anemia (11/28/2019)        Plan:     Afib. Having < 3 second asymptomatic pauses with his afib and not on regis agents. Doesn't meet the indication for pacemaker though probably has some degree of regis dysfunction. Suspect syncope he's had the last 1-2 years more related to anemia. Recommend and will set up outpt 1 month loop to evaluate for SSS. Continue without regis agents. [id number] [address] for hospital d/c cardiac standpoint. Anemia. Difficult situation has pt has GI bleed but also needs [address] [place] [address] for afib with h/o CVA. On eliquis and agree with this. High chance of recurrent admit for bleeding if he doesn't address GI bleed. Will defer this to pt and GI.   H/o CVA

## 2019-12-04 NOTE — PROGRESS NOTES
HEYDI    1. Quinn given and sigend-placed in chart to be scanned. 2.  [facility] has accepted to return with HH/ PT and SN. Spoke with Adiel Andujar with [facility] who stated they have [facility]ON orders. 3.  Family will transport home.       Carolyne Allred  11:08 AM

## 2019-12-05 ENCOUNTER — HOME HEALTH ADMISSION (OUTPATIENT)
Dept: HOME HEALTH SERVICES | Facility: HOME HEALTH | Age: 84
End: 2019-12-05
Payer: MEDICARE

## 2019-12-05 ENCOUNTER — PATIENT OUTREACH (OUTPATIENT)
Dept: FAMILY MEDICINE CLINIC | Age: 84
End: 2019-12-05

## 2019-12-05 ENCOUNTER — CLINICAL SUPPORT (OUTPATIENT)
Dept: CARDIOLOGY CLINIC | Age: 84
End: 2019-12-05

## 2019-12-05 DIAGNOSIS — R55 SYNCOPE, UNSPECIFIED SYNCOPE TYPE: Primary | ICD-10-CM

## 2019-12-05 RX ORDER — ATORVASTATIN CALCIUM 20 MG/1
20 TABLET, FILM COATED ORAL DAILY
Qty: 30 TAB | Refills: 5 | Status: SHIPPED | OUTPATIENT
Start: 2019-12-05 | End: 2021-07-06

## 2019-12-05 RX ORDER — ALBUTEROL SULFATE 90 UG/1
AEROSOL, METERED RESPIRATORY (INHALATION)
Qty: 25.5 G | Refills: 1 | Status: SHIPPED | OUTPATIENT
Start: 2019-12-05 | End: 2021-07-06

## 2019-12-05 NOTE — Clinical Note
Chandra Coughlin did not get the new rxs per Hospital. Could you send orders in for:Lipitor 20mg #30, one refillEliquis 2.5mg #60 , one refillAlbuterol sulfate 90 mct/actuation aebs #1 inhaler with one refill, sig 1-2 puffs by inh q 6 hours prn cough. Treasure Vázquez

## 2019-12-05 NOTE — PROGRESS NOTES
Hospital Discharge Follow-Up      Date/Time:  2019 1:08 PM    Patient was admitted to [facility] on  and discharged on  for acute blood loss anemia/bradycardia/afib/syncope. The physician discharge summary was available at the time of outreach. Patient was contacted within 1 business days of discharge. Top Challenges reviewed with the provider   [facility] - Acute blood loss anemia. Hgb 6.6 on ; s/p 2 units PRBCs- Hgb up to 8.1 on . Monitor. EGD done . Recent CVA 10/12-10/14. Advance Care Planning:   Does patient have an Advance Directive:  not on file; education provided        Method of communication with provider :face to face,chart routing    Inpatient RRAT score: 32  Was this a readmission? no  Patient stated reason for the readmission:     Care Transition Nurse (CTN) contacted the patient by telephone to perform post hospital discharge assessment. Verified name and  with patient as identifiers. Provided introduction to self, and explanation of the CTN role. Patient received hospital discharge instructions. CTN reviewed discharge instructions and red flags with patient who verbalized understanding. Patient given an opportunity to ask questions and does not have any further questions or concerns at this time. The patient agrees to contact the PCP office for questions related to their healthcare. CTN provided contact information for future reference. Disease Specific:   N/A    Patients top risk factors for readmission:  80years old, overwhelmed with paperwork from hospital. Son lives with him and will transport to [facility]. 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 Refusing to have colonoscopy. Does not want any more labs done. Home Health orders at discharge: resumption of care SN/PT.   [address]: [facility]  Date of initial visit: 19    Durable Medical Equipment ordered at discharge: none  [address] Medical Equipment received: na    Medication(s):   New Medications at Discharge: ASA 81mg qd, Lipitor 20mg qd, Protonix 40mg q 12 hours, Albuterol sulfate 90 mcg/actuation 1-2 puffs by inhalation q 6 hours prn cough or sob or wheezing, Ferrous sulfate 324mg one tab q day. Changed Medications at Discharge: Eliquis 2.5mg bid  Discontinued Medications at Discharge: Aspirin 325mg    Medication reconciliation was not performed with patient, who verbalizes understanding of administration of home-said he had not picked up new meds and didn't want to go down list of all other meds. Did later call this CTN back to report he did not get the new meds d/t issue at [place]. CTN contacted his pharmacy, ph [phone], spoke to pharmacist. Has order for the Lipitor but no orders for Lipitor,Eliquis or Albuterol inhaler. CTN requested pcp order rxs- patient has HEYDI with him on 12/11 at 1;30pm.  There were barriers to obtaining medications identified at this time as noted above. Referral to Pharm D needed: no     Current Outpatient Medications   Medication Sig    albuterol sulfate 90 mcg/actuation aebs Take 1-2 Puffs by inhalation every six (6) hours as needed (cough, shortness of breath, wheezing).  apixaban (ELIQUIS) 2.5 mg tablet Take 1 Tab by mouth two (2) times a day.  atorvastatin (LIPITOR) 20 mg tablet Take 1 Tab by mouth daily. For cholesterol    aspirin 81 mg chewable tablet Take 1 Tab by mouth daily.  ferrous sulfate 324 mg (65 mg iron) tablet Take 1 Tab by mouth Daily (before breakfast).  pantoprazole (PROTONIX) 40 mg tablet Take 1 Tab by mouth every twelve (12) hours.  traZODone (DESYREL) 50 mg tablet Take 1 Tab by mouth nightly. For sleep    levothyroxine (SYNTHROID) 50 mcg tablet Take 50 mcg by mouth Daily (before breakfast).  ketoconazole (NIZORAL) 2 % topical cream Apply 1 Each to affected area daily.  apply to nose/face    tretinoin (RETIN-A) 0.025 % topical cream Apply 1 Each to affected area nightly. apply to face    triamcinolone acetonide (KENALOG) 0.1 % ointment Apply 1 Container to affected area two (2) times a day. use thin layer, apply to face    zinc (CHELATED ZINC) 50 mg tab tablet Take 50 mg by mouth daily.  lutein 20 mg cap Take 1 Cap by mouth daily.  coenzyme Q-10 (CO Q-10) 30 mg capsule Take 30 mg by mouth daily.  lecithin/soybean oil (SOYA LECITHIN PO) Take 1 Tab by mouth daily.  multivit-min/FA/lycopen/lutein (SENTRY SENIOR PO) Take 1 Tab by mouth daily.  losartan (COZAAR) 50 mg tablet Take 0.5 Tabs by mouth daily.  vitamin E (AQUA GEMS) 400 unit capsule Take 400 Units by mouth daily. Reports taking 1-2 times a week.  ascorbic acid (VITAMIN C) 500 mg tablet Take 500 mg by mouth daily.  SAW PALMETTO PO Take 1 Tab by mouth daily.  CALCIUM/MAGNESIUM (DOLOMITE PO) Take 1 Tab by mouth daily. No current facility-administered medications for this visit. [facility] follow up appointment(s):   Future Appointments   Date Time Provider Department [place]   12/6/2019 To Be Determined Breann Gutierrez RN [facility]   12/6/2019 To Be Determined Nereyda Nunez [facility]   12/11/2019  2:30 PM Sonal Fairbanks NP [facility]   1/13/2020  3:20 PM Lars Farfan  [address]   2/7/2020  1:00 PM Deandre Martinez  [facility]      Non-[facility] follow up appointment(s): Reminded patient to call  and  for 1 month f/u visits. He agreed to do so. [facility]:  information provided as a resource       Goals      Attends follow-up appointments as directed. 10/15/19    Pt has post hospital appt with Fortino Kearney NP on 10/18 -    Contact information for Dr. Harmeet Rai given to son - to make neurology follow up appt within 2 weeks -   Ttk    12/5/19 [facility] [place] 11/26-12/4 acute blood loss anemia.   Scheduled appt to see pcp, Hector óLpez on 12/11 at 2:30pm.  Reminded to call and schedule 1 month f/u with  and with (GI). Given info on [employer] as resource. Given CTN contact information. mbt         Knowledge and adherence of prescribed medication (ie. action, side effects, missed dose, etc.).      10/15/19  Mr. Rhonda Gutierrez has home health review of his meds -  He reported not correct compliance with his Eliquis prior to admission - missing doses, once a day, etc.  This has been addressed with him. NN will reinforce with pt and with his son - hopefully his son can oversee. Pt's wife has dementia/ son lives with them. ttk    12/5/19 Had not started new meds, had not picked up yet. Did send son to get them. Called CTN back, said couldn't get any of his meds at [place]. Advised CTN to contact [facility] to find out the problem. CTN called and spoke to pharmacist, ph [phone]. Has Protonix ready but did not getnew rxs for the Lipitor or Albuterol sulf. CTN sent message to pcp requesting he order for patient, HEYDI sched for 12/11 2:30pm. Patient notified to check with pharmacy in a few hours. mbt       Supportive resources in place to maintain patient in the community (ie. Home Health, DME equipment, refer to, medication assistant plan, etc.)      10/15/19  Mr. Rhonda Gutierrez has [employer] - with initial nurse visit on 10/15 -   He has PT and speech through OT ordered -     ttk    12/5/19 [facility] 11/26-12/4 Acute blood loss anemia. Resumption of care with [facility]- scheduled to start back up on 12/6 (SN and PT). mbt

## 2019-12-06 ENCOUNTER — HOME CARE VISIT (OUTPATIENT)
Dept: SCHEDULING | Facility: HOME HEALTH | Age: 84
End: 2019-12-06
Payer: MEDICARE

## 2019-12-06 ENCOUNTER — HOME CARE VISIT (OUTPATIENT)
Dept: SCHEDULING | Facility: HOME HEALTH | Age: 84
End: 2019-12-06

## 2019-12-06 VITALS
HEIGHT: 67 IN | TEMPERATURE: 97.6 F | WEIGHT: 238 LBS | HEART RATE: 56 BPM | BODY MASS INDEX: 37.35 KG/M2 | RESPIRATION RATE: 18 BRPM | SYSTOLIC BLOOD PRESSURE: 158 MMHG | DIASTOLIC BLOOD PRESSURE: 80 MMHG | OXYGEN SATURATION: 99 %

## 2019-12-06 VITALS
DIASTOLIC BLOOD PRESSURE: 80 MMHG | TEMPERATURE: 97.6 F | OXYGEN SATURATION: 97 % | RESPIRATION RATE: 18 BRPM | HEART RATE: 56 BPM | SYSTOLIC BLOOD PRESSURE: 158 MMHG

## 2019-12-06 VITALS
RESPIRATION RATE: 18 BRPM | OXYGEN SATURATION: 97 % | DIASTOLIC BLOOD PRESSURE: 80 MMHG | SYSTOLIC BLOOD PRESSURE: 158 MMHG | HEART RATE: 56 BPM | TEMPERATURE: 97.6 F

## 2019-12-06 PROCEDURE — G0151 HHCP-SERV OF PT,EA 15 MIN: HCPCS

## 2019-12-06 PROCEDURE — G0299 HHS/HOSPICE OF RN EA 15 MIN: HCPCS

## 2019-12-06 PROCEDURE — 3331090002 HH PPS REVENUE DEBIT

## 2019-12-06 PROCEDURE — 400013 HH SOC

## 2019-12-06 PROCEDURE — 3331090001 HH PPS REVENUE CREDIT

## 2019-12-07 LAB
BACTERIA SPEC CULT: NORMAL
SERVICE CMNT-IMP: NORMAL

## 2019-12-07 PROCEDURE — 3331090001 HH PPS REVENUE CREDIT

## 2019-12-07 PROCEDURE — 3331090002 HH PPS REVENUE DEBIT

## 2019-12-08 PROCEDURE — 3331090002 HH PPS REVENUE DEBIT

## 2019-12-08 PROCEDURE — 3331090001 HH PPS REVENUE CREDIT

## 2019-12-09 PROCEDURE — 3331090002 HH PPS REVENUE DEBIT

## 2019-12-09 PROCEDURE — 3331090001 HH PPS REVENUE CREDIT

## 2019-12-10 ENCOUNTER — HOME CARE VISIT (OUTPATIENT)
Dept: SCHEDULING | Facility: HOME HEALTH | Age: 84
End: 2019-12-10
Payer: MEDICARE

## 2019-12-10 VITALS
OXYGEN SATURATION: 99 % | TEMPERATURE: 98 F | RESPIRATION RATE: 18 BRPM | HEART RATE: 72 BPM | SYSTOLIC BLOOD PRESSURE: 122 MMHG | DIASTOLIC BLOOD PRESSURE: 88 MMHG

## 2019-12-10 VITALS
OXYGEN SATURATION: 99 % | RESPIRATION RATE: 18 BRPM | DIASTOLIC BLOOD PRESSURE: 72 MMHG | HEART RATE: 66 BPM | SYSTOLIC BLOOD PRESSURE: 130 MMHG | TEMPERATURE: 98.1 F

## 2019-12-10 PROCEDURE — 3331090001 HH PPS REVENUE CREDIT

## 2019-12-10 PROCEDURE — G0151 HHCP-SERV OF PT,EA 15 MIN: HCPCS

## 2019-12-10 PROCEDURE — 3331090002 HH PPS REVENUE DEBIT

## 2019-12-10 PROCEDURE — G0299 HHS/HOSPICE OF RN EA 15 MIN: HCPCS

## 2019-12-11 ENCOUNTER — OFFICE VISIT (OUTPATIENT)
Dept: FAMILY MEDICINE CLINIC | Age: 84
End: 2019-12-11

## 2019-12-11 VITALS
WEIGHT: 234 LBS | HEIGHT: 67 IN | BODY MASS INDEX: 36.73 KG/M2 | TEMPERATURE: 97.5 F | SYSTOLIC BLOOD PRESSURE: 126 MMHG | DIASTOLIC BLOOD PRESSURE: 47 MMHG | OXYGEN SATURATION: 98 % | HEART RATE: 60 BPM | RESPIRATION RATE: 18 BRPM

## 2019-12-11 DIAGNOSIS — I63.412 CEREBROVASCULAR ACCIDENT (CVA) DUE TO EMBOLISM OF LEFT MIDDLE CEREBRAL ARTERY (HCC): ICD-10-CM

## 2019-12-11 DIAGNOSIS — I10 ESSENTIAL HYPERTENSION: ICD-10-CM

## 2019-12-11 DIAGNOSIS — E03.9 ACQUIRED HYPOTHYROIDISM: ICD-10-CM

## 2019-12-11 DIAGNOSIS — L71.9 ROSACEA: ICD-10-CM

## 2019-12-11 DIAGNOSIS — D50.0 IRON DEFICIENCY ANEMIA DUE TO CHRONIC BLOOD LOSS: Primary | ICD-10-CM

## 2019-12-11 DIAGNOSIS — I48.0 PAROXYSMAL ATRIAL FIBRILLATION (HCC): ICD-10-CM

## 2019-12-11 PROCEDURE — 3331090001 HH PPS REVENUE CREDIT

## 2019-12-11 PROCEDURE — 3331090002 HH PPS REVENUE DEBIT

## 2019-12-11 RX ORDER — METRONIDAZOLE 10 MG/G
GEL TOPICAL DAILY
Qty: 120 G | Refills: 1 | Status: SHIPPED | OUTPATIENT
Start: 2019-12-11 | End: 2020-01-28

## 2019-12-11 NOTE — PATIENT INSTRUCTIONS
Iron Deficiency Anemia: Care Instructions  Your Care Instructions    Anemia means that you do not have enough red blood cells. Red blood cells carry oxygen around your body. When you have anemia, it can make you pale, weak, and tired. Many things can cause anemia. The most common cause is loss of blood. This can happen if you have heavy menstrual periods. It can also happen if you have bleeding in your stomach or bowel. You can also get anemia if you don't have enough iron in your diet or if it's hard for your body to absorb iron. In some cases, pregnancy causes anemia. That's because a pregnant woman needs more iron. Your doctor may do more tests to find the cause of your anemia. If a disease or other health problem is causing it, your doctor will treat that problem. It's important to follow up with your doctor to make sure that your iron level returns to normal.  Follow-up care is a key part of your treatment and safety. Be sure to make and go to all appointments, and call your doctor if you are having problems. It's also a good idea to know your test results and keep a list of the medicines you take. How can you care for yourself at home? · If your doctor recommended iron pills, take them as directed. ? Try to take the pills on an empty stomach. You can do this about 1 hour before or 2 hours after meals. But you may need to take iron with food to avoid an upset stomach. ? Do not take antacids or drink milk or anything with caffeine within 2 hours of when you take your iron. They can keep your body from absorbing the iron well. ? Vitamin C helps your body absorb iron. You may want to take iron pills with a glass of orange juice or some other food high in vitamin C.  ? Iron pills may cause stomach problems. These include heartburn, nausea, diarrhea, constipation, and cramps. It can help to drink plenty of fluids and include fruits, vegetables, and fiber in your diet.   ? It's normal for iron pills to make your stool a greenish or grayish black. But internal bleeding can also cause dark stool. So it's important to tell your doctor about any color changes. ? Call your doctor if you think you are having a problem with your iron pills. Even after you start to feel better, it will take several months for your body to build up its supply of iron. ? If you miss a pill, don't take a double dose. ? Keep iron pills out of the reach of small children. Too much iron can be very dangerous. · Eat foods with a lot of iron. These include red meat, shellfish, poultry, and eggs. They also include beans, raisins, whole-grain bread, and leafy green vegetables. · Steam your vegetables. This is the best way to prepare them if you want to get as much iron as possible. · Be safe with medicines. Do not take nonsteroidal anti-inflammatory pain relievers unless your doctor tells you to. These include aspirin, naproxen (Aleve), and ibuprofen (Advil, Motrin). · Liquid iron can stain your teeth. But you can mix it with water or juice and drink it with a straw. Then it won't get on your teeth. When should you call for help? Call 911 anytime you think you may need emergency care. For example, call if:    · You passed out (lost consciousness).    Call your doctor now or seek immediate medical care if:    · You are short of breath.     · You are dizzy or light-headed, or you feel like you may faint.     · You have new or worse bleeding.    Watch closely for changes in your health, and be sure to contact your doctor if:    · You feel weaker or more tired than usual.     · You do not get better as expected. Where can you learn more? Go to http://lucy-ramos.info/. Enter C615 in the search box to learn more about \"Iron Deficiency Anemia: Care Instructions. \"  Current as of: March 28, 2019  Content Version: 12.2  © 7144-9133 [employer].  Care instructions adapted under license by [employer] (which disclaims liability or warranty for this information). If you have questions about a medical condition or this instruction, always ask your healthcare professional. [address] any warranty or liability for your use of this information.

## 2019-12-11 NOTE — PROGRESS NOTES
[facility] Note    Mr. Sun Saab is a 80y.o. year old male, he is seen today for Transition of Care services following a hospital discharge for anemia on 12/4/19. Our office Nurse Navigator performed an outreach to Mr. Suly Clemons on 12/5/19 (within 2 business days of discharge) to complete medication reconciliation and a telephonic assessment of his condition. Subjective:  Hosptial Follow up  Patient seen for follow up for anemia and pulmonary edema. Patient presented to [facility] ED on 11/26/19 for syncope. Patient was found to be anemic with hemeoccult + stool and found to be admitted for further evaluation. Patient received 2 units PRBC at the time of admission. EGD on 12/2 showed mild esophagitis, 3 cm hiatal hernia, seven clean based gastric ulcers, 3 mm non-bleeding duodenal AVM. Patient placed on PPI BID and Eliquis decreased to 2.5 mg BID. Hemoglobin stable at 8.2 on discharge. Taking iron supplement once daily.      Patient also found to be bradycardic with HR in 30s, with Afib-flutter. Echocardiogram showed severe concentric hypertrophy of left ventricle. Patient seen by cardiology, Dr. Floresita Butts. Patient has follow up with cardiology on 1/13/20. Prior to Admission medications    Medication Sig Start Date End Date Taking? Authorizing Provider   metroNIDAZOLE (METROGEL) 1 % topical gel Apply  to affected area daily. Use a thin layer to affected areas after washing 12/11/19  Yes Nicole Garcia NP   apixaban (ELIQUIS) 2.5 mg tablet Take 1 Tab by mouth two (2) times a day. 12/5/19  Yes Tayo De Anda NP   atorvastatin (LIPITOR) 20 mg tablet Take 1 Tab by mouth daily. For cholesterol 12/5/19  Yes Nicole Garcia NP   albuterol (PROVENTIL HFA, VENTOLIN HFA, PROAIR HFA) 90 mcg/actuation inhaler INHALE 1-2 PUFFS EVERY 6 HOURS IF NEEDED  Patient taking differently: Take 2 Puffs by inhalation every four (4) hours as needed.  12/5/19  Yes Tayo De Anda NP aspirin 81 mg chewable tablet Take 1 Tab by mouth daily. 12/5/19  Yes Candelario Suh MD   ferrous sulfate 324 mg (65 mg iron) tablet Take 1 Tab by mouth Daily (before breakfast). 12/4/19  Yes Candelario Suh MD   pantoprazole (PROTONIX) 40 mg tablet Take 1 Tab by mouth every twelve (12) hours. Patient taking differently: Take 40 mg by mouth daily. 12/4/19  Yes Candelario Suh MD   traZODone (DESYREL) 50 mg tablet Take 1 Tab by mouth nightly. For sleep 11/21/19  Yes Governor Syd Garcia NP   ketoconazole (NIZORAL) 2 % topical cream Apply 1 Each to affected area daily. apply to nose/face   Yes Provider, Historical   losartan (COZAAR) 50 mg tablet Take 0.5 Tabs by mouth daily. 10/14/19  Yes Saint Peon, MD   ascorbic acid (VITAMIN C) 500 mg tablet Take 500 mg by mouth daily. Yes Provider, Historical   levothyroxine (SYNTHROID) 50 mcg tablet Take 50 mcg by mouth Daily (before breakfast). Provider, Historical   tretinoin (RETIN-A) 0.025 % topical cream Apply 1 Each to affected area nightly. apply to face    Provider, Historical   triamcinolone acetonide (KENALOG) 0.1 % ointment Apply 1 Container to affected area two (2) times a day. use thin layer, apply to face    Provider, Historical   zinc (CHELATED ZINC) 50 mg tab tablet Take 50 mg by mouth daily. Provider, Historical   lutein 20 mg cap Take 1 Cap by mouth daily. Provider, Historical   coenzyme Q-10 (CO Q-10) 30 mg capsule Take 30 mg by mouth daily. Provider, Historical   lecithin/soybean oil (SOYA LECITHIN PO) Take 1 Tab by mouth daily. Provider, Historical   multivit-min/FA/lycopen/lutein (SENTRY SENIOR PO) Take 1 Tab by mouth daily. Provider, Historical   vitamin E (AQUA GEMS) 400 unit capsule Take 400 Units by mouth daily. Reports taking 1-2 times a week. Provider, Historical   SAW PALMETTO PO Take 1 Tab by mouth daily. Provider, Historical   CALCIUM/MAGNESIUM (DOLOMITE PO) Take 1 Tab by mouth daily.       Provider, Historical          Allergies   Allergen Reactions    Hydrocodone Other (comments)     Unable to void, or have a bowel movement    Amlodipine Swelling and Other (comments)     Severe weeping from leg swelling    Poison Ivy Extract Itching           ROS  See HPI    Objective:   Physical Exam  Vitals signs and nursing note reviewed. Constitutional:       Appearance: He is well-developed. Neck:      Musculoskeletal: Normal range of motion and neck supple. Thyroid: No thyromegaly. Vascular: No carotid bruit or JVD. Cardiovascular:      Rate and Rhythm: Regular rhythm. Bradycardia present. Heart sounds: No murmur. No friction rub. No gallop. Pulmonary:      Effort: Pulmonary effort is normal. No respiratory distress. Breath sounds: Normal breath sounds. Musculoskeletal:      Right lower leg: Edema present. Left lower leg: Edema present. Lymphadenopathy:      Cervical: No cervical adenopathy. Neurological:      Mental Status: He is alert and oriented to person, place, and time. Psychiatric:         Behavior: Behavior normal.           Visit Vitals  /47 (BP 1 Location: Left arm, BP Patient Position: Sitting)   Pulse 60   Temp 97.5 °F (36.4 °C) (Oral)   Resp 18   Ht 5' 7\" (1.702 m)   Wt 234 lb (106.1 kg)   SpO2 98%   BMI 36.65 kg/m²       Assessment & Plan:  Diagnoses and all orders for this visit:    1. Iron deficiency anemia due to chronic blood loss  Recheck labs. -     CBC WITH AUTOMATED DIFF  -     IRON PROFILE    2. Acquired hypothyroidism  TSH within therapeutic range, no dosage change. 3. Essential hypertension  Well controlled, no medication changes. 4. Paroxysmal atrial fibrillation ([address]  Managed by cardiology, no changes. 5. Cerebrovascular accident (CVA) due to embolism of left middle cerebral artery [facility])  Appointment pending with neurology. Continue PT/OT. 6. Rosacea  -     Refill metroNIDAZOLE (METROGEL) 1 % topical gel;  Apply  to affected area daily. Use a thin layer to affected areas after washing      I have discussed the diagnosis with the patient and the intended plan as seen in the above orders. The patient has received an after-visit summary along with patient information handout. I have discussed medication side effects and warnings with the patient as well. Follow-up and Dispositions    · Return in about 4 weeks (around 1/8/2020) for disease management.            Mariana Garcia NP

## 2019-12-11 NOTE — PROGRESS NOTES
Chief Complaint   Patient presents with   [facility] Follow Up     [facility] 11/26/19    Skin Problem     patient has some spots on his face that are not heaking x 1 year     1. Have you been to the ER, urgent care clinic since your last visit? Hospitalized since your last visit? Yes, patient went to [facility] 11/26/19 due to passing out. 2. Have you seen or consulted any other health care providers outside of the [address] [place] [address] since your last visit? Include any pap smears or colon screening.  No

## 2019-12-12 ENCOUNTER — TELEPHONE (OUTPATIENT)
Dept: CARDIOLOGY CLINIC | Age: 84
End: 2019-12-12

## 2019-12-12 ENCOUNTER — HOME CARE VISIT (OUTPATIENT)
Dept: SCHEDULING | Facility: HOME HEALTH | Age: 84
End: 2019-12-12
Payer: MEDICARE

## 2019-12-12 VITALS
HEART RATE: 58 BPM | TEMPERATURE: 98.2 F | DIASTOLIC BLOOD PRESSURE: 72 MMHG | SYSTOLIC BLOOD PRESSURE: 136 MMHG | RESPIRATION RATE: 18 BRPM | OXYGEN SATURATION: 98 %

## 2019-12-12 DIAGNOSIS — D50.0 IRON DEFICIENCY ANEMIA DUE TO CHRONIC BLOOD LOSS: Primary | ICD-10-CM

## 2019-12-12 DIAGNOSIS — E53.8 B12 DEFICIENCY: ICD-10-CM

## 2019-12-12 LAB
BASOPHILS # BLD AUTO: 0.1 X10E3/UL (ref 0–0.2)
BASOPHILS NFR BLD AUTO: 1 %
EOSINOPHIL # BLD AUTO: 0.2 X10E3/UL (ref 0–0.4)
EOSINOPHIL NFR BLD AUTO: 2 %
ERYTHROCYTE [DISTWIDTH] IN BLOOD BY AUTOMATED COUNT: 17 % (ref 12.3–15.4)
HCT VFR BLD AUTO: 27.4 % (ref 37.5–51)
HGB BLD-MCNC: 8.6 G/DL (ref 13–17.7)
IMM GRANULOCYTES # BLD AUTO: 0 X10E3/UL (ref 0–0.1)
IMM GRANULOCYTES NFR BLD AUTO: 0 %
IRON SATN MFR SERPL: 74 % (ref 15–55)
IRON SERPL-MCNC: 338 UG/DL (ref 38–169)
LYMPHOCYTES # BLD AUTO: 4.4 X10E3/UL (ref 0.7–3.1)
LYMPHOCYTES NFR BLD AUTO: 42 %
MCH RBC QN AUTO: 25.6 PG (ref 26.6–33)
MCHC RBC AUTO-ENTMCNC: 31.4 G/DL (ref 31.5–35.7)
MCV RBC AUTO: 82 FL (ref 79–97)
MONOCYTES # BLD AUTO: 0.8 X10E3/UL (ref 0.1–0.9)
MONOCYTES NFR BLD AUTO: 8 %
NEUTROPHILS # BLD AUTO: 4.9 X10E3/UL (ref 1.4–7)
NEUTROPHILS NFR BLD AUTO: 47 %
PLATELET # BLD AUTO: 347 X10E3/UL (ref 150–450)
RBC # BLD AUTO: 3.36 X10E6/UL (ref 4.14–5.8)
TIBC SERPL-MCNC: 458 UG/DL (ref 250–450)
UIBC SERPL-MCNC: 120 UG/DL (ref 111–343)
WBC # BLD AUTO: 10.5 X10E3/UL (ref 3.4–10.8)

## 2019-12-12 PROCEDURE — 3331090002 HH PPS REVENUE DEBIT

## 2019-12-12 PROCEDURE — 3331090001 HH PPS REVENUE CREDIT

## 2019-12-12 PROCEDURE — G0151 HHCP-SERV OF PT,EA 15 MIN: HCPCS

## 2019-12-12 NOTE — TELEPHONE ENCOUNTER
Patient states he ripped off his heart monitor and sending it back. He states it's not for him;not going to wear it.     Phone:[phone]

## 2019-12-12 NOTE — TELEPHONE ENCOUNTER
Returned call to patient. Two patient indentifiers verified. Pt stated that he was unable to wear the heart monitor because he couldn't sleep. Pt denies any further questions at this time.

## 2019-12-12 NOTE — PROGRESS NOTES
Anemia is improved and stable. Iron levels are elevated. Stop iron supplement. Recheck levels in 4 weeks.

## 2019-12-13 ENCOUNTER — TELEPHONE (OUTPATIENT)
Dept: FAMILY MEDICINE CLINIC | Age: 84
End: 2019-12-13

## 2019-12-13 ENCOUNTER — HOME CARE VISIT (OUTPATIENT)
Dept: SCHEDULING | Facility: HOME HEALTH | Age: 84
End: 2019-12-13
Payer: MEDICARE

## 2019-12-13 PROCEDURE — 3331090002 HH PPS REVENUE DEBIT

## 2019-12-13 PROCEDURE — 3331090001 HH PPS REVENUE CREDIT

## 2019-12-13 PROCEDURE — G0300 HHS/HOSPICE OF LPN EA 15 MIN: HCPCS

## 2019-12-13 NOTE — PROGRESS NOTES
[phone] Spoke to patient Identified pt with two pt identifiers (Name @ )  Notified of patient of his labs result and understand

## 2019-12-13 NOTE — TELEPHONE ENCOUNTER
[phone] Spoke to patient Identified pt with two pt identifiers (Name @ )  Notified of patient of his labs result and understand

## 2019-12-13 NOTE — TELEPHONE ENCOUNTER
----- Message from Matthieu Sun sent at 12/13/2019  2:32 PM EST -----  Regarding: GRAZYNA Garcia/Telephone  General Message/Vendor Calls    Caller's first and last name:      Reason for call:  Labs     Callback required yes/no and why:  Yes, to see if his lab results are back     Best contact number(s):  [phone]    Details to clarify the request:      Matthieu Sun

## 2019-12-14 VITALS
OXYGEN SATURATION: 97 % | RESPIRATION RATE: 16 BRPM | HEART RATE: 60 BPM | DIASTOLIC BLOOD PRESSURE: 70 MMHG | SYSTOLIC BLOOD PRESSURE: 130 MMHG | TEMPERATURE: 97.8 F

## 2019-12-14 PROCEDURE — 3331090001 HH PPS REVENUE CREDIT

## 2019-12-14 PROCEDURE — 3331090002 HH PPS REVENUE DEBIT

## 2019-12-15 PROCEDURE — 3331090002 HH PPS REVENUE DEBIT

## 2019-12-15 PROCEDURE — 3331090001 HH PPS REVENUE CREDIT

## 2019-12-16 ENCOUNTER — HOME CARE VISIT (OUTPATIENT)
Dept: SCHEDULING | Facility: HOME HEALTH | Age: 84
End: 2019-12-16
Payer: MEDICARE

## 2019-12-16 VITALS — RESPIRATION RATE: 18 BRPM | HEART RATE: 78 BPM | TEMPERATURE: 98 F | OXYGEN SATURATION: 95 %

## 2019-12-16 PROCEDURE — 3331090001 HH PPS REVENUE CREDIT

## 2019-12-16 PROCEDURE — G0151 HHCP-SERV OF PT,EA 15 MIN: HCPCS

## 2019-12-16 PROCEDURE — 3331090002 HH PPS REVENUE DEBIT

## 2019-12-17 ENCOUNTER — HOME CARE VISIT (OUTPATIENT)
Dept: SCHEDULING | Facility: HOME HEALTH | Age: 84
End: 2019-12-17
Payer: MEDICARE

## 2019-12-17 PROCEDURE — 3331090002 HH PPS REVENUE DEBIT

## 2019-12-17 PROCEDURE — 3331090001 HH PPS REVENUE CREDIT

## 2019-12-17 PROCEDURE — G0300 HHS/HOSPICE OF LPN EA 15 MIN: HCPCS

## 2019-12-18 ENCOUNTER — HOME CARE VISIT (OUTPATIENT)
Dept: SCHEDULING | Facility: HOME HEALTH | Age: 84
End: 2019-12-18
Payer: MEDICARE

## 2019-12-18 VITALS
SYSTOLIC BLOOD PRESSURE: 128 MMHG | TEMPERATURE: 98 F | OXYGEN SATURATION: 97 % | RESPIRATION RATE: 18 BRPM | HEART RATE: 70 BPM | DIASTOLIC BLOOD PRESSURE: 60 MMHG

## 2019-12-18 PROCEDURE — 3331090001 HH PPS REVENUE CREDIT

## 2019-12-18 PROCEDURE — G0151 HHCP-SERV OF PT,EA 15 MIN: HCPCS

## 2019-12-18 PROCEDURE — 3331090002 HH PPS REVENUE DEBIT

## 2019-12-19 PROCEDURE — 3331090001 HH PPS REVENUE CREDIT

## 2019-12-19 PROCEDURE — 3331090002 HH PPS REVENUE DEBIT

## 2019-12-20 ENCOUNTER — HOME CARE VISIT (OUTPATIENT)
Dept: SCHEDULING | Facility: HOME HEALTH | Age: 84
End: 2019-12-20
Payer: MEDICARE

## 2019-12-20 ENCOUNTER — TELEPHONE (OUTPATIENT)
Dept: CARDIOLOGY CLINIC | Age: 84
End: 2019-12-20

## 2019-12-20 VITALS
OXYGEN SATURATION: 97 % | HEART RATE: 75 BPM | BODY MASS INDEX: 36.65 KG/M2 | TEMPERATURE: 98.2 F | DIASTOLIC BLOOD PRESSURE: 70 MMHG | RESPIRATION RATE: 18 BRPM | WEIGHT: 234 LBS | SYSTOLIC BLOOD PRESSURE: 120 MMHG

## 2019-12-20 PROCEDURE — 3331090001 HH PPS REVENUE CREDIT

## 2019-12-20 PROCEDURE — G0300 HHS/HOSPICE OF LPN EA 15 MIN: HCPCS

## 2019-12-20 PROCEDURE — 3331090002 HH PPS REVENUE DEBIT

## 2019-12-20 NOTE — TELEPHONE ENCOUNTER
----- Message from Francis Bueno MD sent at 12/20/2019 12:20 AM EST -----  Please let pt know loop showed already known afib. No significant pauses (the reason he was wearing the monitor). Can keep already scheduled outpt follow up.  thx

## 2019-12-21 PROCEDURE — 3331090002 HH PPS REVENUE DEBIT

## 2019-12-21 PROCEDURE — 3331090001 HH PPS REVENUE CREDIT

## 2019-12-22 VITALS
OXYGEN SATURATION: 93 % | WEIGHT: 227 LBS | TEMPERATURE: 97.9 F | DIASTOLIC BLOOD PRESSURE: 70 MMHG | BODY MASS INDEX: 35.55 KG/M2 | SYSTOLIC BLOOD PRESSURE: 136 MMHG | HEART RATE: 65 BPM | RESPIRATION RATE: 18 BRPM

## 2019-12-22 PROCEDURE — 3331090001 HH PPS REVENUE CREDIT

## 2019-12-22 PROCEDURE — 3331090002 HH PPS REVENUE DEBIT

## 2019-12-23 ENCOUNTER — HOME CARE VISIT (OUTPATIENT)
Dept: SCHEDULING | Facility: HOME HEALTH | Age: 84
End: 2019-12-23
Payer: MEDICARE

## 2019-12-23 ENCOUNTER — PATIENT OUTREACH (OUTPATIENT)
Dept: CASE MANAGEMENT | Age: 84
End: 2019-12-23

## 2019-12-23 VITALS — TEMPERATURE: 98 F | RESPIRATION RATE: 18 BRPM | HEART RATE: 78 BPM | OXYGEN SATURATION: 96 %

## 2019-12-23 PROCEDURE — 3331090001 HH PPS REVENUE CREDIT

## 2019-12-23 PROCEDURE — G0300 HHS/HOSPICE OF LPN EA 15 MIN: HCPCS

## 2019-12-23 PROCEDURE — 3331090002 HH PPS REVENUE DEBIT

## 2019-12-23 PROCEDURE — G0151 HHCP-SERV OF PT,EA 15 MIN: HCPCS

## 2019-12-23 NOTE — PROGRESS NOTES
Nurse navigator note - cardiology  Mr. Shayna Ladd has CTN management with Gurdeep Singletary RN -    He continues to have home health - with [facility] nursing and PT -   Follow up appt with Dr. Jonatan Chavez - : Jan 13 at 3:20     12/23/2019 TBD Michelet Baez LPN [facility] [address] [place] [address]   12/26/2019 TBD Shobha Edmonds [place] [address]   12/27/2019 TBD Maggie Catsellanos, BRANDEE [facility] [address] [place] [address]   1/1/2020 TBD Michelet Nestor, [place] [facility] [address] [place] [address]   1/8/2020 TBD Michelet Nestor, [place] [facility] [address] [place] [address]   1/13/2020 3:20 PM Mamie Mann MD [facility] [address]     Pt's holter monitor showed the following - and pt was made aware -  Please let pt know loop showed already known afib. No significant pauses (the reason he was wearing the monitor).      Parker Good RN , [facility], CCM  Care transitions nurse/ [facility]  [phone]

## 2019-12-24 VITALS
RESPIRATION RATE: 18 BRPM | WEIGHT: 225 LBS | SYSTOLIC BLOOD PRESSURE: 118 MMHG | BODY MASS INDEX: 35.24 KG/M2 | HEART RATE: 69 BPM | TEMPERATURE: 97.2 F | OXYGEN SATURATION: 97 % | DIASTOLIC BLOOD PRESSURE: 65 MMHG

## 2019-12-24 PROCEDURE — 3331090001 HH PPS REVENUE CREDIT

## 2019-12-24 PROCEDURE — 3331090002 HH PPS REVENUE DEBIT

## 2019-12-25 PROCEDURE — 3331090002 HH PPS REVENUE DEBIT

## 2019-12-25 PROCEDURE — 3331090001 HH PPS REVENUE CREDIT

## 2019-12-26 PROCEDURE — 3331090001 HH PPS REVENUE CREDIT

## 2019-12-26 PROCEDURE — 3331090002 HH PPS REVENUE DEBIT

## 2019-12-27 ENCOUNTER — HOME CARE VISIT (OUTPATIENT)
Dept: HOME HEALTH SERVICES | Facility: HOME HEALTH | Age: 84
End: 2019-12-27
Payer: MEDICARE

## 2019-12-27 PROCEDURE — 3331090001 HH PPS REVENUE CREDIT

## 2019-12-27 PROCEDURE — 3331090002 HH PPS REVENUE DEBIT

## 2019-12-28 PROCEDURE — 3331090002 HH PPS REVENUE DEBIT

## 2019-12-28 PROCEDURE — 3331090001 HH PPS REVENUE CREDIT

## 2019-12-29 PROCEDURE — 3331090001 HH PPS REVENUE CREDIT

## 2019-12-29 PROCEDURE — 3331090002 HH PPS REVENUE DEBIT

## 2019-12-30 PROCEDURE — 3331090001 HH PPS REVENUE CREDIT

## 2019-12-30 PROCEDURE — 3331090002 HH PPS REVENUE DEBIT

## 2019-12-31 PROCEDURE — 3331090001 HH PPS REVENUE CREDIT

## 2019-12-31 PROCEDURE — 3331090002 HH PPS REVENUE DEBIT

## 2020-01-01 PROCEDURE — 3331090002 HH PPS REVENUE DEBIT

## 2020-01-01 PROCEDURE — 3331090001 HH PPS REVENUE CREDIT

## 2020-01-02 PROCEDURE — 3331090001 HH PPS REVENUE CREDIT

## 2020-01-02 PROCEDURE — 3331090002 HH PPS REVENUE DEBIT

## 2020-01-03 ENCOUNTER — HOME CARE VISIT (OUTPATIENT)
Dept: SCHEDULING | Facility: HOME HEALTH | Age: 85
End: 2020-01-03
Payer: MEDICARE

## 2020-01-03 VITALS
OXYGEN SATURATION: 96 % | SYSTOLIC BLOOD PRESSURE: 133 MMHG | TEMPERATURE: 98.3 F | HEART RATE: 66 BPM | RESPIRATION RATE: 20 BRPM | BODY MASS INDEX: 36.02 KG/M2 | DIASTOLIC BLOOD PRESSURE: 69 MMHG | WEIGHT: 230 LBS

## 2020-01-03 PROCEDURE — G0299 HHS/HOSPICE OF RN EA 15 MIN: HCPCS

## 2020-01-03 PROCEDURE — 3331090002 HH PPS REVENUE DEBIT

## 2020-01-03 PROCEDURE — 3331090001 HH PPS REVENUE CREDIT

## 2020-01-04 PROCEDURE — 3331090001 HH PPS REVENUE CREDIT

## 2020-01-04 PROCEDURE — 3331090002 HH PPS REVENUE DEBIT

## 2020-01-05 PROCEDURE — 3331090002 HH PPS REVENUE DEBIT

## 2020-01-05 PROCEDURE — 3331090001 HH PPS REVENUE CREDIT

## 2020-01-06 PROCEDURE — 3331090001 HH PPS REVENUE CREDIT

## 2020-01-06 PROCEDURE — 3331090002 HH PPS REVENUE DEBIT

## 2020-01-07 PROCEDURE — 3331090002 HH PPS REVENUE DEBIT

## 2020-01-07 PROCEDURE — 3331090001 HH PPS REVENUE CREDIT

## 2020-01-08 PROCEDURE — 3331090001 HH PPS REVENUE CREDIT

## 2020-01-08 PROCEDURE — 3331090002 HH PPS REVENUE DEBIT

## 2020-01-09 ENCOUNTER — HOME CARE VISIT (OUTPATIENT)
Dept: SCHEDULING | Facility: HOME HEALTH | Age: 85
End: 2020-01-09
Payer: MEDICARE

## 2020-01-09 VITALS
RESPIRATION RATE: 18 BRPM | SYSTOLIC BLOOD PRESSURE: 147 MMHG | TEMPERATURE: 97 F | BODY MASS INDEX: 35.71 KG/M2 | OXYGEN SATURATION: 99 % | WEIGHT: 228 LBS | DIASTOLIC BLOOD PRESSURE: 52 MMHG | HEART RATE: 59 BPM

## 2020-01-09 PROCEDURE — 3331090002 HH PPS REVENUE DEBIT

## 2020-01-09 PROCEDURE — 3331090001 HH PPS REVENUE CREDIT

## 2020-01-09 PROCEDURE — G0299 HHS/HOSPICE OF RN EA 15 MIN: HCPCS

## 2020-01-09 PROCEDURE — 3331090003 HH PPS REVENUE ADJ

## 2020-01-09 RX ORDER — LOSARTAN POTASSIUM 50 MG/1
25 TABLET ORAL DAILY
Qty: 30 TAB | Refills: 5 | Status: SHIPPED | OUTPATIENT
Start: 2020-01-09 | End: 2021-07-06

## 2020-01-09 NOTE — TELEPHONE ENCOUNTER
patient is calling requesting a refill on the following meds. pharm on file verified. Requested Prescriptions     Pending Prescriptions Disp Refills    losartan (COZAAR) 50 mg tablet 30 Tab 0     Sig: Take 0.5 Tabs by mouth daily. Pt. Is completely out of meds.

## 2020-01-13 ENCOUNTER — OFFICE VISIT (OUTPATIENT)
Dept: CARDIOLOGY CLINIC | Age: 85
End: 2020-01-13

## 2020-01-13 VITALS
SYSTOLIC BLOOD PRESSURE: 120 MMHG | HEIGHT: 67 IN | HEART RATE: 61 BPM | OXYGEN SATURATION: 97 % | DIASTOLIC BLOOD PRESSURE: 60 MMHG | WEIGHT: 231 LBS | RESPIRATION RATE: 16 BRPM | BODY MASS INDEX: 36.26 KG/M2

## 2020-01-13 DIAGNOSIS — Z86.73 HISTORY OF STROKE: ICD-10-CM

## 2020-01-13 DIAGNOSIS — I48.20 CHRONIC A-FIB (HCC): Primary | ICD-10-CM

## 2020-01-13 DIAGNOSIS — I10 BENIGN ESSENTIAL HTN: ICD-10-CM

## 2020-01-13 DIAGNOSIS — Z79.01 CHRONIC ANTICOAGULATION: ICD-10-CM

## 2020-01-13 DIAGNOSIS — K92.2 GASTROINTESTINAL HEMORRHAGE, UNSPECIFIED GASTROINTESTINAL HEMORRHAGE TYPE: ICD-10-CM

## 2020-01-13 NOTE — PROGRESS NOTES
[address] [address]: [place]  [phone]    HPI:  Mr. 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 12/2019 loop noted afib but no significant pauses. He was hospitalized in December for a GI bleed and anemia. Colonoscopy was recommended, but he wanted to do conservative management. There were some times when he had pauses, but they were asymptomatic less than 3 seconds. He had an outpatient loop monitor that did not show any significant pauses. Since he has left the hospital, he has been steadily improving. No significant palpitations, lightheadedness, dizziness. His breathing has been stable. He denies any chest pain. He has been compliant with his medications with no bleeding issues on Eliquis. He is compensated on exam with clear lungs. He does have chronic trace to 1+ bilateral lower extremity edema. Soc Hx. , kids. Partially working. . Remote tobacco, quit 1978. Fam Hx. No early CAD. Assessment and Plan:    1. Atrial fibrillation. His heart rate is stable on the current regimen and no changes made. When he was in the hospital there was question of sick sinus, but his outpatient loop was overall normal.    2. Anticoagulation. No bleeding issues on Eliquis at 2.5 mg bid. If he does have recurrent GI bleed in the future, may need to reconsider this, though his risk for recurrent stroke is high. 3. History of CVA. 4. Essential hypertension. Blood pressure is controlled. 5. Anemia, history of GI bleed. He  has a past medical history of Arthritis, Contact dermatitis and other eczema, due to unspecified cause, Hypertension, Stroke ([address] (1997), Thyroid disease, and Unspecified hypothyroidism. He also has no past medical history of Diabetes ([address] Essential hypertension, or Hyperlipidemia. All other systems negative except as above.      PE  Vitals: 20 1529   BP: 120/60   Pulse: 61   Resp: 16   SpO2: 97%   Weight: 231 lb (104.8 kg)   Height: 5' 7\" (1.702 m)    Body mass index is 36.18 kg/m².    General appearance - alert, well appearing, and in no distress  Mental status - affect appropriate to mood  Neck - supple, no JVD  Chest - clear to auscultation, no wheezes, rales or rhonchi  Heart - irregularly irregular, normal S1, S2, no murmurs, rubs, clicks or gallops  Abdomen - soft, nontender, nondistended, no masses or organomegaly  Musculoskeletal - no muscular tenderness noted, normal strength  Extremities - peripheral pulses normal, 1+ LE pedal edema    Recent Labs:  Lab Results   Component Value Date/Time    Cholesterol, total 159 10/12/2019 08:55 AM    HDL Cholesterol 51 10/12/2019 08:55 AM    LDL, calculated 83.4 10/12/2019 08:55 AM    Triglyceride 123 10/12/2019 08:55 AM    CHOL/HDL Ratio 3.1 10/12/2019 08:55 AM     Lab Results   Component Value Date/Time    Creatinine 1.11 2019 03:09 AM     Lab Results   Component Value Date/Time    BUN 14 2019 03:09 AM     Lab Results   Component Value Date/Time    Potassium 3.7 2019 03:09 AM     Lab Results   Component Value Date/Time    Hemoglobin A1c 5.8 10/12/2019 08:55 AM     Lab Results   Component Value Date/Time    HGB 8.6 (L) 2019 04:17 PM     Lab Results   Component Value Date/Time    PLATELET 695  04:17 PM       Reviewed:  Past Medical History:   Diagnosis Date    Arthritis     Contact dermatitis and other eczema, due to unspecified cause     Hypertension     Stroke ([facility] [address]     TIA at [address] Thyroid disease     Unspecified hypothyroidism      Social History     Tobacco Use   Smoking Status Light Tobacco Smoker    Packs/day: 0.25    Types: Cigars    Last attempt to quit: 1978    Years since quittin.3   Smokeless Tobacco Never Used   Tobacco Comment    cigar on occ     Social History     Substance and Sexual Activity   Alcohol Use Yes    Alcohol/week: 14.0 standard drinks    Types: 14 Shots of liquor per week    Comment: 2 drinks a day. Allergies   Allergen Reactions    Hydrocodone Other (comments)     Unable to void, or have a bowel movement    Amlodipine Swelling and Other (comments)     Severe weeping from leg swelling    Poison Ivy Extract Itching       Current Outpatient Medications   Medication Sig    losartan (COZAAR) 50 mg tablet Take 0.5 Tabs by mouth daily.  metroNIDAZOLE (METROGEL) 1 % topical gel Apply  to affected area daily. Use a thin layer to affected areas after washing    apixaban (ELIQUIS) 2.5 mg tablet Take 1 Tab by mouth two (2) times a day.  atorvastatin (LIPITOR) 20 mg tablet Take 1 Tab by mouth daily. For cholesterol    albuterol (PROVENTIL HFA, VENTOLIN HFA, PROAIR HFA) 90 mcg/actuation inhaler INHALE 1-2 PUFFS EVERY 6 HOURS IF NEEDED (Patient taking differently: Take 2 Puffs by inhalation every four (4) hours as needed.)    aspirin 81 mg chewable tablet Take 1 Tab by mouth daily.  pantoprazole (PROTONIX) 40 mg tablet Take 1 Tab by mouth every twelve (12) hours. (Patient taking differently: Take 40 mg by mouth daily.)    traZODone (DESYREL) 50 mg tablet Take 1 Tab by mouth nightly. For sleep    levothyroxine (SYNTHROID) 50 mcg tablet Take 50 mcg by mouth Daily (before breakfast).  ketoconazole (NIZORAL) 2 % topical cream Apply 1 Each to affected area daily. apply to nose/face    tretinoin (RETIN-A) 0.025 % topical cream Apply 1 Each to affected area nightly. apply to face    triamcinolone acetonide (KENALOG) 0.1 % ointment Apply 1 Container to affected area two (2) times a day. use thin layer, apply to face    zinc (CHELATED ZINC) 50 mg tab tablet Take 50 mg by mouth daily.  lutein 20 mg cap Take 1 Cap by mouth daily.  coenzyme Q-10 (CO Q-10) 30 mg capsule Take 30 mg by mouth daily.  lecithin/soybean oil (SOYA LECITHIN PO) Take 1 Tab by mouth daily.     multivit-min/FA/lycopen/lutein (SENTRY SENIOR PO) Take 1 Tab by mouth daily.  vitamin E (AQUA GEMS) 400 unit capsule Take 400 Units by mouth daily. Reports taking 1-2 times a week.  ascorbic acid (VITAMIN C) 500 mg tablet Take 500 mg by mouth daily.  SAW PALMETTO PO Take 1 Tab by mouth daily.  CALCIUM/MAGNESIUM (DOLOMITE PO) Take 1 Tab by mouth daily. No current facility-administered medications for this visit.         Lian Rocha MD  [facility] [place]  [facility], [address] [place] [address] [address]  [facility], [address]

## 2020-01-21 ENCOUNTER — HOSPITAL ENCOUNTER (INPATIENT)
Age: 85
LOS: 5 days | Discharge: HOME HEALTH | DRG: 371 | End: 2020-01-28
Attending: EMERGENCY MEDICINE | Admitting: SURGERY
Payer: MEDICARE

## 2020-01-21 ENCOUNTER — OFFICE VISIT (OUTPATIENT)
Dept: FAMILY MEDICINE CLINIC | Age: 85
End: 2020-01-21

## 2020-01-21 ENCOUNTER — APPOINTMENT (OUTPATIENT)
Dept: CT IMAGING | Age: 85
DRG: 371 | End: 2020-01-21
Attending: EMERGENCY MEDICINE
Payer: MEDICARE

## 2020-01-21 VITALS
SYSTOLIC BLOOD PRESSURE: 106 MMHG | WEIGHT: 224 LBS | DIASTOLIC BLOOD PRESSURE: 81 MMHG | RESPIRATION RATE: 18 BRPM | HEIGHT: 67 IN | HEART RATE: 76 BPM | BODY MASS INDEX: 35.16 KG/M2 | TEMPERATURE: 100.5 F | OXYGEN SATURATION: 96 %

## 2020-01-21 DIAGNOSIS — K35.890 ACUTE PHLEGMONOUS APPENDICITIS: ICD-10-CM

## 2020-01-21 DIAGNOSIS — D50.0 IRON DEFICIENCY ANEMIA DUE TO CHRONIC BLOOD LOSS: ICD-10-CM

## 2020-01-21 DIAGNOSIS — D64.9 ACUTE ANEMIA: ICD-10-CM

## 2020-01-21 DIAGNOSIS — K35.32 PERFORATED APPENDICITIS: Primary | ICD-10-CM

## 2020-01-21 DIAGNOSIS — R10.32 LLQ ABDOMINAL PAIN: Primary | ICD-10-CM

## 2020-01-21 DIAGNOSIS — C91.10 CLL (CHRONIC LYMPHOCYTIC LEUKEMIA) (HCC): ICD-10-CM

## 2020-01-21 LAB
ALBUMIN SERPL-MCNC: 3 G/DL (ref 3.5–5)
ALBUMIN/GLOB SERPL: 0.7 {RATIO} (ref 1.1–2.2)
ALP SERPL-CCNC: 130 U/L (ref 45–117)
ALT SERPL-CCNC: 31 U/L (ref 12–78)
ANION GAP SERPL CALC-SCNC: 8 MMOL/L (ref 5–15)
APPEARANCE UR: CLEAR
AST SERPL-CCNC: 22 U/L (ref 15–37)
BACTERIA URNS QL MICRO: NEGATIVE /HPF
BASOPHILS # BLD: 0 K/UL (ref 0–0.1)
BASOPHILS NFR BLD: 0 % (ref 0–1)
BILIRUB SERPL-MCNC: 1 MG/DL (ref 0.2–1)
BILIRUB UR QL CFM: NEGATIVE
BUN SERPL-MCNC: 19 MG/DL (ref 6–20)
BUN/CREAT SERPL: 15 (ref 12–20)
CALCIUM SERPL-MCNC: 8.8 MG/DL (ref 8.5–10.1)
CHLORIDE SERPL-SCNC: 100 MMOL/L (ref 97–108)
CO2 SERPL-SCNC: 27 MMOL/L (ref 21–32)
COLOR UR: ABNORMAL
COMMENT, HOLDF: NORMAL
CREAT SERPL-MCNC: 1.26 MG/DL (ref 0.7–1.3)
DIFFERENTIAL METHOD BLD: ABNORMAL
EOSINOPHIL # BLD: 0.3 K/UL (ref 0–0.4)
EOSINOPHIL NFR BLD: 1 % (ref 0–7)
EPITH CASTS URNS QL MICRO: ABNORMAL /LPF
ERYTHROCYTE [DISTWIDTH] IN BLOOD BY AUTOMATED COUNT: 20.3 % (ref 11.5–14.5)
GLOBULIN SER CALC-MCNC: 4.6 G/DL (ref 2–4)
GLUCOSE SERPL-MCNC: 105 MG/DL (ref 65–100)
GLUCOSE UR STRIP.AUTO-MCNC: NEGATIVE MG/DL
HCT VFR BLD AUTO: 27.7 % (ref 36.6–50.3)
HGB BLD-MCNC: 7.9 G/DL (ref 12.1–17)
HGB UR QL STRIP: NEGATIVE
IMM GRANULOCYTES # BLD AUTO: 0 K/UL
IMM GRANULOCYTES NFR BLD AUTO: 0 %
KETONES UR QL STRIP.AUTO: ABNORMAL MG/DL
LEUKOCYTE ESTERASE UR QL STRIP.AUTO: ABNORMAL
LIPASE SERPL-CCNC: 73 U/L (ref 73–393)
LYMPHOCYTES # BLD: 6.2 K/UL (ref 0.8–3.5)
LYMPHOCYTES NFR BLD: 24 % (ref 12–49)
MCH RBC QN AUTO: 22.8 PG (ref 26–34)
MCHC RBC AUTO-ENTMCNC: 28.5 G/DL (ref 30–36.5)
MCV RBC AUTO: 80.1 FL (ref 80–99)
MONOCYTES # BLD: 0.8 K/UL (ref 0–1)
MONOCYTES NFR BLD: 3 % (ref 5–13)
NEUTS BAND NFR BLD MANUAL: 2 % (ref 0–6)
NEUTS SEG # BLD: 18.6 K/UL (ref 1.8–8)
NEUTS SEG NFR BLD: 70 % (ref 32–75)
NITRITE UR QL STRIP.AUTO: NEGATIVE
NRBC # BLD: 0 K/UL (ref 0–0.01)
NRBC BLD-RTO: 0 PER 100 WBC
PH UR STRIP: 6 [PH] (ref 5–8)
PLATELET # BLD AUTO: 326 K/UL (ref 150–400)
PMV BLD AUTO: 10 FL (ref 8.9–12.9)
POTASSIUM SERPL-SCNC: 3.4 MMOL/L (ref 3.5–5.1)
PROT SERPL-MCNC: 7.6 G/DL (ref 6.4–8.2)
PROT UR STRIP-MCNC: 30 MG/DL
RBC # BLD AUTO: 3.46 M/UL (ref 4.1–5.7)
RBC #/AREA URNS HPF: ABNORMAL /HPF (ref 0–5)
RBC MORPH BLD: ABNORMAL
SAMPLES BEING HELD,HOLD: NORMAL
SODIUM SERPL-SCNC: 135 MMOL/L (ref 136–145)
SP GR UR REFRACTOMETRY: 1.02 (ref 1–1.03)
UR CULT HOLD, URHOLD: NORMAL
UROBILINOGEN UR QL STRIP.AUTO: 1 EU/DL (ref 0.2–1)
WBC # BLD AUTO: 25.9 K/UL (ref 4.1–11.1)
WBC URNS QL MICRO: ABNORMAL /HPF (ref 0–4)

## 2020-01-21 PROCEDURE — 74011000258 HC RX REV CODE- 258: Performed by: INTERNAL MEDICINE

## 2020-01-21 PROCEDURE — 74011250636 HC RX REV CODE- 250/636: Performed by: EMERGENCY MEDICINE

## 2020-01-21 PROCEDURE — 99218 HC RM OBSERVATION: CPT

## 2020-01-21 PROCEDURE — 85025 COMPLETE CBC W/AUTO DIFF WBC: CPT

## 2020-01-21 PROCEDURE — 81001 URINALYSIS AUTO W/SCOPE: CPT

## 2020-01-21 PROCEDURE — 74011250637 HC RX REV CODE- 250/637: Performed by: SURGERY

## 2020-01-21 PROCEDURE — 74011636320 HC RX REV CODE- 636/320: Performed by: INTERNAL MEDICINE

## 2020-01-21 PROCEDURE — 80053 COMPREHEN METABOLIC PANEL: CPT

## 2020-01-21 PROCEDURE — 74011250636 HC RX REV CODE- 250/636: Performed by: SURGERY

## 2020-01-21 PROCEDURE — 96374 THER/PROPH/DIAG INJ IV PUSH: CPT

## 2020-01-21 PROCEDURE — 74011000258 HC RX REV CODE- 258: Performed by: EMERGENCY MEDICINE

## 2020-01-21 PROCEDURE — 83690 ASSAY OF LIPASE: CPT

## 2020-01-21 PROCEDURE — 74177 CT ABD & PELVIS W/CONTRAST: CPT

## 2020-01-21 PROCEDURE — 96366 THER/PROPH/DIAG IV INF ADDON: CPT

## 2020-01-21 PROCEDURE — 96365 THER/PROPH/DIAG IV INF INIT: CPT

## 2020-01-21 PROCEDURE — 36415 COLL VENOUS BLD VENIPUNCTURE: CPT

## 2020-01-21 PROCEDURE — 99284 EMERGENCY DEPT VISIT MOD MDM: CPT

## 2020-01-21 RX ORDER — MORPHINE SULFATE 2 MG/ML
2-4 INJECTION, SOLUTION INTRAMUSCULAR; INTRAVENOUS
Status: DISCONTINUED | OUTPATIENT
Start: 2020-01-21 | End: 2020-01-28 | Stop reason: HOSPADM

## 2020-01-21 RX ORDER — NALOXONE HYDROCHLORIDE 0.4 MG/ML
0.4 INJECTION, SOLUTION INTRAMUSCULAR; INTRAVENOUS; SUBCUTANEOUS AS NEEDED
Status: DISCONTINUED | OUTPATIENT
Start: 2020-01-21 | End: 2020-01-28 | Stop reason: HOSPADM

## 2020-01-21 RX ORDER — SODIUM CHLORIDE 0.9 % (FLUSH) 0.9 %
10 SYRINGE (ML) INJECTION
Status: COMPLETED | OUTPATIENT
Start: 2020-01-21 | End: 2020-01-21

## 2020-01-21 RX ORDER — ACETAMINOPHEN 325 MG/1
650 TABLET ORAL
Status: DISCONTINUED | OUTPATIENT
Start: 2020-01-21 | End: 2020-01-28 | Stop reason: HOSPADM

## 2020-01-21 RX ORDER — FENTANYL CITRATE 50 UG/ML
50 INJECTION, SOLUTION INTRAMUSCULAR; INTRAVENOUS ONCE
Status: DISPENSED | OUTPATIENT
Start: 2020-01-21 | End: 2020-01-22

## 2020-01-21 RX ORDER — SODIUM CHLORIDE 0.9 % (FLUSH) 0.9 %
5-40 SYRINGE (ML) INJECTION AS NEEDED
Status: DISCONTINUED | OUTPATIENT
Start: 2020-01-21 | End: 2020-01-28 | Stop reason: HOSPADM

## 2020-01-21 RX ORDER — SODIUM CHLORIDE 0.9 % (FLUSH) 0.9 %
5-40 SYRINGE (ML) INJECTION EVERY 8 HOURS
Status: DISCONTINUED | OUTPATIENT
Start: 2020-01-21 | End: 2020-01-28 | Stop reason: HOSPADM

## 2020-01-21 RX ORDER — SODIUM CHLORIDE, SODIUM LACTATE, POTASSIUM CHLORIDE, CALCIUM CHLORIDE 600; 310; 30; 20 MG/100ML; MG/100ML; MG/100ML; MG/100ML
75 INJECTION, SOLUTION INTRAVENOUS CONTINUOUS
Status: DISCONTINUED | OUTPATIENT
Start: 2020-01-21 | End: 2020-01-24

## 2020-01-21 RX ORDER — ONDANSETRON 2 MG/ML
4 INJECTION INTRAMUSCULAR; INTRAVENOUS
Status: DISCONTINUED | OUTPATIENT
Start: 2020-01-21 | End: 2020-01-28 | Stop reason: HOSPADM

## 2020-01-21 RX ORDER — DIPHENHYDRAMINE HYDROCHLORIDE 50 MG/ML
12.5 INJECTION, SOLUTION INTRAMUSCULAR; INTRAVENOUS
Status: DISCONTINUED | OUTPATIENT
Start: 2020-01-21 | End: 2020-01-24

## 2020-01-21 RX ADMIN — Medication 10 ML: at 20:32

## 2020-01-21 RX ADMIN — SODIUM CHLORIDE, SODIUM LACTATE, POTASSIUM CHLORIDE, AND CALCIUM CHLORIDE 100 ML/HR: 600; 310; 30; 20 INJECTION, SOLUTION INTRAVENOUS at 22:02

## 2020-01-21 RX ADMIN — SODIUM CHLORIDE 100 ML: 900 INJECTION, SOLUTION INTRAVENOUS at 20:32

## 2020-01-21 RX ADMIN — IOPAMIDOL 100 ML: 755 INJECTION, SOLUTION INTRAVENOUS at 20:32

## 2020-01-21 RX ADMIN — SODIUM CHLORIDE 10 ML: 9 INJECTION INTRAMUSCULAR; INTRAVENOUS; SUBCUTANEOUS at 22:00

## 2020-01-21 RX ADMIN — PIPERACILLIN AND TAZOBACTAM 3.38 G: 3; .375 INJECTION, POWDER, LYOPHILIZED, FOR SOLUTION INTRAVENOUS at 21:17

## 2020-01-21 RX ADMIN — ACETAMINOPHEN 650 MG: 325 TABLET, FILM COATED ORAL at 22:32

## 2020-01-21 NOTE — PATIENT INSTRUCTIONS

## 2020-01-21 NOTE — PROGRESS NOTES
[place] Jay is a 80 y.o. male    Chief Complaint   Patient presents with    Hip Pain     sore on hip        1. Have you been to the ER, urgent care clinic since your last visit? Hospitalized since your last visit? No    2. Have you seen or consulted any other health care providers outside of the [address] [place] [address] since your last visit? Include any pap smears or colon screening.   No

## 2020-01-21 NOTE — ED TRIAGE NOTES
He went to see his doctor today for wax in his ears and a \"wound that wont heal\" on his hip. But while he was there he mentioned and ongoing abdominal pain and distention. He was sent here to rule out diverticulitis.

## 2020-01-21 NOTE — PROGRESS NOTES
[facility] Note    Archie Donahue is a 80 y.o. male who was seen in clinic today (1/21/2020). Subjective:  Abdominal Pain  Patient complains of abdominal pain. The pain is described as throbbing, gnawing and sharp, and is 8/10 in intensity. Pain is located in the LLQ, RLQ without radiation. Onset was 1 week ago. Symptoms have been unchanged since. Aggravating factors: movement, activity and pressure. Alleviating factors: none. Associated symptoms: anorexia, constipation, fever, flatus and nausea. The patient denies dysuria. Patient previously admitted to [facility] on 11/26/19 for syncope. Patient was found to be anemic with hemeoccult + stool and found to be admitted for further evaluation. Patient received 2 units PRBC at the time of admission. EGD on 12/2 showed mild esophagitis, 3 cm hiatal hernia, seven clean based gastric ulcers, 3 mm non-bleeding duodenal AVM. Patient placed on PPI BID and Eliquis decreased to 2.5 mg BID. Taking iron supplement once daily. Skin Lesion   Patient complains of a left hip skin lesion. The patient reports that the lesion has been present for 1 year. The onset of the mass was gradual. Associated symptoms include itching, scabbing. Patient denies pain, redness or discoloration of the skin, enlargement. Prior treatments, if any: none. Prior to Admission medications    Medication Sig Start Date End Date Taking? Authorizing Provider   losartan (COZAAR) 50 mg tablet Take 0.5 Tabs by mouth daily. 1/9/20  Yes Jewell Garcia NP   metroNIDAZOLE (METROGEL) 1 % topical gel Apply  to affected area daily. Use a thin layer to affected areas after washing 12/11/19  Yes Jewell Garcia NP   apixaban (ELIQUIS) 2.5 mg tablet Take 1 Tab by mouth two (2) times a day. 12/5/19  Yes Presley Blakely NP   atorvastatin (LIPITOR) 20 mg tablet Take 1 Tab by mouth daily.  For cholesterol 12/5/19  Yes Jewell Garcia NP   albuterol (PROVENTIL HFA, VENTOLIN HFA, PROAIR HFA) 90 mcg/actuation inhaler INHALE 1-2 PUFFS EVERY 6 HOURS IF NEEDED  Patient taking differently: Take 2 Puffs by inhalation every four (4) hours as needed. 12/5/19  Yes Mary Downey NP   aspirin 81 mg chewable tablet Take 1 Tab by mouth daily. 12/5/19  Yes Mckenna Carmona MD   traZODone (DESYREL) 50 mg tablet Take 1 Tab by mouth nightly. For sleep 11/21/19  Yes Mary Downey NP   levothyroxine (SYNTHROID) 50 mcg tablet Take 50 mcg by mouth Daily (before breakfast). Yes Provider, Historical   pantoprazole (PROTONIX) 40 mg tablet Take 1 Tab by mouth every twelve (12) hours. Patient taking differently: Take 40 mg by mouth daily. 12/4/19   Mckenna Carmona MD   ketoconazole (NIZORAL) 2 % topical cream Apply 1 Each to affected area daily. apply to nose/face    Provider, Historical   tretinoin (RETIN-A) 0.025 % topical cream Apply 1 Each to affected area nightly. apply to face    Provider, Historical   triamcinolone acetonide (KENALOG) 0.1 % ointment Apply 1 Container to affected area two (2) times a day. use thin layer, apply to face    Provider, Historical   zinc (CHELATED ZINC) 50 mg tab tablet Take 50 mg by mouth daily. Provider, Historical   lutein 20 mg cap Take 1 Cap by mouth daily. Provider, Historical   coenzyme Q-10 (CO Q-10) 30 mg capsule Take 30 mg by mouth daily. Provider, Historical   lecithin/soybean oil (SOYA LECITHIN PO) Take 1 Tab by mouth daily. Provider, Historical   multivit-min/FA/lycopen/lutein (SENTRY SENIOR PO) Take 1 Tab by mouth daily. Provider, Historical   vitamin E (AQUA GEMS) 400 unit capsule Take 400 Units by mouth daily. Reports taking 1-2 times a week. Provider, Historical   ascorbic acid (VITAMIN C) 500 mg tablet Take 500 mg by mouth daily. Provider, Historical   SAW PALMETTO PO Take 1 Tab by mouth daily. Provider, Historical   CALCIUM/MAGNESIUM (DOLOMITE PO) Take 1 Tab by mouth daily.       Provider, Historical Allergies   Allergen Reactions    Hydrocodone Other (comments)     Unable to void, or have a bowel movement    Amlodipine Swelling and Other (comments)     Severe weeping from leg swelling    Poison Ivy Extract Itching           ROS  See HPI    Objective:   Physical Exam  Vitals signs and nursing note reviewed. Constitutional:       Appearance: He is well-developed. Neck:      Musculoskeletal: Normal range of motion and neck supple. Thyroid: No thyromegaly. Vascular: No carotid bruit or JVD. Cardiovascular:      Rate and Rhythm: Regular rhythm. Bradycardia present. Heart sounds: No murmur. No friction rub. No gallop. Pulmonary:      Effort: Pulmonary effort is normal. No respiratory distress. Breath sounds: Normal breath sounds. Abdominal:      General: Bowel sounds are increased. There is distension. Tenderness: There is tenderness in the right lower quadrant and left lower quadrant. Lymphadenopathy:      Cervical: No cervical adenopathy. Neurological:      Mental Status: He is alert and oriented to person, place, and time. Psychiatric:         Behavior: Behavior normal.           Visit Vitals  /81 (BP 1 Location: Right arm, BP Patient Position: Sitting)   Pulse 76   Temp (!) 100.5 °F (38.1 °C) (Oral)   Resp 18   Ht 5' 7\" (1.702 m)   Wt 224 lb (101.6 kg)   SpO2 96%   BMI 35.08 kg/m²       Assessment & Plan:  Diagnoses and all orders for this visit:    1. LLQ abdominal pain   Concern for diverticulitis. Given severity of pain and low grade fever. I have advised patient to go to ED for further evaluation. Patient and son verbalize understanding. 2. Iron deficiency anemia due to chronic blood loss  As above. Repeat labs needed. I have discussed the diagnosis with the patient and the intended plan as seen in the above orders. The patient has received an after-visit summary along with patient information handout.   I have discussed medication side effects and warnings with the patient as well. Follow-up and Dispositions    · Return if symptoms worsen or fail to improve.            Magalie James, NP

## 2020-01-22 ENCOUNTER — APPOINTMENT (OUTPATIENT)
Dept: CT IMAGING | Age: 85
DRG: 371 | End: 2020-01-22
Attending: INTERNAL MEDICINE
Payer: MEDICARE

## 2020-01-22 ENCOUNTER — APPOINTMENT (OUTPATIENT)
Dept: GENERAL RADIOLOGY | Age: 85
DRG: 371 | End: 2020-01-22
Attending: INTERNAL MEDICINE
Payer: MEDICARE

## 2020-01-22 LAB
ANION GAP SERPL CALC-SCNC: 7 MMOL/L (ref 5–15)
ATRIAL RATE: 357 BPM
BASOPHILS # BLD: 0 K/UL (ref 0–0.1)
BASOPHILS NFR BLD: 0 % (ref 0–1)
BUN SERPL-MCNC: 20 MG/DL (ref 6–20)
BUN/CREAT SERPL: 17 (ref 12–20)
CALCIUM SERPL-MCNC: 8.2 MG/DL (ref 8.5–10.1)
CALCULATED R AXIS, ECG10: -47 DEGREES
CALCULATED T AXIS, ECG11: -19 DEGREES
CHLORIDE SERPL-SCNC: 103 MMOL/L (ref 97–108)
CO2 SERPL-SCNC: 26 MMOL/L (ref 21–32)
CREAT SERPL-MCNC: 1.21 MG/DL (ref 0.7–1.3)
DIAGNOSIS, 93000: NORMAL
DIFFERENTIAL METHOD BLD: ABNORMAL
EOSINOPHIL # BLD: 0 K/UL (ref 0–0.4)
EOSINOPHIL NFR BLD: 0 % (ref 0–7)
ERYTHROCYTE [DISTWIDTH] IN BLOOD BY AUTOMATED COUNT: 19.9 % (ref 11.5–14.5)
GLUCOSE SERPL-MCNC: 104 MG/DL (ref 65–100)
HCT VFR BLD AUTO: 22.8 % (ref 36.6–50.3)
HCT VFR BLD AUTO: 24.2 % (ref 36.6–50.3)
HGB BLD-MCNC: 6.7 G/DL (ref 12.1–17)
HGB BLD-MCNC: 7.1 G/DL (ref 12.1–17)
IMM GRANULOCYTES # BLD AUTO: 0.2 K/UL (ref 0–0.04)
IMM GRANULOCYTES NFR BLD AUTO: 1 % (ref 0–0.5)
LYMPHOCYTES # BLD: 5.8 K/UL (ref 0.8–3.5)
LYMPHOCYTES NFR BLD: 27 % (ref 12–49)
MAGNESIUM SERPL-MCNC: 2.3 MG/DL (ref 1.6–2.4)
MCH RBC QN AUTO: 22.8 PG (ref 26–34)
MCHC RBC AUTO-ENTMCNC: 29.4 G/DL (ref 30–36.5)
MCV RBC AUTO: 77.6 FL (ref 80–99)
MONOCYTES # BLD: 1.5 K/UL (ref 0–1)
MONOCYTES NFR BLD: 7 % (ref 5–13)
NEUTS SEG # BLD: 14.1 K/UL (ref 1.8–8)
NEUTS SEG NFR BLD: 65 % (ref 32–75)
NRBC # BLD: 0 K/UL (ref 0–0.01)
NRBC BLD-RTO: 0 PER 100 WBC
PHOSPHATE SERPL-MCNC: 3.3 MG/DL (ref 2.6–4.7)
PLATELET # BLD AUTO: 297 K/UL (ref 150–400)
PMV BLD AUTO: 10.3 FL (ref 8.9–12.9)
POTASSIUM SERPL-SCNC: 3.4 MMOL/L (ref 3.5–5.1)
Q-T INTERVAL, ECG07: 424 MS
QRS DURATION, ECG06: 98 MS
QTC CALCULATION (BEZET), ECG08: 454 MS
RBC # BLD AUTO: 2.94 M/UL (ref 4.1–5.7)
RBC MORPH BLD: ABNORMAL
SODIUM SERPL-SCNC: 136 MMOL/L (ref 136–145)
VENTRICULAR RATE, ECG03: 69 BPM
WBC # BLD AUTO: 21.6 K/UL (ref 4.1–11.1)

## 2020-01-22 PROCEDURE — 96366 THER/PROPH/DIAG IV INF ADDON: CPT

## 2020-01-22 PROCEDURE — 74011250636 HC RX REV CODE- 250/636: Performed by: SURGERY

## 2020-01-22 PROCEDURE — 85025 COMPLETE CBC W/AUTO DIFF WBC: CPT

## 2020-01-22 PROCEDURE — 84100 ASSAY OF PHOSPHORUS: CPT

## 2020-01-22 PROCEDURE — 36415 COLL VENOUS BLD VENIPUNCTURE: CPT

## 2020-01-22 PROCEDURE — 96368 THER/DIAG CONCURRENT INF: CPT

## 2020-01-22 PROCEDURE — 74011250637 HC RX REV CODE- 250/637: Performed by: INTERNAL MEDICINE

## 2020-01-22 PROCEDURE — 93005 ELECTROCARDIOGRAM TRACING: CPT

## 2020-01-22 PROCEDURE — 30233N1 TRANSFUSION OF NONAUTOLOGOUS RED BLOOD CELLS INTO PERIPHERAL VEIN, PERCUTANEOUS APPROACH: ICD-10-PCS | Performed by: SURGERY

## 2020-01-22 PROCEDURE — 80048 BASIC METABOLIC PNL TOTAL CA: CPT

## 2020-01-22 PROCEDURE — 74011250637 HC RX REV CODE- 250/637: Performed by: SURGERY

## 2020-01-22 PROCEDURE — 96375 TX/PRO/DX INJ NEW DRUG ADDON: CPT

## 2020-01-22 PROCEDURE — 99218 HC RM OBSERVATION: CPT

## 2020-01-22 PROCEDURE — 74011250636 HC RX REV CODE- 250/636: Performed by: INTERNAL MEDICINE

## 2020-01-22 PROCEDURE — 83735 ASSAY OF MAGNESIUM: CPT

## 2020-01-22 PROCEDURE — 71045 X-RAY EXAM CHEST 1 VIEW: CPT

## 2020-01-22 PROCEDURE — 86900 BLOOD TYPING SEROLOGIC ABO: CPT

## 2020-01-22 PROCEDURE — 85018 HEMOGLOBIN: CPT

## 2020-01-22 PROCEDURE — 36430 TRANSFUSION BLD/BLD COMPNT: CPT

## 2020-01-22 PROCEDURE — C9113 INJ PANTOPRAZOLE SODIUM, VIA: HCPCS | Performed by: INTERNAL MEDICINE

## 2020-01-22 PROCEDURE — P9016 RBC LEUKOCYTES REDUCED: HCPCS

## 2020-01-22 PROCEDURE — 74011000258 HC RX REV CODE- 258: Performed by: SURGERY

## 2020-01-22 PROCEDURE — 74011000250 HC RX REV CODE- 250: Performed by: INTERNAL MEDICINE

## 2020-01-22 PROCEDURE — 86923 COMPATIBILITY TEST ELECTRIC: CPT

## 2020-01-22 RX ORDER — POTASSIUM CHLORIDE 750 MG/1
40 TABLET, FILM COATED, EXTENDED RELEASE ORAL
Status: COMPLETED | OUTPATIENT
Start: 2020-01-22 | End: 2020-01-22

## 2020-01-22 RX ORDER — LORAZEPAM 2 MG/ML
1 INJECTION INTRAMUSCULAR
Status: DISCONTINUED | OUTPATIENT
Start: 2020-01-22 | End: 2020-01-28 | Stop reason: HOSPADM

## 2020-01-22 RX ORDER — SODIUM CHLORIDE 9 MG/ML
250 INJECTION, SOLUTION INTRAVENOUS AS NEEDED
Status: DISCONTINUED | OUTPATIENT
Start: 2020-01-22 | End: 2020-01-28 | Stop reason: HOSPADM

## 2020-01-22 RX ORDER — IBUPROFEN 200 MG
1 TABLET ORAL DAILY
Status: DISCONTINUED | OUTPATIENT
Start: 2020-01-22 | End: 2020-01-28 | Stop reason: HOSPADM

## 2020-01-22 RX ORDER — LEVOTHYROXINE SODIUM 50 UG/1
50 TABLET ORAL
Status: DISCONTINUED | OUTPATIENT
Start: 2020-01-22 | End: 2020-01-28 | Stop reason: HOSPADM

## 2020-01-22 RX ORDER — ALBUTEROL SULFATE 0.83 MG/ML
2.5 SOLUTION RESPIRATORY (INHALATION)
Status: DISCONTINUED | OUTPATIENT
Start: 2020-01-22 | End: 2020-01-28 | Stop reason: HOSPADM

## 2020-01-22 RX ORDER — SODIUM CHLORIDE 0.9 % (FLUSH) 0.9 %
10 SYRINGE (ML) INJECTION
Status: ACTIVE | OUTPATIENT
Start: 2020-01-22 | End: 2020-01-22

## 2020-01-22 RX ORDER — ATORVASTATIN CALCIUM 10 MG/1
20 TABLET, FILM COATED ORAL DAILY
Status: DISCONTINUED | OUTPATIENT
Start: 2020-01-22 | End: 2020-01-28 | Stop reason: HOSPADM

## 2020-01-22 RX ORDER — ALBUTEROL SULFATE 90 UG/1
2 AEROSOL, METERED RESPIRATORY (INHALATION)
Status: DISCONTINUED | OUTPATIENT
Start: 2020-01-22 | End: 2020-01-22 | Stop reason: CLARIF

## 2020-01-22 RX ORDER — TRAZODONE HYDROCHLORIDE 50 MG/1
50 TABLET ORAL
Status: DISCONTINUED | OUTPATIENT
Start: 2020-01-22 | End: 2020-01-28 | Stop reason: HOSPADM

## 2020-01-22 RX ORDER — LOSARTAN POTASSIUM 25 MG/1
25 TABLET ORAL DAILY
Status: DISCONTINUED | OUTPATIENT
Start: 2020-01-22 | End: 2020-01-24

## 2020-01-22 RX ADMIN — PIPERACILLIN AND TAZOBACTAM 3.38 G: 3; .375 INJECTION, POWDER, LYOPHILIZED, FOR SOLUTION INTRAVENOUS at 13:47

## 2020-01-22 RX ADMIN — LEVOTHYROXINE SODIUM 50 MCG: 0.05 TABLET ORAL at 06:36

## 2020-01-22 RX ADMIN — ATORVASTATIN CALCIUM 20 MG: 10 TABLET, FILM COATED ORAL at 09:00

## 2020-01-22 RX ADMIN — SODIUM CHLORIDE 10 ML: 9 INJECTION INTRAMUSCULAR; INTRAVENOUS; SUBCUTANEOUS at 06:00

## 2020-01-22 RX ADMIN — SODIUM CHLORIDE 10 ML: 9 INJECTION INTRAMUSCULAR; INTRAVENOUS; SUBCUTANEOUS at 21:03

## 2020-01-22 RX ADMIN — TRAZODONE HYDROCHLORIDE 50 MG: 50 TABLET ORAL at 21:02

## 2020-01-22 RX ADMIN — POTASSIUM CHLORIDE 40 MEQ: 750 TABLET, FILM COATED, EXTENDED RELEASE ORAL at 06:03

## 2020-01-22 RX ADMIN — ACETAMINOPHEN 650 MG: 325 TABLET, FILM COATED ORAL at 05:59

## 2020-01-22 RX ADMIN — PIPERACILLIN AND TAZOBACTAM 3.38 G: 3; .375 INJECTION, POWDER, LYOPHILIZED, FOR SOLUTION INTRAVENOUS at 21:02

## 2020-01-22 RX ADMIN — FOLIC ACID: 5 INJECTION, SOLUTION INTRAMUSCULAR; INTRAVENOUS; SUBCUTANEOUS at 10:55

## 2020-01-22 RX ADMIN — SODIUM CHLORIDE 40 MG: 9 INJECTION INTRAMUSCULAR; INTRAVENOUS; SUBCUTANEOUS at 21:02

## 2020-01-22 RX ADMIN — SODIUM CHLORIDE 10 ML: 9 INJECTION INTRAMUSCULAR; INTRAVENOUS; SUBCUTANEOUS at 14:00

## 2020-01-22 RX ADMIN — ACETAMINOPHEN 650 MG: 325 TABLET, FILM COATED ORAL at 15:37

## 2020-01-22 RX ADMIN — SODIUM CHLORIDE, SODIUM LACTATE, POTASSIUM CHLORIDE, AND CALCIUM CHLORIDE 100 ML/HR: 600; 310; 30; 20 INJECTION, SOLUTION INTRAVENOUS at 13:48

## 2020-01-22 NOTE — CONSULTS
Cardiology Consult Note      Patient Name: Archie Donahue  : 1930 MRN: [id number]  Date: 2020  Time: 9:12 AM    Admit Diagnosis: Acute phlegmonous appendicitis [K35.890]    Primary Cardiologist: Morelia Barry MD    Consulting Cardiologist: Harsh Cornell M.D.    Reason for Consult: afib, preop    Requesting MD: Dr. Fidel Ramirez MD    HPI:  Archie Donahue is a 80 y.o. male admitted on 2020  for Acute phlegmonous appendicitis [K35.890]. Has PMH of HTN, CVA/TIA in  (left sided facial droop) noted at [facility], h/o retinal disease, atrial fibrillation.  He was hospitalized in December for a GI bleed and anemia. Colonoscopy was recommended, but he wanted to do conservative management. He did have an EGD 20 which showed mild esophagitis, 3 cm hiatal hernia, seven clean based gastric ulcers, 3 mm non-bleeding duodenal AVM treated with APC - at that time he was transitioned from Heparin back to his eliquis. Had some pauses <3 seconds during that hospitalization and there was question of sick sinus syndrome but Had outpatient loop monitor last month which showed afib but no significant pauses. He saw Dr. Glenny Swain on  and at that time there was no bleeding issues on eliquis and his afib was rate controlled. On 1/15/2020, pt developed abdominal pain described as cramping. Also had abdominal distension. He started taking antibiotic a couple days later and says pain got better. However, he still had tenderness and some nausea. He went to see his PCP who sent him to ER. He was found to have a ruptured appendix. Surgery is following and due to the amount of inflammation, he is being treated with antibiotics for now- trying to avoid surgery. A repeat CT abdomen is planned in few days. Cardiology asked to see for preop evaluation and afib. Pt reports that he has had several strokes, last stroke he says was last year.  States he had a stroke once while only missing 1 day of Eliquis. He is very concerned about holding anticoagulation given previous CVAs. He reports that prior to abdominal pain onset, he was able to ambulate around house and store with assistive device (cane or shopping cart) without chest pain or SOB. He denies any hx of palpitations, dizziness, lightheadedness, syncope, or near syncope. SH:former smoker (says he no longer smokes), Drinks 2 glasses of wine a day. Subjective:  Currently denies chest pain, SOB, dizziness, palpitations. He is in afib, rate controlled. He is very concerned about anticoagulation being held due to having had CVA when missed [address]. Assessment and Plan     1. Afib, chronic   -Rate controlled (not on rate control medications PTA). No significant pauses on tele review. -[id number]= 5 (age, CVA, HTN): Was on Eliquis 2.5 mg BID. Has had CVA off eliquis reportedly per pt. Risk for CVA is high. Yet, clearly has marked anemia and concerning for bleeding. Anticoagulation held for now. - further plan per Dr. Anjana Armstrong.   -Last echo 11/29/18 EF 61-65%, NWMA, trace MR, Severe SCOTT, mild TR, mild dilated RV, mild aortic root dilitation (4.1 cm)   -TSH 11/2019 NL     2. Preop evaluation:     -Defer to Dr. Anjana Armstrong     3. Severe Anemia:  hgb 6.7   -PRBCs transfusing. -D/W Dr. Jane Shafer- does not feel anemia related to appendix rupture.   -Had EGD 12/2/20 with seven ulcers and a non bleeding duodenal AVM treated with APC- stool OB pending but would consider GI evaluation. On protonix PTA   -Anticoagulation on hold    4. Hx of HTN;  BP controlled   -Will hold home losartan for now given severe anemia and controlled BP    5. Ruptured appendix:    -Surgery following. 6. Hx of CVA    I have seen and examined the patient and agree with N.P. assessment.     Discussed current situation with patient  He has been fairly asymptomatic cardiac wise in the last several weeks  His echo of 11/19 with normal EF   his ecg on admission is unchanged with af rate controlled and minimal nstt    At this time the patient is at an acceptable albeit moderate risk for ruptured appendix surgery if needed    regarding anticoagulation this will be on hold given recent bleeding and profound anemia (patient receiving blood)  Risk of recurrent CVA heightened as discussed with patient  Once anemia stabilizes consider transition o heparin but we will defer to Dr. Brandy Hinkle in AM      Review of Symptoms:  Constitutional: Positive for fever,   HEENT: Negative for nosebleeds, vision changes. Respiratory: Negative for cough, SOB  Cardiovascular: Negative for chest pain, palpitations, orthopnea, leg swelling, syncope, and PND. Gastrointestinal: Positive for nausea, vomiting, negative blood in stool and melena, positive abdominal pain   Genitourinary: Negative for dysuria, and hematuria. Musculoskeletal: Negative for myalgias  Skin: Negative for rash. Heme: Does not bleed or bruise easily. Neurological: Negative for speech changes and focal weakness      Previous treatment/evaluation includes echocardiogram and stress echo . Cardiac risk factors: smoking/ tobacco exposure, sedentary life style, male gender, hypertension.     Past Medical History:   Diagnosis Date    Arthritis     Contact dermatitis and other eczema, due to unspecified cause     Hypertension     Stroke ([address] 1997    TIA at [facility] Thyroid disease     Unspecified hypothyroidism      Past Surgical History:   Procedure Laterality Date    HX HEENT      detached retina    HX HIP REPLACEMENT  2008    right [facility]     Current Facility-Administered Medications   Medication Dose Route Frequency    atorvastatin (LIPITOR) tablet 20 mg  20 mg Oral DAILY    levothyroxine (SYNTHROID) tablet 50 mcg  50 mcg Oral ACB    losartan (COZAAR) tablet 25 mg  25 mg Oral DAILY    traZODone (DESYREL) tablet 50 mg  50 mg Oral QHS    albuterol (PROVENTIL VENTOLIN) nebulizer solution 2.5 mg 2.5 mg Nebulization Q4H PRN    0.9% sodium chloride infusion 250 mL  250 mL IntraVENous PRN    lactated Ringers 7,191 mL with folic acid 1 mg, thiamine 100 mg, mvi, adult no. 4 with vit K 10 mL infusion   IntraVENous DAILY    nicotine (NICODERM CQ) 21 mg/24 hr patch 1 Patch  1 Patch TransDERmal DAILY    LORazepam (ATIVAN) injection 1 mg  1 mg IntraVENous Q4H PRN    sodium chloride 0.9 % bolus infusion 100 mL  100 mL IntraVENous RAD ONCE    iopamidoL (ISOVUE-370) 76 % injection 100 mL  100 mL IntraVENous RAD ONCE    sodium chloride (NS) flush 10 mL  10 mL IntraVENous RAD ONCE    sodium chloride (NS) flush 5-40 mL  5-40 mL IntraVENous Q8H    sodium chloride (NS) flush 5-40 mL  5-40 mL IntraVENous PRN    acetaminophen (TYLENOL) tablet 650 mg  650 mg Oral Q6H PRN    morphine injection 2-4 mg  2-4 mg IntraVENous Q3H PRN    naloxone (NARCAN) injection 0.4 mg  0.4 mg IntraVENous PRN    ondansetron (ZOFRAN) injection 4 mg  4 mg IntraVENous Q4H PRN    diphenhydrAMINE (BENADRYL) injection 12.5 mg  12.5 mg IntraVENous Q6H PRN    piperacillin-tazobactam (ZOSYN) 3.375 g in 0.9% sodium chloride (MBP/ADV) 100 mL  3.375 g IntraVENous Q8H    lactated Ringers infusion  100 mL/hr IntraVENous CONTINUOUS       Allergies   Allergen Reactions    Hydrocodone Other (comments)     Unable to void, or have a bowel movement    Amlodipine Swelling and Other (comments)     Severe weeping from leg swelling    Poison Ivy Extract Itching      Family History   Problem Relation Age of Onset    Lung Disease Father     Cancer Father         lung    Coronary Artery Disease Neg Hx       Social History     Socioeconomic History    Marital status:      Spouse name: Not on file    Number of children: Not on file    Years of education: Not on file    Highest education level: Not on file   Tobacco Use    Smoking status: Light Tobacco Smoker     Packs/day: 0.25     Types: Cigars     Last attempt to quit: 9/27/1978     Years since quittin.3    Smokeless tobacco: Never Used    Tobacco comment: cigar on occ   Substance and Sexual Activity    Alcohol use: Yes     Alcohol/week: 14.0 standard drinks     Types: 14 Shots of liquor per week     Comment: 2 drinks a day.  Drug use: No    Sexual activity: Yes     Partners: Female   Other Topics Concern     Service Yes    Blood Transfusions No    Caffeine Concern No    Occupational Exposure No    Hobby Hazards No    Stress Concern No    Weight Concern Yes    Special Diet No    Back Care No    Exercise Yes    Bike Helmet No    Seat Belt Yes    Self-Exams Yes       Objective:    Physical Exam    Vitals:   Vitals:    20 0748 20 0823 20 0856 20 0908   BP: 136/66 148/74 129/63 129/63   Pulse: 74 72 70 76   Resp: 16 16 16 16   Temp: 98.2 °F (36.8 °C) 98.2 °F (36.8 °C)  98.4 °F (36.9 °C)   SpO2: 96% 96% 97% 95%       General:    Alert, cooperative, no distress, appears stated age. Neck:   Supple, no carotid bruit and no JVD. Back:     Symmetric,     Lungs:     Scattered course breath sounds. Heart[de-identified]    Irregularly irregular rate and rhythm, S1, S2 normal, no murmur, click, rub or gallop. Abdomen:     Soft, non-tender. Bowel sounds normal. No masses,  No      organomegaly. Extremities:   Extremities normal, atraumatic, no cyanosis or edema. Vascular:   Pulses - 1+ DP   Skin:   Warm, dry   Neurologic: Alert, oriented x 3.        Telemetry: afib, rate controlled    ECG:   EKG Results     Procedure 720 Value Units Date/Time    EKG, 12 LEAD, INITIAL [[id number]] Collected:  20    Order Status:  Completed Updated:  20     Ventricular Rate 69 BPM      Atrial Rate 357 BPM      QRS Duration 98 ms      Q-T Interval 424 ms      QTC Calculation (Bezet) 454 ms      Calculated R Axis -47 degrees      Calculated T Axis -19 degrees      Diagnosis --     Atrial fibrillation with premature ventricular or aberrantly conducted complexes  Left axis deviation  Abnormal ECG  When compared with ECG of 03-DEC-2019 16:28,  No significant change was found              Data Review:     Radiology:  XR Results (most recent):  Results from Hospital Encounter encounter on 01/21/20   XR CHEST PORT    Narrative Portable chest single view dated 1/22/2020    Comparison chest dated 12/2/2019    History is fever    A single portable AP upright view of the chest was obtained. This film was  obtained during a less than optimal degree of inspiration. The cardiac  silhouette continues to be enlarged. There has been slight interval worsening of  the congestive change. No areas of lobar consolidation are identified. Impression IMPRESSION: Slight interval worsening of the congestive change. No evidence of  lobar consolidation. No results for input(s): CPK, TROIQ in the last 72 hours. No lab exists for component: CKQMB, CPKMB, BMPP  Recent Labs     01/22/20 0139 01/21/20 1927    135*   K 3.4* 3.4*    100   CO2 26 27   BUN 20 19   CREA 1.21 1.26   * 105*   PHOS 3.3  --    CA 8.2* 8.8     Recent Labs     01/22/20 0139 01/21/20 1927   WBC 21.6* 25.9*   HGB 6.7* 7.9*   HCT 22.8* 27.7*    326     Recent Labs     01/21/20 1927   SGOT 22   *     No results for input(s): CHOL, LDLC in the last 72 hours. No lab exists for component: TGL, HDLC,  HBA1C  No results for input(s): CRP, TSH, TSHEXT in the last 72 hours. No lab exists for component: ESR    Thank you very much for this referral. I appreciate the opportunity to participate in this patient's care. I will follow along with above stated plan. Patricia Hicks NP         [facility] [address] [address] [address] [place] [address]     [phone]    CC:Sue Garcia NP

## 2020-01-22 NOTE — PROGRESS NOTES
Bedside and Verbal shift change report given to [address] (oncoming nurse) by Ivis Palacio RN (offgoing nurse). Report included the following information SBAR and Kardex.

## 2020-01-22 NOTE — ED PROVIDER NOTES
80 y.o. male with past medical history significant for TIA (1997), thyroid disease, hypothyroid, HTN who presents via personal vehicle with chief complaint of abdominal pain. Pt is followed by Dr. Armani Sal for cardiology. Saw Dr. Itzel Bailey office on 1/13/2020 and states on 1/15/2020 he started with severe abdominal cramping, abdominal distention, and sleep difficulties secondary to pain. States he took some old antibiotics that he had at home following pain onset and it helped his pain go away. Now c/o abdominal pain under palpation only, persistent abdominal distention, N/V. Saw his PCP earlier today 1/21/2020 and due to severe abdominal pain/tenderness under palpation was sent into ED for eval with concern for diverticulitis. Pt denies diarrhea, dysuria, or any erythema/edema of groin. There are no other acute medical concerns at this time. Surgical hx - right hip replacement, detached retina repair   Social hx - Tobacco use: daily smoker (0.25 packs/day), Alcohol Use: daily drinker (2 drinks/day), Drug Use: denies    PCP: Celeste Brenner NP    Note written by Wallace Schafer, as dictated by Dc Umana DO 7:33PM.      The history is provided by the patient. No  was used.         Past Medical History:   Diagnosis Date    Arthritis     Contact dermatitis and other eczema, due to unspecified cause     Hypertension     Stroke ([address] 1997    [facility] at [facility] Thyroid disease     Unspecified hypothyroidism        Past Surgical History:   Procedure Laterality Date    HX HEENT      detached retina    HX HIP REPLACEMENT  2008    right hip, [facility]         Family History:   Problem Relation Age of Onset    Lung Disease Father     Cancer Father         lung    Coronary Artery Disease Neg Hx        Social History     Socioeconomic History    Marital status:      Spouse name: Not on file    Number of children: Not on file    Years of education: Not on file    Highest education level: Not on file   Occupational History    Not on file   Social Needs    Financial resource strain: Not on file    Food insecurity:     Worry: Not on file     Inability: Not on file    Transportation needs:     Medical: Not on file     Non-medical: Not on file   Tobacco Use    Smoking status: Light Tobacco Smoker     Packs/day: 0.25     Types: Cigars     Last attempt to quit: 1978     Years since quittin.3    Smokeless tobacco: Never Used    Tobacco comment: cigar on occ   Substance and Sexual Activity    Alcohol use: Yes     Alcohol/week: 14.0 standard drinks     Types: 14 Shots of liquor per week     Comment: 2 drinks a day.  Drug use: No    Sexual activity: Yes     Partners: Female   Lifestyle    Physical activity:     Days per week: Not on file     Minutes per session: Not on file    Stress: Not on file   Relationships    Social connections:     Talks on phone: Not on file     Gets together: Not on file     Attends [employer] service: Not on file     Active member of club or organization: Not on file     Attends meetings of clubs or organizations: Not on file     Relationship status: Not on file    Intimate partner violence:     Fear of current or ex partner: Not on file     Emotionally abused: Not on file     Physically abused: Not on file     Forced sexual activity: Not on file   Other Topics Concern     Service Yes    Blood Transfusions No    Caffeine Concern No    Occupational Exposure No    Hobby Hazards No    Sleep Concern Not Asked    Stress Concern No    Weight Concern Yes    Special Diet No    Back Care No    Exercise Yes    Bike Helmet No    Seat Belt Yes    Self-Exams Yes   Social History Narrative    Not on file         ALLERGIES: Hydrocodone; Amlodipine; and Poison ivy extract    Review of Systems   Constitutional: Negative for activity change, appetite change, chills and fever.    HENT: Negative for congestion, rhinorrhea, sinus pain, sneezing and sore throat. Eyes: Negative for photophobia and visual disturbance. Respiratory: Negative for cough and shortness of breath. Cardiovascular: Negative for chest pain. Gastrointestinal: Positive for abdominal distention, abdominal pain, nausea and vomiting. Negative for blood in stool, constipation and diarrhea. Genitourinary: Negative for difficulty urinating, dysuria, flank pain, hematuria, penile pain and testicular pain. Musculoskeletal: Negative for arthralgias, back pain, myalgias and neck pain. Skin: Negative for rash and wound. Neurological: Negative for syncope, weakness, light-headedness, numbness and headaches. Psychiatric/Behavioral: Negative for self-injury and suicidal ideas. All other systems reviewed and are negative. Vitals:    01/21/20 1553   BP: 118/57   Pulse: 76   Resp: 16   Temp: 98.5 °F (36.9 °C)   SpO2: 97%            Physical Exam  Vitals signs and nursing note reviewed. Constitutional:       General: He is not in acute distress. Appearance: Normal appearance. He is well-developed. He is not diaphoretic. Comments: Pleasant during encounter    HENT:      Head: Normocephalic and atraumatic. Nose: Nose normal.   Eyes:      Extraocular Movements: Extraocular movements intact. Conjunctiva/sclera: Conjunctivae normal.      Pupils: Pupils are equal, round, and reactive to light. Neck:      Musculoskeletal: Neck supple. Cardiovascular:      Rate and Rhythm: Normal rate and regular rhythm. Heart sounds: Normal heart sounds. Pulmonary:      Effort: Pulmonary effort is normal.      Breath sounds: Normal breath sounds. Abdominal:      Tenderness: There is guarding. Comments: Exquisitely tender in RLQ and LLQ. Mild distention    Musculoskeletal:         General: No tenderness. Skin:     General: Skin is warm and dry. Neurological:      General: No focal deficit present.       Mental Status: He is alert and oriented to person, place, and time. Cranial Nerves: No cranial nerve deficit. Sensory: No sensory deficit. Motor: No weakness. Coordination: Coordination normal.        Note written by Wallace Alfaro, as dictated by Harley Kurtz DO 7:33PM.    MDM    80 y.o. male presents with lower abdominal pain, concern for possible appendicitis, diverticulitis or obstruction/volvulous. Afebrile with VSS. UA neg for infection. Labs returned showing significant leukocytosis of 25.9, Hg 7.9, normal bicarb, no metabolic acidosis. CT abd/plv shows acute perforated appendicitis. Given dose of zosyn. General surgery consulted and saw the patient at the bedside. Procedures   Total critical care time spent exclusive of procedures:  45 minutes    CONSULT NOTE:  9:05PM Harley Kurtz DO spoke with Dr. Nikko Catalan, Consult for Surgery. Discussed available diagnostic tests and clinical findings.

## 2020-01-22 NOTE — CONSULTS
[address]    Name:  Amelie Soria  MR#:  [id number]  :  1930  ACCOUNT #:  [de-identified]  DATE OF SERVICE:  2020      The patient was seen and evaluated for medical consult by me on 2020. PRIMARY CARE PHYSICIAN:  Henri Pierce NP    SOURCE OF INFORMATION:  The patient. CHIEF COMPLAINT:  Abdominal pain. HISTORY OF PRESENT ILLNESS:  This is an 66-year-old man with past medical history significant for hypertension, hypothyroidism, chronic atrial fibrillation; on Eliquis for anticoagulation, dyslipidemia, and TIA was in his usual state of health until about a week ago when the patient developed abdominal pain. The abdominal pain is diffuse in location. The patient described the abdominal pain as cramping associated with distention and inability to sleep. The pain was constant with no radiation, 6/10 in severity. The patient took all antibiotics that he had at home and his abdominal pain became better. The patient is able to go about his activity of daily living. He went to his primary care physician today because of warts in his ear and also because of ulcer in his lip. While at the primary care physician office, the patient was found to have tenderness on abdominal palpation. The patient was then sent to the emergency room for CT scan of the abdomen and pelvis because of the concern for diverticulitis. When the patient arrived at the emergency room, CT scan of the abdomen and pelvis was performed. The CT scan shows perforated appendicitis. The emergency room physician consulted general surgeon on call. The patient was admitted to surgical service. Medical consult was requested for preop evaluation and medical management. The patient denies fever, rigor, and chills. PAST MEDICAL HISTORY:  Hypertension, hypothyroidism, chronic atrial fibrillation; on Eliquis for anticoagulation, and dyslipidemia.     ALLERGIES:  THE PATIENT IS ALLERGIC TO HYDROCODONE, NORVASC, AND POISON IVY. MEDICATIONS:  1. Albuterol 90 mcg inhalation every 6 hours as needed. 2.  Eliquis 2.5 mg twice daily. 3.  Vitamin C 500 mg daily. 4.  Aspirin 81 mg daily. 5.  Lipitor 20 mg daily. 6.  Synthroid 50 mcg daily. 7.  Losartan 50 mg half tablet daily. 8.  Protonix 40 mg every 12 hours. 9.  Trazodone 50 mg daily as needed for sleep. FAMILY HISTORY:  This was reviewed. Father had lung cancer. PAST SURGICAL HISTORY:  This is significant for right hip replacement and surgery for detached retina. SOCIAL HISTORY:  The patient smokes less than half a pack of cigarettes daily. The patient drinks about 14 standard drinks per week. REVIEW OF SYSTEMS:  HEAD, EYES, EARS, NOSE AND THROAT:  No headache, no dizziness, no blurring of vision, and no photophobia. RESPIRATORY SYSTEM:  No cough, no shortness of breath, and no hemoptysis. CARDIOVASCULAR SYSTEM:   No chest pain, no orthopnea, and no palpitation. GASTROINTESTINAL SYSTEM:  This is positive for abdominal pain, nausea, and vomiting. No diarrhea. No constipation. GENITOURINARY SYSTEM:  No dysuria, no urgency, and no frequency. All other systems are reviewed and they are negative. PHYSICAL EXAMINATION:  GENERAL APPEARANCE:  The patient appeared ill and in moderate distress. VITAL SIGNS:  On arrival at the emergency room; temperature 98.5, pulse 76, respiratory rate 16, blood pressure 118/57, and oxygen saturation 97% on room air. HEAD:  Normocephalic, atraumatic. EYES:  Normal eye movements. No redness. No drainage. No discharge. EARS:  Normal external ears with no evidence of drainage. NOSE:  No deformity. No drainage. MOUTH AND THROAT:  No visible oral lesion. Dry oral mucosa. NECK:  Neck is supple. No JVD. No thyromegaly. CHEST:  Few expiratory wheezing. No crackles. HEART:  Normal S1 and S2. Irregularly irregular. No clinically appreciable murmur. ABDOMEN:  Diffuse tenderness.   No rebound tenderness. No guarding. Reduced bowel sounds. CNS:  Alert and oriented x3. No gross focal neurological deficit. EXTREMITIES:  No edema. Pulses 2+ bilaterally. MUSCULOSKELETAL SYSTEM:  No obvious joint deformity and swelling. SKIN:  No active skin lesions seen in the exposed part of the body. PSYCHIATRY:  Normal mood and affect. LYMPHATIC SYSTEM:  No cervical lymphadenopathy. DIAGNOSTIC DATA:  The CT scan of the abdomen and pelvis shows acute perforated appendicitis with extensive periappendiceal inflammation. LABORATORY DATA:  Chemistry; sodium 135, potassium 3.4, chloride 100, CO2 of 27, glucose 105, BUN 19, creatinine 1.26, calcium 8.8, total bilirubin 1.0, ALT 31, AST 22, alkaline phosphatase 130, total protein 7.6, albumin level 3.0, and globulin at 4.6. Urinalysis, this is significant for negative nitrite, small leukocyte esterase, negative for bacteria, and negative for blood. Hematology; WBC 25.9, hemoglobin 7.9, hematocrit 27.7, and platelets 145. Lipase level 73. ASSESSMENT:  1. Acute perforated appendicitis. 2.  Hypertension. 3.  Hypothyroidism. 4.  Persistent atrial fibrillation. 5.  Tobacco abuse. 6.  Dyslipidemia. 7.  Anemia. 8.  Hypokalemia. 9.  Alcohol abuse. 10.  Leukocytosis. PLAN:  1. Acute perforated appendicitis. The patient will continue treatment for perforated appendicitis as per surgical service. The patient has been started on appropriate antibiotics as per as per surgical service. 2.  Hypertension. The patient has been started back on his preadmission medication. We will monitor the patient's blood pressure closely. 3.  Hypothyroidism. The patient has been started on Synthroid. We will check TSH level. 4.  Persistent atrial fibrillation. We will obtain an EKG. We will hold Eliquis because of suspected surgical intervention.   We will obtain echocardiogram.  Cardiology consult will be requested to assist in further evaluation and treatment. The patient will require cardiac clearance prior to surgical intervention. 5.  Tobacco abuse. We will place the patient on Nicoderm patch. 6.  Dyslipidemia. The patient has been started on Lipitor. 7.  Anemia. This is most likely due to chronic disease. We will carry out anemia workup including checking a stool guaiac to rule out occult GI bleed. We will transfuse as indicated. 8.  Hypokalemia. We will replace potassium and repeat potassium level. Magnesium level is within normal limits. 9.  Alcohol abuse. The patient advised to cut back on alcohol consumption. We will start the patient on banana bag. The patient will also be placed on Ativan as needed and if the patient's CIWA score becomes elevated, we will initiate CIWA protocol. 10.  Leukocytosis. The patient presented with significant leukocytosis, most likely as a result of the perforated appendicitis. The patient has been started on antibiotics for the perforated appendicitis. We will obtain chest x-ray to evaluate the patient for pneumonia. Because of the significant leukocytosis, we will obtain CT scan of the chest to rule out mass lesion and pulmonary embolism and other acute pathology in the chest.  The patient had a CT scan of the abdomen and pelvis with contrast done on arrival at the emergency room. The CTA of the chest will be delayed for 24 hours because of the initial CT scan of the abdomen and pelvis with contrast.    In summary, this is an 49-year-old man who presented in the emergency room with abdominal pain and the CT scan shows perforated appendicitis. The patient was admitted to the surgical service. Medical consult was requested for preop clearance as well as medical management. Evaluation and management of the patient's medical problems are as stated above. The patient will require clearance by cardiologist prior to surgery unless if the patient's surgical intervention is determined to be an emergency. We will follow the patient closely with the surgical service. Thank you for involving the hospitalist service in the care of this patient. About 1 hour spent on this medical consult.       MD ARIANNA [email or web address]  D:  01/22/2020 5:46  T:  01/22/2020 7:16  JOB #:  [id number]

## 2020-01-22 NOTE — PROGRESS NOTES
Problem: Falls - Risk of  Goal: *Absence of Falls  Description  Document Kris Everett Fall Risk and appropriate interventions in the flowsheet.   Outcome: Progressing Towards Goal  Note: Fall Risk Interventions:  Mobility Interventions: Communicate number of staff needed for ambulation/transfer, Patient to call before getting OOB, Utilize walker, cane, or other assistive device         Medication Interventions: Patient to call before getting OOB, Teach patient to arise slowly    Elimination Interventions: Call light in reach, Patient to call for help with toileting needs

## 2020-01-22 NOTE — PROGRESS NOTES
[facility] NP Progress note    Name: Pallavi Grey  YOB: 1930  MRN: [id number]  Admission Date: 1/21/2020  5:53 PM    Date of service: 1/22/2020 4:14 AM                                Overnight Update:        Notified by patient's nurse of current HGB/HCT:    Lab Results   Component Value Date/Time    HGB 6.7 (L) 01/22/2020 01:39 AM    HCT 22.8 (L) 01/22/2020 01:39 AM     Will transfuse 1 unit(s) of PRBC. Consent verified and witnessed by primary RN.       Kenyon COPELAND-C, PAYumikoC  [phone] or Pj

## 2020-01-22 NOTE — PROGRESS NOTES
Patient would like to speak to Dr. Charles Griffith taking his Eliquis. He states he cannot miss a dose, and this  RN informed him that there was an order placed for no anticoagulation meds. RN told patient to speak his concerns to the DR. In the am. Will continue to monitor. 7808: Patient's hemoglobin is 6.7 this morning. Perfect served Dr. Seda Fields, who asked to consult with the hospitalist. [place] NP, awaiting response. Spoke to [place] [facility] NP who ordered 1 unit of blood. He will also come up to see the patient to get the consent form signed. Sending off type and screen now. Will continue to monitor. 3129: Before going to  blood, this RN took patients VS. Patient had a temp of 100.3, and when repeated it was 101.1. RN has administered tylenol PO and will retest temperature. Will continue to monitor.

## 2020-01-22 NOTE — H&P
Surgery History and Physical    Subjective:      Aida Arciniega is a 80 y.o. male who presents complaining of an 8-day history of abdominal pain. After the first 2 days the patient began taking amoxicillin that he had at home. He states that this seemed to help the pain. He states it only hurts now when people touch it. He complains of abdominal cramping distention and difficulty sleeping on it. He did have mild nausea. He denies any fevers chills sweats. He finally saw his primary care physician today who thought he might have diverticulitis and sent to the hospital.  He has never had anything similar in the past.  Of note the patient recently had a stroke and was placed on Eliquis. He was also noted to be anemic a month later and underwent an EGD that showed ulcers. Patient Active Problem List    Diagnosis Date Noted    Acute phlegmonous appendicitis 2020    Acute anemia 2019    Syncope 2019    Stroke ([address] 10/12/2019    Severe obesity (BMI 35.0-39. 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    Encounter for monitoring Coumadin therapy 2014    Paroxysmal atrial fibrillation ([address] 2014    Hypothyroidism 2011    Essential hypertension 2010     Past Medical History:   Diagnosis Date    Arthritis     Contact dermatitis and other eczema, due to unspecified cause     Hypertension     Stroke ([address]     TIA at [facility] Thyroid disease     Unspecified hypothyroidism       Past Surgical History:   Procedure Laterality Date    HX HEENT      detached retina    HX HIP REPLACEMENT  2008    right hip, [facility]      Social History     Tobacco Use    Smoking status: Light Tobacco Smoker     Packs/day: 0.25     Types: Cigars     Last attempt to quit: 1978     Years since quittin.3    Smokeless tobacco: Never Used    Tobacco comment: cigar on occ Substance Use Topics    Alcohol use: Yes     Alcohol/week: 14.0 standard drinks     Types: 14 Shots of liquor per week     Comment: 2 drinks a day. Family History   Problem Relation Age of Onset    Lung Disease Father     Cancer Father         lung    Coronary Artery Disease Neg Hx       Prior to Admission medications    Medication Sig Start Date End Date Taking? Authorizing Provider   losartan (COZAAR) 50 mg tablet Take 0.5 Tabs by mouth daily. 1/9/20   Ari Zaragoza NP   metroNIDAZOLE (METROGEL) 1 % topical gel Apply  to affected area daily. Use a thin layer to affected areas after washing 12/11/19   Ari Zaragoza NP   apixaban (ELIQUIS) 2.5 mg tablet Take 1 Tab by mouth two (2) times a day. 12/5/19   Ari Zaragoza NP   atorvastatin (LIPITOR) 20 mg tablet Take 1 Tab by mouth daily. For cholesterol 12/5/19   Ashleigh Garcia NP   albuterol (PROVENTIL HFA, VENTOLIN HFA, PROAIR HFA) 90 mcg/actuation inhaler INHALE 1-2 PUFFS EVERY 6 HOURS IF NEEDED  Patient taking differently: Take 2 Puffs by inhalation every four (4) hours as needed. 12/5/19   Ari Zaragoza NP   aspirin 81 mg chewable tablet Take 1 Tab by mouth daily. 12/5/19   Fabby Fernandez MD   pantoprazole (PROTONIX) 40 mg tablet Take 1 Tab by mouth every twelve (12) hours. Patient taking differently: Take 40 mg by mouth daily. 12/4/19   Fabby Fernandez MD   traZODone (DESYREL) 50 mg tablet Take 1 Tab by mouth nightly. For sleep 11/21/19   Ari Zaragoza NP   levothyroxine (SYNTHROID) 50 mcg tablet Take 50 mcg by mouth Daily (before breakfast). Provider, Historical   ketoconazole (NIZORAL) 2 % topical cream Apply 1 Each to affected area daily. apply to nose/face    Provider, Historical   tretinoin (RETIN-A) 0.025 % topical cream Apply 1 Each to affected area nightly. apply to face    Provider, Historical   triamcinolone acetonide (KENALOG) 0.1 % ointment Apply 1 Container to affected area two (2) times a day.  use thin layer, apply to face    Provider, Historical   zinc (CHELATED ZINC) 50 mg tab tablet Take 50 mg by mouth daily. Provider, Historical   lutein 20 mg cap Take 1 Cap by mouth daily. Provider, Historical   coenzyme Q-10 (CO Q-10) 30 mg capsule Take 30 mg by mouth daily. Provider, Historical   lecithin/soybean oil (SOYA LECITHIN PO) Take 1 Tab by mouth daily. Provider, Historical   multivit-min/FA/lycopen/lutein (SENTRY SENIOR PO) Take 1 Tab by mouth daily. Provider, Historical   vitamin E (AQUA GEMS) 400 unit capsule Take 400 Units by mouth daily. Reports taking 1-2 times a week. Provider, Historical   ascorbic acid (VITAMIN C) 500 mg tablet Take 500 mg by mouth daily. Provider, Historical   SAW PALMETTO PO Take 1 Tab by mouth daily. Provider, Historical   CALCIUM/MAGNESIUM (DOLOMITE PO) Take 1 Tab by mouth daily. Provider, Historical     Allergies   Allergen Reactions    Hydrocodone Other (comments)     Unable to void, or have a bowel movement    Amlodipine Swelling and Other (comments)     Severe weeping from leg swelling    Poison Ivy Extract Itching        Review of Systems:    Pertinent items are noted in the History of Present Illness.      Objective:     Visit Vitals  /57 (BP 1 Location: Left arm, BP Patient Position: Sitting)   Pulse 76   Temp 98.5 °F (36.9 °C)   Resp 16   SpO2 97%       Physical Exam:    GENERAL: alert, cooperative, no distress, appears stated age, EYE: negative, THROAT & NECK: normal, LUNG: clear to auscultation bilaterally, HEART: regular rate and rhythm, ABDOMEn -distended with tenderness., EXTREMITIES:  no edema, SKIN: Normal., NEUROLOGIC: negative, PSYCH: non focal    Imaging:  images and reports reviewed  Reviewed directly with radiologist  CT shows perforated appendix with phlegmon  Lab Review:    Recent Results (from the past 24 hour(s))   CBC WITH AUTOMATED DIFF    Collection Time: 01/21/20  7:27 PM   Result Value Ref Range    WBC 25.9 (H) 4.1 - 11.1 K/uL    RBC 3.46 (L) 4.10 - 5.70 M/uL    HGB 7.9 (L) 12.1 - 17.0 g/dL    HCT 27.7 (L) 36.6 - 50.3 %    MCV 80.1 80.0 - 99.0 FL    MCH 22.8 (L) 26.0 - 34.0 PG    MCHC 28.5 (L) 30.0 - 36.5 g/dL    RDW 20.3 (H) 11.5 - 14.5 %    PLATELET 125 587 - 596 K/uL    MPV 10.0 8.9 - 12.9 FL    NRBC 0.0 0  WBC    ABSOLUTE NRBC 0.00 0.00 - 0.01 K/uL    NEUTROPHILS 70 32 - 75 %    BAND NEUTROPHILS 2 0 - 6 %    LYMPHOCYTES 24 12 - 49 %    MONOCYTES 3 (L) 5 - 13 %    EOSINOPHILS 1 0 - 7 %    BASOPHILS 0 0 - 1 %    IMMATURE GRANULOCYTES 0 %    ABS. NEUTROPHILS 18.6 (H) 1.8 - 8.0 K/UL    ABS. LYMPHOCYTES 6.2 (H) 0.8 - 3.5 K/UL    ABS. MONOCYTES 0.8 0.0 - 1.0 K/UL    ABS. EOSINOPHILS 0.3 0.0 - 0.4 K/UL    ABS. BASOPHILS 0.0 0.0 - 0.1 K/UL    ABS. IMM. GRANS. 0.0 K/UL    DF MANUAL      RBC COMMENTS ANISOCYTOSIS  1+        RBC COMMENTS OVALOCYTES  PRESENT        RBC COMMENTS HYPOCHROMIA  1+       METABOLIC PANEL, COMPREHENSIVE    Collection Time: 01/21/20  7:27 PM   Result Value Ref Range    Sodium 135 (L) 136 - 145 mmol/L    Potassium 3.4 (L) 3.5 - 5.1 mmol/L    Chloride 100 97 - 108 mmol/L    CO2 27 21 - 32 mmol/L    Anion gap 8 5 - 15 mmol/L    Glucose 105 (H) 65 - 100 mg/dL    BUN 19 6 - 20 MG/DL    Creatinine 1.26 0.70 - 1.30 MG/DL    BUN/Creatinine ratio 15 12 - 20      GFR est AA >60 >60 ml/min/1.73m2    GFR est non-AA 54 (L) >60 ml/min/1.73m2    Calcium 8.8 8.5 - 10.1 MG/DL    Bilirubin, total 1.0 0.2 - 1.0 MG/DL    ALT (SGPT) 31 12 - 78 U/L    AST (SGOT) 22 15 - 37 U/L    Alk.  phosphatase 130 (H) 45 - 117 U/L    Protein, total 7.6 6.4 - 8.2 g/dL    Albumin 3.0 (L) 3.5 - 5.0 g/dL    Globulin 4.6 (H) 2.0 - 4.0 g/dL    A-G Ratio 0.7 (L) 1.1 - 2.2     SAMPLES BEING HELD    Collection Time: 01/21/20  7:27 PM   Result Value Ref Range    SAMPLES BEING HELD  1RED, 1 BLUE     COMMENT        Add-on orders for these samples will be processed based on acceptable specimen integrity and analyte stability, which may vary by analyte. LIPASE    Collection Time: 01/21/20  7:27 PM   Result Value Ref Range    Lipase 73 73 - 393 U/L   URINALYSIS W/ RFLX MICROSCOPIC    Collection Time: 01/21/20  7:50 PM   Result Value Ref Range    Color DARK YELLOW      Appearance CLEAR CLEAR      Specific gravity 1.025 1.003 - 1.030      pH (UA) 6.0 5.0 - 8.0      Protein 30 (A) NEG mg/dL    Glucose NEGATIVE  NEG mg/dL    Ketone TRACE (A) NEG mg/dL    Blood NEGATIVE  NEG      Urobilinogen 1.0 0.2 - 1.0 EU/dL    Nitrites NEGATIVE  NEG      Leukocyte Esterase SMALL (A) NEG      WBC 0-4 0 - 4 /hpf    RBC 0-5 0 - 5 /hpf    Epithelial cells FEW FEW /lpf    Bacteria NEGATIVE  NEG /hpf   URINE CULTURE HOLD SAMPLE    Collection Time: 01/21/20  7:50 PM   Result Value Ref Range    Urine culture hold        URINE ON HOLD IN MICROBIOLOGY DEPT FOR 3 DAYS. IF UNPRESERVED URINE IS SUBMITTED, IT CANNOT BE USED FOR ADDITIONAL TESTING AFTER 24 HRS, RECOLLECTION WILL BE REQUIRED. BILIRUBIN, CONFIRM    Collection Time: 01/21/20  7:50 PM   Result Value Ref Range    Bilirubin UA, confirm NEGATIVE  NEG         Assessment:plan    Perforated appendicitis with phlegmon  Due to the amount of inflammation that the patient has and the duration of this process I do not believe that his appendix can be safely taken out at this time. He will need to be on IV antibiotics. We will follow his WBC count. Will likely get repeat CT scan in the next several days to see if there is a drainable collection. N.p.o. except for sips of medications for now. I suspect that his H&H will drop and may require blood transfusion. He was in the hospital recently for anemia and GI bleed while on Eliquis. We will get hospitalist consultation due to his multiple comorbidities and to weigh in on whether or not he needs to remain anticoagulated until we are sure whether or not he will require intervention.

## 2020-01-22 NOTE — PROGRESS NOTES
[address] [facility]  Hospitalist Group                                                                                          Hospitalist Progress Note  Rustam Burgos MD  Answering service: [phone] from in house phone        Date of Service:  2020  NAME:  April Laboy  :  1930  MRN:  [id number]      Admission Summary: This is an 59-year-old man with past medical history significant for hypertension, hypothyroidism, chronic atrial fibrillation; on Eliquis for anticoagulation, dyslipidemia, and TIA was in his usual state of health until about a week ago when the patient developed abdominal pain. The abdominal pain is diffuse in location. The patient described the abdominal pain as cramping associated with distention and inability to sleep. The pain was constant with no radiation, 6/10 in severity. The patient took all antibiotics that he had at home and his abdominal pain became better. The patient is able to go about his activity of daily living. He went to his primary care physician today because of warts in his ear and also because of ulcer in his lip. While at the primary care physician office, the patient was found to have tenderness on abdominal palpation. The patient was then sent to the emergency room for CT scan of the abdomen and pelvis because of the concern for diverticulitis. When the patient arrived at the emergency room, CT scan of the abdomen and pelvis was performed. The CT scan shows perforated appendicitis. The emergency room physician consulted general surgeon on call. The patient was admitted to surgical service. Medical consult was requested for preop evaluation and medical management. The patient denies fever, rigor, and chills  Interval history / Subjective:   Patient seen and examined bedside. Chart and labs reviewed discussed case with surgery.   Anticoagulation is on hold today as the patient is anemic and is getting 1 unit of blood if the hemoglobin is stable in the morning and stool occult is negative we should start him on heparin drip. If the stool occult is positive and the he is actively bleeding which means if hemoglobin does not appropriately go up then we definitely need to GI consult as well. He might not be stable enough to get colonoscopy and other procedures with his ongoing perforated appendicitis. Assessment & Plan:       Acute perforated appendicitis   Started on Zosyn. Surgery on board. IV fluids. Surgery waiting to repeat CAT scan to see if he can be operated at some point. Not a surgical candidate for now       Acute blood loss anemia   Waiting for stool occult. Got 1 unit of blood today  He had GI bleed last admission in December   LAST EGD /Gastric erosions and small bowel AVM as possible cause  Patient refused colonoscopy last time  If stool occult is positive again we would ask GI to see. 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 This should not affect the management of anemia with GI bleed, it is also not a contraindication to give heparin drip       # Persistent atrial Fib:  -HR controlled. Off  Anticoagulation  Cardiology on board. # History of stroke in October 2019:  --Patient is in persistent atrial fibrillation on tele  Prudent to start heparin drip if patient is not actively bleeding     # Hypertension,  -Blood pressure better now. Resume her losartan hopefully in the morning     # Hypothyroidism  . Continue Synthroid.     Code status: FULL  DVT prophylaxis: SCD     Care Plan discussed with: Patient/Family  Disposition: TBD     Hospital Problems  Date Reviewed: 1/21/2020          Codes Class Noted POA    Acute phlegmonous appendicitis ICD-10-CM: K35.890  ICD-9-CM: 540.9  1/21/2020 Unknown                Review of Systems:   A comprehensive review of systems was negative except for that written in the HPI. Vital Signs:    Last 24hrs VS reviewed since prior progress note. Most recent are:  Visit Vitals  /77   Pulse 68   Temp 100.2 °F (37.9 °C)   Resp 16   SpO2 97%         Intake/Output Summary (Last 24 hours) at 1/22/2020 1757  Last data filed at 1/22/2020 1557  Gross per 24 hour   Intake 310 ml   Output 750 ml   Net -440 ml        Physical Examination:             Constitutional:  No acute distress, cooperative, pleasant    ENT:  Oral mucous moist, oropharynx benign. Resp:  CTA bilaterally. No wheezing/rhonchi/rales. No accessory muscle use   CV:  Regular rhythm, normal rate, no murmurs, gallops, rubs    GI:  Soft, non distended, non tender. normoactive bowel sounds, no hepatosplenomegaly     Musculoskeletal:  No edema, warm, 2+ pulses throughout    Neurologic:  Moves all extremities. AAOx3, CN II-XII reviewed       Data Review:    Review and/or order of clinical lab test      Labs:     Recent Labs     01/22/20 0139 01/21/20 1927   WBC 21.6* 25.9*   HGB 6.7* 7.9*   HCT 22.8* 27.7*    326     Recent Labs     01/22/20 0139 01/21/20 1927    135*   K 3.4* 3.4*    100   CO2 26 27   BUN 20 19   CREA 1.21 1.26   * 105*   CA 8.2* 8.8   MG 2.3  --    PHOS 3.3  --      Recent Labs     01/21/20 1927   SGOT 22   ALT 31   *   TBILI 1.0   TP 7.6   ALB 3.0*   GLOB 4.6*   LPSE 73     No results for input(s): INR, PTP, APTT, INREXT in the last 72 hours. No results for input(s): FE, TIBC, PSAT, FERR in the last 72 hours. No results found for: FOL, RBCF   No results for input(s): PH, PCO2, PO2 in the last 72 hours. No results for input(s): CPK, CKNDX, TROIQ in the last 72 hours.     No lab exists for component: CPKMB  Lab Results   Component Value Date/Time    Cholesterol, total 159 10/12/2019 08:55 AM    HDL Cholesterol 51 10/12/2019 08:55 AM    LDL, calculated 83.4 10/12/2019 08:55 AM Triglyceride 123 10/12/2019 08:55 AM    CHOL/HDL Ratio 3.1 10/12/2019 08:55 AM     Lab Results   Component Value Date/Time    Glucose (POC) 117 (H) 10/12/2019 08:41 AM     Lab Results   Component Value Date/Time    Color DARK YELLOW 01/21/2020 07:50 PM    Appearance CLEAR 01/21/2020 07:50 PM    Specific gravity 1.025 01/21/2020 07:50 PM    pH (UA) 6.0 01/21/2020 07:50 PM    Protein 30 (A) 01/21/2020 07:50 PM    Glucose NEGATIVE  01/21/2020 07:50 PM    Ketone TRACE (A) 01/21/2020 07:50 PM    Bilirubin NEGATIVE  12/02/2019 01:27 PM    Urobilinogen 1.0 01/21/2020 07:50 PM    Nitrites NEGATIVE  01/21/2020 07:50 PM    Leukocyte Esterase SMALL (A) 01/21/2020 07:50 PM    Epithelial cells FEW 01/21/2020 07:50 PM    Bacteria NEGATIVE  01/21/2020 07:50 PM    WBC 0-4 01/21/2020 07:50 PM    RBC 0-5 01/21/2020 07:50 PM         Medications Reviewed:     Current Facility-Administered Medications   Medication Dose Route Frequency    atorvastatin (LIPITOR) tablet 20 mg  20 mg Oral DAILY    levothyroxine (SYNTHROID) tablet 50 mcg  50 mcg Oral ACB    [Held by provider] losartan (COZAAR) tablet 25 mg  25 mg Oral DAILY    traZODone (DESYREL) tablet 50 mg  50 mg Oral QHS    albuterol (PROVENTIL VENTOLIN) nebulizer solution 2.5 mg  2.5 mg Nebulization Q4H PRN    0.9% sodium chloride infusion 250 mL  250 mL IntraVENous PRN    lactated Ringers 1,654 mL with folic acid 1 mg, thiamine 100 mg, mvi, adult no. 4 with vit K 10 mL infusion   IntraVENous DAILY    nicotine (NICODERM CQ) 21 mg/24 hr patch 1 Patch  1 Patch TransDERmal DAILY    LORazepam (ATIVAN) injection 1 mg  1 mg IntraVENous Q4H PRN    sodium chloride 0.9 % bolus infusion 100 mL  100 mL IntraVENous RAD ONCE    iopamidoL (ISOVUE-370) 76 % injection 100 mL  100 mL IntraVENous RAD ONCE    sodium chloride (NS) flush 10 mL  10 mL IntraVENous RAD ONCE    sodium chloride (NS) flush 5-40 mL  5-40 mL IntraVENous Q8H    sodium chloride (NS) flush 5-40 mL  5-40 mL IntraVENous PRN    acetaminophen (TYLENOL) tablet 650 mg  650 mg Oral Q6H PRN    morphine injection 2-4 mg  2-4 mg IntraVENous Q3H PRN    naloxone (NARCAN) injection 0.4 mg  0.4 mg IntraVENous PRN    ondansetron (ZOFRAN) injection 4 mg  4 mg IntraVENous Q4H PRN    diphenhydrAMINE (BENADRYL) injection 12.5 mg  12.5 mg IntraVENous Q6H PRN    piperacillin-tazobactam (ZOSYN) 3.375 g in 0.9% sodium chloride (MBP/ADV) 100 mL  3.375 g IntraVENous Q8H    lactated Ringers infusion  100 mL/hr IntraVENous CONTINUOUS     ______________________________________________________________________  EXPECTED LENGTH OF STAY: - - -  ACTUAL LENGTH OF STAY:          Libby Thapa MD

## 2020-01-22 NOTE — PROGRESS NOTES
General Surgery Daily Progress Note    Admit Date: 2020  Post-Operative Day: * No surgery found * from * No surgery found *     Subjective:     Last 24 hrs: Pt still has some RLQ tenderness o/w doing well; wants something to drink; WBC down, on zosyn   Has rec'd a unit of blood for hgb 6.7. Not on eliquis    Objective:     Blood pressure 143/76, pulse 68, temperature 98.4 °F (36.9 °C), resp. rate 16, SpO2 97 %. Temp (24hrs), Av.2 °F (37.3 °C), Min:97.9 °F (36.6 °C), Max:101.9 °F (38.8 °C)      _____________________  Physical Exam:     Alert and Oriented, x3, in no acute distress. Cardiovascular: irreg, no peripheral edema  Abdomen: soft, nl BS, TTP RLq      Assessment:   Active Problems:    Acute phlegmonous appendicitis (2020)            Plan:     Cont abx  May have clear liquids  OOB as tolerated  Interval CT in a few days  Cardiology following  Am labs    Data Review:    Recent Labs     20  0139 20   WBC 21.6* 25.9*   HGB 6.7* 7.9*   HCT 22.8* 27.7*    326     Recent Labs     20  01320    135*   K 3.4* 3.4*    100   CO2 26 27   * 105*   BUN 20 19   CREA 1.21 1.26   CA 8.2* 8.8   MG 2.3  --    PHOS 3.3  --    ALB  --  3.0*   SGOT  --  22   ALT  --  31     Recent Labs     20   LPSE 73           ______________________  Medications:    Current Facility-Administered Medications   Medication Dose Route Frequency    atorvastatin (LIPITOR) tablet 20 mg  20 mg Oral DAILY    levothyroxine (SYNTHROID) tablet 50 mcg  50 mcg Oral ACB    [Held by provider] losartan (COZAAR) tablet 25 mg  25 mg Oral DAILY    traZODone (DESYREL) tablet 50 mg  50 mg Oral QHS    albuterol (PROVENTIL VENTOLIN) nebulizer solution 2.5 mg  2.5 mg Nebulization Q4H PRN    0.9% sodium chloride infusion 250 mL  250 mL IntraVENous PRN    lactated Ringers 9,068 mL with folic acid 1 mg, thiamine 100 mg, mvi, adult no. 4 with vit K 10 mL infusion   IntraVENous DAILY    nicotine (NICODERM CQ) 21 mg/24 hr patch 1 Patch  1 Patch TransDERmal DAILY    LORazepam (ATIVAN) injection 1 mg  1 mg IntraVENous Q4H PRN    sodium chloride 0.9 % bolus infusion 100 mL  100 mL IntraVENous RAD ONCE    iopamidoL (ISOVUE-370) 76 % injection 100 mL  100 mL IntraVENous RAD ONCE    sodium chloride (NS) flush 10 mL  10 mL IntraVENous RAD ONCE    sodium chloride (NS) flush 5-40 mL  5-40 mL IntraVENous Q8H    sodium chloride (NS) flush 5-40 mL  5-40 mL IntraVENous PRN    acetaminophen (TYLENOL) tablet 650 mg  650 mg Oral Q6H PRN    morphine injection 2-4 mg  2-4 mg IntraVENous Q3H PRN    naloxone (NARCAN) injection 0.4 mg  0.4 mg IntraVENous PRN    ondansetron (ZOFRAN) injection 4 mg  4 mg IntraVENous Q4H PRN    diphenhydrAMINE (BENADRYL) injection 12.5 mg  12.5 mg IntraVENous Q6H PRN    piperacillin-tazobactam (ZOSYN) 3.375 g in 0.9% sodium chloride (MBP/ADV) 100 mL  3.375 g IntraVENous Q8H    lactated Ringers infusion  100 mL/hr IntraVENous CONTINUOUS       Joo Dumont NP  1/22/2020        Pt seen and examined  Still complains of some abdominal pain. Is thirsty  Gen- alert in NAD  Lungs- CTA  H-RRR  Abd- S/mild distension with less tenderness    WBC down to 21 and H/H drop - most consisitent with hydration. He received 1 unit of blood. Appreciate  Cardiology input   Appreciate medical input-   Sanket Clemons are planning stool occult blood if positive will need GI.  FIguring out if he is still candidate for anticoagulation.   CT in a day or two to look for abscess

## 2020-01-23 LAB
ABO + RH BLD: NORMAL
ANION GAP SERPL CALC-SCNC: 6 MMOL/L (ref 5–15)
BASOPHILS # BLD: 0 K/UL (ref 0–0.1)
BASOPHILS NFR BLD: 0 % (ref 0–1)
BLD PROD TYP BPU: NORMAL
BLOOD GROUP ANTIBODIES SERPL: NORMAL
BLOOD GROUP ANTIBODIES SERPL: NORMAL
BPU ID: NORMAL
BUN SERPL-MCNC: 19 MG/DL (ref 6–20)
BUN/CREAT SERPL: 18 (ref 12–20)
CALCIUM SERPL-MCNC: 8.1 MG/DL (ref 8.5–10.1)
CHLORIDE SERPL-SCNC: 107 MMOL/L (ref 97–108)
CO2 SERPL-SCNC: 25 MMOL/L (ref 21–32)
CREAT SERPL-MCNC: 1.07 MG/DL (ref 0.7–1.3)
CROSSMATCH RESULT,%XM: NORMAL
DAT POLY-SP REAG RBC QL: NORMAL
DIFFERENTIAL METHOD BLD: ABNORMAL
EOSINOPHIL # BLD: 0 K/UL (ref 0–0.4)
EOSINOPHIL NFR BLD: 0 % (ref 0–7)
ERYTHROCYTE [DISTWIDTH] IN BLOOD BY AUTOMATED COUNT: 19.7 % (ref 11.5–14.5)
GLUCOSE SERPL-MCNC: 125 MG/DL (ref 65–100)
HAPTOGLOB SERPL-MCNC: 370 MG/DL (ref 30–200)
HCT VFR BLD AUTO: 26 % (ref 36.6–50.3)
HGB BLD-MCNC: 7.5 G/DL (ref 12.1–17)
IMM GRANULOCYTES # BLD AUTO: 0.4 K/UL (ref 0–0.04)
IMM GRANULOCYTES NFR BLD AUTO: 2 % (ref 0–0.5)
LDH SERPL L TO P-CCNC: 153 U/L (ref 85–241)
LYMPHOCYTES # BLD: 5.5 K/UL (ref 0.8–3.5)
LYMPHOCYTES NFR BLD: 26 % (ref 12–49)
MAGNESIUM SERPL-MCNC: 2.5 MG/DL (ref 1.6–2.4)
MCH RBC QN AUTO: 22.7 PG (ref 26–34)
MCHC RBC AUTO-ENTMCNC: 28.8 G/DL (ref 30–36.5)
MCV RBC AUTO: 78.8 FL (ref 80–99)
MONOCYTES # BLD: 1.5 K/UL (ref 0–1)
MONOCYTES NFR BLD: 7 % (ref 5–13)
NEUTS SEG # BLD: 13.7 K/UL (ref 1.8–8)
NEUTS SEG NFR BLD: 65 % (ref 32–75)
NRBC # BLD: 0 K/UL (ref 0–0.01)
NRBC BLD-RTO: 0 PER 100 WBC
PLATELET # BLD AUTO: 289 K/UL (ref 150–400)
PMV BLD AUTO: 10 FL (ref 8.9–12.9)
POTASSIUM SERPL-SCNC: 3.7 MMOL/L (ref 3.5–5.1)
RBC # BLD AUTO: 3.3 M/UL (ref 4.1–5.7)
RBC MORPH BLD: ABNORMAL
RETICS # AUTO: 0.03 M/UL (ref 0.03–0.1)
RETICS/RBC NFR AUTO: 0.9 % (ref 0.7–2.1)
SODIUM SERPL-SCNC: 138 MMOL/L (ref 136–145)
SPECIMEN EXP DATE BLD: NORMAL
STATUS OF UNIT,%ST: NORMAL
UNIT DIVISION, %UDIV: 0
WBC # BLD AUTO: 21.1 K/UL (ref 4.1–11.1)

## 2020-01-23 PROCEDURE — 74011250636 HC RX REV CODE- 250/636: Performed by: INTERNAL MEDICINE

## 2020-01-23 PROCEDURE — 74011000250 HC RX REV CODE- 250: Performed by: INTERNAL MEDICINE

## 2020-01-23 PROCEDURE — 96366 THER/PROPH/DIAG IV INF ADDON: CPT

## 2020-01-23 PROCEDURE — 74011000258 HC RX REV CODE- 258: Performed by: SURGERY

## 2020-01-23 PROCEDURE — 86880 COOMBS TEST DIRECT: CPT

## 2020-01-23 PROCEDURE — 96375 TX/PRO/DX INJ NEW DRUG ADDON: CPT

## 2020-01-23 PROCEDURE — C9113 INJ PANTOPRAZOLE SODIUM, VIA: HCPCS | Performed by: INTERNAL MEDICINE

## 2020-01-23 PROCEDURE — 85045 AUTOMATED RETICULOCYTE COUNT: CPT

## 2020-01-23 PROCEDURE — 96376 TX/PRO/DX INJ SAME DRUG ADON: CPT

## 2020-01-23 PROCEDURE — 36415 COLL VENOUS BLD VENIPUNCTURE: CPT

## 2020-01-23 PROCEDURE — 80048 BASIC METABOLIC PNL TOTAL CA: CPT

## 2020-01-23 PROCEDURE — 83010 ASSAY OF HAPTOGLOBIN QUANT: CPT

## 2020-01-23 PROCEDURE — 74011250637 HC RX REV CODE- 250/637: Performed by: SURGERY

## 2020-01-23 PROCEDURE — 65270000029 HC RM PRIVATE

## 2020-01-23 PROCEDURE — 51798 US URINE CAPACITY MEASURE: CPT

## 2020-01-23 PROCEDURE — 83735 ASSAY OF MAGNESIUM: CPT

## 2020-01-23 PROCEDURE — 74011250636 HC RX REV CODE- 250/636: Performed by: SURGERY

## 2020-01-23 PROCEDURE — 83615 LACTATE (LD) (LDH) ENZYME: CPT

## 2020-01-23 PROCEDURE — 99218 HC RM OBSERVATION: CPT

## 2020-01-23 PROCEDURE — 74011250636 HC RX REV CODE- 250/636: Performed by: NURSE PRACTITIONER

## 2020-01-23 PROCEDURE — 85025 COMPLETE CBC W/AUTO DIFF WBC: CPT

## 2020-01-23 RX ORDER — HYDRALAZINE HYDROCHLORIDE 20 MG/ML
10 INJECTION INTRAMUSCULAR; INTRAVENOUS ONCE
Status: COMPLETED | OUTPATIENT
Start: 2020-01-23 | End: 2020-01-23

## 2020-01-23 RX ORDER — METRONIDAZOLE 500 MG/1
500 TABLET ORAL 3 TIMES DAILY
Qty: 30 TAB | Refills: 0 | Status: SHIPPED | OUTPATIENT
Start: 2020-01-23 | End: 2020-02-02

## 2020-01-23 RX ORDER — LEVOFLOXACIN 500 MG/1
500 TABLET, FILM COATED ORAL DAILY
Qty: 10 TAB | Refills: 0 | Status: SHIPPED | OUTPATIENT
Start: 2020-01-23 | End: 2020-02-02

## 2020-01-23 RX ADMIN — SODIUM CHLORIDE, SODIUM LACTATE, POTASSIUM CHLORIDE, AND CALCIUM CHLORIDE 75 ML/HR: 600; 310; 30; 20 INJECTION, SOLUTION INTRAVENOUS at 04:36

## 2020-01-23 RX ADMIN — SODIUM CHLORIDE 40 MG: 9 INJECTION INTRAMUSCULAR; INTRAVENOUS; SUBCUTANEOUS at 21:31

## 2020-01-23 RX ADMIN — PIPERACILLIN AND TAZOBACTAM 3.38 G: 3; .375 INJECTION, POWDER, LYOPHILIZED, FOR SOLUTION INTRAVENOUS at 21:32

## 2020-01-23 RX ADMIN — HYDRALAZINE HYDROCHLORIDE 10 MG: 20 INJECTION INTRAMUSCULAR; INTRAVENOUS at 00:50

## 2020-01-23 RX ADMIN — ACETAMINOPHEN 650 MG: 325 TABLET, FILM COATED ORAL at 00:32

## 2020-01-23 RX ADMIN — SODIUM CHLORIDE 40 MG: 9 INJECTION INTRAMUSCULAR; INTRAVENOUS; SUBCUTANEOUS at 08:29

## 2020-01-23 RX ADMIN — ATORVASTATIN CALCIUM 20 MG: 10 TABLET, FILM COATED ORAL at 08:18

## 2020-01-23 RX ADMIN — TRAZODONE HYDROCHLORIDE 50 MG: 50 TABLET ORAL at 21:32

## 2020-01-23 RX ADMIN — PIPERACILLIN AND TAZOBACTAM 3.38 G: 3; .375 INJECTION, POWDER, LYOPHILIZED, FOR SOLUTION INTRAVENOUS at 04:36

## 2020-01-23 RX ADMIN — PIPERACILLIN AND TAZOBACTAM 3.38 G: 3; .375 INJECTION, POWDER, LYOPHILIZED, FOR SOLUTION INTRAVENOUS at 12:25

## 2020-01-23 RX ADMIN — SODIUM CHLORIDE 10 ML: 9 INJECTION INTRAMUSCULAR; INTRAVENOUS; SUBCUTANEOUS at 22:00

## 2020-01-23 RX ADMIN — LEVOTHYROXINE SODIUM 50 MCG: 0.05 TABLET ORAL at 06:46

## 2020-01-23 RX ADMIN — FOLIC ACID: 5 INJECTION, SOLUTION INTRAMUSCULAR; INTRAVENOUS; SUBCUTANEOUS at 08:34

## 2020-01-23 NOTE — PROGRESS NOTES
Cardiology Progress Note      1/23/2020 8:18 AM    Admit Date: 1/21/2020    Admit Diagnosis: Acute phlegmonous appendicitis [K35.890]      Subjective:     Prem S Linda pain fairly well controlled. No chest pain. Breathing ok    Visit Vitals  /69   Pulse 67   Temp 98 °F (36.7 °C)   Resp 18   SpO2 98%     Current Facility-Administered Medications   Medication Dose Route Frequency    atorvastatin (LIPITOR) tablet 20 mg  20 mg Oral DAILY    levothyroxine (SYNTHROID) tablet 50 mcg  50 mcg Oral ACB    [Held by provider] losartan (COZAAR) tablet 25 mg  25 mg Oral DAILY    traZODone (DESYREL) tablet 50 mg  50 mg Oral QHS    albuterol (PROVENTIL VENTOLIN) nebulizer solution 2.5 mg  2.5 mg Nebulization Q4H PRN    0.9% sodium chloride infusion 250 mL  250 mL IntraVENous PRN    lactated Ringers 2,502 mL with folic acid 1 mg, thiamine 100 mg, mvi, adult no. 4 with vit K 10 mL infusion   IntraVENous DAILY    nicotine (NICODERM CQ) 21 mg/24 hr patch 1 Patch  1 Patch TransDERmal DAILY    LORazepam (ATIVAN) injection 1 mg  1 mg IntraVENous Q4H PRN    pantoprazole (PROTONIX) 40 mg in 0.9% sodium chloride 10 mL injection  40 mg IntraVENous Q12H    sodium chloride (NS) flush 5-40 mL  5-40 mL IntraVENous Q8H    sodium chloride (NS) flush 5-40 mL  5-40 mL IntraVENous PRN    acetaminophen (TYLENOL) tablet 650 mg  650 mg Oral Q6H PRN    morphine injection 2-4 mg  2-4 mg IntraVENous Q3H PRN    naloxone (NARCAN) injection 0.4 mg  0.4 mg IntraVENous PRN    ondansetron (ZOFRAN) injection 4 mg  4 mg IntraVENous Q4H PRN    diphenhydrAMINE (BENADRYL) injection 12.5 mg  12.5 mg IntraVENous Q6H PRN    piperacillin-tazobactam (ZOSYN) 3.375 g in 0.9% sodium chloride (MBP/ADV) 100 mL  3.375 g IntraVENous Q8H    lactated Ringers infusion  75 mL/hr IntraVENous CONTINUOUS         Objective:      Physical Exam:  Visit Vitals  /69   Pulse 67   Temp 98 °F (36.7 °C)   Resp 18   SpO2 98%     General Appearance:  Alert, appropriate, no acute distress. Ears/Nose/Mouth/Throat:   Hearing grossly normal.         Neck: Supple. No JVD   Chest:   Lungs clear to auscultation bilaterally. Cardiovascular:  Irregularly irregular, S1, S2 normal, I/VI systolic murmur. Abdomen:   deferred   Extremities: trace edema bilaterally. Skin: Warm and dry.                Data Review:   Labs:    Recent Results (from the past 24 hour(s))   EKG, 12 LEAD, INITIAL    Collection Time: 01/22/20  8:23 AM   Result Value Ref Range    Ventricular Rate 69 BPM    Atrial Rate 357 BPM    QRS Duration 98 ms    Q-T Interval 424 ms    QTC Calculation (Bezet) 454 ms    Calculated R Axis -47 degrees    Calculated T Axis -19 degrees    Diagnosis       Atrial fibrillation  Left axis deviation Nonspecific T wave abnormality  Abnormal ECG  When compared with ECG of 03-DEC-2019 16:28,  No significant change was found  Confirmed by Grant Michel M.D., Michael Hernandez [id number]) on 1/22/2020 2:17:37 PM     HGB & HCT    Collection Time: 01/22/20  7:20 PM   Result Value Ref Range    HGB 7.1 (L) 12.1 - 17.0 g/dL    HCT 24.2 (L) 36.6 - 70.1 %   METABOLIC PANEL, BASIC    Collection Time: 01/23/20  4:27 AM   Result Value Ref Range    Sodium 138 136 - 145 mmol/L    Potassium 3.7 3.5 - 5.1 mmol/L    Chloride 107 97 - 108 mmol/L    CO2 25 21 - 32 mmol/L    Anion gap 6 5 - 15 mmol/L    Glucose 125 (H) 65 - 100 mg/dL    BUN 19 6 - 20 MG/DL    Creatinine 1.07 0.70 - 1.30 MG/DL    BUN/Creatinine ratio 18 12 - 20      GFR est AA >60 >60 ml/min/1.73m2    GFR est non-AA >60 >60 ml/min/1.73m2    Calcium 8.1 (L) 8.5 - 10.1 MG/DL   MAGNESIUM    Collection Time: 01/23/20  4:27 AM   Result Value Ref Range    Magnesium 2.5 (H) 1.6 - 2.4 mg/dL   CBC WITH AUTOMATED DIFF    Collection Time: 01/23/20  4:27 AM   Result Value Ref Range    WBC 21.1 (H) 4.1 - 11.1 K/uL    RBC 3.30 (L) 4.10 - 5.70 M/uL    HGB 7.5 (L) 12.1 - 17.0 g/dL    HCT 26.0 (L) 36.6 - 50.3 %    MCV 78.8 (L) 80.0 - 99.0 FL    MCH 22.7 (L) 26.0 - 34.0 PG    MCHC 28.8 (L) 30.0 - 36.5 g/dL    RDW 19.7 (H) 11.5 - 14.5 %    PLATELET 850 908 - 120 K/uL    MPV 10.0 8.9 - 12.9 FL    NRBC 0.0 0  WBC    ABSOLUTE NRBC 0.00 0.00 - 0.01 K/uL    NEUTROPHILS 65 32 - 75 %    LYMPHOCYTES 26 12 - 49 %    MONOCYTES 7 5 - 13 %    EOSINOPHILS 0 0 - 7 %    BASOPHILS 0 0 - 1 %    IMMATURE GRANULOCYTES 2 (H) 0.0 - 0.5 %    ABS. NEUTROPHILS 13.7 (H) 1.8 - 8.0 K/UL    ABS. LYMPHOCYTES 5.5 (H) 0.8 - 3.5 K/UL    ABS. MONOCYTES 1.5 (H) 0.0 - 1.0 K/UL    ABS. EOSINOPHILS 0.0 0.0 - 0.4 K/UL    ABS. BASOPHILS 0.0 0.0 - 0.1 K/UL    ABS. IMM. GRANS. 0.4 (H) 0.00 - 0.04 K/UL    DF SMEAR SCANNED      RBC COMMENTS HYPOCHROMIA  3+        RBC COMMENTS POLYCHROMASIA  1+        RBC COMMENTS JONATHAN CELLS  PRESENT        RBC COMMENTS ANISOCYTOSIS  1+           Telemetry: AFIB      Assessment:     Active Problems:    Acute phlegmonous appendicitis (1/21/2020)        Plan:     Preop cardiac evaluation. Stable cardiac wise and low to moderate risk for cardiac complications. Agree with anticoagulation on hold for surgery. Restart eliquis 2.5 mg bid post surgery, when ok from surgical standpoint. Afib. HR controlled. Normal LV function by echo  Anemia. HTN. Ruptured appendix.

## 2020-01-23 NOTE — PROGRESS NOTES
Progress Note    Patient: Montana Jay MRN: [id number]  SSN: [id number]    YOB: 1930  Age: 80 y.o. Sex: male      Admit Date: 2020    * No surgery found *    Procedure:      Subjective:     Patient only complains of pain with palpation. Objective:     Visit Vitals  /78   Pulse 71   Temp 97.3 °F (36.3 °C)   Resp 18   SpO2 96%       Temp (24hrs), Av.1 °F (37.3 °C), Min:97.3 °F (36.3 °C), Max:100.6 °F (38.1 °C)      Physical Exam:    Gen- Alert in NAD  Lungs- CTA  H-RRR  Abd- S/mild distension mild tenderness in the RLQ    Data Review: images and reports reviewed    Lab Review: All lab results for the last 24 hours reviewed.   Recent Results (from the past 24 hour(s))   HGB & HCT    Collection Time: 20  7:20 PM   Result Value Ref Range    HGB 7.1 (L) 12.1 - 17.0 g/dL    HCT 24.2 (L) 36.6 - 22.3 %   METABOLIC PANEL, BASIC    Collection Time: 20  4:27 AM   Result Value Ref Range    Sodium 138 136 - 145 mmol/L    Potassium 3.7 3.5 - 5.1 mmol/L    Chloride 107 97 - 108 mmol/L    CO2 25 21 - 32 mmol/L    Anion gap 6 5 - 15 mmol/L    Glucose 125 (H) 65 - 100 mg/dL    BUN 19 6 - 20 MG/DL    Creatinine 1.07 0.70 - 1.30 MG/DL    BUN/Creatinine ratio 18 12 - 20      GFR est AA >60 >60 ml/min/1.73m2    GFR est non-AA >60 >60 ml/min/1.73m2    Calcium 8.1 (L) 8.5 - 10.1 MG/DL   MAGNESIUM    Collection Time: 20  4:27 AM   Result Value Ref Range    Magnesium 2.5 (H) 1.6 - 2.4 mg/dL   CBC WITH AUTOMATED DIFF    Collection Time: 20  4:27 AM   Result Value Ref Range    WBC 21.1 (H) 4.1 - 11.1 K/uL    RBC 3.30 (L) 4.10 - 5.70 M/uL    HGB 7.5 (L) 12.1 - 17.0 g/dL    HCT 26.0 (L) 36.6 - 50.3 %    MCV 78.8 (L) 80.0 - 99.0 FL    MCH 22.7 (L) 26.0 - 34.0 PG    MCHC 28.8 (L) 30.0 - 36.5 g/dL    RDW 19.7 (H) 11.5 - 14.5 %    PLATELET 267 169 - 500 K/uL    MPV 10.0 8.9 - 12.9 FL    NRBC 0.0 0  WBC    ABSOLUTE NRBC 0.00 0.00 - 0.01 K/uL    NEUTROPHILS 65 32 - 75 % LYMPHOCYTES 26 12 - 49 %    MONOCYTES 7 5 - 13 %    EOSINOPHILS 0 0 - 7 %    BASOPHILS 0 0 - 1 %    IMMATURE GRANULOCYTES 2 (H) 0.0 - 0.5 %    ABS. NEUTROPHILS 13.7 (H) 1.8 - 8.0 K/UL    ABS. LYMPHOCYTES 5.5 (H) 0.8 - 3.5 K/UL    ABS. MONOCYTES 1.5 (H) 0.0 - 1.0 K/UL    ABS. EOSINOPHILS 0.0 0.0 - 0.4 K/UL    ABS. BASOPHILS 0.0 0.0 - 0.1 K/UL    ABS. IMM. GRANS. 0.4 (H) 0.00 - 0.04 K/UL    DF SMEAR SCANNED      RBC COMMENTS HYPOCHROMIA  3+        RBC COMMENTS POLYCHROMASIA  1+        RBC COMMENTS JONATHAN CELLS  PRESENT        RBC COMMENTS ANISOCYTOSIS  1+         Assessment:     Hospital Problems  Date Reviewed: 1/21/2020          Codes Class Noted POA    Acute phlegmonous appendicitis ICD-10-CM: K35.890  ICD-9-CM: 540.9  1/21/2020 Unknown              Plan/Recommendations/Medical Decision Making:   WBC is still elevated   He has a history of CLL which has never been treated. He also states that he is usually \" Doesn't make hemoglobin\" Will get Heme input to see if he should have a normal WBC response to infection  Continue ABX  Appreciate Hospitalist and Cardiology input. Repeat CT scan tomorrow.

## 2020-01-23 NOTE — PROGRESS NOTES
Patient taken off tele per nursing supervisor, no order in place, patient has been running A-Fib. Removed tele box and notified nursing supervisor that it has been removed.

## 2020-01-23 NOTE — PROGRESS NOTES
The following labs were collected from patient and hand delivered to in-patient lab:    Direct and Indirect Miguelina  Haptoglobin  Immunophenotyping Profile  LD  Reticulocyte Count

## 2020-01-23 NOTE — CONSULTS
Consult received. Questionable hx of CLL. Please proceed with required surgery. There is no reason to believe that he is at any higher risk of infectious complications. Antibiotics perioperatively. Will add labs to rule out hemolysis and confirm diagnosis of CLL. Will see patient in AM.     Thank you for the consult.       Po Clemons MD, [address] [facility]

## 2020-01-23 NOTE — PROGRESS NOTES
[facility]  Hospitalist Group                                                                                          Hospitalist Progress Note  Niesha Morales MD  Answering service: [phone] from in house phone        Date of Service:  2020  NAME:  Corky Granger  :  1930  MRN:  [id number]      Admission Summary: This is an 51-year-old man with past medical history significant for hypertension, hypothyroidism, chronic atrial fibrillation; on Eliquis for anticoagulation, dyslipidemia, and TIA was in his usual state of health until about a week ago when the patient developed abdominal pain. The abdominal pain is diffuse in location. The patient described the abdominal pain as cramping associated with distention and inability to sleep. The pain was constant with no radiation, 6/10 in severity. The patient took all antibiotics that he had at home and his abdominal pain became better. The patient is able to go about his activity of daily living. He went to his primary care physician today because of warts in his ear and also because of ulcer in his lip. While at the primary care physician office, the patient was found to have tenderness on abdominal palpation. The patient was then sent to the emergency room for CT scan of the abdomen and pelvis because of the concern for diverticulitis. When the patient arrived at the emergency room, CT scan of the abdomen and pelvis was performed. The CT scan shows perforated appendicitis. The emergency room physician consulted general surgeon on call. The patient was admitted to surgical service. Medical consult was requested for preop evaluation and medical management. The patient denies fever, rigor, and chills  Interval history / Subjective:       Patient seen and examined bedside. Chart and labs reviewed discussed case with surgery. Good today. No evidence of bleeding so far. Hemoglobin is stable.   Stool occult is still pending. We can check his hemoglobin again in the morning okay to start heparin from our side. Assessment & Plan:       Acute perforated appendicitis   Started on Zosyn. Surgery on board. IV fluids. Surgery waiting to repeat CAT scan to see if he can be operated at some point. Not a surgical candidate for now       Acute blood loss anemia   Waiting for stool occult. Got 1 unit of blood today  He had GI bleed last admission in December   LAST EGD /Gastric erosions and small bowel AVM as possible cause  Patient refused colonoscopy last time  If stool occult is positive again we would ask GI to see. 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 This should not affect the management of anemia with GI bleed, it is also not a contraindication to give heparin drip       # Persistent atrial Fib:  -HR controlled. Off  Anticoagulation  Cardiology on board. # History of stroke in October 2019:  --Patient is in persistent atrial fibrillation on tele  Prudent to start heparin drip if patient is not actively bleeding     # Hypertension,  -Blood pressure better now. Resume her losartan hopefully in the morning     # Hypothyroidism  . Continue Synthroid. Code status: FULL  DVT prophylaxis: SCD     Care Plan discussed with: Patient/Family  Disposition: TBD     Hospital Problems  Date Reviewed: 1/21/2020          Codes Class Noted POA    Acute phlegmonous appendicitis ICD-10-CM: K35.890  ICD-9-CM: 540.9  1/21/2020 Unknown                Review of Systems:   A comprehensive review of systems was negative except for that written in the HPI. Vital Signs:    Last 24hrs VS reviewed since prior progress note.  Most recent are:  Visit Vitals  /77   Pulse (!) 58   Temp 98.4 °F (36.9 °C)   Resp 18   SpO2 97%         Intake/Output Summary (Last 24 hours) at 1/23/2020 1605  Last data filed at 1/23/2020 0141  Gross per 24 hour   Intake --   Output 700 ml   Net -700 ml        Physical Examination:             Constitutional:  No acute distress, cooperative, pleasant    ENT:  Oral mucous moist, oropharynx benign. Resp:  CTA bilaterally. No wheezing/rhonchi/rales. No accessory muscle use   CV:  Regular rhythm, normal rate, no murmurs, gallops, rubs    GI:  Soft, non distended, non tender. normoactive bowel sounds, no hepatosplenomegaly     Musculoskeletal:  No edema, warm, 2+ pulses throughout    Neurologic:  Moves all extremities. AAOx3, CN II-XII reviewed       Data Review:    Review and/or order of clinical lab test      Labs:     Recent Labs     01/23/20 0427 01/22/20 1920 01/22/20 0139   WBC 21.1*  --  21.6*   HGB 7.5* 7.1* 6.7*   HCT 26.0* 24.2* 22.8*     --  297     Recent Labs     01/23/20 0427 01/22/20 0139 01/21/20 1927    136 135*   K 3.7 3.4* 3.4*    103 100   CO2 25 26 27   BUN 19 20 19   CREA 1.07 1.21 1.26   * 104* 105*   CA 8.1* 8.2* 8.8   MG 2.5* 2.3  --    PHOS  --  3.3  --      Recent Labs     01/21/20 1927   SGOT 22   ALT 31   *   TBILI 1.0   TP 7.6   ALB 3.0*   GLOB 4.6*   LPSE 73     No results for input(s): INR, PTP, APTT, INREXT, INREXT in the last 72 hours. No results for input(s): FE, TIBC, PSAT, FERR in the last 72 hours. No results found for: FOL, RBCF   No results for input(s): PH, PCO2, PO2 in the last 72 hours. No results for input(s): CPK, CKNDX, TROIQ in the last 72 hours.     No lab exists for component: CPKMB  Lab Results   Component Value Date/Time    Cholesterol, total 159 10/12/2019 08:55 AM    HDL Cholesterol 51 10/12/2019 08:55 AM    LDL, calculated 83.4 10/12/2019 08:55 AM    Triglyceride 123 10/12/2019 08:55 AM    CHOL/HDL Ratio 3.1 10/12/2019 08:55 AM     Lab Results   Component Value Date/Time    Glucose (POC) 117 (H) 10/12/2019 08:41 AM     Lab Results Component Value Date/Time    Color DARK YELLOW 01/21/2020 07:50 PM    Appearance CLEAR 01/21/2020 07:50 PM    Specific gravity 1.025 01/21/2020 07:50 PM    pH (UA) 6.0 01/21/2020 07:50 PM    Protein 30 (A) 01/21/2020 07:50 PM    Glucose NEGATIVE  01/21/2020 07:50 PM    Ketone TRACE (A) 01/21/2020 07:50 PM    Bilirubin NEGATIVE  12/02/2019 01:27 PM    Urobilinogen 1.0 01/21/2020 07:50 PM    Nitrites NEGATIVE  01/21/2020 07:50 PM    Leukocyte Esterase SMALL (A) 01/21/2020 07:50 PM    Epithelial cells FEW 01/21/2020 07:50 PM    Bacteria NEGATIVE  01/21/2020 07:50 PM    WBC 0-4 01/21/2020 07:50 PM    RBC 0-5 01/21/2020 07:50 PM         Medications Reviewed:     Current Facility-Administered Medications   Medication Dose Route Frequency    atorvastatin (LIPITOR) tablet 20 mg  20 mg Oral DAILY    levothyroxine (SYNTHROID) tablet 50 mcg  50 mcg Oral ACB    [Held by provider] losartan (COZAAR) tablet 25 mg  25 mg Oral DAILY    traZODone (DESYREL) tablet 50 mg  50 mg Oral QHS    albuterol (PROVENTIL VENTOLIN) nebulizer solution 2.5 mg  2.5 mg Nebulization Q4H PRN    0.9% sodium chloride infusion 250 mL  250 mL IntraVENous PRN    lactated Ringers 5,648 mL with folic acid 1 mg, thiamine 100 mg, mvi, adult no. 4 with vit K 10 mL infusion   IntraVENous DAILY    nicotine (NICODERM CQ) 21 mg/24 hr patch 1 Patch  1 Patch TransDERmal DAILY    LORazepam (ATIVAN) injection 1 mg  1 mg IntraVENous Q4H PRN    pantoprazole (PROTONIX) 40 mg in 0.9% sodium chloride 10 mL injection  40 mg IntraVENous Q12H    sodium chloride (NS) flush 5-40 mL  5-40 mL IntraVENous Q8H    sodium chloride (NS) flush 5-40 mL  5-40 mL IntraVENous PRN    acetaminophen (TYLENOL) tablet 650 mg  650 mg Oral Q6H PRN    morphine injection 2-4 mg  2-4 mg IntraVENous Q3H PRN    naloxone (NARCAN) injection 0.4 mg  0.4 mg IntraVENous PRN    ondansetron (ZOFRAN) injection 4 mg  4 mg IntraVENous Q4H PRN    diphenhydrAMINE (BENADRYL) injection 12.5 mg  12.5 mg IntraVENous Q6H PRN    piperacillin-tazobactam (ZOSYN) 3.375 g in 0.9% sodium chloride (MBP/ADV) 100 mL  3.375 g IntraVENous Q8H    lactated Ringers infusion  75 mL/hr IntraVENous CONTINUOUS     ______________________________________________________________________  EXPECTED LENGTH OF STAY: - - -  ACTUAL LENGTH OF STAY:          Makenzie Cardona MD

## 2020-01-23 NOTE — PROGRESS NOTES
Problem: Falls - Risk of  Goal: *Absence of Falls  Description  Document Ji Santos Fall Risk and appropriate interventions in the flowsheet.   Outcome: Progressing Towards Goal  Note: Fall Risk Interventions:  Mobility Interventions: Communicate number of staff needed for ambulation/transfer, Patient to call before getting OOB         Medication Interventions: Evaluate medications/consider consulting pharmacy, Patient to call before getting OOB, Teach patient to arise slowly    Elimination Interventions: Call light in reach, Patient to call for help with toileting needs

## 2020-01-23 NOTE — PROGRESS NOTES
Bedside shift change report given to [facility] (oncoming nurse) by Nick IRVIN (offgoing nurse). Report included the following information SBAR, Kardex, Intake/Output and MAR.

## 2020-01-24 ENCOUNTER — APPOINTMENT (OUTPATIENT)
Dept: CT IMAGING | Age: 85
DRG: 371 | End: 2020-01-24
Attending: SURGERY
Payer: MEDICARE

## 2020-01-24 LAB
ANION GAP SERPL CALC-SCNC: 8 MMOL/L (ref 5–15)
APTT PPP: 27.6 SEC (ref 22.1–32)
BASOPHILS # BLD: 0.2 K/UL (ref 0–0.1)
BASOPHILS NFR BLD: 1 % (ref 0–1)
BUN SERPL-MCNC: 16 MG/DL (ref 6–20)
BUN/CREAT SERPL: 17 (ref 12–20)
CALCIUM SERPL-MCNC: 8.4 MG/DL (ref 8.5–10.1)
CHLORIDE SERPL-SCNC: 106 MMOL/L (ref 97–108)
CO2 SERPL-SCNC: 24 MMOL/L (ref 21–32)
COMMENT, HOLDF: NORMAL
CREAT SERPL-MCNC: 0.93 MG/DL (ref 0.7–1.3)
DIFFERENTIAL METHOD BLD: ABNORMAL
EOSINOPHIL # BLD: 0 K/UL (ref 0–0.4)
EOSINOPHIL NFR BLD: 0 % (ref 0–7)
ERYTHROCYTE [DISTWIDTH] IN BLOOD BY AUTOMATED COUNT: 19.9 % (ref 11.5–14.5)
FOLATE SERPL-MCNC: 28.4 NG/ML (ref 5–21)
GLUCOSE SERPL-MCNC: 104 MG/DL (ref 65–100)
HCT VFR BLD AUTO: 27.4 % (ref 36.6–50.3)
HGB BLD-MCNC: 8.2 G/DL (ref 12.1–17)
IMM GRANULOCYTES # BLD AUTO: 0 K/UL
IMM GRANULOCYTES NFR BLD AUTO: 0 %
INR PPP: 1.4 (ref 0.9–1.1)
LYMPHOCYTES # BLD: 7.5 K/UL (ref 0.8–3.5)
LYMPHOCYTES NFR BLD: 41 % (ref 12–49)
MAGNESIUM SERPL-MCNC: 2.4 MG/DL (ref 1.6–2.4)
MCH RBC QN AUTO: 23.6 PG (ref 26–34)
MCHC RBC AUTO-ENTMCNC: 29.9 G/DL (ref 30–36.5)
MCV RBC AUTO: 78.7 FL (ref 80–99)
MONOCYTES # BLD: 1.7 K/UL (ref 0–1)
MONOCYTES NFR BLD: 9 % (ref 5–13)
NEUTS SEG # BLD: 9 K/UL (ref 1.8–8)
NEUTS SEG NFR BLD: 49 % (ref 32–75)
NRBC # BLD: 0 K/UL (ref 0–0.01)
NRBC BLD-RTO: 0 PER 100 WBC
PATH REV BLD -IMP: ABNORMAL
PLATELET # BLD AUTO: 354 K/UL (ref 150–400)
PLATELET COMMENTS,PCOM: ABNORMAL
PMV BLD AUTO: 9.8 FL (ref 8.9–12.9)
POTASSIUM SERPL-SCNC: 3.7 MMOL/L (ref 3.5–5.1)
PROTHROMBIN TIME: 14.1 SEC (ref 9–11.1)
RBC # BLD AUTO: 3.48 M/UL (ref 4.1–5.7)
RBC MORPH BLD: ABNORMAL
SAMPLES BEING HELD,HOLD: NORMAL
SODIUM SERPL-SCNC: 138 MMOL/L (ref 136–145)
THERAPEUTIC RANGE,PTTT: NORMAL SECS (ref 58–77)
VIT B12 SERPL-MCNC: 705 PG/ML (ref 193–986)
WBC # BLD AUTO: 18.4 K/UL (ref 4.1–11.1)
WBC MORPH BLD: ABNORMAL

## 2020-01-24 PROCEDURE — 74011636320 HC RX REV CODE- 636/320: Performed by: RADIOLOGY

## 2020-01-24 PROCEDURE — 36415 COLL VENOUS BLD VENIPUNCTURE: CPT

## 2020-01-24 PROCEDURE — C9113 INJ PANTOPRAZOLE SODIUM, VIA: HCPCS | Performed by: INTERNAL MEDICINE

## 2020-01-24 PROCEDURE — 74011250636 HC RX REV CODE- 250/636: Performed by: SURGERY

## 2020-01-24 PROCEDURE — 74177 CT ABD & PELVIS W/CONTRAST: CPT

## 2020-01-24 PROCEDURE — 85025 COMPLETE CBC W/AUTO DIFF WBC: CPT

## 2020-01-24 PROCEDURE — 80048 BASIC METABOLIC PNL TOTAL CA: CPT

## 2020-01-24 PROCEDURE — 82746 ASSAY OF FOLIC ACID SERUM: CPT

## 2020-01-24 PROCEDURE — 74011250637 HC RX REV CODE- 250/637: Performed by: INTERNAL MEDICINE

## 2020-01-24 PROCEDURE — 83735 ASSAY OF MAGNESIUM: CPT

## 2020-01-24 PROCEDURE — 74011000250 HC RX REV CODE- 250: Performed by: INTERNAL MEDICINE

## 2020-01-24 PROCEDURE — 74011250636 HC RX REV CODE- 250/636: Performed by: INTERNAL MEDICINE

## 2020-01-24 PROCEDURE — 85730 THROMBOPLASTIN TIME PARTIAL: CPT

## 2020-01-24 PROCEDURE — 65270000029 HC RM PRIVATE

## 2020-01-24 PROCEDURE — 74011250637 HC RX REV CODE- 250/637: Performed by: SURGERY

## 2020-01-24 PROCEDURE — 74011000258 HC RX REV CODE- 258: Performed by: RADIOLOGY

## 2020-01-24 PROCEDURE — 82607 VITAMIN B-12: CPT

## 2020-01-24 PROCEDURE — 74011000258 HC RX REV CODE- 258: Performed by: SURGERY

## 2020-01-24 PROCEDURE — 51798 US URINE CAPACITY MEASURE: CPT

## 2020-01-24 PROCEDURE — 85610 PROTHROMBIN TIME: CPT

## 2020-01-24 PROCEDURE — 74011250636 HC RX REV CODE- 250/636: Performed by: STUDENT IN AN ORGANIZED HEALTH CARE EDUCATION/TRAINING PROGRAM

## 2020-01-24 RX ORDER — HEPARIN SODIUM 5000 [USP'U]/ML
5000 INJECTION, SOLUTION INTRAVENOUS; SUBCUTANEOUS EVERY 8 HOURS
Status: DISCONTINUED | OUTPATIENT
Start: 2020-01-24 | End: 2020-01-27

## 2020-01-24 RX ORDER — SODIUM CHLORIDE 0.9 % (FLUSH) 0.9 %
10 SYRINGE (ML) INJECTION
Status: COMPLETED | OUTPATIENT
Start: 2020-01-24 | End: 2020-01-24

## 2020-01-24 RX ORDER — HYDRALAZINE HYDROCHLORIDE 20 MG/ML
20 INJECTION INTRAMUSCULAR; INTRAVENOUS
Status: DISCONTINUED | OUTPATIENT
Start: 2020-01-24 | End: 2020-01-25

## 2020-01-24 RX ORDER — DIPHENHYDRAMINE HCL 25 MG
25 CAPSULE ORAL
Status: DISCONTINUED | OUTPATIENT
Start: 2020-01-24 | End: 2020-01-28 | Stop reason: HOSPADM

## 2020-01-24 RX ORDER — LOSARTAN POTASSIUM 25 MG/1
25 TABLET ORAL DAILY
Status: DISCONTINUED | OUTPATIENT
Start: 2020-01-24 | End: 2020-01-27

## 2020-01-24 RX ORDER — PANTOPRAZOLE SODIUM 40 MG/1
40 TABLET, DELAYED RELEASE ORAL
Status: DISCONTINUED | OUTPATIENT
Start: 2020-01-25 | End: 2020-01-28 | Stop reason: HOSPADM

## 2020-01-24 RX ADMIN — HEPARIN SODIUM 5000 UNITS: 5000 INJECTION INTRAVENOUS; SUBCUTANEOUS at 13:50

## 2020-01-24 RX ADMIN — LOSARTAN POTASSIUM 25 MG: 25 TABLET ORAL at 11:04

## 2020-01-24 RX ADMIN — SODIUM CHLORIDE 10 ML: 9 INJECTION INTRAMUSCULAR; INTRAVENOUS; SUBCUTANEOUS at 06:00

## 2020-01-24 RX ADMIN — PIPERACILLIN AND TAZOBACTAM 3.38 G: 3; .375 INJECTION, POWDER, LYOPHILIZED, FOR SOLUTION INTRAVENOUS at 05:09

## 2020-01-24 RX ADMIN — PIPERACILLIN AND TAZOBACTAM 3.38 G: 3; .375 INJECTION, POWDER, LYOPHILIZED, FOR SOLUTION INTRAVENOUS at 21:09

## 2020-01-24 RX ADMIN — ATORVASTATIN CALCIUM 20 MG: 10 TABLET, FILM COATED ORAL at 09:51

## 2020-01-24 RX ADMIN — Medication 10 ML: at 08:37

## 2020-01-24 RX ADMIN — TRAZODONE HYDROCHLORIDE 50 MG: 50 TABLET ORAL at 21:09

## 2020-01-24 RX ADMIN — IOPAMIDOL 100 ML: 755 INJECTION, SOLUTION INTRAVENOUS at 08:37

## 2020-01-24 RX ADMIN — IOHEXOL 50 ML: 240 INJECTION, SOLUTION INTRATHECAL; INTRAVASCULAR; INTRAVENOUS; ORAL at 08:00

## 2020-01-24 RX ADMIN — PIPERACILLIN AND TAZOBACTAM 3.38 G: 3; .375 INJECTION, POWDER, LYOPHILIZED, FOR SOLUTION INTRAVENOUS at 13:50

## 2020-01-24 RX ADMIN — SODIUM CHLORIDE 100 ML: 900 INJECTION, SOLUTION INTRAVENOUS at 08:37

## 2020-01-24 RX ADMIN — FOLIC ACID: 5 INJECTION, SOLUTION INTRAMUSCULAR; INTRAVENOUS; SUBCUTANEOUS at 09:56

## 2020-01-24 RX ADMIN — HEPARIN SODIUM 5000 UNITS: 5000 INJECTION INTRAVENOUS; SUBCUTANEOUS at 21:09

## 2020-01-24 RX ADMIN — SODIUM CHLORIDE 10 ML: 9 INJECTION INTRAMUSCULAR; INTRAVENOUS; SUBCUTANEOUS at 21:10

## 2020-01-24 RX ADMIN — HYDRALAZINE HYDROCHLORIDE 20 MG: 20 INJECTION INTRAMUSCULAR; INTRAVENOUS at 21:06

## 2020-01-24 RX ADMIN — SODIUM CHLORIDE 40 MG: 9 INJECTION INTRAMUSCULAR; INTRAVENOUS; SUBCUTANEOUS at 09:51

## 2020-01-24 NOTE — PROGRESS NOTES
Spoke with MD Goldsmith about patient having little to no urine output. MD Goldsmith advised to continue to follow bladder scan protocol.  Will endorse to oncoming RN

## 2020-01-24 NOTE — PROGRESS NOTES
Bedside and Verbal shift change report given to Mo Hurd 82 (oncoming nurse) by Leda Newman RN (offgoing nurse). Report included the following information SBAR and Kardex.

## 2020-01-24 NOTE — PROGRESS NOTES
Cardiology Progress Note      1/24/2020 8:18 AM    Admit Date: 1/21/2020    Admit Diagnosis: Acute phlegmonous appendicitis [K35.890]; Acute phlegmonous appendicitis [K35.890]      Subjective:     Prem ALDRICH Linda pain fairly well controlled. No chest pain. Breathing ok    Visit Vitals  /80   Pulse 71   Temp 98.3 °F (36.8 °C)   Resp 17   SpO2 99%     Current Facility-Administered Medications   Medication Dose Route Frequency    losartan (COZAAR) tablet 25 mg  25 mg Oral DAILY    [START ON 1/25/2020] lactated Ringers 2,528 mL with folic acid 1 mg, thiamine 100 mg, mvi, adult no. 4 with vit K 10 mL infusion   IntraVENous DAILY    diphenhydrAMINE (BENADRYL) capsule 25 mg  25 mg Oral Q6H PRN    heparin (porcine) injection 5,000 Units  5,000 Units SubCUTAneous Q8H    [START ON 1/25/2020] pantoprazole (PROTONIX) tablet 40 mg  40 mg Oral ACB    atorvastatin (LIPITOR) tablet 20 mg  20 mg Oral DAILY    levothyroxine (SYNTHROID) tablet 50 mcg  50 mcg Oral ACB    traZODone (DESYREL) tablet 50 mg  50 mg Oral QHS    albuterol (PROVENTIL VENTOLIN) nebulizer solution 2.5 mg  2.5 mg Nebulization Q4H PRN    0.9% sodium chloride infusion 250 mL  250 mL IntraVENous PRN    nicotine (NICODERM CQ) 21 mg/24 hr patch 1 Patch  1 Patch TransDERmal DAILY    LORazepam (ATIVAN) injection 1 mg  1 mg IntraVENous Q4H PRN    sodium chloride (NS) flush 5-40 mL  5-40 mL IntraVENous Q8H    sodium chloride (NS) flush 5-40 mL  5-40 mL IntraVENous PRN    acetaminophen (TYLENOL) tablet 650 mg  650 mg Oral Q6H PRN    morphine injection 2-4 mg  2-4 mg IntraVENous Q3H PRN    naloxone (NARCAN) injection 0.4 mg  0.4 mg IntraVENous PRN    ondansetron (ZOFRAN) injection 4 mg  4 mg IntraVENous Q4H PRN    piperacillin-tazobactam (ZOSYN) 3.375 g in 0.9% sodium chloride (MBP/ADV) 100 mL  3.375 g IntraVENous Q8H         Objective:      Physical Exam:  Visit Vitals  /80   Pulse 71   Temp 98.3 °F (36.8 °C)   Resp 17   SpO2 99%     General Appearance:  Alert, appropriate, no acute distress. Ears/Nose/Mouth/Throat:   Hearing grossly normal.         Neck: Supple. No JVD   Chest:   Lungs clear to auscultation bilaterally. Cardiovascular:  Irregularly irregular, S1, S2 normal, I/VI systolic murmur. Abdomen:   deferred   Extremities: trace edema bilaterally. Skin: Warm and dry.                Data Review:   Labs:    Recent Results (from the past 24 hour(s))   RETICULOCYTE COUNT    Collection Time: 01/23/20  4:48 PM   Result Value Ref Range    Reticulocyte count 0.9 0.7 - 2.1 %    Absolute Retic Cnt. 0.0303 0.0260 - 0.0950 M/ul   DIRECT & INDIRECT SOBIA    Collection Time: 01/23/20  4:48 PM   Result Value Ref Range    TESFAYE Poly NEG     Antibody screen NEG    LD    Collection Time: 01/23/20  4:48 PM   Result Value Ref Range     85 - 241 U/L   HAPTOGLOBIN    Collection Time: 01/23/20  4:48 PM   Result Value Ref Range    Haptoglobin 370 (H) 30 - 405 mg/dL   METABOLIC PANEL, BASIC    Collection Time: 01/24/20  5:04 AM   Result Value Ref Range    Sodium 138 136 - 145 mmol/L    Potassium 3.7 3.5 - 5.1 mmol/L    Chloride 106 97 - 108 mmol/L    CO2 24 21 - 32 mmol/L    Anion gap 8 5 - 15 mmol/L    Glucose 104 (H) 65 - 100 mg/dL    BUN 16 6 - 20 MG/DL    Creatinine 0.93 0.70 - 1.30 MG/DL    BUN/Creatinine ratio 17 12 - 20      GFR est AA >60 >60 ml/min/1.73m2    GFR est non-AA >60 >60 ml/min/1.73m2    Calcium 8.4 (L) 8.5 - 10.1 MG/DL   MAGNESIUM    Collection Time: 01/24/20  5:04 AM   Result Value Ref Range    Magnesium 2.4 1.6 - 2.4 mg/dL   CBC WITH AUTOMATED DIFF    Collection Time: 01/24/20  5:04 AM   Result Value Ref Range    WBC 18.4 (H) 4.1 - 11.1 K/uL    RBC 3.48 (L) 4.10 - 5.70 M/uL    HGB 8.2 (L) 12.1 - 17.0 g/dL    HCT 27.4 (L) 36.6 - 50.3 %    MCV 78.7 (L) 80.0 - 99.0 FL    MCH 23.6 (L) 26.0 - 34.0 PG    MCHC 29.9 (L) 30.0 - 36.5 g/dL    RDW 19.9 (H) 11.5 - 14.5 %    PLATELET 188 511 - 737 K/uL    MPV 9.8 8.9 - 12.9 FL    NRBC 0.0 0  WBC    ABSOLUTE NRBC 0.00 0.00 - 0.01 K/uL    NEUTROPHILS 49 32 - 75 %    LYMPHOCYTES 41 12 - 49 %    MONOCYTES 9 5 - 13 %    EOSINOPHILS 0 0 - 7 %    BASOPHILS 1 0 - 1 %    IMMATURE GRANULOCYTES 0 %    ABS. NEUTROPHILS 9.0 (H) 1.8 - 8.0 K/UL    ABS. LYMPHOCYTES 7.5 (H) 0.8 - 3.5 K/UL    ABS. MONOCYTES 1.7 (H) 0.0 - 1.0 K/UL    ABS. EOSINOPHILS 0.0 0.0 - 0.4 K/UL    ABS. BASOPHILS 0.2 (H) 0.0 - 0.1 K/UL    ABS. IMM. GRANS. 0.0 K/UL    DF MANUAL      PLATELET COMMENTS Large Platelets      RBC COMMENTS ANISOCYTOSIS  2+        RBC COMMENTS MICROCYTOSIS  1+        RBC COMMENTS HYPOCHROMIA  1+        WBC COMMENTS SMUDGE CELLS SEEN      Pathologist review        Pathologic examination results can be viewed in [facility] Chart Review under the Pathology tab. PROTHROMBIN TIME + INR    Collection Time: 01/24/20  7:06 AM   Result Value Ref Range    INR 1.4 (H) 0.9 - 1.1      Prothrombin time 14.1 (H) 9.0 - 11.1 sec   PTT    Collection Time: 01/24/20  7:06 AM   Result Value Ref Range    aPTT 27.6 22.1 - 32.0 sec    aPTT, therapeutic range     58.0 - 77.0 SECS       Telemetry: AFIB      Assessment:     Active Problems:    Acute phlegmonous appendicitis (1/21/2020)        Plan:     Preop cardiac evaluation. Stable cardiac wise and low to moderate risk for cardiac complications. Agree with anticoagulation on hold for surgery. Restart eliquis 2.5 mg bid post surgery, when ok from surgical standpoint.  [facility] will be available prn over weekend. Afib. HR controlled. Normal LV function by echo  Anemia. HTN. Ruptured appendix.

## 2020-01-24 NOTE — PHYSICIAN ADVISORY
Letter of Status Determination: Current Status   INPATIENT is Appropriate         Pt Name:  Pallavi Grey   MR#  [id number]   [facility]#   [id number]   [address]  [phone]  @ . [facility]   Hospitalization date  1/21/2020  5:53 PM   Current Attending Physician  Jaja Mendoza MD   Principal diagnosis  <principal problem not specified>    Lesa Guardado is a 80 y.o. male who presents complaining of an 8-day history of abdominal pain. After the first 2 days the patient began taking amoxicillin that he had at home. He states that this seemed to help the pain. He states it only hurts now when people touch it. He complains of abdominal cramping distention and difficulty sleeping on it. He did have mild nausea. He denies any fevers chills sweats. He finally saw his primary care physician today who thought he might have diverticulitis and sent to the hospital.  He has never had anything similar in the past.  Of note the patient recently had a stroke and was placed on Eliquis. He was also noted to be anemic a month later and underwent an EGD that showed ulcers. Sx A/P  Perforated appendicitis with phlegmon  Due to the amount of inflammation that the patient has and the duration of this process I do not believe that his appendix can be safely taken out at this time. He will need to be on IV antibiotics. We will follow his WBC count. Will likely get repeat CT scan in the next several days to see if there is a drainable collection. N.p.o. except for sips of medications for now.      [place] MCG criteria    Gastroenterology GRG (MG-GAS)  Peritoneal signs present,Other signs or symptoms of acute abdominal disease (eg, severe pain, free air)(12)     STATUS DETERMINATION  On the basis of clinical data, available documentation, we believe that the current status of this patient as INPATIENT is Appropriate    Additional comments     Insurance  Payor: Arron Grierper / Plan: Paris Villagomez MEDICARE HMO / Product Type: Managed Care Medicare /    Insurance Information                [address] Rickie [employer]avard HMO Phone:     Subscriber: Alan Barajas Subscriber#: [id number]    Group#: [id number] Precert#:         MEDICAID [facility]/[place] MEDICAID [facility] Phone:     Subscriber: Alan Barajas Subscriber#: [id number]    Group#:  Precert#:                  Gurpreet Viera MD, [facility]   Physician Sam Jackman, [address] [place] [address]

## 2020-01-24 NOTE — PROGRESS NOTES
[facility]  Hospitalist Group                                                                                          Hospitalist Progress Note  Jaja Canseco MD  Answering service: [phone] from in house phone        Date of Service:  2020  NAME:  Brenda Olivo  :  1930  MRN:  [id number]      Admission Summary: This is an 40-year-old man with past medical history significant for hypertension, hypothyroidism, chronic atrial fibrillation; on Eliquis for anticoagulation, dyslipidemia, and TIA was in his usual state of health until about a week ago when the patient developed abdominal pain. The abdominal pain is diffuse in location. The patient described the abdominal pain as cramping associated with distention and inability to sleep. The pain was constant with no radiation, 6/10 in severity. The patient took all antibiotics that he had at home and his abdominal pain became better. The patient is able to go about his activity of daily living. He went to his primary care physician today because of warts in his ear and also because of ulcer in his lip. While at the primary care physician office, the patient was found to have tenderness on abdominal palpation. The patient was then sent to the emergency room for CT scan of the abdomen and pelvis because of the concern for diverticulitis. When the patient arrived at the emergency room, CT scan of the abdomen and pelvis was performed. The CT scan shows perforated appendicitis. The emergency room physician consulted general surgeon on call. The patient was admitted to surgical service. Medical consult was requested for preop evaluation and medical management. The patient denies fever, rigor, and chills  Interval history / Subjective:       Patient seen and examined bedside. Chart and labs reviewed discussed case with surgery. Good today. No evidence of bleeding so far. Hemoglobin is stable.         Assessment & Plan:       Acute perforated appendicitis   Started on Zosyn. Surgery on board. IV fluids. Surgery waiting to repeat CAT scan to see if he can be operated at some point. Not a surgical candidate for now  Drain per surgery       Acute blood loss anemia   Waiting for stool occult. Got 1 unit of blood today  He had GI bleed last admission in December   LAST EGD /Gastric erosions and small bowel AVM as possible cause  Patient refused colonoscopy last time  If stool occult is positive again we would ask GI to see. 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 This should not affect the management of anemia with GI bleed, it is also not a contraindication to give heparin drip       # Persistent atrial Fib:  -HR controlled. Off  Anticoagulation  Cardiology on board. # History of stroke in October 2019:  --Patient is in persistent atrial fibrillation on tele  Prudent to start heparin drip if patient is not actively bleeding     # Hypertension,  -Blood pressure better now. Resume her losartan hopefully in the morning     # Hypothyroidism  . Continue Synthroid. Code status: FULL  DVT prophylaxis: SCD     Care Plan discussed with: Patient/Family  Disposition: TBD     Hospital Problems  Date Reviewed: 1/21/2020          Codes Class Noted POA    Acute phlegmonous appendicitis ICD-10-CM: K35.890  ICD-9-CM: 540.9  1/21/2020 Unknown                Review of Systems:   A comprehensive review of systems was negative except for that written in the HPI. Vital Signs:    Last 24hrs VS reviewed since prior progress note.  Most recent are:  Visit Vitals  /80   Pulse 71   Temp 98.3 °F (36.8 °C)   Resp 17   SpO2 99%         Intake/Output Summary (Last 24 hours) at 1/24/2020 3402  Last data filed at 1/24/2020 0900  Gross per 24 hour Intake 0 ml   Output 525 ml   Net -525 ml        Physical Examination:             Constitutional:  No acute distress, cooperative, pleasant    ENT:  Oral mucous moist, oropharynx benign. Resp:  CTA bilaterally. No wheezing/rhonchi/rales. No accessory muscle use   CV:  Regular rhythm, normal rate, no murmurs, gallops, rubs    GI:  Soft, non distended, non tender. normoactive bowel sounds, no hepatosplenomegaly     Musculoskeletal:  No edema, warm, 2+ pulses throughout    Neurologic:  Moves all extremities. AAOx3, CN II-XII reviewed       Data Review:    Review and/or order of clinical lab test      Labs:     Recent Labs     01/24/20  0504 01/23/20 0427   WBC 18.4* 21.1*   HGB 8.2* 7.5*   HCT 27.4* 26.0*    289     Recent Labs     01/24/20  0504 01/23/20  0427 01/22/20  0139    138 136   K 3.7 3.7 3.4*    107 103   CO2 24 25 26   BUN 16 19 20   CREA 0.93 1.07 1.21   * 125* 104*   CA 8.4* 8.1* 8.2*   MG 2.4 2.5* 2.3   PHOS  --   --  3.3     Recent Labs     01/21/20  1927   SGOT 22   ALT 31   *   TBILI 1.0   TP 7.6   ALB 3.0*   GLOB 4.6*   LPSE 73     Recent Labs     01/24/20  0706   INR 1.4*   PTP 14.1*   APTT 27.6      No results for input(s): FE, TIBC, PSAT, FERR in the last 72 hours. No results found for: FOL, RBCF   No results for input(s): PH, PCO2, PO2 in the last 72 hours. No results for input(s): CPK, CKNDX, TROIQ in the last 72 hours.     No lab exists for component: CPKMB  Lab Results   Component Value Date/Time    Cholesterol, total 159 10/12/2019 08:55 AM    HDL Cholesterol 51 10/12/2019 08:55 AM    LDL, calculated 83.4 10/12/2019 08:55 AM    Triglyceride 123 10/12/2019 08:55 AM    CHOL/HDL Ratio 3.1 10/12/2019 08:55 AM     Lab Results   Component Value Date/Time    Glucose (POC) 117 (H) 10/12/2019 08:41 AM     Lab Results   Component Value Date/Time    Color DARK YELLOW 01/21/2020 07:50 PM    Appearance CLEAR 01/21/2020 07:50 PM    Specific gravity 1.025 01/21/2020 07:50 PM    pH (UA) 6.0 01/21/2020 07:50 PM    Protein 30 (A) 01/21/2020 07:50 PM    Glucose NEGATIVE  01/21/2020 07:50 PM    Ketone TRACE (A) 01/21/2020 07:50 PM    Bilirubin NEGATIVE  12/02/2019 01:27 PM    Urobilinogen 1.0 01/21/2020 07:50 PM    Nitrites NEGATIVE  01/21/2020 07:50 PM    Leukocyte Esterase SMALL (A) 01/21/2020 07:50 PM    Epithelial cells FEW 01/21/2020 07:50 PM    Bacteria NEGATIVE  01/21/2020 07:50 PM    WBC 0-4 01/21/2020 07:50 PM    RBC 0-5 01/21/2020 07:50 PM         Medications Reviewed:     Current Facility-Administered Medications   Medication Dose Route Frequency    losartan (COZAAR) tablet 25 mg  25 mg Oral DAILY    [START ON 1/25/2020] lactated Ringers 3,057 mL with folic acid 1 mg, thiamine 100 mg, mvi, adult no. 4 with vit K 10 mL infusion   IntraVENous DAILY    diphenhydrAMINE (BENADRYL) capsule 25 mg  25 mg Oral Q6H PRN    heparin (porcine) injection 5,000 Units  5,000 Units SubCUTAneous Q8H    [START ON 1/25/2020] pantoprazole (PROTONIX) tablet 40 mg  40 mg Oral ACB    atorvastatin (LIPITOR) tablet 20 mg  20 mg Oral DAILY    levothyroxine (SYNTHROID) tablet 50 mcg  50 mcg Oral ACB    traZODone (DESYREL) tablet 50 mg  50 mg Oral QHS    albuterol (PROVENTIL VENTOLIN) nebulizer solution 2.5 mg  2.5 mg Nebulization Q4H PRN    0.9% sodium chloride infusion 250 mL  250 mL IntraVENous PRN    nicotine (NICODERM CQ) 21 mg/24 hr patch 1 Patch  1 Patch TransDERmal DAILY    LORazepam (ATIVAN) injection 1 mg  1 mg IntraVENous Q4H PRN    sodium chloride (NS) flush 5-40 mL  5-40 mL IntraVENous Q8H    sodium chloride (NS) flush 5-40 mL  5-40 mL IntraVENous PRN    acetaminophen (TYLENOL) tablet 650 mg  650 mg Oral Q6H PRN    morphine injection 2-4 mg  2-4 mg IntraVENous Q3H PRN    naloxone (NARCAN) injection 0.4 mg  0.4 mg IntraVENous PRN    ondansetron (ZOFRAN) injection 4 mg  4 mg IntraVENous Q4H PRN    piperacillin-tazobactam (ZOSYN) 3.375 g in 0.9% sodium chloride (MBP/ADV) 100 mL  3.375 g IntraVENous Q8H     ______________________________________________________________________  EXPECTED LENGTH OF STAY: 4d 0h  ACTUAL LENGTH OF STAY:          1                 Jaya Osborn MD

## 2020-01-24 NOTE — PROGRESS NOTES
[facility]  [address]  W: [phone]  F: [phone]    Reason for Visit:   Melissa Pérez is a 80 y.o. male who is seen in consultation at the request of Dr. Lina Bray  for evaluation for history of CLL never treated and anemia (will he have normal infectious response). Treatment History:   · unknown    History of Present Illness:   Katty Mahmood is an 80year old male who presented to the ED with complaints of abd pain x8 days. CT abd was performed showing acute perforated appendicitis with extensive periappendiceal inflammation. Patient has past medical history of HTN, atrial fibrillation, DJD, stroke, GI bleed, and anemia. Patient sitting up in bed on arrival; no visitors at bedside. Patient is a poor historian and has limited insight into past medical history. Patient states that his low hemoglobin is chronic and not an issue. When asked when he was first told about his low hemoglobin he states he does not remember but a primary care doctor though he had leukemia. He then states that he went to a hematologist and they told him he did not have leukemia. Past Medical History:   Diagnosis Date    Arthritis     Contact dermatitis and other eczema, due to unspecified cause     Hypertension     Stroke ([facility] [address] 1997    TIA at [facility] Thyroid disease     Unspecified hypothyroidism       Past Surgical History:   Procedure Laterality Date    HX HEENT      detached retina    HX HIP REPLACEMENT  2008    right [facility],  [place]      Social History     Tobacco Use    Smoking status: Light Tobacco Smoker     Packs/day: 0.25     Types: Cigars     Last attempt to quit: 1978     Years since quittin.3    Smokeless tobacco: Never Used    Tobacco comment: cigar on occ   Substance Use Topics    Alcohol use: Yes     Alcohol/week: 14.0 standard drinks     Types: 14 Shots of liquor per week     Comment: 2 drinks a day. Family History   Problem Relation Age of Onset    Lung Disease Father     Cancer Father         lung    Coronary Artery Disease Neg Hx      Current Facility-Administered Medications   Medication Dose Route Frequency    losartan (COZAAR) tablet 25 mg  25 mg Oral DAILY    [START ON 1/25/2020] lactated Ringers 2,923 mL with folic acid 1 mg, thiamine 100 mg, mvi, adult no. 4 with vit K 10 mL infusion   IntraVENous DAILY    diphenhydrAMINE (BENADRYL) capsule 25 mg  25 mg Oral Q6H PRN    heparin (porcine) injection 5,000 Units  5,000 Units SubCUTAneous Q8H    [START ON 1/25/2020] pantoprazole (PROTONIX) tablet 40 mg  40 mg Oral ACB    atorvastatin (LIPITOR) tablet 20 mg  20 mg Oral DAILY    levothyroxine (SYNTHROID) tablet 50 mcg  50 mcg Oral ACB    traZODone (DESYREL) tablet 50 mg  50 mg Oral QHS    albuterol (PROVENTIL VENTOLIN) nebulizer solution 2.5 mg  2.5 mg Nebulization Q4H PRN    0.9% sodium chloride infusion 250 mL  250 mL IntraVENous PRN    nicotine (NICODERM CQ) 21 mg/24 hr patch 1 Patch  1 Patch TransDERmal DAILY    LORazepam (ATIVAN) injection 1 mg  1 mg IntraVENous Q4H PRN    sodium chloride (NS) flush 5-40 mL  5-40 mL IntraVENous Q8H    sodium chloride (NS) flush 5-40 mL  5-40 mL IntraVENous PRN    acetaminophen (TYLENOL) tablet 650 mg  650 mg Oral Q6H PRN    morphine injection 2-4 mg  2-4 mg IntraVENous Q3H PRN    naloxone (NARCAN) injection 0.4 mg  0.4 mg IntraVENous PRN    ondansetron (ZOFRAN) injection 4 mg  4 mg IntraVENous Q4H PRN    piperacillin-tazobactam (ZOSYN) 3.375 g in 0.9% sodium chloride (MBP/ADV) 100 mL  3.375 g IntraVENous Q8H      Allergies   Allergen Reactions    Hydrocodone Other (comments)     Unable to void, or have a bowel movement    Amlodipine Swelling and Other (comments)     Severe weeping from leg swelling    Poison Ivy Extract Itching        Review of Systems: A complete review of systems was obtained, negative except as described above.     Physical Exam:     Visit Vitals  /85   Pulse 75   Temp 98.6 °F (37 °C)   Resp 18   SpO2 98%     ECOG PS: 2-3  General: No distress  Eyes:  anicteric sclerae  HENT: Atraumatic  Neck: Supple  Respiratory: expiratory wheeze, shortness of breath with talking, on O2 via nasal cannula  CV: Normal rate, regular rhythm, no murmurs, 1-2+ peripheral edema  GI: Soft, palpation deferred  MS: Digits without clubbing or cyanosis. Skin: BLE with discoloration and flaking skin. Normal temperature. Psych: Alert, oriented    Results:     Lab Results   Component Value Date/Time    WBC 18.4 (H) 01/24/2020 05:04 AM    HGB 8.2 (L) 01/24/2020 05:04 AM    HCT 27.4 (L) 01/24/2020 05:04 AM    PLATELET 700 41/25/6591 05:04 AM    MCV 78.7 (L) 01/24/2020 05:04 AM    ABS. NEUTROPHILS 9.0 (H) 01/24/2020 05:04 AM     Lab Results   Component Value Date/Time    Sodium 138 01/24/2020 05:04 AM    Potassium 3.7 01/24/2020 05:04 AM    Chloride 106 01/24/2020 05:04 AM    CO2 24 01/24/2020 05:04 AM    Glucose 104 (H) 01/24/2020 05:04 AM    BUN 16 01/24/2020 05:04 AM    Creatinine 0.93 01/24/2020 05:04 AM    GFR est AA >60 01/24/2020 05:04 AM    GFR est non-AA >60 01/24/2020 05:04 AM    Calcium 8.4 (L) 01/24/2020 05:04 AM    Glucose (POC) 117 (H) 10/12/2019 08:41 AM     Lab Results   Component Value Date/Time    Bilirubin, total 1.0 01/21/2020 07:27 PM    ALT (SGPT) 31 01/21/2020 07:27 PM    AST (SGOT) 22 01/21/2020 07:27 PM    Alk. phosphatase 130 (H) 01/21/2020 07:27 PM    Protein, total 7.6 01/21/2020 07:27 PM    Albumin 3.0 (L) 01/21/2020 07:27 PM    Globulin 4.6 (H) 01/21/2020 07:27 PM       CT ABD/PELV 1/21/2020  IMPRESSION: Acute perforated appendicitis with extensive periappendiceal  Inflammation. CT ABD/PELV 1/23/2020  IMPRESSION:   1. Stable perforated appendicitis. Trace ascites is new. 2. Possible small, noncalcified, extraluminal, appendicoliths. 3. No drainable, walled off fluid collection.   4. Probable CHF: cardiomegaly, new bilateral pleural effusions, and interstitial  pulmonary edema. 5. Cirrhosis. Liver protocol CT or MRI should be considered on nonemergent basis  for hepatocellular carcinoma screening. Records reviewed and summarized above. Radiology report(s) reviewed above. Assessment:   1) CLL    He was diagnosed by Dr. Shireen Hale in 2011  Flowcytometry results are under media and reviewed but the patient denies having this diagnosis  It does appear that his counts have been stable over the years and CLL has been indolent    His anemia needs further work up as below      2)Perforated appendicitis  General surgery following. Interventions should not be affected by his CLL diagnosis    3) Anemia  Patient states this is chronic and has been on iron pills in past.   Per our records, normal Hgb up until 2014  Current anemia most likely 2/2 #2 but will complete an anemia workup      So far no hemolysis    --Folate, B12, iron profile, ferritin ordered on 1/22, not drawn. Asked bedside nurse to draw these. --Peripheral smear pending  -- I doubt this indicated BM involvement with CLL as generally that is accompanied with thrombocytopenia      4)Leukocytosis  Most likely 2/2 #2 and CLL  Antibiotics per primary team.    4) Psychosocial  Patient states he lives at home with his wife and son. Wife has Alzheimers and son is primary caregiver. Patient appears in good spirits and anxious to be discharged back home. Plan:     · Peripheral smear, folate, B12, iron profile, and ferritin pending. · Immunophenotype pending. He has a diagnosis of CLL but denies this  · Management of appendicitis per general surgery and primary team.  · Transfuse as needed. Dr. Shireen Hale to see Monday  Please call with questions over the weekend    I appreciate the opportunity to participate in Mr. Karin Huertago care.     Signed By: Claudetta Cruel, MD

## 2020-01-24 NOTE — PROGRESS NOTES
9094: Bladder scanned patient. 415 ml of urine was found in bladder. Per nursing protocol, patient was straight cathed, 425 ml of urine was obtained. Will page hospitalist if need to cath again, since this is the 2nd straight cath the patient has had within 24 hours. 6662: Bladder scanned patient. 3 ml of urine was found. Will bladder scan in 2 hours. 18: Spoke with Jesse Gomes NP in person, he stated that if patient is to retain greater than 400ml of urine again, to put in tidwell catheter. Will continue to monitor. 1527: Bladder scanned patient. Found to have 114 ml of urine in bladder. No pain or discomfort. Will continue to monitor. 3936: Bladder scanned again. Found 136 ml of urine in bladder. No pain or discomfort.  Will continue to monitor

## 2020-01-24 NOTE — PROGRESS NOTES
Mr. Radha Villela has no specific complaints today. Tm 100.6 Tc 98.6 HR: 183/92 Resp Rate: 18 96% sat on room air. Intake/Output Summary (Last 24 hours) at 1/24/2020 1215  Last data filed at 1/24/2020 0900  Gross per 24 hour   Intake 0 ml   Output 525 ml   Net -525 ml   Exam: Cor: RRR. Lungs: Bilateral breath sounds. Clear to auscultation. Abd: Soft. Tender in RLQ. No guarding or rebound. Non distended. Labs:   Recent Results (from the past 12 hour(s))   METABOLIC PANEL, BASIC    Collection Time: 01/24/20  5:04 AM   Result Value Ref Range    Sodium 138 136 - 145 mmol/L    Potassium 3.7 3.5 - 5.1 mmol/L    Chloride 106 97 - 108 mmol/L    CO2 24 21 - 32 mmol/L    Anion gap 8 5 - 15 mmol/L    Glucose 104 (H) 65 - 100 mg/dL    BUN 16 6 - 20 MG/DL    Creatinine 0.93 0.70 - 1.30 MG/DL    BUN/Creatinine ratio 17 12 - 20      GFR est AA >60 >60 ml/min/1.73m2    GFR est non-AA >60 >60 ml/min/1.73m2    Calcium 8.4 (L) 8.5 - 10.1 MG/DL   MAGNESIUM    Collection Time: 01/24/20  5:04 AM   Result Value Ref Range    Magnesium 2.4 1.6 - 2.4 mg/dL   CBC WITH AUTOMATED DIFF    Collection Time: 01/24/20  5:04 AM   Result Value Ref Range    WBC 18.4 (H) 4.1 - 11.1 K/uL    RBC 3.48 (L) 4.10 - 5.70 M/uL    HGB 8.2 (L) 12.1 - 17.0 g/dL    HCT 27.4 (L) 36.6 - 50.3 %    MCV 78.7 (L) 80.0 - 99.0 FL    MCH 23.6 (L) 26.0 - 34.0 PG    MCHC 29.9 (L) 30.0 - 36.5 g/dL    RDW 19.9 (H) 11.5 - 14.5 %    PLATELET 673 332 - 026 K/uL    MPV 9.8 8.9 - 12.9 FL    NRBC 0.0 0  WBC    ABSOLUTE NRBC 0.00 0.00 - 0.01 K/uL    NEUTROPHILS 49 32 - 75 %    LYMPHOCYTES 41 12 - 49 %    MONOCYTES 9 5 - 13 %    EOSINOPHILS 0 0 - 7 %    BASOPHILS 1 0 - 1 %    IMMATURE GRANULOCYTES 0 %    ABS. NEUTROPHILS 9.0 (H) 1.8 - 8.0 K/UL    ABS. LYMPHOCYTES 7.5 (H) 0.8 - 3.5 K/UL    ABS. MONOCYTES 1.7 (H) 0.0 - 1.0 K/UL    ABS. EOSINOPHILS 0.0 0.0 - 0.4 K/UL    ABS. BASOPHILS 0.2 (H) 0.0 - 0.1 K/UL    ABS. IMM.  GRANS. 0.0 K/UL    DF MANUAL      PLATELET COMMENTS Large Platelets      RBC COMMENTS ANISOCYTOSIS  2+        RBC COMMENTS MICROCYTOSIS  1+        RBC COMMENTS HYPOCHROMIA  1+        WBC COMMENTS SMUDGE CELLS SEEN     PROTHROMBIN TIME + INR    Collection Time: 01/24/20  7:06 AM   Result Value Ref Range    INR 1.4 (H) 0.9 - 1.1      Prothrombin time 14.1 (H) 9.0 - 11.1 sec   PTT    Collection Time: 01/24/20  7:06 AM   Result Value Ref Range    aPTT 27.6 22.1 - 32.0 sec    aPTT, therapeutic range     58.0 - 77.0 SECS   Will try clear liquid diet. Continue IVF - Decrease rate to 75ml/hour. IV abx - Zosyn as ordered. CBC in AM - 1/25/2020. Anti-emetics and pain medication as needed. Hematology input - noted. Hospitalists - following. Cardiology input - noted. Physical Therapy to see. DVT Prophylaxis - SC Heparin.

## 2020-01-25 LAB
ANION GAP SERPL CALC-SCNC: 8 MMOL/L (ref 5–15)
BASOPHILS # BLD: 0.2 K/UL (ref 0–0.1)
BASOPHILS NFR BLD: 1 % (ref 0–1)
BUN SERPL-MCNC: 14 MG/DL (ref 6–20)
BUN/CREAT SERPL: 19 (ref 12–20)
CALCIUM SERPL-MCNC: 8.6 MG/DL (ref 8.5–10.1)
CHLORIDE SERPL-SCNC: 105 MMOL/L (ref 97–108)
CO2 SERPL-SCNC: 25 MMOL/L (ref 21–32)
CREAT SERPL-MCNC: 0.75 MG/DL (ref 0.7–1.3)
DIFFERENTIAL METHOD BLD: ABNORMAL
EOSINOPHIL # BLD: 0.3 K/UL (ref 0–0.4)
EOSINOPHIL NFR BLD: 2 % (ref 0–7)
ERYTHROCYTE [DISTWIDTH] IN BLOOD BY AUTOMATED COUNT: 19.7 % (ref 11.5–14.5)
GLUCOSE SERPL-MCNC: 109 MG/DL (ref 65–100)
HCT VFR BLD AUTO: 28.1 % (ref 36.6–50.3)
HGB BLD-MCNC: 8.2 G/DL (ref 12.1–17)
IMM GRANULOCYTES # BLD AUTO: 0.3 K/UL (ref 0–0.04)
IMM GRANULOCYTES NFR BLD AUTO: 2 % (ref 0–0.5)
LYMPHOCYTES # BLD: 6.5 K/UL (ref 0.8–3.5)
LYMPHOCYTES NFR BLD: 43 % (ref 12–49)
MAGNESIUM SERPL-MCNC: 2.4 MG/DL (ref 1.6–2.4)
MCH RBC QN AUTO: 23 PG (ref 26–34)
MCHC RBC AUTO-ENTMCNC: 29.2 G/DL (ref 30–36.5)
MCV RBC AUTO: 78.7 FL (ref 80–99)
MONOCYTES # BLD: 0.6 K/UL (ref 0–1)
MONOCYTES NFR BLD: 4 % (ref 5–13)
NEUTS SEG # BLD: 7.3 K/UL (ref 1.8–8)
NEUTS SEG NFR BLD: 48 % (ref 32–75)
NRBC # BLD: 0 K/UL (ref 0–0.01)
NRBC BLD-RTO: 0 PER 100 WBC
PLATELET # BLD AUTO: 392 K/UL (ref 150–400)
PMV BLD AUTO: 10.1 FL (ref 8.9–12.9)
POTASSIUM SERPL-SCNC: 3.4 MMOL/L (ref 3.5–5.1)
RBC # BLD AUTO: 3.57 M/UL (ref 4.1–5.7)
RBC MORPH BLD: ABNORMAL
SODIUM SERPL-SCNC: 138 MMOL/L (ref 136–145)
WBC # BLD AUTO: 15.2 K/UL (ref 4.1–11.1)

## 2020-01-25 PROCEDURE — 74011250637 HC RX REV CODE- 250/637: Performed by: HOSPITALIST

## 2020-01-25 PROCEDURE — 36415 COLL VENOUS BLD VENIPUNCTURE: CPT

## 2020-01-25 PROCEDURE — 80048 BASIC METABOLIC PNL TOTAL CA: CPT

## 2020-01-25 PROCEDURE — 85025 COMPLETE CBC W/AUTO DIFF WBC: CPT

## 2020-01-25 PROCEDURE — 74011250637 HC RX REV CODE- 250/637: Performed by: INTERNAL MEDICINE

## 2020-01-25 PROCEDURE — 97162 PT EVAL MOD COMPLEX 30 MIN: CPT

## 2020-01-25 PROCEDURE — 74011250636 HC RX REV CODE- 250/636: Performed by: SURGERY

## 2020-01-25 PROCEDURE — 83735 ASSAY OF MAGNESIUM: CPT

## 2020-01-25 PROCEDURE — 74011000258 HC RX REV CODE- 258: Performed by: SURGERY

## 2020-01-25 PROCEDURE — 74011250637 HC RX REV CODE- 250/637: Performed by: SURGERY

## 2020-01-25 PROCEDURE — 97116 GAIT TRAINING THERAPY: CPT

## 2020-01-25 PROCEDURE — 74011000250 HC RX REV CODE- 250: Performed by: SURGERY

## 2020-01-25 PROCEDURE — 65270000029 HC RM PRIVATE

## 2020-01-25 RX ORDER — HYDRALAZINE HYDROCHLORIDE 20 MG/ML
20 INJECTION INTRAMUSCULAR; INTRAVENOUS
Status: DISCONTINUED | OUTPATIENT
Start: 2020-01-25 | End: 2020-01-28 | Stop reason: HOSPADM

## 2020-01-25 RX ADMIN — SODIUM CHLORIDE 10 ML: 9 INJECTION INTRAMUSCULAR; INTRAVENOUS; SUBCUTANEOUS at 07:05

## 2020-01-25 RX ADMIN — SODIUM CHLORIDE 10 ML: 9 INJECTION INTRAMUSCULAR; INTRAVENOUS; SUBCUTANEOUS at 22:02

## 2020-01-25 RX ADMIN — PIPERACILLIN AND TAZOBACTAM 3.38 G: 3; .375 INJECTION, POWDER, LYOPHILIZED, FOR SOLUTION INTRAVENOUS at 07:04

## 2020-01-25 RX ADMIN — ATORVASTATIN CALCIUM 20 MG: 10 TABLET, FILM COATED ORAL at 09:21

## 2020-01-25 RX ADMIN — PANTOPRAZOLE SODIUM 40 MG: 40 TABLET, DELAYED RELEASE ORAL at 07:04

## 2020-01-25 RX ADMIN — LOSARTAN POTASSIUM 25 MG: 25 TABLET ORAL at 09:21

## 2020-01-25 RX ADMIN — HEPARIN SODIUM 5000 UNITS: 5000 INJECTION INTRAVENOUS; SUBCUTANEOUS at 07:04

## 2020-01-25 RX ADMIN — POTASSIUM BICARBONATE 40 MEQ: 782 TABLET, EFFERVESCENT ORAL at 13:34

## 2020-01-25 RX ADMIN — HEPARIN SODIUM 5000 UNITS: 5000 INJECTION INTRAVENOUS; SUBCUTANEOUS at 13:34

## 2020-01-25 RX ADMIN — LEVOTHYROXINE SODIUM 50 MCG: 0.05 TABLET ORAL at 07:04

## 2020-01-25 RX ADMIN — PIPERACILLIN AND TAZOBACTAM 3.38 G: 3; .375 INJECTION, POWDER, LYOPHILIZED, FOR SOLUTION INTRAVENOUS at 22:02

## 2020-01-25 RX ADMIN — TRAZODONE HYDROCHLORIDE 50 MG: 50 TABLET ORAL at 22:02

## 2020-01-25 RX ADMIN — HEPARIN SODIUM 5000 UNITS: 5000 INJECTION INTRAVENOUS; SUBCUTANEOUS at 22:03

## 2020-01-25 RX ADMIN — ALBUTEROL SULFATE 2.5 MG: 2.5 SOLUTION RESPIRATORY (INHALATION) at 09:32

## 2020-01-25 RX ADMIN — PIPERACILLIN AND TAZOBACTAM 3.38 G: 3; .375 INJECTION, POWDER, LYOPHILIZED, FOR SOLUTION INTRAVENOUS at 13:34

## 2020-01-25 RX ADMIN — FOLIC ACID: 5 INJECTION, SOLUTION INTRAMUSCULAR; INTRAVENOUS; SUBCUTANEOUS at 09:31

## 2020-01-25 NOTE — PROGRESS NOTES
Patient's blood pressure 180's to 190's. Nurse paged on call hospitalist, Dr. Elise Lefort, and he ordered PRN hydralazine every 6 hours. Nurse administered medication. Nurse redrew blood pressure and it was 163/79. Nurse will continue to monitor.

## 2020-01-25 NOTE — PROGRESS NOTES
[address] [facility] Adult  Hospitalist Group                                                                                          Hospitalist Progress Note  Roland Mills MD  Answering service: [phone] OR [phone] from in house phone        Date of Service:  2020  NAME:  Miguel Schofield  :  1930  MRN:  [id number]      Admission Summary: This is an 60-year-old man with past medical history significant for hypertension, hypothyroidism, chronic atrial fibrillation; on Eliquis for anticoagulation, dyslipidemia, and TIA was in his usual state of health until about a week ago when the patient developed abdominal pain. The abdominal pain is diffuse in location. The patient described the abdominal pain as cramping associated with distention and inability to sleep. The pain was constant with no radiation, 6/10 in severity. The patient took all antibiotics that he had at home and his abdominal pain became better. The patient is able to go about his activity of daily living. He went to his primary care physician today because of warts in his ear and also because of ulcer in his lip. While at the primary care physician office, the patient was found to have tenderness on abdominal palpation. The patient was then sent to the emergency room for CT scan of the abdomen and pelvis because of the concern for diverticulitis. When the patient arrived at the emergency room, CT scan of the abdomen and pelvis was performed. The CT scan shows perforated appendicitis. The emergency room physician consulted general surgeon on call. The patient was admitted to surgical service. Medical consult was requested for preop evaluation and medical management. The patient denies fever, rigor, and chills  Interval history / Subjective:       F/u for medical management. No new complaint. Denies abdominal pain. No N/V. No fever       Assessment & Plan:       Acute perforated appendicitis   Started on Zosyn. Surgery on board. IV fluids. Surgery waiting to repeat CAT scan to see if he can be operated at some point. Not a surgical candidate for now        Acute blood loss anemia   Hb 8.2  Monitor     # Persistent atrial Fib:  -HR controlled. Off  Anticoagulation  Cardiology on board. # History of stroke in October 2019:  --Patient is in persistent atrial fibrillation on tele  - Anticoagulation held for now. Management as per cardiology     # Hypertension,  -Resume losartan. Monitor     # Hypothyroidism  . Continue Synthroid. Code status: FULL  DVT prophylaxis: SCD     Care Plan discussed with: Patient/Family  Disposition: TBD     Hospital Problems  Date Reviewed: 1/21/2020          Codes Class Noted POA    Acute phlegmonous appendicitis ICD-10-CM: K35.890  ICD-9-CM: 540.9  1/21/2020 Unknown                Review of Systems:   A comprehensive review of systems was negative except for that written in the HPI. Vital Signs:    Last 24hrs VS reviewed since prior progress note. Most recent are:  Visit Vitals  /81 (BP 1 Location: Left arm, BP Patient Position: At rest)   Pulse 68   Temp 98 °F (36.7 °C)   Resp 16   SpO2 93%         Intake/Output Summary (Last 24 hours) at 1/25/2020 1602  Last data filed at 1/25/2020 4912  Gross per 24 hour   Intake --   Output 575 ml   Net -575 ml        Physical Examination:             Constitutional:  No acute distress, cooperative, pleasant    ENT:  Oral mucous moist, oropharynx benign. Resp:  CTA bilaterally. No wheezing/rhonchi/rales. No accessory muscle use   CV:  Regular rhythm, normal rate, no murmurs, gallops, rubs    GI:  Soft, non distended, non tender. normoactive bowel sounds, no hepatosplenomegaly     Musculoskeletal:  No edema, warm, 2+ pulses throughout    Neurologic:  Moves all extremities.   AAOx3, CN II-XII reviewed       Data Review:    Review and/or order of clinical lab test      Labs:     Recent Labs     01/25/20  0346 01/24/20  0504   WBC 15.2* 18.4*   HGB 8.2* 8.2*   HCT 28.1* 27.4*    354     Recent Labs     01/25/20  0346 01/24/20  0504 01/23/20  0427    138 138   K 3.4* 3.7 3.7    106 107   CO2 25 24 25   BUN 14 16 19   CREA 0.75 0.93 1.07   * 104* 125*   CA 8.6 8.4* 8.1*   MG 2.4 2.4 2.5*     No results for input(s): SGOT, GPT, ALT, AP, TBIL, TBILI, TP, ALB, GLOB, GGT, AML, LPSE in the last 72 hours. No lab exists for component: AMYP, HLPSE  Recent Labs     01/24/20  0706   INR 1.4*   PTP 14.1*   APTT 27.6      No results for input(s): FE, TIBC, PSAT, FERR in the last 72 hours. Lab Results   Component Value Date/Time    Folate 28.4 (H) 01/24/2020 06:00 PM      No results for input(s): PH, PCO2, PO2 in the last 72 hours. No results for input(s): CPK, CKNDX, TROIQ in the last 72 hours.     No lab exists for component: CPKMB  Lab Results   Component Value Date/Time    Cholesterol, total 159 10/12/2019 08:55 AM    HDL Cholesterol 51 10/12/2019 08:55 AM    LDL, calculated 83.4 10/12/2019 08:55 AM    Triglyceride 123 10/12/2019 08:55 AM    CHOL/HDL Ratio 3.1 10/12/2019 08:55 AM     Lab Results   Component Value Date/Time    Glucose (POC) 117 (H) 10/12/2019 08:41 AM     Lab Results   Component Value Date/Time    Color DARK YELLOW 01/21/2020 07:50 PM    Appearance CLEAR 01/21/2020 07:50 PM    Specific gravity 1.025 01/21/2020 07:50 PM    pH (UA) 6.0 01/21/2020 07:50 PM    Protein 30 (A) 01/21/2020 07:50 PM    Glucose NEGATIVE  01/21/2020 07:50 PM    Ketone TRACE (A) 01/21/2020 07:50 PM    Bilirubin NEGATIVE  12/02/2019 01:27 PM    Urobilinogen 1.0 01/21/2020 07:50 PM    Nitrites NEGATIVE  01/21/2020 07:50 PM    Leukocyte Esterase SMALL (A) 01/21/2020 07:50 PM    Epithelial cells FEW 01/21/2020 07:50 PM    Bacteria NEGATIVE  01/21/2020 07:50 PM    WBC 0-4 01/21/2020 07:50 PM    RBC 0-5 01/21/2020 07:50 PM         Medications Reviewed:     Current Facility-Administered Medications   Medication Dose Route Frequency    losartan (COZAAR) tablet 25 mg  25 mg Oral DAILY    lactated Ringers 1,606 mL with folic acid 1 mg, thiamine 100 mg, mvi, adult no. 4 with vit K 10 mL infusion   IntraVENous DAILY    diphenhydrAMINE (BENADRYL) capsule 25 mg  25 mg Oral Q6H PRN    heparin (porcine) injection 5,000 Units  5,000 Units SubCUTAneous Q8H    pantoprazole (PROTONIX) tablet 40 mg  40 mg Oral ACB    hydrALAZINE (APRESOLINE) 20 mg/mL injection 20 mg  20 mg IntraVENous Q6H PRN    atorvastatin (LIPITOR) tablet 20 mg  20 mg Oral DAILY    levothyroxine (SYNTHROID) tablet 50 mcg  50 mcg Oral ACB    traZODone (DESYREL) tablet 50 mg  50 mg Oral QHS    albuterol (PROVENTIL VENTOLIN) nebulizer solution 2.5 mg  2.5 mg Nebulization Q4H PRN    0.9% sodium chloride infusion 250 mL  250 mL IntraVENous PRN    nicotine (NICODERM CQ) 21 mg/24 hr patch 1 Patch  1 Patch TransDERmal DAILY    LORazepam (ATIVAN) injection 1 mg  1 mg IntraVENous Q4H PRN    sodium chloride (NS) flush 5-40 mL  5-40 mL IntraVENous Q8H    sodium chloride (NS) flush 5-40 mL  5-40 mL IntraVENous PRN    acetaminophen (TYLENOL) tablet 650 mg  650 mg Oral Q6H PRN    morphine injection 2-4 mg  2-4 mg IntraVENous Q3H PRN    naloxone (NARCAN) injection 0.4 mg  0.4 mg IntraVENous PRN    ondansetron (ZOFRAN) injection 4 mg  4 mg IntraVENous Q4H PRN    piperacillin-tazobactam (ZOSYN) 3.375 g in 0.9% sodium chloride (MBP/ADV) 100 mL  3.375 g IntraVENous Q8H     ______________________________________________________________________  EXPECTED LENGTH OF STAY: 4d 0h  ACTUAL LENGTH OF STAY:          2                 Cherry Souza MD

## 2020-01-25 NOTE — PROGRESS NOTES
Problem: Mobility Impaired (Adult and Pediatric)  Goal: *Acute Goals and Plan of Care (Insert Text)  Description  FUNCTIONAL STATUS PRIOR TO ADMISSION: Patient was modified independent using a single point cane for functional mobility. HOME SUPPORT PRIOR TO ADMISSION: The patient lives with son and his wife. Physical Therapy Goals  Initiated 1/25/2020  1. Patient will move from supine to sit and sit to supine  in bed with minimal assistance/contact guard assist within 7 day(s). 2.  Patient will transfer from bed to chair and chair to bed with modified independence using the least restrictive device within 7 day(s). 3.  Patient will perform sit to stand with modified independence within 7 day(s). 4.  Patient will ambulate with minimal assistance/contact guard assist for 50 feet with the least restrictive device within 7 day(s). Outcome: Progressing Towards Goal   PHYSICAL THERAPY EVALUATION  Patient: Corky Granger (79 y.o. male)  Date: 1/25/2020  Primary Diagnosis: Acute phlegmonous appendicitis [K35.890]  Acute phlegmonous appendicitis [K35.890]        Precautions:  Fall      ASSESSMENT  Based on the objective data described below, the patient presents with decreased strength, balance, and overall limited functional mobility. At first, patient resistance to working with therapy today. Patient's son present in room to help encourage patient which led to participation. Patient required mod A to perform bed mobility and min to mod A to perform sit to stand transfer. Patient with SOB following each transition. RN present in room and aware. Vitals stable throughout session. Patient agreeable to 5 steps to [facility] and returned to supine per request.     Current Level of Function Impacting Discharge (mobility/balance): mod A bed mobility, 5 steps amb with [facility]    Functional Outcome Measure:   The patient scored 45/100 on the Barthel outcome measure which is indicative of impairment of mobility and performance with ADLs. Other factors to consider for discharge: lives with son and wife who has Alzheimers     Patient will benefit from skilled therapy intervention to address the above noted impairments. 3  PLAN :  Recommendations and Planned Interventions: bed mobility training, transfer training, gait training, therapeutic exercises, neuromuscular re-education, patient and family training/education, and therapeutic activities      Frequency/Duration: Patient will be followed by physical therapy:  5 times a week to address goals. Recommendation for discharge: (in order for the patient to meet his/her long term goals)  Physical therapy at least 2 days/week in the home AND ensure assist and/or supervision for safety with mobility and ambulation     This discharge recommendation:  Has not yet been discussed the attending provider and/or case management    IF patient discharges home will need the following DME: to be determined (TBD)         SUBJECTIVE:   Patient stated Sure I guess I can do that.     OBJECTIVE DATA SUMMARY:   HISTORY:    Past Medical History:   Diagnosis Date    Arthritis     Contact dermatitis and other eczema, due to unspecified cause     Hypertension     Stroke ([facility] [address] 1997    TIA at [facility]     Thyroid disease     Unspecified hypothyroidism      Past Surgical History:   Procedure Laterality Date    HX HEENT      detached retina    HX HIP REPLACEMENT  2008    right hip, [facility]       Personal factors and/or comorbidities impacting plan of care:     Home Situation  Home Environment: Private residence  # Steps to Enter: 5  Rails to Enter: Yes  Hand Rails : Bilateral  One/Two Story Residence: One story  Living Alone: No  Support Systems: Family member(s)  Patient Expects to be Discharged to[de-identified] Private residence  Current DME Used/Available at Home: jose alejandro Torres    EXAMINATION/PRESENTATION/DECISION MAKING:   Critical Behavior:   A&O x 4        Safety/Judgement: Awareness of environment  Hearing: Auditory  Auditory Impairment: Hard of hearing, bilateral    Range Of Motion:  AROM: Within functional limits                       Strength:    Strength: Generally decreased, functional                    Tone & Sensation:   Tone: Normal           Coordination: Generally decreased, functional  Vision:   Tracking: Able to track stimulus in all quadrants w/o difficulty  Functional Mobility:  Bed Mobility:  Rolling: Moderate assistance;Assist x1  Supine to Sit: Moderate assistance;Assist x1        Transfers:  Sit to Stand: Minimum assistance;Assist x1  Stand to Sit: Assist x1;Minimum assistance                       Balance:   Sitting: Impaired; With support  Sitting - Static: Good (unsupported)  Sitting - Dynamic: Good (unsupported)  Standing: Impaired; With support  Standing - Static: Fair  Standing - Dynamic : Poor  Ambulation/Gait Training:              Gait Description (WDL): (5 steps to [facility] with SPC and HHA)                   Therapeutic Exercises: Ankle pumps x 10, LAQ x 10 while seated  EOB    Functional Measure:  Barthel Index:    Bathin  Bladder: 0  Bowels: 10  Groomin  Dressing: 10  Feeding: 10  Mobility: 0  Stairs: 0  Toilet Use: 5  Transfer (Bed to Chair and Back): 5  Total: 45/100       The Barthel ADL Index: Guidelines  1. The index should be used as a record of what a patient does, not as a record of what a patient could do. 2. The main aim is to establish degree of independence from any help, physical or verbal, however minor and for whatever reason. 3. The need for supervision renders the patient not independent. 4. A patient's performance should be established using the best available evidence. Asking the patient, friends/relatives and nurses are the usual sources, but direct observation and common sense are also important. However direct testing is not needed.   5. Usually the patient's performance over the preceding 24-48 hours is important, but occasionally longer periods will be relevant. 6. Middle categories imply that the patient supplies over 50 per cent of the effort. 7. Use of aids to be independent is allowed. Darryle Chant., Barthel, DLarissaW. (2625). Functional evaluation: the Barthel Index. [address] [facility] (14)2. DEQUAN Rodriguez, Romeo Macario., Balaji Perez., [place], [address] Measuring the change indisability after inpatient rehabilitation; comparison of the responsiveness of the Barthel Index and Functional [place] Measure. Journal of Neurology, Neurosurgery, and Psychiatry, 66(4), 033-201. Roland Gant, NLAYNE.A, REG Marrufo, & Elmira Mckeon M.A. (2004.) Assessment of post-stroke quality of life in cost-effectiveness studies: The usefulness of the Barthel Index and the EuroQoL-5D. Quality of Life Research, 15, 774-93           Physical Therapy Evaluation Charge Determination   History Examination Presentation Decision-Making   MEDIUM  Complexity : 1-2 comorbidities / personal factors will impact the outcome/ POC  MEDIUM Complexity : 3 Standardized tests and measures addressing body structure, function, activity limitation and / or participation in recreation  MEDIUM Complexity : Evolving with changing characteristics  MEDIUM Complexity : FOTO score of 26-74      Based on the above components, the patient evaluation is determined to be of the following complexity level: MEDIUM    Pain Ratin/10    Activity Tolerance:   Fair and requires rest breaks  Please refer to the flowsheet for vital signs taken during this treatment. After treatment patient left in no apparent distress:   Supine in bed, Call bell within reach, and Caregiver / family present    COMMUNICATION/EDUCATION:   The patients plan of care was discussed with: Registered Nurse. Fall prevention education was provided and the patient/caregiver indicated understanding. and Patient/family have participated as able in goal setting and plan of care.     Thank you for this referral.  Boston Umaña, PT   Time Calculation: 30 mins

## 2020-01-25 NOTE — PROGRESS NOTES
Bedside and Verbal shift change report given to Mary Hoang RN (oncoming nurse) by Jono Funes RN (offgoing nurse). Report included the following information SBAR, Kardex, ED Summary, Intake/Output, MAR and Recent Results.

## 2020-01-25 NOTE — PROGRESS NOTES
Progress Note    Patient: Mignon iL MRN: [id number]  SSN: [id number]    YOB: 1930  Age: 80 y.o. Sex: male      Admit Date: 2020    Perforated appendicitis    Subjective:     No acute surgical issues. Pt reported pain is under control. Tolerating clear liquids without nausea or vomiting. CT scan from yesterday showed no drainable abscess. There is  cirrhosis of the liver with concern for [address] however patient reported he does not wish to have any further workup for any malignancy. Objective:     Visit Vitals  /84 (BP 1 Location: Right arm, BP Patient Position: Head of bed elevated (Comment degrees))   Pulse 96   Temp 98.1 °F (36.7 °C)   Resp 16   SpO2 96%       Temp (24hrs), Av °F (36.7 °C), Min:97.6 °F (36.4 °C), Max:98.5 °F (36.9 °C)        Physical Exam:    Gen:  NAD  Pulm:  Unlabored  Abd:  S/ND/mild TTP in RLQ without any guarding or rebound    Recent Results (from the past 24 hour(s))   VITAMIN B12    Collection Time: 20  6:00 PM   Result Value Ref Range    Vitamin B12 705 193 - 986 pg/mL   FOLATE    Collection Time: 20  6:00 PM   Result Value Ref Range    Folate 28.4 (H) 5.0 - 21.0 ng/mL   SAMPLES BEING HELD    Collection Time: 20  6:00 PM   Result Value Ref Range    SAMPLES BEING HELD 1PST     COMMENT        Add-on orders for these samples will be processed based on acceptable specimen integrity and analyte stability, which may vary by analyte.    METABOLIC PANEL, BASIC    Collection Time: 20  3:46 AM   Result Value Ref Range    Sodium 138 136 - 145 mmol/L    Potassium 3.4 (L) 3.5 - 5.1 mmol/L    Chloride 105 97 - 108 mmol/L    CO2 25 21 - 32 mmol/L    Anion gap 8 5 - 15 mmol/L    Glucose 109 (H) 65 - 100 mg/dL    BUN 14 6 - 20 MG/DL    Creatinine 0.75 0.70 - 1.30 MG/DL    BUN/Creatinine ratio 19 12 - 20      GFR est AA >60 >60 ml/min/1.73m2    GFR est non-AA >60 >60 ml/min/1.73m2    Calcium 8.6 8.5 - 10.1 MG/DL   MAGNESIUM    Collection Time: 01/25/20  3:46 AM   Result Value Ref Range    Magnesium 2.4 1.6 - 2.4 mg/dL   CBC WITH AUTOMATED DIFF    Collection Time: 01/25/20  3:46 AM   Result Value Ref Range    WBC 15.2 (H) 4.1 - 11.1 K/uL    RBC 3.57 (L) 4.10 - 5.70 M/uL    HGB 8.2 (L) 12.1 - 17.0 g/dL    HCT 28.1 (L) 36.6 - 50.3 %    MCV 78.7 (L) 80.0 - 99.0 FL    MCH 23.0 (L) 26.0 - 34.0 PG    MCHC 29.2 (L) 30.0 - 36.5 g/dL    RDW 19.7 (H) 11.5 - 14.5 %    PLATELET 195 899 - 284 K/uL    MPV 10.1 8.9 - 12.9 FL    NRBC 0.0 0  WBC    ABSOLUTE NRBC 0.00 0.00 - 0.01 K/uL    NEUTROPHILS 48 32 - 75 %    LYMPHOCYTES 43 12 - 49 %    MONOCYTES 4 (L) 5 - 13 %    EOSINOPHILS 2 0 - 7 %    BASOPHILS 1 0 - 1 %    IMMATURE GRANULOCYTES 2 (H) 0.0 - 0.5 %    ABS. NEUTROPHILS 7.3 1.8 - 8.0 K/UL    ABS. LYMPHOCYTES 6.5 (H) 0.8 - 3.5 K/UL    ABS. MONOCYTES 0.6 0.0 - 1.0 K/UL    ABS. EOSINOPHILS 0.3 0.0 - 0.4 K/UL    ABS. BASOPHILS 0.2 (H) 0.0 - 0.1 K/UL    ABS. IMM. GRANS. 0.3 (H) 0.00 - 0.04 K/UL    DF SMEAR SCANNED      RBC COMMENTS MICROCYTOSIS  1+        RBC COMMENTS MACROCYTOSIS  1+        RBC COMMENTS ANISOCYTOSIS  2+        RBC COMMENTS OVALOCYTES  1+        RBC COMMENTS SCHISTOCYTES  1+             Assessment:     Hospital Problems  Date Reviewed: 1/21/2020          Codes Class Noted POA    Acute phlegmonous appendicitis ICD-10-CM: K35.890  ICD-9-CM: 540.9  1/21/2020 Unknown              Plan/Recommendations/Medical Decision Making:     - Perforated appendicitis:  No acute indication for appendectomy. Plan interval appendectomy in 4-6 weeks  - Continue IV antibiotic therapy  - Labs in am  - Advance to full liquids tomorrow  - Out of bed as tolerated.

## 2020-01-26 ENCOUNTER — APPOINTMENT (OUTPATIENT)
Dept: GENERAL RADIOLOGY | Age: 85
DRG: 371 | End: 2020-01-26
Attending: HOSPITALIST
Payer: MEDICARE

## 2020-01-26 PROCEDURE — 71045 X-RAY EXAM CHEST 1 VIEW: CPT

## 2020-01-26 PROCEDURE — 77030029684 HC NEB SM VOL KT MONA -A

## 2020-01-26 PROCEDURE — 74011000258 HC RX REV CODE- 258: Performed by: SURGERY

## 2020-01-26 PROCEDURE — 74011250636 HC RX REV CODE- 250/636: Performed by: SURGERY

## 2020-01-26 PROCEDURE — 74011250637 HC RX REV CODE- 250/637: Performed by: INTERNAL MEDICINE

## 2020-01-26 PROCEDURE — 65270000029 HC RM PRIVATE

## 2020-01-26 PROCEDURE — 74011250637 HC RX REV CODE- 250/637: Performed by: SURGERY

## 2020-01-26 PROCEDURE — 74011000250 HC RX REV CODE- 250: Performed by: SURGERY

## 2020-01-26 RX ORDER — IPRATROPIUM BROMIDE AND ALBUTEROL SULFATE 2.5; .5 MG/3ML; MG/3ML
3 SOLUTION RESPIRATORY (INHALATION)
Status: DISCONTINUED | OUTPATIENT
Start: 2020-01-26 | End: 2020-01-28 | Stop reason: HOSPADM

## 2020-01-26 RX ORDER — SODIUM CHLORIDE, SODIUM LACTATE, POTASSIUM CHLORIDE, CALCIUM CHLORIDE 600; 310; 30; 20 MG/100ML; MG/100ML; MG/100ML; MG/100ML
50 INJECTION, SOLUTION INTRAVENOUS CONTINUOUS
Status: DISCONTINUED | OUTPATIENT
Start: 2020-01-26 | End: 2020-01-26

## 2020-01-26 RX ADMIN — SODIUM CHLORIDE 10 ML: 9 INJECTION INTRAMUSCULAR; INTRAVENOUS; SUBCUTANEOUS at 06:49

## 2020-01-26 RX ADMIN — PIPERACILLIN AND TAZOBACTAM 3.38 G: 3; .375 INJECTION, POWDER, LYOPHILIZED, FOR SOLUTION INTRAVENOUS at 06:49

## 2020-01-26 RX ADMIN — LEVOTHYROXINE SODIUM 50 MCG: 0.05 TABLET ORAL at 06:49

## 2020-01-26 RX ADMIN — ATORVASTATIN CALCIUM 20 MG: 10 TABLET, FILM COATED ORAL at 08:47

## 2020-01-26 RX ADMIN — LOSARTAN POTASSIUM 25 MG: 25 TABLET ORAL at 08:47

## 2020-01-26 RX ADMIN — FOLIC ACID: 5 INJECTION, SOLUTION INTRAMUSCULAR; INTRAVENOUS; SUBCUTANEOUS at 08:48

## 2020-01-26 RX ADMIN — PIPERACILLIN AND TAZOBACTAM 3.38 G: 3; .375 INJECTION, POWDER, LYOPHILIZED, FOR SOLUTION INTRAVENOUS at 13:34

## 2020-01-26 RX ADMIN — HEPARIN SODIUM 5000 UNITS: 5000 INJECTION INTRAVENOUS; SUBCUTANEOUS at 23:04

## 2020-01-26 RX ADMIN — TRAZODONE HYDROCHLORIDE 50 MG: 50 TABLET ORAL at 23:04

## 2020-01-26 RX ADMIN — HEPARIN SODIUM 5000 UNITS: 5000 INJECTION INTRAVENOUS; SUBCUTANEOUS at 06:49

## 2020-01-26 RX ADMIN — PIPERACILLIN AND TAZOBACTAM 3.38 G: 3; .375 INJECTION, POWDER, LYOPHILIZED, FOR SOLUTION INTRAVENOUS at 23:04

## 2020-01-26 RX ADMIN — PANTOPRAZOLE SODIUM 40 MG: 40 TABLET, DELAYED RELEASE ORAL at 06:49

## 2020-01-26 RX ADMIN — SODIUM CHLORIDE 10 ML: 9 INJECTION INTRAMUSCULAR; INTRAVENOUS; SUBCUTANEOUS at 23:04

## 2020-01-26 RX ADMIN — HEPARIN SODIUM 5000 UNITS: 5000 INJECTION INTRAVENOUS; SUBCUTANEOUS at 13:34

## 2020-01-26 NOTE — PROGRESS NOTES
[facility] [facility] Adult  Hospitalist Group                                                                                          Hospitalist Progress Note  Garo Stoner MD  Answering service: [phone] or [phone] from in house phone        Date of Service:  2020  NAME:  Fredy Bradford  :  1930  MRN:  [id number]      Admission Summary: This is an 80-year-old man with past medical history significant for hypertension, hypothyroidism, chronic atrial fibrillation; on Eliquis for anticoagulation, dyslipidemia, and TIA was in his usual state of health until about a week ago when the patient developed abdominal pain. The abdominal pain is diffuse in location. The patient described the abdominal pain as cramping associated with distention and inability to sleep. The pain was constant with no radiation, 6/10 in severity. The patient took all antibiotics that he had at home and his abdominal pain became better. The patient is able to go about his activity of daily living. He went to his primary care physician today because of warts in his ear and also because of ulcer in his lip. While at the primary care physician office, the patient was found to have tenderness on abdominal palpation. The patient was then sent to the emergency room for CT scan of the abdomen and pelvis because of the concern for diverticulitis. When the patient arrived at the emergency room, CT scan of the abdomen and pelvis was performed. The CT scan shows perforated appendicitis. The emergency room physician consulted general surgeon on call. The patient was admitted to surgical service. Medical consult was requested for preop evaluation and medical management. The patient denies fever, rigor, and chills  Interval history / Subjective:       F/u for medical management. No new complaint. Denies abdominal pain. No N/V. No fever       Assessment & Plan:       Acute perforated appendicitis   Started on Zosyn. Surgery on board. IV fluids. Surgery waiting to repeat CAT scan to see if he can be operated at some point. Not a surgical candidate for now        Acute blood loss anemia   Hb 8.2  Monitor     # Persistent atrial Fib:  -HR controlled. Off  Anticoagulation  Cardiology on board. # History of stroke in October 2019:  --Patient is in persistent atrial fibrillation on tele  - Anticoagulation held for now. Management as per cardiology     # Hypertension,  -Resume losartan. Monitor     # Hypothyroidism   Continue Synthroid. Concern for cirrhosis on imaging. No thrombocytopenia. Check Liver function test.    Reactive airway disease: Suspect due to COPD   Duonebs PRN. Check CXR    Code status: FULL  DVT prophylaxis: SCD     Care Plan discussed with: Patient/Family  Disposition: TBD     Hospital Problems  Date Reviewed: 1/21/2020          Codes Class Noted POA    Acute phlegmonous appendicitis ICD-10-CM: K35.890  ICD-9-CM: 540.9  1/21/2020 Unknown                Review of Systems:   A comprehensive review of systems was negative except for that written in the HPI. Vital Signs:    Last 24hrs VS reviewed since prior progress note. Most recent are:  Visit Vitals  /75   Pulse 69   Temp 98.8 °F (37.1 °C)   Resp 18   SpO2 97%         Intake/Output Summary (Last 24 hours) at 1/26/2020 1512  Last data filed at 1/26/2020 1337  Gross per 24 hour   Intake --   Output 1325 ml   Net -1325 ml        Physical Examination:             Constitutional:  No acute distress, cooperative, pleasant    ENT:  Oral mucous moist, oropharynx benign. Resp:  CTA bilaterally. No wheezing/rhonchi/rales. No accessory muscle use   CV:  Regular rhythm, normal rate, no murmurs, gallops, rubs    GI:  Soft, non distended, non tender. normoactive bowel sounds, no hepatosplenomegaly     Musculoskeletal:  No edema, warm, 2+ pulses throughout    Neurologic:  Moves all extremities.   AAOx3, CN II-XII reviewed       Data Review:    Review and/or order of clinical lab test      Labs:     Recent Labs     01/25/20  0346 01/24/20  0504   WBC 15.2* 18.4*   HGB 8.2* 8.2*   HCT 28.1* 27.4*    354     Recent Labs     01/25/20  0346 01/24/20  0504    138   K 3.4* 3.7    106   CO2 25 24   BUN 14 16   CREA 0.75 0.93   * 104*   CA 8.6 8.4*   MG 2.4 2.4     No results for input(s): SGOT, GPT, ALT, AP, TBIL, TBILI, TP, ALB, GLOB, GGT, AML, LPSE in the last 72 hours. No lab exists for component: AMYP, HLPSE  Recent Labs     01/24/20  0706   INR 1.4*   PTP 14.1*   APTT 27.6      No results for input(s): FE, TIBC, PSAT, FERR in the last 72 hours. Lab Results   Component Value Date/Time    Folate 28.4 (H) 01/24/2020 06:00 PM      No results for input(s): PH, PCO2, PO2 in the last 72 hours. No results for input(s): CPK, CKNDX, TROIQ in the last 72 hours.     No lab exists for component: CPKMB  Lab Results   Component Value Date/Time    Cholesterol, total 159 10/12/2019 08:55 AM    HDL Cholesterol 51 10/12/2019 08:55 AM    LDL, calculated 83.4 10/12/2019 08:55 AM    Triglyceride 123 10/12/2019 08:55 AM    CHOL/HDL Ratio 3.1 10/12/2019 08:55 AM     Lab Results   Component Value Date/Time    Glucose (POC) 117 (H) 10/12/2019 08:41 AM     Lab Results   Component Value Date/Time    Color DARK YELLOW 01/21/2020 07:50 PM    Appearance CLEAR 01/21/2020 07:50 PM    Specific gravity 1.025 01/21/2020 07:50 PM    pH (UA) 6.0 01/21/2020 07:50 PM    Protein 30 (A) 01/21/2020 07:50 PM    Glucose NEGATIVE  01/21/2020 07:50 PM    Ketone TRACE (A) 01/21/2020 07:50 PM    Bilirubin NEGATIVE  12/02/2019 01:27 PM    Urobilinogen 1.0 01/21/2020 07:50 PM    Nitrites NEGATIVE  01/21/2020 07:50 PM    Leukocyte Esterase SMALL (A) 01/21/2020 07:50 PM    Epithelial cells FEW 01/21/2020 07:50 PM    Bacteria NEGATIVE  01/21/2020 07:50 PM    WBC 0-4 01/21/2020 07:50 PM    RBC 0-5 01/21/2020 07:50 PM         Medications Reviewed:     Current Facility-Administered Medications   Medication Dose Route Frequency    albuterol-ipratropium (DUO-NEB) 2.5 MG-0.5 MG/3 ML  3 mL Nebulization Q6H PRN    hydrALAZINE (APRESOLINE) 20 mg/mL injection 20 mg  20 mg IntraVENous Q6H PRN    losartan (COZAAR) tablet 25 mg  25 mg Oral DAILY    diphenhydrAMINE (BENADRYL) capsule 25 mg  25 mg Oral Q6H PRN    heparin (porcine) injection 5,000 Units  5,000 Units SubCUTAneous Q8H    pantoprazole (PROTONIX) tablet 40 mg  40 mg Oral ACB    atorvastatin (LIPITOR) tablet 20 mg  20 mg Oral DAILY    levothyroxine (SYNTHROID) tablet 50 mcg  50 mcg Oral ACB    traZODone (DESYREL) tablet 50 mg  50 mg Oral QHS    albuterol (PROVENTIL VENTOLIN) nebulizer solution 2.5 mg  2.5 mg Nebulization Q4H PRN    0.9% sodium chloride infusion 250 mL  250 mL IntraVENous PRN    nicotine (NICODERM CQ) 21 mg/24 hr patch 1 Patch  1 Patch TransDERmal DAILY    LORazepam (ATIVAN) injection 1 mg  1 mg IntraVENous Q4H PRN    sodium chloride (NS) flush 5-40 mL  5-40 mL IntraVENous Q8H    sodium chloride (NS) flush 5-40 mL  5-40 mL IntraVENous PRN    acetaminophen (TYLENOL) tablet 650 mg  650 mg Oral Q6H PRN    morphine injection 2-4 mg  2-4 mg IntraVENous Q3H PRN    naloxone (NARCAN) injection 0.4 mg  0.4 mg IntraVENous PRN    ondansetron (ZOFRAN) injection 4 mg  4 mg IntraVENous Q4H PRN    piperacillin-tazobactam (ZOSYN) 3.375 g in 0.9% sodium chloride (MBP/ADV) 100 mL  3.375 g IntraVENous Q8H     ______________________________________________________________________  EXPECTED LENGTH OF STAY: 4d 0h  ACTUAL LENGTH OF STAY:          3                 Ada Apgar, MD

## 2020-01-26 NOTE — PROGRESS NOTES
Progress Note    Patient: Donna Banks MRN: [id number]  SSN: [id number]    YOB: 1930  Age: 80 y.o. Sex: male      Admit Date: 2020    Perforated appendicitis    Subjective:     No acute surgical issues. Pt is overall doing well. Tolerating full liquids without nausea or vomiting. Pain is under control. Pt does have some wheezes. Objective:     Visit Vitals  /75   Pulse 69   Temp 98.8 °F (37.1 °C)   Resp 18   SpO2 97%       Temp (24hrs), Av.5 °F (36.9 °C), Min:98.1 °F (36.7 °C), Max:98.8 °F (37.1 °C)        Physical Exam:    Gen:  NAD  Pulm:  Bilateral expiratory wheezes  Abd:  S/ND/mild TTP in RLQ without any guarding or rebound    No results found for this or any previous visit (from the past 24 hour(s)). Assessment:     Hospital Problems  Date Reviewed: 2020          Codes Class Noted POA    Acute phlegmonous appendicitis ICD-10-CM: K35.890  ICD-9-CM: 540.9  2020 Unknown              Plan/Recommendations/Medical Decision Making:     - Perforated appendicitis:  No acute indication for appendectomy. Plan interval appendectomy in 4-6 weeks  - Continue IV antibiotic therapy  - Labs in am  - Advance to Gi lite diet  - Out of bed as tolerated. - Wheezing:  Albuterol.   CXR pending

## 2020-01-26 NOTE — ROUTINE PROCESS
Bedside and Verbal shift change report given to Jeniffer Ayala RN (oncoming nurse) by Jennifer Parmar RN (offgoing nurse). Report included the following information SBAR, Kardex, Intake/Output, MAR and Recent Results.

## 2020-01-26 NOTE — PROGRESS NOTES
Bedside and Verbal shift change report given to Yolanda Guan RN (oncoming nurse) by Emily Childress RN (offgoing nurse). Report included the following information SBAR, Kardex, ED Summary, Intake/Output, MAR and Recent Results.

## 2020-01-27 ENCOUNTER — HOME HEALTH ADMISSION (OUTPATIENT)
Dept: HOME HEALTH SERVICES | Facility: HOME HEALTH | Age: 85
End: 2020-01-27
Payer: MEDICARE

## 2020-01-27 LAB
ALBUMIN SERPL-MCNC: 2.5 G/DL (ref 3.5–5)
ALBUMIN/GLOB SERPL: 0.7 {RATIO} (ref 1.1–2.2)
ALP SERPL-CCNC: 118 U/L (ref 45–117)
ALT SERPL-CCNC: 36 U/L (ref 12–78)
ANION GAP SERPL CALC-SCNC: 6 MMOL/L (ref 5–15)
AST SERPL-CCNC: 29 U/L (ref 15–37)
BILIRUB SERPL-MCNC: 0.6 MG/DL (ref 0.2–1)
BUN SERPL-MCNC: 10 MG/DL (ref 6–20)
BUN/CREAT SERPL: 11 (ref 12–20)
CALCIUM SERPL-MCNC: 8.8 MG/DL (ref 8.5–10.1)
CHLORIDE SERPL-SCNC: 105 MMOL/L (ref 97–108)
CO2 SERPL-SCNC: 27 MMOL/L (ref 21–32)
CREAT SERPL-MCNC: 0.94 MG/DL (ref 0.7–1.3)
ERYTHROCYTE [DISTWIDTH] IN BLOOD BY AUTOMATED COUNT: 19.9 % (ref 11.5–14.5)
GLOBULIN SER CALC-MCNC: 3.8 G/DL (ref 2–4)
GLUCOSE SERPL-MCNC: 95 MG/DL (ref 65–100)
HCT VFR BLD AUTO: 29 % (ref 36.6–50.3)
HGB BLD-MCNC: 8.2 G/DL (ref 12.1–17)
MCH RBC QN AUTO: 22.5 PG (ref 26–34)
MCHC RBC AUTO-ENTMCNC: 28.3 G/DL (ref 30–36.5)
MCV RBC AUTO: 79.5 FL (ref 80–99)
NRBC # BLD: 0 K/UL (ref 0–0.01)
NRBC BLD-RTO: 0 PER 100 WBC
PLATELET # BLD AUTO: 432 K/UL (ref 150–400)
PMV BLD AUTO: 9.6 FL (ref 8.9–12.9)
POTASSIUM SERPL-SCNC: 3.7 MMOL/L (ref 3.5–5.1)
PROT SERPL-MCNC: 6.3 G/DL (ref 6.4–8.2)
RBC # BLD AUTO: 3.65 M/UL (ref 4.1–5.7)
SODIUM SERPL-SCNC: 138 MMOL/L (ref 136–145)
WBC # BLD AUTO: 13.6 K/UL (ref 4.1–11.1)

## 2020-01-27 PROCEDURE — 74011250636 HC RX REV CODE- 250/636: Performed by: SURGERY

## 2020-01-27 PROCEDURE — 97116 GAIT TRAINING THERAPY: CPT

## 2020-01-27 PROCEDURE — 74011250637 HC RX REV CODE- 250/637: Performed by: SURGERY

## 2020-01-27 PROCEDURE — 74011250636 HC RX REV CODE- 250/636: Performed by: NURSE PRACTITIONER

## 2020-01-27 PROCEDURE — 85027 COMPLETE CBC AUTOMATED: CPT

## 2020-01-27 PROCEDURE — 74011000258 HC RX REV CODE- 258: Performed by: SURGERY

## 2020-01-27 PROCEDURE — 74011250637 HC RX REV CODE- 250/637: Performed by: INTERNAL MEDICINE

## 2020-01-27 PROCEDURE — 80053 COMPREHEN METABOLIC PANEL: CPT

## 2020-01-27 PROCEDURE — 74011250637 HC RX REV CODE- 250/637: Performed by: NURSE PRACTITIONER

## 2020-01-27 PROCEDURE — 65270000029 HC RM PRIVATE

## 2020-01-27 PROCEDURE — 36415 COLL VENOUS BLD VENIPUNCTURE: CPT

## 2020-01-27 PROCEDURE — 74011000250 HC RX REV CODE- 250: Performed by: SURGERY

## 2020-01-27 RX ORDER — FUROSEMIDE 10 MG/ML
40 INJECTION INTRAMUSCULAR; INTRAVENOUS DAILY
Status: DISCONTINUED | OUTPATIENT
Start: 2020-01-27 | End: 2020-01-28 | Stop reason: HOSPADM

## 2020-01-27 RX ORDER — LOSARTAN POTASSIUM 50 MG/1
100 TABLET ORAL DAILY
Status: DISCONTINUED | OUTPATIENT
Start: 2020-01-28 | End: 2020-01-28 | Stop reason: HOSPADM

## 2020-01-27 RX ORDER — ENOXAPARIN SODIUM 100 MG/ML
1 INJECTION SUBCUTANEOUS EVERY 12 HOURS
Status: DISCONTINUED | OUTPATIENT
Start: 2020-01-27 | End: 2020-01-28 | Stop reason: HOSPADM

## 2020-01-27 RX ADMIN — TRAZODONE HYDROCHLORIDE 50 MG: 50 TABLET ORAL at 21:08

## 2020-01-27 RX ADMIN — PIPERACILLIN AND TAZOBACTAM 3.38 G: 3; .375 INJECTION, POWDER, LYOPHILIZED, FOR SOLUTION INTRAVENOUS at 21:08

## 2020-01-27 RX ADMIN — SODIUM CHLORIDE 10 ML: 9 INJECTION INTRAMUSCULAR; INTRAVENOUS; SUBCUTANEOUS at 21:09

## 2020-01-27 RX ADMIN — FUROSEMIDE 40 MG: 10 INJECTION, SOLUTION INTRAMUSCULAR; INTRAVENOUS at 14:49

## 2020-01-27 RX ADMIN — PANTOPRAZOLE SODIUM 40 MG: 40 TABLET, DELAYED RELEASE ORAL at 06:36

## 2020-01-27 RX ADMIN — LOSARTAN POTASSIUM 75 MG: 25 TABLET ORAL at 14:48

## 2020-01-27 RX ADMIN — SODIUM CHLORIDE 10 ML: 9 INJECTION INTRAMUSCULAR; INTRAVENOUS; SUBCUTANEOUS at 06:36

## 2020-01-27 RX ADMIN — PIPERACILLIN AND TAZOBACTAM 3.38 G: 3; .375 INJECTION, POWDER, LYOPHILIZED, FOR SOLUTION INTRAVENOUS at 14:48

## 2020-01-27 RX ADMIN — SODIUM CHLORIDE 10 ML: 9 INJECTION INTRAMUSCULAR; INTRAVENOUS; SUBCUTANEOUS at 14:49

## 2020-01-27 RX ADMIN — ATORVASTATIN CALCIUM 20 MG: 10 TABLET, FILM COATED ORAL at 09:51

## 2020-01-27 RX ADMIN — LEVOTHYROXINE SODIUM 50 MCG: 0.05 TABLET ORAL at 06:36

## 2020-01-27 RX ADMIN — PIPERACILLIN AND TAZOBACTAM 3.38 G: 3; .375 INJECTION, POWDER, LYOPHILIZED, FOR SOLUTION INTRAVENOUS at 06:36

## 2020-01-27 RX ADMIN — ENOXAPARIN SODIUM 100 MG: 100 INJECTION SUBCUTANEOUS at 21:08

## 2020-01-27 RX ADMIN — HEPARIN SODIUM 5000 UNITS: 5000 INJECTION INTRAVENOUS; SUBCUTANEOUS at 06:36

## 2020-01-27 RX ADMIN — ENOXAPARIN SODIUM 100 MG: 100 INJECTION SUBCUTANEOUS at 14:48

## 2020-01-27 RX ADMIN — LOSARTAN POTASSIUM 25 MG: 25 TABLET ORAL at 09:51

## 2020-01-27 NOTE — PROGRESS NOTES
Problem: Falls - Risk of  Goal: *Absence of Falls  Description  Document Angel Kessler Fall Risk and appropriate interventions in the flowsheet. Outcome: Progressing Towards Goal  Note: Fall Risk Interventions:  Mobility Interventions: Bed/chair exit alarm         Medication Interventions: Teach patient to arise slowly    Elimination Interventions: Call light in reach              Problem: Patient Education: Go to Patient Education Activity  Goal: Patient/Family Education  Outcome: Progressing Towards Goal     Problem: General Medical Care Plan  Goal: *Vital signs within specified parameters  Outcome: Progressing Towards Goal  Goal: *Labs within defined limits  Outcome: Progressing Towards Goal  Goal: *Absence of infection signs and symptoms  Outcome: Progressing Towards Goal  Goal: *Optimal pain control at patient's stated goal  Outcome: Progressing Towards Goal  Goal: *Skin integrity maintained  Outcome: Progressing Towards Goal  Goal: *Fluid volume balance  Outcome: Progressing Towards Goal  Goal: *Optimize nutritional status  Outcome: Progressing Towards Goal  Goal: *Anxiety reduced or absent  Outcome: Progressing Towards Goal  Goal: *Progressive mobility and function (eg: ADL's)  Outcome: Progressing Towards Goal     Problem: Pressure Injury - Risk of  Goal: *Prevention of pressure injury  Description  Document Sea Scale and appropriate interventions in the flowsheet. Outcome: Progressing Towards Goal  Note: Pressure Injury Interventions:             Activity Interventions: Increase time out of bed    Mobility Interventions: Pressure redistribution bed/mattress (bed type)    Nutrition Interventions: Document food/fluid/supplement intake

## 2020-01-27 NOTE — PROGRESS NOTES
Cardiology Progress Note      1/27/2020 8:18 AM    Admit Date: 1/21/2020    Admit Diagnosis: Acute phlegmonous appendicitis [K35.890]; Acute phlegmonous appendicitis [K35.890]      Subjective:     Sherrie Gonzalez denies chest pain. Some SOB with activity and some wheezing. States he has brief dizziness when initially standing up but subsides quickly. He says he doesn't like nebulizers.      .Visit Vitals  /84 (BP 1 Location: Right arm, BP Patient Position: At rest)   Pulse 78   Temp 97.7 °F (36.5 °C)   Resp 18   Wt 224 lb (101.6 kg)   SpO2 95%   BMI 35.08 kg/m²     Current Facility-Administered Medications   Medication Dose Route Frequency    albuterol-ipratropium (DUO-NEB) 2.5 MG-0.5 MG/3 ML  3 mL Nebulization Q6H PRN    hydrALAZINE (APRESOLINE) 20 mg/mL injection 20 mg  20 mg IntraVENous Q6H PRN    losartan (COZAAR) tablet 25 mg  25 mg Oral DAILY    diphenhydrAMINE (BENADRYL) capsule 25 mg  25 mg Oral Q6H PRN    heparin (porcine) injection 5,000 Units  5,000 Units SubCUTAneous Q8H    pantoprazole (PROTONIX) tablet 40 mg  40 mg Oral ACB    atorvastatin (LIPITOR) tablet 20 mg  20 mg Oral DAILY    levothyroxine (SYNTHROID) tablet 50 mcg  50 mcg Oral ACB    traZODone (DESYREL) tablet 50 mg  50 mg Oral QHS    albuterol (PROVENTIL VENTOLIN) nebulizer solution 2.5 mg  2.5 mg Nebulization Q4H PRN    0.9% sodium chloride infusion 250 mL  250 mL IntraVENous PRN    nicotine (NICODERM CQ) 21 mg/24 hr patch 1 Patch  1 Patch TransDERmal DAILY    LORazepam (ATIVAN) injection 1 mg  1 mg IntraVENous Q4H PRN    sodium chloride (NS) flush 5-40 mL  5-40 mL IntraVENous Q8H    sodium chloride (NS) flush 5-40 mL  5-40 mL IntraVENous PRN    acetaminophen (TYLENOL) tablet 650 mg  650 mg Oral Q6H PRN    morphine injection 2-4 mg  2-4 mg IntraVENous Q3H PRN    naloxone (NARCAN) injection 0.4 mg  0.4 mg IntraVENous PRN    ondansetron (ZOFRAN) injection 4 mg  4 mg IntraVENous Q4H PRN    piperacillin-tazobactam (ZOSYN) 3.375 g in 0.9% sodium chloride (MBP/ADV) 100 mL  3.375 g IntraVENous Q8H         Objective:      Physical Exam:  Visit Vitals  /84 (BP 1 Location: Right arm, BP Patient Position: At rest)   Pulse 78   Temp 97.7 °F (36.5 °C)   Resp 18   Wt 224 lb (101.6 kg)   SpO2 95%   BMI 35.08 kg/m²     General Appearance:  Alert, appropriate, no acute distress. Ears/Nose/Mouth/Throat:   Hearing grossly normal.         Neck: Supple. No JVD   Chest:   Lungs with faint end expiratory wheeze throughout. Cardiovascular:  Irregularly irregular, S1, S2 normal, I/VI systolic murmur. Abdomen:   deferred   Extremities: trace edema bilaterally. Skin: Warm and dry. Data Review:   Labs:    Recent Results (from the past 24 hour(s))   METABOLIC PANEL, COMPREHENSIVE    Collection Time: 01/27/20  5:45 AM   Result Value Ref Range    Sodium 138 136 - 145 mmol/L    Potassium 3.7 3.5 - 5.1 mmol/L    Chloride 105 97 - 108 mmol/L    CO2 27 21 - 32 mmol/L    Anion gap 6 5 - 15 mmol/L    Glucose 95 65 - 100 mg/dL    BUN 10 6 - 20 MG/DL    Creatinine 0.94 0.70 - 1.30 MG/DL    BUN/Creatinine ratio 11 (L) 12 - 20      GFR est AA >60 >60 ml/min/1.73m2    GFR est non-AA >60 >60 ml/min/1.73m2    Calcium 8.8 8.5 - 10.1 MG/DL    Bilirubin, total 0.6 0.2 - 1.0 MG/DL    ALT (SGPT) 36 12 - 78 U/L    AST (SGOT) 29 15 - 37 U/L    Alk.  phosphatase 118 (H) 45 - 117 U/L    Protein, total 6.3 (L) 6.4 - 8.2 g/dL    Albumin 2.5 (L) 3.5 - 5.0 g/dL    Globulin 3.8 2.0 - 4.0 g/dL    A-G Ratio 0.7 (L) 1.1 - 2.2     CBC W/O DIFF    Collection Time: 01/27/20  5:45 AM   Result Value Ref Range    WBC 13.6 (H) 4.1 - 11.1 K/uL    RBC 3.65 (L) 4.10 - 5.70 M/uL    HGB 8.2 (L) 12.1 - 17.0 g/dL    HCT 29.0 (L) 36.6 - 50.3 %    MCV 79.5 (L) 80.0 - 99.0 FL    MCH 22.5 (L) 26.0 - 34.0 PG    MCHC 28.3 (L) 30.0 - 36.5 g/dL    RDW 19.9 (H) 11.5 - 14.5 %    PLATELET 306 (H) 960 - 400 K/uL    MPV 9.6 8.9 - 12.9 FL    NRBC 0.0 0  WBC    ABSOLUTE NRBC 0.00 0.00 - 0.01 K/uL       Telemetry: AFIB      Assessment:     Active Problems:    Acute phlegmonous appendicitis (1/21/2020)        Plan:     Preop cardiac evaluation. Stable cardiac wise and low to moderate risk for cardiac complications. Per surgery, plan on appendectomy in 4-6 weeks. D/w Surgery NP- ok from surgery perspective for [address] [place] [address] resumption once anemia sorted out. Restart eliquis 2.5 mg bid when ok from hematology/primary team standpoint but in meantime since high stroke risk will start lovenox in case invasive procedure needed. Afib. HR controlled. Normal LV function by echo. [id number]. High stroke risk. Will start Lovenox 1mg/kg q 12 hours for now, until hematology workup complete, and it is determined no immediate invasive procedure needed. Anemia. Hgb stable at 8.2. : hematology following. Has hx of EGD 12/2/19 with gastric ulcers and nonbleeding duodenal avm treated with APC. Last week Dr. Natalee Alex did not feel that marked anemia due to ruptured appendix. Will defer to hematology/primary team.   HTN.: BP elevated. Will increase losartan to 100 mg po daily. Pulmonary edema: noted on CXR done yesterday afternoon, will start lasix 40 mg IV daily, 1st dose now. Echo 11/2019 with EF 61-65%  Ruptured appendix. Saw and evaluated pt and agree with above assessment and plan. Restart eliquis if no surgery/procedures planned and ok with hematology. Lovenox until then for [address] [place] [address].   Alexus Hall MD

## 2020-01-27 NOTE — PROGRESS NOTES
HEYDI:    [facility]. accepted patient.     Kalpesh Tracy, RN/CRM

## 2020-01-27 NOTE — PROGRESS NOTES
[address] [facility]  Hospitalist Group                                                                                          Hospitalist Progress Note  Senait Tracey MD  Answering service: [phone] or [phone] from in house phone        Date of Service:  2020  NAME:  Sherrie Gonzalez  :  1930  MRN:  [id number]      Admission Summary: This is an 80-year-old man with past medical history significant for hypertension, hypothyroidism, chronic atrial fibrillation; on Eliquis for anticoagulation, dyslipidemia, and TIA was in his usual state of health until about a week ago when the patient developed abdominal pain. The abdominal pain is diffuse in location. The patient described the abdominal pain as cramping associated with distention and inability to sleep. The pain was constant with no radiation, 6/10 in severity. The patient took all antibiotics that he had at home and his abdominal pain became better. The patient is able to go about his activity of daily living. He went to his primary care physician today because of warts in his ear and also because of ulcer in his lip. While at the primary care physician office, the patient was found to have tenderness on abdominal palpation. The patient was then sent to the emergency room for CT scan of the abdomen and pelvis because of the concern for diverticulitis. When the patient arrived at the emergency room, CT scan of the abdomen and pelvis was performed. The CT scan shows perforated appendicitis. The emergency room physician consulted general surgeon on call. The patient was admitted to surgical service. Medical consult was requested for preop evaluation and medical management. The patient denies fever, rigor, and chills  Interval history / Subjective:       F/u for medical management. Comfortable, afebrile, discuss with cardiology. Patient denies any abdominal pain, any nausea or vomiting, tolerating diet.      Assessment & Plan:       Acute perforated appendicitis   Managed by general surgery team.  Continue IV Zosyn, IV fluids, appears that patient will have surgery once acute infection controlled likely in 4 to 6 weeks. Urine cultures negative, no blood cultures were obtained. Acute blood loss anemia   Status post 1 unit of PRBC, etiology unclear, hematology following  Monitor     # Persistent atrial Fib:  -HR controlled. Cardiology on board, recommends to resume Lovenox until Eliquis could be resumed once cleared by Heme    # History of stroke in October 2019:  --Patient is in persistent atrial fibrillation on tele  Management as per cardiology     # Hypertension,  -Resume losartan. Monitor     # Hypothyroidism   Continue Synthroid. Concern for cirrhosis on imaging. No thrombocytopenia. Check Liver function test.    Shortness of breath, wheezing-likely cardiac wheezing, chest x-ray suspicion for pulmonary edema, Lasix started by cardiology. Last echo was in 11/2019 showing EF of 61 to 65%    Code status: FULL  DVT prophylaxis: SCD     Care Plan discussed with: Patient/Family  Disposition: TBD     Hospital Problems  Date Reviewed: 1/21/2020          Codes Class Noted POA    Acute phlegmonous appendicitis ICD-10-CM: K35.890  ICD-9-CM: 540.9  1/21/2020 Unknown                Review of Systems:   A comprehensive review of systems was negative except for that written in the HPI. Vital Signs:    Last 24hrs VS reviewed since prior progress note.  Most recent are:  Visit Vitals  /84 (BP 1 Location: Right arm, BP Patient Position: At rest)   Pulse 69   Temp 98 °F (36.7 °C)   Resp 18   Wt 101.6 kg (224 lb)   SpO2 95%   BMI 35.08 kg/m²         Intake/Output Summary (Last 24 hours) at 1/27/2020 1538  Last data filed at 1/26/2020 1934  Gross per 24 hour   Intake --   Output 825 ml   Net -825 ml        Physical Examination:             Constitutional:  No acute distress, cooperative, pleasant    ENT:  Oral mucous moist, oropharynx benign. Resp:  CTA bilaterally. No wheezing/rhonchi/rales. No accessory muscle use   CV:  Regular rhythm, normal rate, no murmurs, gallops, rubs    GI:  Soft, non distended, non tender. normoactive bowel sounds, no hepatosplenomegaly     Musculoskeletal:  No edema, warm, 2+ pulses throughout           Data Review:    Review and/or order of clinical lab test      Labs:     Recent Labs     01/27/20  0545 01/25/20  0346   WBC 13.6* 15.2*   HGB 8.2* 8.2*   HCT 29.0* 28.1*   * 392     Recent Labs     01/27/20  0545 01/25/20  0346    138   K 3.7 3.4*    105   CO2 27 25   BUN 10 14   CREA 0.94 0.75   GLU 95 109*   CA 8.8 8.6   MG  --  2.4     Recent Labs     01/27/20  0545   SGOT 29   ALT 36   *   TBILI 0.6   TP 6.3*   ALB 2.5*   GLOB 3.8     No results for input(s): INR, PTP, APTT, INREXT, INREXT in the last 72 hours. No results for input(s): FE, TIBC, PSAT, FERR in the last 72 hours. Lab Results   Component Value Date/Time    Folate 28.4 (H) 01/24/2020 06:00 PM      No results for input(s): PH, PCO2, PO2 in the last 72 hours. No results for input(s): CPK, CKNDX, TROIQ in the last 72 hours.     No lab exists for component: CPKMB  Lab Results   Component Value Date/Time    Cholesterol, total 159 10/12/2019 08:55 AM    HDL Cholesterol 51 10/12/2019 08:55 AM    LDL, calculated 83.4 10/12/2019 08:55 AM    Triglyceride 123 10/12/2019 08:55 AM    CHOL/HDL Ratio 3.1 10/12/2019 08:55 AM     Lab Results   Component Value Date/Time    Glucose (POC) 117 (H) 10/12/2019 08:41 AM     Lab Results   Component Value Date/Time    Color DARK YELLOW 01/21/2020 07:50 PM    Appearance CLEAR 01/21/2020 07:50 PM    Specific gravity 1.025 01/21/2020 07:50 PM    pH (UA) 6.0 01/21/2020 07:50 PM    Protein 30 (A) 01/21/2020 07:50 PM    Glucose NEGATIVE  01/21/2020 07:50 PM    Ketone TRACE (A) 01/21/2020 07:50 PM    Bilirubin NEGATIVE  12/02/2019 01:27 PM    Urobilinogen 1.0 01/21/2020 07:50 PM    Nitrites NEGATIVE  01/21/2020 07:50 PM    Leukocyte Esterase SMALL (A) 01/21/2020 07:50 PM    Epithelial cells FEW 01/21/2020 07:50 PM    Bacteria NEGATIVE  01/21/2020 07:50 PM    WBC 0-4 01/21/2020 07:50 PM    RBC 0-5 01/21/2020 07:50 PM         Medications Reviewed:     Current Facility-Administered Medications   Medication Dose Route Frequency    [START ON 1/28/2020] losartan (COZAAR) tablet 100 mg  100 mg Oral DAILY    furosemide (LASIX) injection 40 mg  40 mg IntraVENous DAILY    enoxaparin (LOVENOX) injection 100 mg  1 mg/kg SubCUTAneous Q12H    albuterol-ipratropium (DUO-NEB) 2.5 MG-0.5 MG/3 ML  3 mL Nebulization Q6H PRN    hydrALAZINE (APRESOLINE) 20 mg/mL injection 20 mg  20 mg IntraVENous Q6H PRN    diphenhydrAMINE (BENADRYL) capsule 25 mg  25 mg Oral Q6H PRN    pantoprazole (PROTONIX) tablet 40 mg  40 mg Oral ACB    atorvastatin (LIPITOR) tablet 20 mg  20 mg Oral DAILY    levothyroxine (SYNTHROID) tablet 50 mcg  50 mcg Oral ACB    traZODone (DESYREL) tablet 50 mg  50 mg Oral QHS    albuterol (PROVENTIL VENTOLIN) nebulizer solution 2.5 mg  2.5 mg Nebulization Q4H PRN    0.9% sodium chloride infusion 250 mL  250 mL IntraVENous PRN    nicotine (NICODERM CQ) 21 mg/24 hr patch 1 Patch  1 Patch TransDERmal DAILY    LORazepam (ATIVAN) injection 1 mg  1 mg IntraVENous Q4H PRN    sodium chloride (NS) flush 5-40 mL  5-40 mL IntraVENous Q8H    sodium chloride (NS) flush 5-40 mL  5-40 mL IntraVENous PRN    acetaminophen (TYLENOL) tablet 650 mg  650 mg Oral Q6H PRN    morphine injection 2-4 mg  2-4 mg IntraVENous Q3H PRN    naloxone (NARCAN) injection 0.4 mg  0.4 mg IntraVENous PRN    ondansetron (ZOFRAN) injection 4 mg  4 mg IntraVENous Q4H PRN    piperacillin-tazobactam (ZOSYN) 3.375 g in 0.9% sodium chloride (MBP/ADV) 100 mL  3.375 g IntraVENous Q8H     ______________________________________________________________________  EXPECTED LENGTH OF STAY: 4d 0h  ACTUAL LENGTH OF STAY:          4 Karina Mallory MD

## 2020-01-27 NOTE — PROGRESS NOTES
Mr. Cinthia Sih has no c/o today. Tm 98.8 HR: 82 BP: 193/80 Resp Rate: 18 95% sat on room air. Intake/Output Summary (Last 24 hours) at 1/27/2020 1258  Last data filed at 1/26/2020 1934  Gross per 24 hour   Intake --   Output 1475 ml   Net -1475 ml   Exam: Cor: Irregularly, Irregular. Lungs: Bilateral breath sounds. Clear to auscultation. Abd: Soft. Non tender. No guarding or rebound. Slightly distended. Labs:   Recent Results (from the past 12 hour(s))   METABOLIC PANEL, COMPREHENSIVE    Collection Time: 01/27/20  5:45 AM   Result Value Ref Range    Sodium 138 136 - 145 mmol/L    Potassium 3.7 3.5 - 5.1 mmol/L    Chloride 105 97 - 108 mmol/L    CO2 27 21 - 32 mmol/L    Anion gap 6 5 - 15 mmol/L    Glucose 95 65 - 100 mg/dL    BUN 10 6 - 20 MG/DL    Creatinine 0.94 0.70 - 1.30 MG/DL    BUN/Creatinine ratio 11 (L) 12 - 20      GFR est AA >60 >60 ml/min/1.73m2    GFR est non-AA >60 >60 ml/min/1.73m2    Calcium 8.8 8.5 - 10.1 MG/DL    Bilirubin, total 0.6 0.2 - 1.0 MG/DL    ALT (SGPT) 36 12 - 78 U/L    AST (SGOT) 29 15 - 37 U/L    Alk. phosphatase 118 (H) 45 - 117 U/L    Protein, total 6.3 (L) 6.4 - 8.2 g/dL    Albumin 2.5 (L) 3.5 - 5.0 g/dL    Globulin 3.8 2.0 - 4.0 g/dL    A-G Ratio 0.7 (L) 1.1 - 2.2     CBC W/O DIFF    Collection Time: 01/27/20  5:45 AM   Result Value Ref Range    WBC 13.6 (H) 4.1 - 11.1 K/uL    RBC 3.65 (L) 4.10 - 5.70 M/uL    HGB 8.2 (L) 12.1 - 17.0 g/dL    HCT 29.0 (L) 36.6 - 50.3 %    MCV 79.5 (L) 80.0 - 99.0 FL    MCH 22.5 (L) 26.0 - 34.0 PG    MCHC 28.3 (L) 30.0 - 36.5 g/dL    RDW 19.9 (H) 11.5 - 14.5 %    PLATELET 069 (H) 797 - 400 K/uL    MPV 9.6 8.9 - 12.9 FL    NRBC 0.0 0  WBC    ABSOLUTE NRBC 0.00 0.00 - 0.01 K/uL   Diet as tolerated. IV abx - Zosyn. Cardiology input - noted. Hematology following. Hgb has been stable at 8.2 - can restart Eliquis if OK with hematology. Repeat CBC in AM.   Needs to be OOB.

## 2020-01-27 NOTE — ROUTINE PROCESS
Bedside and Verbal shift change report given to Momo Simon RN (oncoming nurse) by Kenroy Brownlee RN (offgoing nurse). Report included the following information SBAR, Kardex, Intake/Output, MAR and Recent Results.

## 2020-01-27 NOTE — PROGRESS NOTES
FOC offered for Home Health. Patient chose [facility]. Referral sent. [address] letter signed and in chart. Care Management Interventions  PCP Verified by CM: Yes  Mode of Transport at Discharge:  Other (see comment)(Family)  Transition of Care Consult (CM Consult): [address]: Yes  MyChart Signup: Yes  Physical Therapy Consult: Yes  Confirm Follow Up Transport: Family  The Plan for Transition of Care is Related to the Following Treatment Goals : PT in home  Name of the Patient Representative Who was Provided with a Choice of Provider and Agrees with the Discharge Plan: [place] of Choice List was Provided with Basic Dialogue that Supports the Patient's Individualized Plan of Care/Goals, Treatment Preferences and Shares the Quality Data Associated with the Providers?: Yes  [employer] Information Provided?: No  Discharge Location  Discharge Placement: Home with home health      Jony Eng, RN/CRM

## 2020-01-27 NOTE — PROGRESS NOTES
Bedside and Verbal shift change report given to Kiara Brown RN (oncoming nurse) by Antonio Davila RN (offgoing nurse). Report included the following information SBAR, Kardex, ED Summary, Intake/Output, MAR and Recent Results.

## 2020-01-27 NOTE — PROGRESS NOTES
Problem: Mobility Impaired (Adult and Pediatric)  Goal: *Acute Goals and Plan of Care (Insert Text)  Description  FUNCTIONAL STATUS PRIOR TO ADMISSION: Patient was modified independent using a single point cane for functional mobility. HOME SUPPORT PRIOR TO ADMISSION: The patient lives with son and his wife. Physical Therapy Goals  Initiated 1/25/2020  1. Patient will move from supine to sit and sit to supine  in bed with minimal assistance/contact guard assist within 7 day(s). 2.  Patient will transfer from bed to chair and chair to bed with modified independence using the least restrictive device within 7 day(s). 3.  Patient will perform sit to stand with modified independence within 7 day(s). 4.  Patient will ambulate with minimal assistance/contact guard assist for 50 feet with the least restrictive device within 7 day(s). Outcome: Progressing Towards Goal   PHYSICAL THERAPY TREATMENT  Patient: Michael Garcia (27 y.o. male)  Date: 1/27/2020  Diagnosis: Acute phlegmonous appendicitis [K35.890]  Acute phlegmonous appendicitis [K35.890]   <principal problem not specified>       Precautions: Fall  Chart, physical therapy assessment, plan of care and goals were reviewed. ASSESSMENT  Patient continues with skilled PT services and is progressing towards goals. He typically uses a SPC at home and reports no recent falls. Comleted 2 gait trails this date with min-CGA required when using his cane with noted , increased trunk sway, and lateral LOB where the patient used the wall for balance. 2nd trial completed using a RW with much improved flavio and posture with no further LOB. 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 Recommend use of a rollator that he owns and for HHPT to follow to wean to his baseline cane. Current Level of Function Impacting Discharge (mobility/balance):  CGA for transfers, improved safety with rW PLAN :  Patient continues to benefit from skilled intervention to address the above impairments. Continue treatment per established plan of care. to address goals. Recommendation for discharge: (in order for the patient to meet his/her long term goals)  Physical therapy at least 2 days/week in the home     This discharge recommendation:  Has been made in collaboration with the attending provider and/or case management    IF patient discharges home will need the following DME: patient owns DME required for discharge       SUBJECTIVE:   Patient stated Yes, I felt that.     OBJECTIVE DATA SUMMARY:   Critical Behavior:           Safety/Judgement: Awareness of environment  Functional Mobility Training:  Bed Mobility:  Rolling: Contact guard assistance; Additional time  Supine to Sit: Contact guard assistance; Additional time              Transfers:  Sit to Stand: Contact guard assistance  Stand to Sit: Contact guard assistance                             Balance:  Sitting: Impaired; With support  Sitting - Static: Good (unsupported)  Sitting - Dynamic: Good (unsupported)  Standing: Impaired; With support  Standing - Static: Fair  Standing - Dynamic : Fair  Ambulation/Gait Training:  Distance (ft): 75 Feet (ft)(50 with cane, 25 with RW)  Assistive Device: Gait belt;Cane, straight;Walker, rolling  Ambulation - Level of Assistance: Contact guard assistance        Gait Abnormalities: Decreased step clearance;Trunk sway increased; Shuffling gait        Base of Support: Widened     Speed/[place]: Shuffled; Slow                       Stairs: Therapeutic Exercises:     Pain Rating:      Activity Tolerance:   Fair  Patient c/o a hx of baseline wheezing, noted during gait but SpO2 95% and HR 88 BPM during activity on RA. Please refer to the flowsheet for vital signs taken during this treatment.     After treatment patient left in no apparent distress:   Supine in bed and Call bell within reach    COMMUNICATION/COLLABORATION:   The patients plan of care was discussed with: Registered Nurse    Rina Franco, PT, DPT   Time Calculation: 25 mins

## 2020-01-28 VITALS
HEART RATE: 63 BPM | OXYGEN SATURATION: 95 % | BODY MASS INDEX: 35.08 KG/M2 | TEMPERATURE: 98.1 F | DIASTOLIC BLOOD PRESSURE: 61 MMHG | SYSTOLIC BLOOD PRESSURE: 120 MMHG | WEIGHT: 224 LBS | RESPIRATION RATE: 16 BRPM

## 2020-01-28 LAB
ANION GAP SERPL CALC-SCNC: 6 MMOL/L (ref 5–15)
BASOPHILS # BLD: 0.2 K/UL (ref 0–0.1)
BASOPHILS NFR BLD: 1 % (ref 0–1)
BUN SERPL-MCNC: 9 MG/DL (ref 6–20)
BUN/CREAT SERPL: 9 (ref 12–20)
CALCIUM SERPL-MCNC: 8.8 MG/DL (ref 8.5–10.1)
CHLORIDE SERPL-SCNC: 104 MMOL/L (ref 97–108)
CO2 SERPL-SCNC: 29 MMOL/L (ref 21–32)
CREAT SERPL-MCNC: 0.96 MG/DL (ref 0.7–1.3)
DIFFERENTIAL METHOD BLD: ABNORMAL
EOSINOPHIL # BLD: 0.5 K/UL (ref 0–0.4)
EOSINOPHIL NFR BLD: 3 % (ref 0–7)
ERYTHROCYTE [DISTWIDTH] IN BLOOD BY AUTOMATED COUNT: 20.4 % (ref 11.5–14.5)
GLUCOSE SERPL-MCNC: 92 MG/DL (ref 65–100)
HCT VFR BLD AUTO: 30.6 % (ref 36.6–50.3)
HGB BLD-MCNC: 8.9 G/DL (ref 12.1–17)
IMM GRANULOCYTES # BLD AUTO: 0.2 K/UL (ref 0–0.04)
IMM GRANULOCYTES NFR BLD AUTO: 1 % (ref 0–0.5)
LYMPHOCYTES # BLD: 7 K/UL (ref 0.8–3.5)
LYMPHOCYTES NFR BLD: 45 % (ref 12–49)
MCH RBC QN AUTO: 23 PG (ref 26–34)
MCHC RBC AUTO-ENTMCNC: 29.1 G/DL (ref 30–36.5)
MCV RBC AUTO: 79.1 FL (ref 80–99)
MONOCYTES # BLD: 0.6 K/UL (ref 0–1)
MONOCYTES NFR BLD: 4 % (ref 5–13)
NEUTS SEG # BLD: 7.1 K/UL (ref 1.8–8)
NEUTS SEG NFR BLD: 46 % (ref 32–75)
NRBC # BLD: 0 K/UL (ref 0–0.01)
NRBC BLD-RTO: 0 PER 100 WBC
PLATELET # BLD AUTO: 519 K/UL (ref 150–400)
PMV BLD AUTO: 9.7 FL (ref 8.9–12.9)
POTASSIUM SERPL-SCNC: 3.3 MMOL/L (ref 3.5–5.1)
RBC # BLD AUTO: 3.87 M/UL (ref 4.1–5.7)
RBC MORPH BLD: ABNORMAL
SODIUM SERPL-SCNC: 139 MMOL/L (ref 136–145)
WBC # BLD AUTO: 15.6 K/UL (ref 4.1–11.1)

## 2020-01-28 PROCEDURE — 74011250637 HC RX REV CODE- 250/637: Performed by: INTERNAL MEDICINE

## 2020-01-28 PROCEDURE — 85025 COMPLETE CBC W/AUTO DIFF WBC: CPT

## 2020-01-28 PROCEDURE — 74011000258 HC RX REV CODE- 258: Performed by: SURGERY

## 2020-01-28 PROCEDURE — 80048 BASIC METABOLIC PNL TOTAL CA: CPT

## 2020-01-28 PROCEDURE — 97116 GAIT TRAINING THERAPY: CPT

## 2020-01-28 PROCEDURE — 74011250636 HC RX REV CODE- 250/636: Performed by: SURGERY

## 2020-01-28 PROCEDURE — 74011250636 HC RX REV CODE- 250/636: Performed by: NURSE PRACTITIONER

## 2020-01-28 PROCEDURE — 74011250636 HC RX REV CODE- 250/636: Performed by: HOSPITALIST

## 2020-01-28 PROCEDURE — 36415 COLL VENOUS BLD VENIPUNCTURE: CPT

## 2020-01-28 PROCEDURE — 74011250637 HC RX REV CODE- 250/637: Performed by: SURGERY

## 2020-01-28 PROCEDURE — 74011250637 HC RX REV CODE- 250/637: Performed by: NURSE PRACTITIONER

## 2020-01-28 RX ORDER — CARVEDILOL 6.25 MG/1
6.25 TABLET ORAL 2 TIMES DAILY WITH MEALS
Status: DISCONTINUED | OUTPATIENT
Start: 2020-01-28 | End: 2020-01-28 | Stop reason: HOSPADM

## 2020-01-28 RX ORDER — TRAMADOL HYDROCHLORIDE 50 MG/1
50 TABLET ORAL
Qty: 20 TAB | Refills: 0 | Status: SHIPPED | OUTPATIENT
Start: 2020-01-28 | End: 2020-02-04

## 2020-01-28 RX ADMIN — SODIUM CHLORIDE 10 ML: 9 INJECTION INTRAMUSCULAR; INTRAVENOUS; SUBCUTANEOUS at 16:47

## 2020-01-28 RX ADMIN — CARVEDILOL 6.25 MG: 6.25 TABLET, FILM COATED ORAL at 10:48

## 2020-01-28 RX ADMIN — LEVOTHYROXINE SODIUM 50 MCG: 0.05 TABLET ORAL at 07:12

## 2020-01-28 RX ADMIN — HYDRALAZINE HYDROCHLORIDE 20 MG: 20 INJECTION INTRAMUSCULAR; INTRAVENOUS at 05:01

## 2020-01-28 RX ADMIN — ATORVASTATIN CALCIUM 20 MG: 10 TABLET, FILM COATED ORAL at 09:31

## 2020-01-28 RX ADMIN — PANTOPRAZOLE SODIUM 40 MG: 40 TABLET, DELAYED RELEASE ORAL at 07:12

## 2020-01-28 RX ADMIN — PIPERACILLIN AND TAZOBACTAM 3.38 G: 3; .375 INJECTION, POWDER, LYOPHILIZED, FOR SOLUTION INTRAVENOUS at 12:43

## 2020-01-28 RX ADMIN — FUROSEMIDE 40 MG: 10 INJECTION, SOLUTION INTRAMUSCULAR; INTRAVENOUS at 09:32

## 2020-01-28 RX ADMIN — LOSARTAN POTASSIUM 100 MG: 50 TABLET, FILM COATED ORAL at 09:32

## 2020-01-28 RX ADMIN — ENOXAPARIN SODIUM 100 MG: 100 INJECTION SUBCUTANEOUS at 09:32

## 2020-01-28 RX ADMIN — SODIUM CHLORIDE 10 ML: 9 INJECTION INTRAMUSCULAR; INTRAVENOUS; SUBCUTANEOUS at 14:33

## 2020-01-28 RX ADMIN — SODIUM CHLORIDE 10 ML: 9 INJECTION INTRAMUSCULAR; INTRAVENOUS; SUBCUTANEOUS at 05:01

## 2020-01-28 RX ADMIN — CARVEDILOL 6.25 MG: 6.25 TABLET, FILM COATED ORAL at 16:47

## 2020-01-28 RX ADMIN — PIPERACILLIN AND TAZOBACTAM 3.38 G: 3; .375 INJECTION, POWDER, LYOPHILIZED, FOR SOLUTION INTRAVENOUS at 05:01

## 2020-01-28 NOTE — ROUTINE PROCESS
Rolling walker delivered to patient room. The Rehabilitation department at [facility] has ordered the following durable medical equipment (DME):   Rolling walker  From:  [employer] [phone]  If the rehab department or DME company is waiting for insurance approval for the equipment and the patient decides to discharge from the hospital before the medical equipment arrives, the patient may contact the company above to work out the delivery. Please keep in mind that some DME companies WILL NOT deliver to the home. Insurance companies and DME companies are not open on the weekends to approve authorization and deliver to the hospital. Therefore it is the patient's responsibility to figure out a way to access the DME medical equipment. Thank you so much for your help as we provide the equipment the patient requires.

## 2020-01-28 NOTE — PROGRESS NOTES
Problem: Mobility Impaired (Adult and Pediatric)  Goal: *Acute Goals and Plan of Care (Insert Text)  Description  FUNCTIONAL STATUS PRIOR TO ADMISSION: Patient was modified independent using a single point cane for functional mobility. HOME SUPPORT PRIOR TO ADMISSION: The patient lives with son and his wife. Physical Therapy Goals  Initiated 1/25/2020  1. Patient will move from supine to sit and sit to supine  in bed with minimal assistance/contact guard assist within 7 day(s). 2.  Patient will transfer from bed to chair and chair to bed with modified independence using the least restrictive device within 7 day(s). 3.  Patient will perform sit to stand with modified independence within 7 day(s). 4.  Patient will ambulate with minimal assistance/contact guard assist for 50 feet with the least restrictive device within 7 day(s). Outcome: Progressing Towards Goal   PHYSICAL THERAPY TREATMENT  Patient: Montana Jay (81 y.o. male)  Date: 1/28/2020  Diagnosis: Acute phlegmonous appendicitis [K35.890]  Acute phlegmonous appendicitis [K35.890]   <principal problem not specified>       Precautions: Fall  Chart, physical therapy assessment, plan of care and goals were reviewed. ASSESSMENT  Patient continues with skilled PT services and is progressing towards goals. Pt seen following RN clearance. Pt resting in bed but agreeable to bed mobility, transfers, gait, and stair training per home needs. Pt reports he plans to return home with his wife (who has dementia and a caregiver of her own) and son (caregiver 24/7) who now lives with pt. Pt presents with some difficulty exiting his bed and utilizes HOB elevated and rails for success. Pt also noted to have some instability at RW this morning and ataxia with LE placement during gait on flat surface. RW has been used during his acute stay and this DME is recommended for home use. Pt previously using a SPC.   Initially thought pt could use rollator, however, pt states his wife also uses this DME. RW order placed this morning for potential order, pending co-pay amount and pt agreeability. Safety concerns discussed with CM and need for son to truly be present at home for discharge (pt seems slightly confused and tangential in conversation-difficult to follow). Pt will also benefit from HHPT. If appropriate assistance is not available at home, recommend brief rehab stay. Current Level of Function Impacting Discharge (mobility/balance): assist x1 with RW and gait belt for safety    Other factors to consider for discharge: pt lives with his wife who also suffers from dementia-she has a caregiver and pt's son also lives with pt and wife         PLAN :  Patient continues to benefit from skilled intervention to address the above impairments. Continue treatment per established plan of care. to address goals. Recommendation for discharge: (in order for the patient to meet his/her long term goals)  Physical therapy at least 2 days/week in the home     This discharge recommendation:  Has been made in collaboration with the attending provider and/or case management    IF patient discharges home will need the following DME: rolling walker       SUBJECTIVE:   Patient stated I have one [RW] like this but we leave it in the bathroom for my wife to bathe. ..    OBJECTIVE DATA SUMMARY:   Critical Behavior:   Calm, alert, confused, and tangential        Safety/Judgement: Awareness of environment  Functional Mobility Training:  Bed Mobility:  Rolling: (NT)  Supine to Sit: Contact guard assistance; Additional time;Bed Modified(HOB elevated and rail used)  Transfers:  Sit to Stand: Stand-by assistance; Additional time(cues to push from support surface)  Stand to Sit: Contact guard assistance; Additional time(cues for alignment, reach back, controlled descent)  Balance:  Sitting: Intact  Sitting - Static: Good (unsupported)  Sitting - Dynamic: Prop sitting  Standing: Impaired; With support  Standing - Static: Fair;Constant support  Standing - Dynamic : Fair;Constant support  Ambulation/Gait Training:  Distance (ft): 55 Feet (ft)(x2)  Assistive Device: Gait belt;Walker, rolling  Ambulation - Level of Assistance: Contact guard assistance; Additional time  Gait Abnormalities: Ataxic;Decreased step clearance;Trunk sway increased; Path deviations(pt with some incoordination with foot placement)  Base of Support: Widened  Speed/[place]: Slow;Pace decreased (<100 feet/min)  Step Length: Right shortened;Left shortened  Swing Pattern: Left asymmetrical;Right asymmetrical    Stairs:  Number of Stairs Trained: 5  Stairs - Level of Assistance: Contact guard assistance; Additional time(cues for sequencing \"up with good, down with bad\")   Rail Use: Both    Pain Rating:  NT    Activity Tolerance:   Good, Fair, SpO2 stable on RA, and observed SOB with activity; SpO2 96% and HR 95 post gait and stairs on RA  Please refer to the flowsheet for vital signs taken during this treatment.     After treatment patient left in no apparent distress:   Sitting in chair and Call bell within reach    COMMUNICATION/COLLABORATION:   The patients plan of care was discussed with: Registered Nurse, SHERINE Balbuena   Time Calculation: 31 mins

## 2020-01-28 NOTE — PROGRESS NOTES
Bedside shift change report given to Steven RN (oncoming nurse) by Sara Mohan RN (offgoing nurse).  Report included the following information SBAR, Kardex, Intake/Output, MAR and Recent Results. '

## 2020-01-28 NOTE — CDMP QUERY
Pt admitted with perforated appendicitis. Pt noted to also have CHF documented. . If possible, please document in progress notes and discharge summary the etiology of CHF, if able to be determined. ? Hypertensive Heart Disease 
? CHF not d/t Hypertension If CHF is unrelated to HTN, please document:   
 
? CHF d/t Ischemic Cardiomyopathy 
? CHF d/t valvular heart disease 
? CHF d/t other, please specify ? Clinically unable to be determine The medical record reflects the following: 
  Risk Factors: Hx. HTN, chronic Afib Clinical Indicators: Per cardiology PN of 1/22- Last echo 11/29/18 EF 61-65%, NWMA, trace MR, Severe SCOTT, mild TR, mild dilated RV, mild aortic root dilitation (4.1 cm) 
1/24/20- Per Hematology PN-Probable CHF: cardiomegaly, new bilateral pleural effusions, and interstitial  
pulmonary edema. 1/2609-[id number] of CHF pattern since prior study. Rola Rm 1/27-Per PN-Shortness of breath, wheezinglikely cardiac wheezing, chest x-ray suspicion for pulmonary edema, Lasix started by cardiology. 1/28-Per cardiology-HTN. BP elevated. Will add coreg. Continue IV lasix; transition to po when he ready for hospital dc. Echo 11/2019 with EF 61-65% Treatment: Cardiology consult, coreg started on 1/28, IV lasix  40 mg started on 1/27, monitoring of vital signs and labwork, echo Thank you, Matt LEIGHN, RN 
[facility]  
[phone]

## 2020-01-28 NOTE — PROGRESS NOTES
[facility] [facility]                                                                                          Hospitalist Progress Note  Yessica Paige MD  Answering service: [phone] or [phone] from in house phone        Date of Service:  2020  NAME:  Merary Garcia  :  1930  MRN:  [id number]      Admission Summary: This is an 19-year-old man with past medical history significant for hypertension, hypothyroidism, chronic atrial fibrillation; on Eliquis for anticoagulation, dyslipidemia, and TIA was in his usual state of health until about a week ago when the patient developed abdominal pain. The abdominal pain is diffuse in location. The patient described the abdominal pain as cramping associated with distention and inability to sleep. The pain was constant with no radiation, 6/10 in severity. The patient took all antibiotics that he had at home and his abdominal pain became better. The patient is able to go about his activity of daily living. He went to his primary care physician today because of warts in his ear and also because of ulcer in his lip. While at the primary care physician office, the patient was found to have tenderness on abdominal palpation. The patient was then sent to the emergency room for CT scan of the abdomen and pelvis because of the concern for diverticulitis. When the patient arrived at the emergency room, CT scan of the abdomen and pelvis was performed. The CT scan shows perforated appendicitis. The emergency room physician consulted general surgeon on call. The patient was admitted to surgical service. Medical consult was requested for preop evaluation and medical management. The patient denies fever, rigor, and chills  Interval history / Subjective:       F/u for medical management. Comfortable, shortness of breath has improved, no nausea, no vomiting, no fevers or chills, tolerating diet.      Assessment & Plan: Acute perforated appendicitis   Managed by general surgery team.  On IV Zosyn. General surgery to determine the antibiotic course. Urine cultures negative, no blood cultures were obtained. Acute blood loss anemia   Status post 1 unit of PRBC, etiology unclear, hematology following  Monitor     # Persistent atrial Fib:  -HR controlled. Cardiology on board, recommends to resume Lovenox until Eliquis could be resumed once cleared by Heme    # History of stroke in October 2019:  --Patient is in persistent atrial fibrillation on tele  Management as per cardiology     # Hypertension,  -Resume losartan. Monitor     # Hypothyroidism   Continue Synthroid. Concern for cirrhosis on imaging. No thrombocytopenia. Check Liver function test.    Shortness of breath, wheezing-likely cardiac wheezing, chest x-ray suspicion for pulmonary edema, Lasix started by cardiology. Last echo was in 11/2019 showing EF of 61 to 65%    Code status: FULL  DVT prophylaxis: SCD     Care Plan discussed with: Patient/Family      Hospitalist signing off, please call if you have questions. Hospital Problems  Date Reviewed: 1/21/2020          Codes Class Noted POA    Acute phlegmonous appendicitis ICD-10-CM: K35.890  ICD-9-CM: 540.9  1/21/2020 Unknown                Review of Systems:   A comprehensive review of systems was negative except for that written in the HPI. Vital Signs:    Last 24hrs VS reviewed since prior progress note. Most recent are:  Visit Vitals  /70 (BP 1 Location: Right arm, BP Patient Position: Sitting)   Pulse 61   Temp 97.6 °F (36.4 °C)   Resp 16   Wt 101.6 kg (224 lb)   SpO2 95%   BMI 35.08 kg/m²         Intake/Output Summary (Last 24 hours) at 1/28/2020 1348  Last data filed at 1/28/2020 6968  Gross per 24 hour   Intake --   Output 3900 ml   Net -3900 ml        Physical Examination:             Constitutional:  No acute distress, cooperative, pleasant    ENT:  Oral mucous moist, oropharynx benign. Resp: CTA bilaterally. No wheezing/rhonchi/rales. No accessory muscle use   CV:  Regular rhythm, normal rate, no murmurs, gallops, rubs    GI:  Soft, non distended, non tender. normoactive bowel sounds, no hepatosplenomegaly     Musculoskeletal:  No edema, warm, 2+ pulses throughout           Data Review:    Review and/or order of clinical lab test      Labs:     Recent Labs     01/28/20  0437 01/27/20  0545   WBC 15.6* 13.6*   HGB 8.9* 8.2*   HCT 30.6* 29.0*   * 432*     Recent Labs     01/28/20  0437 01/27/20  0545    138   K 3.3* 3.7    105   CO2 29 27   BUN 9 10   CREA 0.96 0.94   GLU 92 95   CA 8.8 8.8     Recent Labs     01/27/20  0545   SGOT 29   ALT 36   *   TBILI 0.6   TP 6.3*   ALB 2.5*   GLOB 3.8     No results for input(s): INR, PTP, APTT, INREXT, INREXT in the last 72 hours. No results for input(s): FE, TIBC, PSAT, FERR in the last 72 hours. Lab Results   Component Value Date/Time    Folate 28.4 (H) 01/24/2020 06:00 PM      No results for input(s): PH, PCO2, PO2 in the last 72 hours. No results for input(s): CPK, CKNDX, TROIQ in the last 72 hours.     No lab exists for component: CPKMB  Lab Results   Component Value Date/Time    Cholesterol, total 159 10/12/2019 08:55 AM    HDL Cholesterol 51 10/12/2019 08:55 AM    LDL, calculated 83.4 10/12/2019 08:55 AM    Triglyceride 123 10/12/2019 08:55 AM    CHOL/HDL Ratio 3.1 10/12/2019 08:55 AM     Lab Results   Component Value Date/Time    Glucose (POC) 117 (H) 10/12/2019 08:41 AM     Lab Results   Component Value Date/Time    Color DARK YELLOW 01/21/2020 07:50 PM    Appearance CLEAR 01/21/2020 07:50 PM    Specific gravity 1.025 01/21/2020 07:50 PM    pH (UA) 6.0 01/21/2020 07:50 PM    Protein 30 (A) 01/21/2020 07:50 PM    Glucose NEGATIVE  01/21/2020 07:50 PM    Ketone TRACE (A) 01/21/2020 07:50 PM    Bilirubin NEGATIVE  12/02/2019 01:27 PM    Urobilinogen 1.0 01/21/2020 07:50 PM    Nitrites NEGATIVE  01/21/2020 07:50 PM Leukocyte Esterase SMALL (A) 01/21/2020 07:50 PM    Epithelial cells FEW 01/21/2020 07:50 PM    Bacteria NEGATIVE  01/21/2020 07:50 PM    WBC 0-4 01/21/2020 07:50 PM    RBC 0-5 01/21/2020 07:50 PM         Medications Reviewed:     Current Facility-Administered Medications   Medication Dose Route Frequency    carvediloL (COREG) tablet 6.25 mg  6.25 mg Oral BID WITH MEALS    losartan (COZAAR) tablet 100 mg  100 mg Oral DAILY    furosemide (LASIX) injection 40 mg  40 mg IntraVENous DAILY    enoxaparin (LOVENOX) injection 100 mg  1 mg/kg SubCUTAneous Q12H    albuterol-ipratropium (DUO-NEB) 2.5 MG-0.5 MG/3 ML  3 mL Nebulization Q6H PRN    hydrALAZINE (APRESOLINE) 20 mg/mL injection 20 mg  20 mg IntraVENous Q6H PRN    diphenhydrAMINE (BENADRYL) capsule 25 mg  25 mg Oral Q6H PRN    pantoprazole (PROTONIX) tablet 40 mg  40 mg Oral ACB    atorvastatin (LIPITOR) tablet 20 mg  20 mg Oral DAILY    levothyroxine (SYNTHROID) tablet 50 mcg  50 mcg Oral ACB    traZODone (DESYREL) tablet 50 mg  50 mg Oral QHS    albuterol (PROVENTIL VENTOLIN) nebulizer solution 2.5 mg  2.5 mg Nebulization Q4H PRN    0.9% sodium chloride infusion 250 mL  250 mL IntraVENous PRN    nicotine (NICODERM CQ) 21 mg/24 hr patch 1 Patch  1 Patch TransDERmal DAILY    LORazepam (ATIVAN) injection 1 mg  1 mg IntraVENous Q4H PRN    sodium chloride (NS) flush 5-40 mL  5-40 mL IntraVENous Q8H    sodium chloride (NS) flush 5-40 mL  5-40 mL IntraVENous PRN    acetaminophen (TYLENOL) tablet 650 mg  650 mg Oral Q6H PRN    morphine injection 2-4 mg  2-4 mg IntraVENous Q3H PRN    naloxone (NARCAN) injection 0.4 mg  0.4 mg IntraVENous PRN    ondansetron (ZOFRAN) injection 4 mg  4 mg IntraVENous Q4H PRN    piperacillin-tazobactam (ZOSYN) 3.375 g in 0.9% sodium chloride (MBP/ADV) 100 mL  3.375 g IntraVENous Q8H     ______________________________________________________________________  EXPECTED LENGTH OF STAY: 4d 0h  ACTUAL LENGTH OF STAY: Su 2, MD

## 2020-01-28 NOTE — CDMP QUERY
Pt admitted with  perforated appendicitis and has CHF documented on 1/24/20. Please further specify type and acuity of CHF in the medical record. ? Acute on Chronic Diastolic CHF ? Acute Diastolic CHF 
? Other CHF please specify ? Clinically unable to determine The medical record reflects the following: 
  Risk Factors: Hx. HTN, chronic Afib Clinical Indicators: Per cardiology PN of 1/22- Last echo 11/29/18 EF 61-65%, NWMA, trace MR, Severe SCOTT, mild TR, mild dilated RV, mild aortic root dilitation (4.1 cm) 
1/24/20- Per Hematology PN-Probable CHF: cardiomegaly, new bilateral pleural effusions, and interstitial  
pulmonary edema. 1/2616-[id number] of CHF pattern since prior study. Jane Gudino 1/27-Per PN-Shortness of breath, wheezinglikely cardiac wheezing, chest x-ray suspicion for pulmonary edema, Lasix started by cardiology. 1/28-Per cardiology-HTN. BP elevated. Will add coreg. Continue IV lasix; transition to po when he ready for hospital dc. Echo 11/2019 with EF 61-65% Treatment: Cardiology consult, coreg started on 1/28, IV lasix  40 mg started on 1/27, monitoring Thank you, Nisha Bean BSN, RN 
[facility]  
[phone]

## 2020-01-28 NOTE — PROGRESS NOTES
Bedside shift change report given to Sergio Kaba (oncoming nurse) by Cassandra Bellamy (offgoing nurse). Report included the following information SBAR.

## 2020-01-28 NOTE — DISCHARGE INSTRUCTIONS
Patient Education     1. Do not drive while on pain medication. You should take a stool softener while on pain medication to prevent constipation. 2.  You may resume your home medications. 3.  You will need to take Levofloxacin and Metronidazole antibiotics two times daily for 10 days. 4.  Please call [phone] to schedule a follow-up with Dr. Billie Cook in 2-4 weeks for interval appendectomy in 6 weeks. Possible Appendicitis: Care Instructions  Your Care Instructions    Your doctor thinks you may have appendicitis. This means that your appendix may be infected. The appendix is a small sac that is shaped like a finger. It's attached to your large intestine. Sometimes it's hard to tell if a person has appendicitis. It is especially hard to tell in children, pregnant women, and older adults. If your doctor thinks it's possible that you have appendicitis, he or she may want to order more tests. Or your doctor may want to wait to see if your symptoms change. Your doctor thinks it's okay for you to go home right now. But you will need to watch for symptoms of appendicitis at home. If your symptoms continue or get worse, it's important to call your doctor or get medical care right away. Appendicitis can get serious very quickly. The main treatment is surgery to remove your appendix. Follow-up care is a key part of your treatment and safety. Be sure to make and go to all appointments, and call your doctor if you are having problems. It's also a good idea to know your test results and keep a list of the medicines you take. How can you care for yourself at home? · Do not eat or drink, unless your doctor says it is okay. If you need surgery, it's best to have an empty stomach. If you're thirsty, you can rinse your mouth with water. Or you can suck on hard candy. · Do not take laxatives. If you have appendicitis, they can make the appendix burst.  · Follow your doctor's instructions about taking medicines.  Your doctor may tell you not to take antibiotics or pain pills. These medicines can make it harder to tell if you have appendicitis. · Watch for symptoms of appendicitis. See the When should you call for help section below. It is very important to follow your doctor's instructions about getting treatment if you have these symptoms. When should you call for help? Call your doctor now or seek immediate medical care if:    · You have pain that becomes focused on one area of your belly.     · You have new or worse belly pain.     · You are vomiting.     · You have a fever.     · You cannot pass stools or gas.    Watch closely for changes in your health, and be sure to contact your doctor if:    · You do not get better as expected. Where can you learn more? Go to http://lucy-ramos.info/. Enter G728 in the search box to learn more about \"Possible Appendicitis: Care Instructions. \"  Current as of: November 7, 2018  Content Version: 12.2  © 4314-9833 [employer]. Care instructions adapted under license by [employer] (which disclaims liability or warranty for this information). If you have questions about a medical condition or this instruction, always ask your healthcare professional. [address] any warranty or liability for your use of this information.

## 2020-01-28 NOTE — PROGRESS NOTES
Cardiology Progress Note      1/28/2020 8:18 AM    Admit Date: 1/21/2020    Admit Diagnosis: Acute phlegmonous appendicitis [K35.890]; Acute phlegmonous appendicitis [K35.890]      Subjective:     Prem ALDRICH Linda breathing better. Negative balance with lasix. No chest pain  .   Visit Vitals  /72 (BP 1 Location: Right arm, BP Patient Position: At rest;Head of bed elevated (Comment degrees))   Pulse 60   Temp 97.8 °F (36.6 °C)   Resp 16   Wt 224 lb (101.6 kg)   SpO2 95%   BMI 35.08 kg/m²     Current Facility-Administered Medications   Medication Dose Route Frequency    carvediloL (COREG) tablet 6.25 mg  6.25 mg Oral BID WITH MEALS    losartan (COZAAR) tablet 100 mg  100 mg Oral DAILY    furosemide (LASIX) injection 40 mg  40 mg IntraVENous DAILY    enoxaparin (LOVENOX) injection 100 mg  1 mg/kg SubCUTAneous Q12H    albuterol-ipratropium (DUO-NEB) 2.5 MG-0.5 MG/3 ML  3 mL Nebulization Q6H PRN    hydrALAZINE (APRESOLINE) 20 mg/mL injection 20 mg  20 mg IntraVENous Q6H PRN    diphenhydrAMINE (BENADRYL) capsule 25 mg  25 mg Oral Q6H PRN    pantoprazole (PROTONIX) tablet 40 mg  40 mg Oral ACB    atorvastatin (LIPITOR) tablet 20 mg  20 mg Oral DAILY    levothyroxine (SYNTHROID) tablet 50 mcg  50 mcg Oral ACB    traZODone (DESYREL) tablet 50 mg  50 mg Oral QHS    albuterol (PROVENTIL VENTOLIN) nebulizer solution 2.5 mg  2.5 mg Nebulization Q4H PRN    0.9% sodium chloride infusion 250 mL  250 mL IntraVENous PRN    nicotine (NICODERM CQ) 21 mg/24 hr patch 1 Patch  1 Patch TransDERmal DAILY    LORazepam (ATIVAN) injection 1 mg  1 mg IntraVENous Q4H PRN    sodium chloride (NS) flush 5-40 mL  5-40 mL IntraVENous Q8H    sodium chloride (NS) flush 5-40 mL  5-40 mL IntraVENous PRN    acetaminophen (TYLENOL) tablet 650 mg  650 mg Oral Q6H PRN    morphine injection 2-4 mg  2-4 mg IntraVENous Q3H PRN    naloxone (NARCAN) injection 0.4 mg  0.4 mg IntraVENous PRN    ondansetron (ZOFRAN) injection 4 mg  4 mg IntraVENous Q4H PRN    piperacillin-tazobactam (ZOSYN) 3.375 g in 0.9% sodium chloride (MBP/ADV) 100 mL  3.375 g IntraVENous Q8H         Objective:      Physical Exam:  Visit Vitals  /72 (BP 1 Location: Right arm, BP Patient Position: At rest;Head of bed elevated (Comment degrees))   Pulse 60   Temp 97.8 °F (36.6 °C)   Resp 16   Wt 224 lb (101.6 kg)   SpO2 95%   BMI 35.08 kg/m²     General Appearance:  Alert, appropriate, no acute distress. Ears/Nose/Mouth/Throat:   Hearing grossly normal.         Neck: Supple. No JVD   Chest:   Lungs expiratory wheeze, scattered rhonchi   Cardiovascular:  Irregularly irregular, S1, S2 normal, I/VI systolic murmur. Abdomen:   deferred   Extremities: trace edema bilaterally. Skin: Warm and dry.                Data Review:   Labs:    Recent Results (from the past 24 hour(s))   METABOLIC PANEL, BASIC    Collection Time: 01/28/20  4:37 AM   Result Value Ref Range    Sodium 139 136 - 145 mmol/L    Potassium 3.3 (L) 3.5 - 5.1 mmol/L    Chloride 104 97 - 108 mmol/L    CO2 29 21 - 32 mmol/L    Anion gap 6 5 - 15 mmol/L    Glucose 92 65 - 100 mg/dL    BUN 9 6 - 20 MG/DL    Creatinine 0.96 0.70 - 1.30 MG/DL    BUN/Creatinine ratio 9 (L) 12 - 20      GFR est AA >60 >60 ml/min/1.73m2    GFR est non-AA >60 >60 ml/min/1.73m2    Calcium 8.8 8.5 - 10.1 MG/DL   CBC WITH AUTOMATED DIFF    Collection Time: 01/28/20  4:37 AM   Result Value Ref Range    WBC 15.6 (H) 4.1 - 11.1 K/uL    RBC 3.87 (L) 4.10 - 5.70 M/uL    HGB 8.9 (L) 12.1 - 17.0 g/dL    HCT 30.6 (L) 36.6 - 50.3 %    MCV 79.1 (L) 80.0 - 99.0 FL    MCH 23.0 (L) 26.0 - 34.0 PG    MCHC 29.1 (L) 30.0 - 36.5 g/dL    RDW 20.4 (H) 11.5 - 14.5 %    PLATELET 632 (H) 065 - 400 K/uL    MPV 9.7 8.9 - 12.9 FL    NRBC 0.0 0  WBC    ABSOLUTE NRBC 0.00 0.00 - 0.01 K/uL    NEUTROPHILS 46 32 - 75 %    LYMPHOCYTES 45 12 - 49 %    MONOCYTES 4 (L) 5 - 13 %    EOSINOPHILS 3 0 - 7 %    BASOPHILS 1 0 - 1 %    IMMATURE GRANULOCYTES 1 (H) 0.0 - 0.5 %    ABS. NEUTROPHILS 7.1 1.8 - 8.0 K/UL    ABS. LYMPHOCYTES 7.0 (H) 0.8 - 3.5 K/UL    ABS. MONOCYTES 0.6 0.0 - 1.0 K/UL    ABS. EOSINOPHILS 0.5 (H) 0.0 - 0.4 K/UL    ABS. BASOPHILS 0.2 (H) 0.0 - 0.1 K/UL    ABS. IMM. GRANS. 0.2 (H) 0.00 - 0.04 K/UL    DF SMEAR SCANNED      RBC COMMENTS ANISOCYTOSIS  2+        RBC COMMENTS MICROCYTOSIS  1+        RBC COMMENTS HYPOCHROMIA  1+        RBC COMMENTS OVALOCYTES  PRESENT        RBC COMMENTS SCHISTOCYTES  PRESENT           Telemetry: AFIB      Assessment:     Active Problems:    Acute phlegmonous appendicitis (1/21/2020)        Plan:     Preop cardiac evaluation. Stable cardiac wise and low to moderate risk for cardiac complications. Per surgery, plan on appendectomy in 4-6 weeks. D/w Surgery NP- ok from surgery perspective for [facility] resumption once anemia sorted out. Restart eliquis 2.5 mg bid when ok from hematology/primary team standpoint but in meantime since high stroke risk will start lovenox in case invasive procedure needed. Afib. HR controlled. Normal LV function by echo. [id number]. High stroke risk. Restart eliquis if no surgery/procedures planned and ok with hematology. Lovenox until then for [facility]. Anemia. Hgb stable. Hematology following. Has hx of EGD 12/2/19 with gastric ulcers and nonbleeding duodenal avm treated with APC. Last week Dr. Seda Fields did not feel that marked anemia due to ruptured appendix. Will defer to hematology/primary team.   HTN. BP elevated. Will add coreg. Continue IV lasix; transition to po when he ready for hospital dc. Acute diastolic CHF NYHA III. Fluid overload due to IV fluids and HTN. Now compensated. Ai Moore for hospital d/c. Outpt f/u. Echo 11/2019 with EF 61-65%  Ruptured appendix.

## 2020-01-28 NOTE — CONSULTS
[facility] at [address], [address], [address]  W: [phone]  F: [phone]    Reason for Visit:   Marcio Dillon is a 80 y.o. male who is seen in consultation at the request of Dr. Kenny Greene  for evaluation for re-evaluate to see if patient can change from lovenox to Eliquis. Treatment History:   · unknown    History of Present Illness:   Julian Zheng is an 80year old male who presented to the ED with complaints of abd pain x8 days. CT abd was performed showing acute perforated appendicitis with extensive periappendiceal inflammation. Patient has past medical history of HTN, atrial fibrillation, DJD, stroke, GI bleed, and anemia. Patient had questionable hx of CLL; patient poor historian and limited insight into past medical history. He then states that he went to a hematologist and they told him he did not have leukemia. After looking through his records, he does have hx  CLL and flow cytometry is loaded in media. Interval History:   Patient is sitting up in the chair talking to pastoral care on arrival. Patient states his abdominal pain is much improved and is hoping to be discharged this evening. He states he believes he misinterpreted the hematologist/oncologist in previous years. He thought he had no disease but admits they probably stated they did not want to perform any treatment at this time. Patient states that he feels good and wants to continue without any treatment. He says at the age of 80years old he is very happy with the life he had and sees enough doctors already.      Past Medical History:   Diagnosis Date    Arthritis     Contact dermatitis and other eczema, due to unspecified cause     Hypertension     Stroke ([address] 1997    TIA at [facility] Thyroid disease     Unspecified hypothyroidism       Past Surgical History:   Procedure Laterality Date    HX HEENT      detached retina    HX HIP REPLACEMENT  2008    right hip, [facility]      Social History     Tobacco Use    Smoking status: Light Tobacco Smoker     Packs/day: 0.25     Types: Cigars     Last attempt to quit: 1978     Years since quittin.3    Smokeless tobacco: Never Used    Tobacco comment: cigar on occ   Substance Use Topics    Alcohol use: Yes     Alcohol/week: 14.0 standard drinks     Types: 14 Shots of liquor per week     Comment: 2 drinks a day.       Family History   Problem Relation Age of Onset    Lung Disease Father     Cancer Father         lung    Coronary Artery Disease Neg Hx      Current Facility-Administered Medications   Medication Dose Route Frequency    carvediloL (COREG) tablet 6.25 mg  6.25 mg Oral BID WITH MEALS    losartan (COZAAR) tablet 100 mg  100 mg Oral DAILY    furosemide (LASIX) injection 40 mg  40 mg IntraVENous DAILY    enoxaparin (LOVENOX) injection 100 mg  1 mg/kg SubCUTAneous Q12H    albuterol-ipratropium (DUO-NEB) 2.5 MG-0.5 MG/3 ML  3 mL Nebulization Q6H PRN    hydrALAZINE (APRESOLINE) 20 mg/mL injection 20 mg  20 mg IntraVENous Q6H PRN    diphenhydrAMINE (BENADRYL) capsule 25 mg  25 mg Oral Q6H PRN    pantoprazole (PROTONIX) tablet 40 mg  40 mg Oral ACB    atorvastatin (LIPITOR) tablet 20 mg  20 mg Oral DAILY    levothyroxine (SYNTHROID) tablet 50 mcg  50 mcg Oral ACB    traZODone (DESYREL) tablet 50 mg  50 mg Oral QHS    albuterol (PROVENTIL VENTOLIN) nebulizer solution 2.5 mg  2.5 mg Nebulization Q4H PRN    0.9% sodium chloride infusion 250 mL  250 mL IntraVENous PRN    nicotine (NICODERM CQ) 21 mg/24 hr patch 1 Patch  1 Patch TransDERmal DAILY    LORazepam (ATIVAN) injection 1 mg  1 mg IntraVENous Q4H PRN    sodium chloride (NS) flush 5-40 mL  5-40 mL IntraVENous Q8H    sodium chloride (NS) flush 5-40 mL  5-40 mL IntraVENous PRN    acetaminophen (TYLENOL) tablet 650 mg  650 mg Oral Q6H PRN    morphine injection 2-4 mg  2-4 mg IntraVENous Q3H PRN    naloxone (NARCAN) injection 0.4 mg  0.4 mg IntraVENous PRN    ondansetron (ZOFRAN) injection 4 mg  4 mg IntraVENous Q4H PRN    piperacillin-tazobactam (ZOSYN) 3.375 g in 0.9% sodium chloride (MBP/ADV) 100 mL  3.375 g IntraVENous Q8H      Allergies   Allergen Reactions    Hydrocodone Other (comments)     Unable to void, or have a bowel movement    Amlodipine Swelling and Other (comments)     Severe weeping from leg swelling    Poison Ivy Extract Itching        Review of Systems: A complete review of systems was obtained, negative except as described above. Physical Exam:     Visit Vitals  /70 (BP 1 Location: Right arm, BP Patient Position: Sitting)   Pulse 61   Temp 97.6 °F (36.4 °C)   Resp 16   Wt 224 lb (101.6 kg)   SpO2 95%   BMI 35.08 kg/m²     ECOG PS: 2  General: No distress   Eyes:  anicteric sclerae  HENT: Atraumatic  Neck: Supple  Respiratory: expiratory wheeze, normal respiratory effort  CV: rrgular rate & rhythm, no murmurs, 1 peripheral edema  GI: Soft, nontender  MS: Digits without clubbing or cyanosis, moves all extremities   Skin: BLE with discoloration and flaking skin. Normal temperature. Psych: Alert, oriented    Results:     Lab Results   Component Value Date/Time    WBC 15.6 (H) 01/28/2020 04:37 AM    HGB 8.9 (L) 01/28/2020 04:37 AM    HCT 30.6 (L) 01/28/2020 04:37 AM    PLATELET 136 (H) 20/00/5341 04:37 AM    MCV 79.1 (L) 01/28/2020 04:37 AM    ABS.  NEUTROPHILS 7.1 01/28/2020 04:37 AM     Lab Results   Component Value Date/Time    Sodium 139 01/28/2020 04:37 AM    Potassium 3.3 (L) 01/28/2020 04:37 AM    Chloride 104 01/28/2020 04:37 AM    CO2 29 01/28/2020 04:37 AM    Glucose 92 01/28/2020 04:37 AM    BUN 9 01/28/2020 04:37 AM    Creatinine 0.96 01/28/2020 04:37 AM    GFR est AA >60 01/28/2020 04:37 AM    GFR est non-AA >60 01/28/2020 04:37 AM    Calcium 8.8 01/28/2020 04:37 AM    Glucose (POC) 117 (H) 10/12/2019 08:41 AM     Lab Results   Component Value Date/Time    Bilirubin, total 0.6 01/27/2020 05:45 AM    ALT (SGPT) 36 01/27/2020 05:45 AM    AST (SGOT) 29 01/27/2020 05:45 AM    Alk. phosphatase 118 (H) 01/27/2020 05:45 AM    Protein, total 6.3 (L) 01/27/2020 05:45 AM    Albumin 2.5 (L) 01/27/2020 05:45 AM    Globulin 3.8 01/27/2020 05:45 AM       CT ABD/PELV 1/21/2020  IMPRESSION: Acute perforated appendicitis with extensive periappendiceal  Inflammation. CT ABD/PELV 1/23/2020  IMPRESSION:   1. Stable perforated appendicitis. Trace ascites is new. 2. Possible small, noncalcified, extraluminal, appendicoliths. 3. No drainable, walled off fluid collection. 4. Probable CHF: cardiomegaly, new bilateral pleural effusions, and interstitial  pulmonary edema. 5. Cirrhosis. Liver protocol CT or MRI should be considered on nonemergent basis  for hepatocellular carcinoma screening. Peripheral Smear 1/24/2020   FINAL PATHOLOGIC DIAGNOSIS   Peripheral smear:   Mature lymphocytosis with smudge cells   Neutrophilia   Red blood cells with anisocytosis and polychromasia   Platelets are adequate   Comment: In this age patient, the presence of mature lymphocytosis with smudge cells suggests the possibility of chronic lymphocytic leukemia. Correlation with clinical findings recommended. If clinically indicated, flow cytometry may be performed upon request     Records reviewed and summarized above. Radiology report(s) reviewed above. Pathology report(s) reviewed above. Assessment:   1) CLL  He was diagnosed in 2011  Flowcytometry results are under media and reviewed but the patient denies having this diagnosis  It does appear that his counts have been stable over the years and CLL has been indolent  Immunophenotype ordered 1/24/2020 but was not drawn. Reordering today. Discussed follow-up with patient; He is declining any treatment or follow-up at this time. Can communicate with his primary care provider. 2)Perforated appendicitis  General surgery following.   Interventions should not be affected by his CLL diagnosis    3) Anemia  Patient states this is chronic and has been on iron pills in past.   Per our records, normal Hgb up until 2014  Current anemia most likely 2/2 #2 but will complete an anemia workup. Stable at 8.9 g/dL today. --Folate B12 normal. Iron profile & ferritin were not drawn. Ordered again today. --Peripheral smear showed mature lymphocytosis with smudge cells, neutrophilia, red blood cells with anisocytosis and polychromasia. -- I doubt this indicated BM involvement with CLL as generally that is accompanied with thrombocytopenia    4)Leukocytosis  Most likely 2/2 #2 and CLL  Antibiotics per primary team.    5) Anticoagulation  On Eliquis prior to hospitalization due to a fib and hx stroke  On lovenox during hospitalization  --No contraindication for Eliquis from an oncology standpoint     6) Psychosocial  Patient states he lives at home with his wife and son. Wife has Alzheimers and son is primary caregiver. Patient appears in good spirits and anxious to be discharged back home. Plan:     · Iron profile and ferritin ordered. · Immunophenotype pending. He has a diagnosis of CLL declining follow-up at this time. · Management of appendicitis per general surgery and primary team.  · Transfuse as needed. Pt seen today in conjunction with GRAZYNA Bejarano  Reviewed CLL with pt today. Pt states he is not interested in any treatment of follow for this blood problem. Pt states he will f/u with his PCP. We will follow as requested  Call if questions  I appreciate the opportunity to participate in Mr. Karin sandoval.     Signed By: Poncho Duong NP

## 2020-01-28 NOTE — PROGRESS NOTES
General Surgery Daily Progress Note    Admit Date: 2020  Post-Operative Day: * No surgery found * from * No surgery found *     Subjective:     Last 24 hrs: Pt is doing well; very anxious to get out of the hospital. No c/o pain; H/H are stable; WBC is elevated to 15.6 today       Objective:     Blood pressure 150/70, pulse 61, temperature 97.6 °F (36.4 °C), resp. rate 16, weight 224 lb (101.6 kg), SpO2 95 %. Temp (24hrs), Av.7 °F (36.5 °C), Min:97.3 °F (36.3 °C), Max:98 °F (36.7 °C)      _____________________  Physical Exam:     Alert and Oriented, x3, in no acute distress. Cardiovascular: RRR, no peripheral edema  Lungs:CTAB   Abdomen: soft, NT      Assessment:   Active Problems:    Acute phlegmonous appendicitis (2020)            Plan:     Cont abx  Not back on eliquis yet but can be from surgical standpoint  Cont OOB  D/c dispo - will d/w Dr Tanner Sour  Monitor wbc    Data Review:    Recent Labs     20  0437 20  0545   WBC 15.6* 13.6*   HGB 8.9* 8.2*   HCT 30.6* 29.0*   * 432*     Recent Labs     20  0437 20  0545    138   K 3.3* 3.7    105   CO2 29 27   GLU 92 95   BUN 9 10   CREA 0.96 0.94   CA 8.8 8.8   ALB  --  2.5*   SGOT  --  29   ALT  --  36     No results for input(s): AML, LPSE in the last 72 hours.         ______________________  Medications:    Current Facility-Administered Medications   Medication Dose Route Frequency    carvediloL (COREG) tablet 6.25 mg  6.25 mg Oral BID WITH MEALS    losartan (COZAAR) tablet 100 mg  100 mg Oral DAILY    furosemide (LASIX) injection 40 mg  40 mg IntraVENous DAILY    enoxaparin (LOVENOX) injection 100 mg  1 mg/kg SubCUTAneous Q12H    albuterol-ipratropium (DUO-NEB) 2.5 MG-0.5 MG/3 ML  3 mL Nebulization Q6H PRN    hydrALAZINE (APRESOLINE) 20 mg/mL injection 20 mg  20 mg IntraVENous Q6H PRN    diphenhydrAMINE (BENADRYL) capsule 25 mg  25 mg Oral Q6H PRN    pantoprazole (PROTONIX) tablet 40 mg  40 mg Oral ACB    atorvastatin (LIPITOR) tablet 20 mg  20 mg Oral DAILY    levothyroxine (SYNTHROID) tablet 50 mcg  50 mcg Oral ACB    traZODone (DESYREL) tablet 50 mg  50 mg Oral QHS    albuterol (PROVENTIL VENTOLIN) nebulizer solution 2.5 mg  2.5 mg Nebulization Q4H PRN    0.9% sodium chloride infusion 250 mL  250 mL IntraVENous PRN    nicotine (NICODERM CQ) 21 mg/24 hr patch 1 Patch  1 Patch TransDERmal DAILY    LORazepam (ATIVAN) injection 1 mg  1 mg IntraVENous Q4H PRN    sodium chloride (NS) flush 5-40 mL  5-40 mL IntraVENous Q8H    sodium chloride (NS) flush 5-40 mL  5-40 mL IntraVENous PRN    acetaminophen (TYLENOL) tablet 650 mg  650 mg Oral Q6H PRN    morphine injection 2-4 mg  2-4 mg IntraVENous Q3H PRN    naloxone (NARCAN) injection 0.4 mg  0.4 mg IntraVENous PRN    ondansetron (ZOFRAN) injection 4 mg  4 mg IntraVENous Q4H PRN    piperacillin-tazobactam (ZOSYN) 3.375 g in 0.9% sodium chloride (MBP/ADV) 100 mL  3.375 g IntraVENous Q8H       Chandler Jo NP  1/28/2020      ADDENDUM:  Marquita Stockton MD  PT seen and examined. No acute surgical issues. Pt doing pretty well. WBC is somewhat elevated but pt denied any pain and no fever. Tolerating diet. DC tidwell. Plan DC home this afternoon if patient is able to void.

## 2020-01-29 ENCOUNTER — PATIENT OUTREACH (OUTPATIENT)
Dept: FAMILY MEDICINE CLINIC | Age: 85
End: 2020-01-29

## 2020-01-29 ENCOUNTER — TELEPHONE (OUTPATIENT)
Dept: FAMILY MEDICINE CLINIC | Age: 85
End: 2020-01-29

## 2020-01-29 NOTE — TELEPHONE ENCOUNTER
----- Message from Derek Ayers sent at 2020 10:20 AM EST -----  Regarding: Jose/telephone  Pt is requesting for you to call him to discuss his medications. Pts number is [phone]. .  Outbound call to , Pt MEGAN verified,   He states he has been in hospital for 7 days got out last night, ruptured appendix, but they wanted  to observe and want to see him in 1 week, he states he wants to go back to his regular medications for Atrial Fibrination. He wants to discuss that with him. He cant walk now so cant come to visit, but would like to speak to [facility].   BCB# [phone]  LOV : 20

## 2020-01-29 NOTE — TELEPHONE ENCOUNTER
Called patient. He was in [facility] from 01/21-01/28. Did not have surgery yet for appendicitis. Has to wait for inflammation to decrease. Has apt with [facility] 02/06/20 to re eval . For surgery. He is not currently on any blood thinners asa or Eliquis. I told him due to upcoming surgery , will need to be off . He wants your opinion.

## 2020-01-29 NOTE — TELEPHONE ENCOUNTER
Per hospital records, patient can resume Eliquis.  If he has surgery, this medication would have to be held 3 days prior

## 2020-01-29 NOTE — PROGRESS NOTES
Hospital Discharge Follow-Up      Date/Time:  2020 10:31 AM  [place] [facility] Ambulatory Care Coordination Team  Phone [phone]    Patient was admitted to [facility] on  and discharged on  for perforated appendicitis. The physician discharge summary was not available at the time of outreach. Patient was contacted within 1 business days of discharge. Top Challenges reviewed with the provider   [facility] - Perforated appendicitis with phlegmon  IV antibiotics, surgery pending. WBC 25.9 on , down to 15.6 on -monitor. Hgb 6.7 -transfusion of 1 unit PRBCs . Hgb 8.9 (). Called and requested visit from [address] [place] [address],[address] Floor for today. Advance Care Planning:   Does patient have an Advance Directive:  not on file; education provided        Method of communication with provider :chart routing,face to face    Was this a readmission? no   Patient stated reason for the readmission:     Care Transition Nurse (CTN) contacted the patient by telephone to perform post hospital discharge assessment. Verified name and  with patient as identifiers. Provided introduction to self, and explanation of the CTN role. Patient reports he is weak after being in hospital for a week. Little wobbly this am, using walker. Advised to make sure son is assisting him and using walker until feels stronger. Says he slept well. Lives with son. Denies any abdominal pain or nausea or vomiting. Patient received hospital discharge instructions. CTN reviewed discharge instructions and red flags with patient who verbalized understanding. Patient given an opportunity to ask questions and does not have any further questions or concerns at this time. The patient agrees to contact the PCP office for questions related to their healthcare. CTN provided contact information for future reference.     Disease Specific:   N/A    Patients top risk factors for readmission:      Home Health orders at discharge: SN/PT  [address]: [facility]  Date of initial visit: 1/30/20    Durable Medical Equipment ordered at discharge: none  [facility] -  [id number] received: na    Medication(s):   New Medications at Discharge: Levaquin 500mg qd x 10 days, Flagyl 500mg tid x 10 days, Tramadol 50mg q 6 hours prn pain  Changed Medications at Discharge: none  Discontinued Medications at Discharge: none    Medication reconciliation was performed with patient, who verbalizes understanding of administration of home medications. There were no barriers to obtaining medications identified at this time. NOTE- patient's son to  meds today. Advised to do so as soon as possible. Referral to Pharm D needed: no     Current Outpatient Medications   Medication Sig    traMADol (ULTRAM) 50 mg tablet Take 1 Tab by mouth every six (6) hours as needed for Pain for up to 7 days. Max Daily Amount: 200 mg.  levoFLOXacin (LEVAQUIN) 500 mg tablet Take 1 Tab by mouth daily for 10 days.  metroNIDAZOLE (FLAGYL) 500 mg tablet Take 1 Tab by mouth three (3) times daily for 10 days.  losartan (COZAAR) 50 mg tablet Take 0.5 Tabs by mouth daily.  apixaban (ELIQUIS) 2.5 mg tablet Take 1 Tab by mouth two (2) times a day.  atorvastatin (LIPITOR) 20 mg tablet Take 1 Tab by mouth daily. For cholesterol    albuterol (PROVENTIL HFA, VENTOLIN HFA, PROAIR HFA) 90 mcg/actuation inhaler INHALE 1-2 PUFFS EVERY 6 HOURS IF NEEDED (Patient taking differently: Take 2 Puffs by inhalation every four (4) hours as needed.)    aspirin 81 mg chewable tablet Take 1 Tab by mouth daily.  pantoprazole (PROTONIX) 40 mg tablet Take 1 Tab by mouth every twelve (12) hours. (Patient taking differently: Take 40 mg by mouth daily.)    traZODone (DESYREL) 50 mg tablet Take 1 Tab by mouth nightly.  For sleep    levothyroxine (SYNTHROID) 50 mcg tablet Take 50 mcg by mouth Daily (before breakfast).  ketoconazole (NIZORAL) 2 % topical cream Apply 1 Each to affected area daily. apply to nose/face    tretinoin (RETIN-A) 0.025 % topical cream Apply 1 Each to affected area nightly. apply to face    triamcinolone acetonide (KENALOG) 0.1 % ointment Apply 1 Container to affected area two (2) times a day. use thin layer, apply to face    zinc (CHELATED ZINC) 50 mg tab tablet Take 50 mg by mouth daily.  lutein 20 mg cap Take 1 Cap by mouth daily.  coenzyme Q-10 (CO Q-10) 30 mg capsule Take 30 mg by mouth daily.  lecithin/soybean oil (SOYA LECITHIN PO) Take 1 Tab by mouth daily.  multivit-min/FA/lycopen/lutein (SENTRY SENIOR PO) Take 1 Tab by mouth daily.  vitamin E (AQUA GEMS) 400 unit capsule Take 400 Units by mouth daily. Reports taking 1-2 times a week.  ascorbic acid (VITAMIN C) 500 mg tablet Take 500 mg by mouth daily.  SAW PALMETTO PO Take 1 Tab by mouth daily.  CALCIUM/MAGNESIUM (DOLOMITE PO) Take 1 Tab by mouth daily. No current facility-administered medications for this visit. There are no discontinued medications. [facility] follow up appointment(s):   Future Appointments   Date Time Provider Jose Samson   1/30/2020 11:00 AM Wood Chen [address] [place] [address]   2/7/2020  1:00 PM MD JOE Sneed/ Oswaldo Valencia   7/15/2020  1:40 PM Grady Crow  [address]       Non-[facility] follow up appointment(s): Dr.Stuart Goldsmith- reminded patient to call and schedule. [facility]:  scheduled       Goals      Prevent complications post hospitalization. 1/29/20 [facility] 1/21=1/28 Perforated appendicitis with phlegmon  · Reviewed discharge instructions with patient  · Reviewed meds with patient- education using teachback on new meds: Levaquin, Flagyl and Tramadol. Son to  this am for him to start. · Reviewed red flags: abdominal pain, fever,nausea,vomiting,diarrhea,constipated.   · Reviewed fall precautions- advised use walker with son assisting until stronger and more steady on his fee. · Given info on [facility] as resource-CTN called and requested visit for today or tomorrow. · Reminded to call [facility] office to schedule 2 week f/u. · Declined ov for HEYDI with GRAZYNA Garcia. · Given CTN contact info if any questions/concerns.   · Advised CTN to check back in 7-10 days for update,sooner prn.[email or web address]

## 2020-01-30 ENCOUNTER — HOME CARE VISIT (OUTPATIENT)
Dept: SCHEDULING | Facility: HOME HEALTH | Age: 85
End: 2020-01-30
Payer: MEDICARE

## 2020-01-30 VITALS
SYSTOLIC BLOOD PRESSURE: 118 MMHG | HEART RATE: 50 BPM | DIASTOLIC BLOOD PRESSURE: 60 MMHG | RESPIRATION RATE: 18 BRPM | OXYGEN SATURATION: 98 % | TEMPERATURE: 98 F

## 2020-01-30 PROCEDURE — 3331090002 HH PPS REVENUE DEBIT

## 2020-01-30 PROCEDURE — G0151 HHCP-SERV OF PT,EA 15 MIN: HCPCS

## 2020-01-30 PROCEDURE — 400013 HH SOC

## 2020-01-30 PROCEDURE — 3331090001 HH PPS REVENUE CREDIT

## 2020-01-31 PROCEDURE — 3331090001 HH PPS REVENUE CREDIT

## 2020-01-31 PROCEDURE — 3331090002 HH PPS REVENUE DEBIT

## 2020-02-01 PROCEDURE — 3331090001 HH PPS REVENUE CREDIT

## 2020-02-01 PROCEDURE — 3331090002 HH PPS REVENUE DEBIT

## 2020-02-02 PROCEDURE — 3331090001 HH PPS REVENUE CREDIT

## 2020-02-02 PROCEDURE — 3331090002 HH PPS REVENUE DEBIT

## 2020-02-03 ENCOUNTER — HOME CARE VISIT (OUTPATIENT)
Dept: SCHEDULING | Facility: HOME HEALTH | Age: 85
End: 2020-02-03
Payer: MEDICARE

## 2020-02-03 VITALS
RESPIRATION RATE: 16 BRPM | SYSTOLIC BLOOD PRESSURE: 151 MMHG | HEART RATE: 66 BPM | DIASTOLIC BLOOD PRESSURE: 86 MMHG | OXYGEN SATURATION: 98 %

## 2020-02-03 PROCEDURE — 3331090001 HH PPS REVENUE CREDIT

## 2020-02-03 PROCEDURE — G0157 HHC PT ASSISTANT EA 15: HCPCS

## 2020-02-03 PROCEDURE — 3331090002 HH PPS REVENUE DEBIT

## 2020-02-04 ENCOUNTER — TELEPHONE (OUTPATIENT)
Dept: SURGERY | Age: 85
End: 2020-02-04

## 2020-02-04 PROCEDURE — 3331090002 HH PPS REVENUE DEBIT

## 2020-02-04 PROCEDURE — 3331090001 HH PPS REVENUE CREDIT

## 2020-02-04 NOTE — TELEPHONE ENCOUNTER
Spoke with patient who states, he is schedule for surgery on Thursday with Dr. Kelli Snow. Informed patient he has an appointment to be seen in office on Thursday  @ 0910. Patient would like appointment in afternoon . Told patient Dr. Kelli Snow does not see patients in afternoon. Will call patient back regarding appointment.

## 2020-02-05 ENCOUNTER — HOME CARE VISIT (OUTPATIENT)
Dept: SCHEDULING | Facility: HOME HEALTH | Age: 85
End: 2020-02-05
Payer: MEDICARE

## 2020-02-05 VITALS
DIASTOLIC BLOOD PRESSURE: 89 MMHG | SYSTOLIC BLOOD PRESSURE: 157 MMHG | RESPIRATION RATE: 16 BRPM | HEART RATE: 52 BPM | OXYGEN SATURATION: 97 %

## 2020-02-05 PROCEDURE — G0157 HHC PT ASSISTANT EA 15: HCPCS

## 2020-02-05 PROCEDURE — 3331090001 HH PPS REVENUE CREDIT

## 2020-02-05 PROCEDURE — 3331090002 HH PPS REVENUE DEBIT

## 2020-02-06 ENCOUNTER — OFFICE VISIT (OUTPATIENT)
Dept: SURGERY | Age: 85
End: 2020-02-06

## 2020-02-06 VITALS
HEART RATE: 65 BPM | DIASTOLIC BLOOD PRESSURE: 90 MMHG | RESPIRATION RATE: 18 BRPM | SYSTOLIC BLOOD PRESSURE: 146 MMHG | BODY MASS INDEX: 34.28 KG/M2 | HEIGHT: 67 IN | TEMPERATURE: 97.8 F | WEIGHT: 218.4 LBS | OXYGEN SATURATION: 96 %

## 2020-02-06 DIAGNOSIS — K35.32 ACUTE APPENDICITIS WITH PERFORATION AND LOCALIZED PERITONITIS, WITHOUT ABSCESS OR GANGRENE: Primary | ICD-10-CM

## 2020-02-06 PROCEDURE — 3331090002 HH PPS REVENUE DEBIT

## 2020-02-06 PROCEDURE — 3331090001 HH PPS REVENUE CREDIT

## 2020-02-06 NOTE — PROGRESS NOTES
The patient is here for follow up of his hospitalization for acute phlegmenous appendicitis. He has been feeling well without  Abdominal pains. He is still on ABX that he is going to finish in the next few days. Visit Vitals  /90   Pulse 65   Temp 97.8 °F (36.6 °C) (Oral)   Resp 18   Ht 5' 7\" (1.702 m)   Wt 218 lb 6.4 oz (99.1 kg)   SpO2 96%   BMI 34.21 kg/m²     Gen- Alert in NAD  Lungs- CTA  H-RRR  Abd-S/nt/nd    A/p Phlegmenous appenidicits  Seems to be improving. Will finish current course of ABX  Will get repeat CT scan in about 3 weeks. F/u in the office with Dr. Nancy Spurling after that to discuss Lap Appendectomy.

## 2020-02-06 NOTE — PROGRESS NOTES
1. Have you been to the ER, urgent care clinic since your last visit? Hospitalized since your last visit? [facility] !/21/2020 for appendicitis     2. Have you seen or consulted any other health care providers outside of the [address] [place] [address] since your last visit? Include any pap smears or colon screening. No      Patient schedule for CT scan on 02/27/2020. @ 1:00 PM . Arrive @ 12:30 PM for appointment. Follow up appointment with Dr. Val Hinojosa 03/09/2020 @ 1:40 PM..

## 2020-02-07 PROCEDURE — 3331090001 HH PPS REVENUE CREDIT

## 2020-02-07 PROCEDURE — 3331090002 HH PPS REVENUE DEBIT

## 2020-02-08 PROCEDURE — 3331090001 HH PPS REVENUE CREDIT

## 2020-02-08 PROCEDURE — 3331090002 HH PPS REVENUE DEBIT

## 2020-02-09 PROCEDURE — 3331090002 HH PPS REVENUE DEBIT

## 2020-02-09 PROCEDURE — 3331090001 HH PPS REVENUE CREDIT

## 2020-02-10 PROCEDURE — 3331090002 HH PPS REVENUE DEBIT

## 2020-02-10 PROCEDURE — 3331090001 HH PPS REVENUE CREDIT

## 2020-02-11 ENCOUNTER — HOME CARE VISIT (OUTPATIENT)
Dept: SCHEDULING | Facility: HOME HEALTH | Age: 85
End: 2020-02-11
Payer: MEDICARE

## 2020-02-11 VITALS
TEMPERATURE: 97.8 F | OXYGEN SATURATION: 97 % | RESPIRATION RATE: 18 BRPM | HEART RATE: 55 BPM | SYSTOLIC BLOOD PRESSURE: 145 MMHG | DIASTOLIC BLOOD PRESSURE: 80 MMHG

## 2020-02-11 PROCEDURE — G0151 HHCP-SERV OF PT,EA 15 MIN: HCPCS

## 2020-02-11 PROCEDURE — 3331090001 HH PPS REVENUE CREDIT

## 2020-02-11 PROCEDURE — 3331090002 HH PPS REVENUE DEBIT

## 2020-02-16 NOTE — DISCHARGE SUMMARY
Physician Discharge Summary     Patient ID:  Merary Garcia  [id number]  65 y.o.  7/11/1930    Allergies: Hydrocodone; Amlodipine; and Poison ivy extract    Admit Date: 1/21/2020    Discharge Date 1/28/2020    * Admission Diagnoses: Acute phlegmonous appendicitis [K35.890]; Acute phlegmonous appendicitis [K35.890]    * Discharge Diagnoses:    Hospital Problems as of 1/28/2020 Date Reviewed: 1/21/2020          Codes Class Noted - Resolved POA    Acute phlegmonous appendicitis ICD-10-CM: K35.890  ICD-9-CM: 540.9  1/21/2020 - Present Unknown               Admission Condition: Fair    * Discharge Condition: improved    * Procedures: * No surgery found [facility] Course: The patient presented to the hospital complaining of abdominal pain. Work-up revealed acute phlegmonous appendicitis. He was admitted to the hospital for IV antibiotics. Patient has a history of hypertension hypothyroidism chronic A. fib on Eliquis. He also had a questionable history of CLL. Consultations were made to cardiology internal medicine and to heme-onc because of this. While patient was in the hospital.  Additionally the patient was noted to be anemic and had been recently in the hospital for a GI bleed. Did require transfusion while he was here. He was placed on IV antibiotics a few days after admission repeat CT scan was performed that did not show any further abscess. Slowly got better. And was eventually discharged home after he was started on a diet. He was seen by heme-onc for possible CLL but he was not interested in treatment.     Consults: Cardiology, Hematology/Oncology and Hospitalist    Significant Diagnostic Studies: radiology: CT scan: Acute phlegmonous appendicitis without abscess x2    * Disposition: Home    Discharge Medications:   Discharge Medication List as of 1/28/2020  4:50 PM      START taking these medications    Details   traMADol (ULTRAM) 50 mg tablet Take 1 Tab by mouth every six (6) hours as needed for Pain for up to 7 days. Max Daily Amount: 200 mg., Print, Disp-20 Tab, R-0      levoFLOXacin (LEVAQUIN) 500 mg tablet Take 1 Tab by mouth daily for 10 days. , Print, Disp-10 Tab, R-0      metroNIDAZOLE (FLAGYL) 500 mg tablet Take 1 Tab by mouth three (3) times daily for 10 days. , Print, Disp-30 Tab, R-0         CONTINUE these medications which have NOT CHANGED    Details   losartan (COZAAR) 50 mg tablet Take 0.5 Tabs by mouth daily. , Normal, Disp-30 Tab, R-5      apixaban (ELIQUIS) 2.5 mg tablet Take 1 Tab by mouth two (2) times a day., Normal, Disp-60 Tab, R-5      atorvastatin (LIPITOR) 20 mg tablet Take 1 Tab by mouth daily. For cholesterol, Normal, Disp-30 Tab, R-5      albuterol (PROVENTIL HFA, VENTOLIN HFA, PROAIR HFA) 90 mcg/actuation inhaler INHALE 1-2 PUFFS EVERY 6 HOURS IF NEEDED, Normal**Patient requests 90 days supply**Disp-25.5 g, R-1      aspirin 81 mg chewable tablet Take 1 Tab by mouth daily. , Normal, Disp-30 Tab, R-1      pantoprazole (PROTONIX) 40 mg tablet Take 1 Tab by mouth every twelve (12) hours. , Print, Disp-60 Tab, R-0      traZODone (DESYREL) 50 mg tablet Take 1 Tab by mouth nightly. For sleep, Normal, Disp-30 Tab, R-5      levothyroxine (SYNTHROID) 50 mcg tablet Take 50 mcg by mouth Daily (before breakfast). , Historical Med      ketoconazole (NIZORAL) 2 % topical cream Apply 1 Each to affected area daily. apply to nose/face, Historical Med      tretinoin (RETIN-A) 0.025 % topical cream Apply 1 Each to affected area nightly. apply to face, Historical Med      triamcinolone acetonide (KENALOG) 0.1 % ointment Apply 1 Container to affected area two (2) times a day. use thin layer, apply to face, Historical Med      zinc (CHELATED ZINC) 50 mg tab tablet Take 50 mg by mouth daily. , Historical Med      lutein 20 mg cap Take 1 Cap by mouth daily. , Historical Med      coenzyme Q-10 (CO Q-10) 30 mg capsule Take 30 mg by mouth daily. , Historical Med      lecithin/soybean oil (SOYA LECITHIN PO) Take 1 Tab by mouth daily. , Historical Med      multivit-min/FA/lycopen/lutein (SENTRY SENIOR PO) Take 1 Tab by mouth daily. , Historical Med      vitamin E (AQUA GEMS) 400 unit capsule Take 400 Units by mouth daily. Reports taking 1-2 times a week., Historical Med      ascorbic acid (VITAMIN C) 500 mg tablet Take 500 mg by mouth daily. , Historical Med      SAW PALMETTO PO Take 1 Tab by mouth daily. , Historical Med      CALCIUM/MAGNESIUM (DOLOMITE PO) Take 1 Tab by mouth daily. , Historical Med         STOP taking these medications       metroNIDAZOLE (METROGEL) 1 % topical gel Comments:   Reason for Stopping:               * Follow-up Care/Patient Instructions:   Activity: Activity as tolerated  Diet: Resume previous diet  Wound Care: None neede    Signed:  More Laboy MD  2/15/2020  8:25 PM .

## 2020-02-27 ENCOUNTER — HOSPITAL ENCOUNTER (OUTPATIENT)
Dept: CT IMAGING | Age: 85
Discharge: HOME | End: 2020-02-27
Attending: SURGERY
Payer: MEDICARE

## 2020-02-27 DIAGNOSIS — K35.32 ACUTE APPENDICITIS WITH PERFORATION AND LOCALIZED PERITONITIS, WITHOUT ABSCESS OR GANGRENE: ICD-10-CM

## 2020-02-27 PROCEDURE — 74177 CT ABD & PELVIS W/CONTRAST: CPT

## 2020-02-27 PROCEDURE — 74176 CT ABD & PELVIS W/O CONTRAST: CPT

## 2020-02-27 RX ORDER — SODIUM CHLORIDE 0.9 % (FLUSH) 0.9 %
10 SYRINGE (ML) INJECTION
Status: DISCONTINUED | OUTPATIENT
Start: 2020-02-27 | End: 2020-02-27 | Stop reason: CLARIF

## 2020-03-06 ENCOUNTER — TELEPHONE (OUTPATIENT)
Dept: FAMILY MEDICINE CLINIC | Age: 85
End: 2020-03-06

## 2020-03-06 NOTE — TELEPHONE ENCOUNTER
[phone] spoke to patient to find out medication he wants GRAZYNA Garcia to continue writing per patient he wants eliquis I advised that GRAZYNA Wahl is writing the prescription for him already but patient just want to make sure he will continue writing it.  I advised will send message to GRAZYNA Wahl

## 2020-03-06 NOTE — TELEPHONE ENCOUNTER
----- Message from Manuela Kim sent at 3/6/2020  3:05 PM EST -----  Regarding: GRAZYNA Garcia/telephone  Contact: [phone]  Caller's first and last name: n/a  Reason for call: Pt would like talk to GRAZYNA Garcia. Callback required yes/no and why: Yes  Best contact number(s): [phone]  Details to clarify the request: Pt wanted to know if GRAZYNA Kinney will continue write his scripts or if his specialist has to write them.

## 2020-03-09 ENCOUNTER — OFFICE VISIT (OUTPATIENT)
Dept: SURGERY | Age: 85
End: 2020-03-09

## 2020-03-09 VITALS
DIASTOLIC BLOOD PRESSURE: 51 MMHG | HEART RATE: 68 BPM | TEMPERATURE: 98 F | BODY MASS INDEX: 34.45 KG/M2 | RESPIRATION RATE: 16 BRPM | HEIGHT: 67 IN | SYSTOLIC BLOOD PRESSURE: 125 MMHG | OXYGEN SATURATION: 96 % | WEIGHT: 219.5 LBS

## 2020-03-09 DIAGNOSIS — K35.890 ACUTE PHLEGMONOUS APPENDICITIS: Primary | ICD-10-CM

## 2020-03-09 NOTE — TELEPHONE ENCOUNTER
[phone] spoke to patient notified NP Rosa Jones will prescribe his Eliquis and prescription was sent to pharmacy patient understand

## 2020-03-09 NOTE — PROGRESS NOTES
Tucker Finn is a 80 y.o. male who returns for follow up of acute phlegmonous appendicitis. Mr. Qing Philip was admitted on 2020 with phlegmonous appendicitis. He was treated non-operatively and was discharged on 2020. Last seen on 2020. Doing well since then. No complaints today. Mr. Qing Philip reports no abdominal pain. He denies fevers, chills, nausea or vomitting. He has otherwise been in his usual state of health. CT scan abdomen/pelvis with po contrast - 2020 - Marked improvement in the periappendiceal inflammatory change. Past Medical History:   Diagnosis Date    Arthritis     Contact dermatitis and other eczema, due to unspecified cause     Hypertension     Stroke ([facility] [address]     TIA at [facility] Thyroid disease     Unspecified hypothyroidism        Past Surgical History:   Procedure Laterality Date    HX HEENT      detached retina    HX HIP REPLACEMENT      right hip, [facility]       Family History   Problem Relation Age of Onset    Lung Disease Father     Cancer Father         lung    Coronary Artery Disease Neg Hx        Social History     Socioeconomic History    Marital status:      Spouse name: Not on file    Number of children: Not on file    Years of education: Not on file    Highest education level: Not on file   Tobacco Use    Smoking status: Light Tobacco Smoker     Packs/day: 0.25     Types: Cigars     Last attempt to quit: 1978     Years since quittin.4    Smokeless tobacco: Never Used    Tobacco comment: cigar on occ   Substance and Sexual Activity    Alcohol use: Yes     Alcohol/week: 14.0 standard drinks     Types: 14 Shots of liquor per week     Comment: 2 drinks a day.     Drug use: No    Sexual activity: Yes     Partners: Female   Other Topics Concern     Service Yes    Blood Transfusions No    Caffeine Concern No    Occupational Exposure No    Hobby Hazards No    Stress Concern No    Weight Concern Yes  Special Diet No    Back Care No    Exercise Yes    Bike Helmet No    Seat Belt Yes    Self-Exams Yes       Review of systems negative except as noted. Review of Systems   Constitutional: Negative for chills and fever. Gastrointestinal: Negative for abdominal pain, nausea and vomiting. Physical Exam  Vitals signs reviewed. Constitutional:       General: He is not in acute distress. Appearance: Normal appearance. HENT:      Head: Normocephalic and atraumatic. Eyes:      General: No scleral icterus. Neck:      Musculoskeletal: Neck supple. Cardiovascular:      Rate and Rhythm: Normal rate and regular rhythm. Pulmonary:      Effort: Pulmonary effort is normal.      Breath sounds: Normal breath sounds. Abdominal:      General: There is no distension. Palpations: Abdomen is soft. Tenderness: There is no abdominal tenderness. There is no guarding or rebound. Musculoskeletal: Normal range of motion. Lymphadenopathy:      Cervical: No cervical adenopathy. Neurological:      General: No focal deficit present. Mental Status: He is alert. ASSESSMENT and PLAN  Reviewed CT scan. Discussed CT scan findings with Mr. Corazon Vigil today and reassured him that he is doing well thus far. At this point in time, do not believe that there is an indication for further abx therapy or repeat CT scan. Discussed laparoscopic interval appendectomy with Mr. Corazon Vigil today including risks of bleeding, infection, conversion to open procedure. At this point in time, he does not wish to proceed with surgery unless absolutely necessary. Follow up with Mr. Garcia as scheduled. Will see as needed.       CC: Leonor Maldonado NP

## 2020-03-09 NOTE — PROGRESS NOTES
1. Have you been to the ER, urgent care clinic since your last visit? Hospitalized since your last visit? No    2. Have you seen or consulted any other health care providers outside of the [address] [place] [address] since your last visit? Include any pap smears or colon screening.  No

## 2020-03-30 ENCOUNTER — TELEPHONE (OUTPATIENT)
Dept: CARDIOLOGY CLINIC | Age: 85
End: 2020-03-30

## 2020-04-02 NOTE — TELEPHONE ENCOUNTER
----- Message from Karli Jacobsen sent at 4/2/2020  9:15 AM EDT -----  Regarding: Dr. Kishore Morillo Refill  Contact: [phone] (if not patient): Pt  Relationship of caller (if not patient): n/a  Best contact number(s): [phone]  Name of medication and dosage if known: Eliquis 3 month supply  Is patient out of this medication (yes/no): n  Pharmacy name: [facility] [address] listed in chart? (yes/no): y  Pharmacy phone number: n/a  Date of last visit: 1/21/20  Details to clarify the request: n/a

## 2020-05-21 RX ORDER — TRAZODONE HYDROCHLORIDE 50 MG/1
TABLET ORAL
Qty: 30 TAB | Refills: 5 | Status: SHIPPED | OUTPATIENT
Start: 2020-05-21 | End: 2021-04-15 | Stop reason: SDUPTHER

## 2020-08-11 ENCOUNTER — TELEPHONE (OUTPATIENT)
Dept: FAMILY MEDICINE CLINIC | Age: 85
End: 2020-08-11

## 2020-08-11 NOTE — TELEPHONE ENCOUNTER
Inbound call from patient through [employer] as a level one call. Patients name and  verified. Patient stated that his BP has been running high. His reading today was 181/80. Patient denies any chest pain, headache, blurred vision, N/V. Scheduled patient for follow up visit. Patient states he is taking his BP medication daily.      Please advise

## 2020-08-12 NOTE — TELEPHONE ENCOUNTER
Patient can taking the full tablet of Losartan 50 mg. Follow up needed - can we get a sooner appointment then is scheduled? Continue home monitoring and call for reading >140/90. Go to ER for new or worsening symptoms.

## 2020-08-20 ENCOUNTER — OFFICE VISIT (OUTPATIENT)
Dept: FAMILY MEDICINE CLINIC | Age: 85
End: 2020-08-20
Payer: MEDICARE

## 2020-08-20 VITALS
BODY MASS INDEX: 34.97 KG/M2 | HEIGHT: 67 IN | DIASTOLIC BLOOD PRESSURE: 45 MMHG | RESPIRATION RATE: 16 BRPM | HEART RATE: 58 BPM | SYSTOLIC BLOOD PRESSURE: 128 MMHG | WEIGHT: 222.8 LBS | TEMPERATURE: 97.5 F | OXYGEN SATURATION: 98 %

## 2020-08-20 DIAGNOSIS — D50.0 IRON DEFICIENCY ANEMIA DUE TO CHRONIC BLOOD LOSS: ICD-10-CM

## 2020-08-20 DIAGNOSIS — Z23 NEED FOR PROPHYLACTIC VACCINATION AGAINST STREPTOCOCCUS PNEUMONIAE (PNEUMOCOCCUS) AND INFLUENZA: ICD-10-CM

## 2020-08-20 DIAGNOSIS — Z23 ENCOUNTER FOR IMMUNIZATION: ICD-10-CM

## 2020-08-20 DIAGNOSIS — Z71.89 ACP (ADVANCE CARE PLANNING): ICD-10-CM

## 2020-08-20 DIAGNOSIS — I48.0 PAROXYSMAL ATRIAL FIBRILLATION (HCC): ICD-10-CM

## 2020-08-20 DIAGNOSIS — I10 ESSENTIAL HYPERTENSION: Primary | ICD-10-CM

## 2020-08-20 DIAGNOSIS — I73.9 PAD (PERIPHERAL ARTERY DISEASE) (HCC): ICD-10-CM

## 2020-08-20 DIAGNOSIS — E03.9 ACQUIRED HYPOTHYROIDISM: ICD-10-CM

## 2020-08-20 DIAGNOSIS — Z00.00 MEDICARE ANNUAL WELLNESS VISIT, SUBSEQUENT: ICD-10-CM

## 2020-08-20 PROBLEM — K35.890 ACUTE PHLEGMONOUS APPENDICITIS: Status: RESOLVED | Noted: 2020-01-21 | Resolved: 2020-08-20

## 2020-08-20 PROCEDURE — G8432 DEP SCR NOT DOC, RNG: HCPCS | Performed by: NURSE PRACTITIONER

## 2020-08-20 PROCEDURE — G8536 NO DOC ELDER MAL SCRN: HCPCS | Performed by: NURSE PRACTITIONER

## 2020-08-20 PROCEDURE — 99214 OFFICE O/P EST MOD 30 MIN: CPT | Performed by: NURSE PRACTITIONER

## 2020-08-20 PROCEDURE — G0439 PPPS, SUBSEQ VISIT: HCPCS | Performed by: NURSE PRACTITIONER

## 2020-08-20 PROCEDURE — 1101F PT FALLS ASSESS-DOCD LE1/YR: CPT | Performed by: NURSE PRACTITIONER

## 2020-08-20 PROCEDURE — G8427 DOCREV CUR MEDS BY ELIG CLIN: HCPCS | Performed by: NURSE PRACTITIONER

## 2020-08-20 PROCEDURE — G8417 CALC BMI ABV UP PARAM F/U: HCPCS | Performed by: NURSE PRACTITIONER

## 2020-08-20 NOTE — PROGRESS NOTES
[facility] Note    Concha Lloyd is a 80 y.o. male who was seen in clinic today (8/20/2020). Subjective:  Cardiovascular Review:  The patient has hypertension, hyperlipidemia and Atrial Fibrillation. Patient CVA in 10/2019. Seen by cardiology, Dr. Charles Fuller and anticoagulated on Eliquis. Diet and Lifestyle: generally follows a low fat low cholesterol diet, generally follows a low sodium diet, sedentary, nonsmoker  Home BP Monitoring: well controlled at 140/70s  Pertinent ROS: taking medications as instructed, no medication side effects noted, no TIA's, no chest pain on exertion, no dyspnea on exertion, no swelling of ankles. Patient seen for follow up for anemia and pulmonary edema. Patient presented to [facility] ED on 11/26/19 for syncope. Patient was found to be anemic with hemeoccult + stool and found to be admitted for further evaluation. Patient received 2 units PRBC at the time of admission. EGD on 12/2 showed mild esophagitis, 3 cm hiatal hernia, seven clean based gastric ulcers, 3 mm non-bleeding duodenal AVM. Patient placed on PPI BID and Eliquis decreased to 2.5 mg BID. Hemoglobin stable at 8.2 on discharge. Taking iron supplement once daily.      Patient also found to be bradycardic with HR in 30s, with Afib-flutter. Echocardiogram showed severe concentric hypertrophy of left ventricle. Patient seen by cardiology, Dr. Charles Fuller. Patient has follow up with cardiology on 1/13/20. Prior to Admission medications    Medication Sig Start Date End Date Taking? Authorizing Provider   Eliquis 2.5 mg tablet TAKE 1 TABLET BY MOUTH TWICE DAILY 8/7/20  Yes Kristy Garcia NP   levothyroxine (SYNTHROID) 50 mcg tablet Take 1 Tab by mouth Daily (before breakfast). 6/10/20  Yes Jose Shaw NP   traZODone (DESYREL) 50 mg tablet TAKE 1 TABLET BY MOUTH EVERY NIGHT FOR SLEEP 5/21/20  Yes Kristy Garcia NP   losartan (COZAAR) 50 mg tablet Take 0.5 Tabs by mouth daily.  1/9/20 Yes Juan Luis Roach, NP   atorvastatin (LIPITOR) 20 mg tablet Take 1 Tab by mouth daily. For cholesterol 12/5/19   Rubens Garcia, NP   albuterol (PROVENTIL HFA, VENTOLIN HFA, PROAIR HFA) 90 mcg/actuation inhaler INHALE 1-2 PUFFS EVERY 6 HOURS IF NEEDED  Patient taking differently: Take 2 Puffs by inhalation every four (4) hours as needed. 12/5/19   Juan Luis Roach, NP   aspirin 81 mg chewable tablet Take 1 Tab by mouth daily. 12/5/19   Anni Macias MD   pantoprazole (PROTONIX) 40 mg tablet Take 1 Tab by mouth every twelve (12) hours. Patient taking differently: Take 40 mg by mouth daily. 12/4/19   Anni Macias MD   ketoconazole (NIZORAL) 2 % topical cream Apply 1 Each to affected area daily. apply to nose/face    Provider, Historical   tretinoin (RETIN-A) 0.025 % topical cream Apply 1 Each to affected area nightly. apply to face    Provider, Historical   triamcinolone acetonide (KENALOG) 0.1 % ointment Apply 1 Container to affected area two (2) times a day. use thin layer, apply to face    Provider, Historical   zinc (CHELATED ZINC) 50 mg tab tablet Take 50 mg by mouth daily. Provider, Historical   lutein 20 mg cap Take 1 Cap by mouth daily. Provider, Historical   coenzyme Q-10 (CO Q-10) 30 mg capsule Take 30 mg by mouth daily. Provider, Historical   lecithin/soybean oil (SOYA LECITHIN PO) Take 1 Tab by mouth daily. Provider, Historical   multivit-min/FA/lycopen/lutein (SENTRY SENIOR PO) Take 1 Tab by mouth daily. Provider, Historical   vitamin E (AQUA GEMS) 400 unit capsule Take 400 Units by mouth daily. Reports taking 1-2 times a week. Provider, Historical   ascorbic acid (VITAMIN C) 500 mg tablet Take 500 mg by mouth daily. Provider, Historical   SAW PALMETTO PO Take 1 Tab by mouth daily. Provider, Historical   CALCIUM/MAGNESIUM (DOLOMITE PO) Take 1 Tab by mouth daily.       Provider, Historical          Allergies   Allergen Reactions    Hydrocodone Other (comments) Unable to void, or have a bowel movement    Amlodipine Swelling and Other (comments)     Severe weeping from leg swelling    Poison Ivy Extract Itching           ROS  See HPI    Objective:   Physical Exam  Vitals signs and nursing note reviewed. Constitutional:       Appearance: He is well-developed. Neck:      Musculoskeletal: Normal range of motion and neck supple. Thyroid: No thyromegaly. Vascular: No carotid bruit or JVD. Cardiovascular:      Rate and Rhythm: Normal rate and regular rhythm. Pulses: Decreased pulses (feet). Heart sounds: No murmur. No friction rub. No gallop. Pulmonary:      Effort: Pulmonary effort is normal. No respiratory distress. Breath sounds: Normal breath sounds. Lymphadenopathy:      Cervical: No cervical adenopathy. Neurological:      Mental Status: He is alert and oriented to person, place, and time. Psychiatric:         Behavior: Behavior normal.         Visit Vitals  /45 (BP 1 Location: Left arm, BP Patient Position: Sitting)   Pulse (!) 58   Temp 97.5 °F (36.4 °C) (Oral)   Resp 16   Ht 5' 7\" (1.702 m)   Wt 222 lb 12.8 oz (101.1 kg)   SpO2 98%   BMI 34.90 kg/m²       Assessment & Plan:  Diagnoses and all orders for this visit:    1. Essential hypertension  Well controlled, no medication changes. Continue home monitoring and call for reading >140/90    2. Paroxysmal atrial fibrillation ([address]  Managed by cardiology, no changes. 3. Acquired hypothyroidism  TSH within therapeutic range, no dosage change. 4. Iron deficiency anemia due to chronic blood loss  Labs refused by patient. 5. PAD (peripheral artery disease) ([address]  -     Check ANKLE BRACHIAL INDEX; Future    6. Need for prophylactic vaccination against Streptococcus pneumoniae (pneumococcus) and influenza  -     PNEUMOCOCCAL POLYSACCHARIDE VACCINE, 23-VALENT, ADULT OR IMMUNOSUPPRESSED PT DOSE,  -     ADMIN PNEUMOCOCCAL VACCINE    7.  Encounter for immunization  - PNEUMOCOCCAL POLYSACCHARIDE VACCINE, 23-VALENT, ADULT OR IMMUNOSUPPRESSED PT DOSE,  -     ADMIN PNEUMOCOCCAL VACCINE    8. Medicare annual wellness visit, subsequent    9. ACP (advance care planning)  Patient has a living will, recommended adding a copy to medical record      I have discussed the diagnosis with the patient and the intended plan as seen in the above orders. The patient has received an after-visit summary along with patient information handout. I have discussed medication side effects and warnings with the patient as well. Follow-up and Dispositions    · Return in about 6 months (around 2/20/2021) for disease management.            Nan Zhang NP

## 2020-08-20 NOTE — PROGRESS NOTES
Chief Complaint   Patient presents with    Complete Physical    Hypertension     1. Have you been to the ER, urgent care clinic since your last visit? Hospitalized since your last visit? No    2. Have you seen or consulted any other health care providers outside of the [address] [place] [address] since your last visit? Include any pap smears or colon screening.  No         Patient presents in office for CPE

## 2020-08-21 PROCEDURE — 90732 PPSV23 VACC 2 YRS+ SUBQ/IM: CPT | Performed by: NURSE PRACTITIONER

## 2020-08-21 PROCEDURE — G0009 ADMIN PNEUMOCOCCAL VACCINE: HCPCS | Performed by: NURSE PRACTITIONER

## 2020-08-25 NOTE — PROGRESS NOTES
This is the Subsequent Medicare Annual Wellness Exam, performed 12 months or more after the Initial AWV or the last Subsequent AWV    I have reviewed the patient's medical history in detail and updated the computerized patient record. History     Patient Active Problem List   Diagnosis Code    Essential hypertension I10    Hypothyroidism E03.9    Paroxysmal atrial fibrillation (HCC) I48.0    Encounter for monitoring Coumadin therapy Z51.81, Z79.01    Right knee DJD M17.11    ACP (advance care planning) Z71.89    Peripheral edema R60.9    Severe obesity (BMI 35.0-39. 9) with comorbidity ([address] E66.01    Stroke ([facility] [address] I63.9     Past Medical History:   Diagnosis Date    Acute phlegmonous appendicitis 1/21/2020    Arthritis     Contact dermatitis and other eczema, due to unspecified cause     Hypertension     Stroke ([facility] [address] 1997    TIA at [facility] Thyroid disease     Unspecified hypothyroidism       Past Surgical History:   Procedure Laterality Date    HX HEENT      detached retina    HX HIP REPLACEMENT  2008    [facility] hip[facility]     Current Outpatient Medications   Medication Sig Dispense Refill    Eliquis 2.5 mg tablet TAKE 1 TABLET BY MOUTH TWICE DAILY 60 Tab 5    levothyroxine (SYNTHROID) 50 mcg tablet Take 1 Tab by mouth Daily (before breakfast). 90 Tab 1    traZODone (DESYREL) 50 mg tablet TAKE 1 TABLET BY MOUTH EVERY NIGHT FOR SLEEP 30 Tab 5    losartan (COZAAR) 50 mg tablet Take 0.5 Tabs by mouth daily. 30 Tab 5    atorvastatin (LIPITOR) 20 mg tablet Take 1 Tab by mouth daily. For cholesterol 30 Tab 5    albuterol (PROVENTIL HFA, VENTOLIN HFA, PROAIR HFA) 90 mcg/actuation inhaler INHALE 1-2 PUFFS EVERY 6 HOURS IF NEEDED (Patient taking differently: Take 2 Puffs by inhalation every four (4) hours as needed.) 25.5 g 1    aspirin 81 mg chewable tablet Take 1 Tab by mouth daily. 30 Tab 1    pantoprazole (PROTONIX) 40 mg tablet Take 1 Tab by mouth every twelve (12) hours.  (Patient taking differently: Take 40 mg by mouth daily.) 60 Tab 0    ketoconazole (NIZORAL) 2 % topical cream Apply 1 Each to affected area daily. apply to nose/face      tretinoin (RETIN-A) 0.025 % topical cream Apply 1 Each to affected area nightly. apply to face      triamcinolone acetonide (KENALOG) 0.1 % ointment Apply 1 Container to affected area two (2) times a day. use thin layer, apply to face      zinc (CHELATED ZINC) 50 mg tab tablet Take 50 mg by mouth daily.  lutein 20 mg cap Take 1 Cap by mouth daily.  coenzyme Q-10 (CO Q-10) 30 mg capsule Take 30 mg by mouth daily.  lecithin/soybean oil (SOYA LECITHIN PO) Take 1 Tab by mouth daily.  multivit-min/FA/lycopen/lutein (SENTRY SENIOR PO) Take 1 Tab by mouth daily.  vitamin E (AQUA GEMS) 400 unit capsule Take 400 Units by mouth daily. Reports taking 1-2 times a week.  ascorbic acid (VITAMIN C) 500 mg tablet Take 500 mg by mouth daily.  SAW PALMETTO PO Take 1 Tab by mouth daily.  CALCIUM/MAGNESIUM (DOLOMITE PO) Take 1 Tab by mouth daily. Allergies   Allergen Reactions    Hydrocodone Other (comments)     Unable to void, or have a bowel movement    Amlodipine Swelling and Other (comments)     Severe weeping from leg swelling    Poison Ivy Extract Itching       Family History   Problem Relation Age of Onset    Lung Disease Father     Cancer Father         lung    Coronary Artery Disease Neg Hx      Social History     Tobacco Use    Smoking status: Former Smoker     Packs/day: 0.25     Types: Cigars     Last attempt to quit: 1978     Years since quittin.9    Smokeless tobacco: Never Used    Tobacco comment: cigar on occ   Substance Use Topics    Alcohol use: Yes     Alcohol/week: 14.0 standard drinks     Types: 14 Shots of liquor per week     Comment: 2 drinks a day.        Depression Risk Factor Screening:     3 most recent PHQ Screens 3/9/2020   PHQ Not Done -   Little interest or pleasure in doing things Not at all   Feeling down, depressed, irritable, or hopeless Not at all   Total Score PHQ 2 0       Alcohol Risk Factor Screening (MALE > 65): Do you average more 1 drink per night or more than 7 drinks a week: No    In the past three months have you have had more than 4 drinks containing alcohol on one occasion: No      Functional Ability and Level of Safety:   Hearing: Hearing is good. Activities of Daily Living: The home contains: handrails  Patient needs help with:  transportation and shopping     Ambulation: with difficulty, uses a walker     Fall Risk:  Fall Risk Assessment, last 12 mths 3/9/2020   Able to walk? Yes   Fall in past 12 months? No   Fall with injury? -   Number of falls in past 12 months -   Fall Risk Score -     Abuse Screen:  Patient is not abused       Cognitive Screening   Has your family/caregiver stated any concerns about your memory: no     Patient Care Team   Patient Care Team:  Sonia Hutchison NP as PCP - General (Nurse Practitioner)  Sonia Hutchison NP as PCP - [facility] Empaneled Provider    Assessment/Plan   Education and counseling provided:  Are appropriate based on today's review and evaluation    Diagnoses and all orders for this visit:    1. Essential hypertension    2. Paroxysmal atrial fibrillation (HCC)    3. Acquired hypothyroidism    4. Iron deficiency anemia due to chronic blood loss    5. PAD (peripheral artery disease) (HCC)  -     ANKLE BRACHIAL INDEX; Future    6. Need for prophylactic vaccination against Streptococcus pneumoniae (pneumococcus) and influenza  -     PNEUMOCOCCAL POLYSACCHARIDE VACCINE, 23-VALENT, ADULT OR IMMUNOSUPPRESSED PT DOSE,  -     ADMIN PNEUMOCOCCAL VACCINE    7. Encounter for immunization  -     PNEUMOCOCCAL POLYSACCHARIDE VACCINE, 23-VALENT, ADULT OR IMMUNOSUPPRESSED PT DOSE,  -     ADMIN PNEUMOCOCCAL VACCINE    8. Medicare annual wellness visit, subsequent    9.  ACP (advance care planning)        Health Maintenance Due   Topic Date Due    Shingrix Vaccine Age 50> (1 of 2) 07/11/1980    GLAUCOMA SCREENING Q2Y  08/21/2019    Medicare Yearly Exam  04/19/2020

## 2020-08-31 ENCOUNTER — HOSPITAL ENCOUNTER (OUTPATIENT)
Dept: VASCULAR SURGERY | Age: 85
Discharge: HOME | End: 2020-08-31
Payer: MEDICARE

## 2020-08-31 DIAGNOSIS — I73.9 PAD (PERIPHERAL ARTERY DISEASE) (HCC): ICD-10-CM

## 2020-08-31 PROCEDURE — 93922 UPR/L XTREMITY ART 2 LEVELS: CPT

## 2020-09-02 LAB
LEFT ABI: 1.24
LEFT ANTERIOR TIBIAL: 182 MMHG
LEFT ARM BP: 135 MMHG
LEFT POSTERIOR TIBIAL: 171 MMHG
LEFT TBI: 0.56
LEFT TOE PRESSURE: 83 MMHG
RIGHT ABI: 0.99
RIGHT ANTERIOR TIBIAL: 139 MMHG
RIGHT ARM BP: 147 MMHG
RIGHT POSTERIOR TIBIAL: 145 MMHG
RIGHT TBI: 0.82
RIGHT TOE PRESSURE: 120 MMHG

## 2020-09-22 DIAGNOSIS — R68.89 ABNORMAL ANKLE BRACHIAL INDEX (ABI): Primary | ICD-10-CM

## 2020-09-23 NOTE — PROGRESS NOTES
Patient's RAÚL studt showed no evidence of hemodynamically significant arterial obstruction in right or left leg. There is evidence of possible arterial obstruction/small vessel disease at left foot or digit level - I recommend patient see a vascular specialist. Referral placed.

## 2020-09-25 NOTE — PROGRESS NOTES
Call placed to patient. No answer left message for patient to return call regarding recent test results.

## 2020-09-28 NOTE — PROGRESS NOTES
Call placed to patient. Name and  verified. Reviewed recent RAÚL results with patient. He expressed understanding. Referral is for provider in [place], is there one closer?

## 2020-10-16 NOTE — PROGRESS NOTES
Returned call to patient.  No answer left message I was returning his call regarding recent referral. Left detailed information for provider on name confirmed voicemail

## 2020-10-31 ENCOUNTER — TRANSCRIBE ORDER (OUTPATIENT)
Dept: INTERNAL MEDICINE CLINIC | Age: 85
End: 2020-10-31

## 2020-11-03 ENCOUNTER — TELEPHONE (OUTPATIENT)
Dept: FAMILY MEDICINE CLINIC | Age: 85
End: 2020-11-03

## 2020-11-03 NOTE — TELEPHONE ENCOUNTER
----- Message from [place] sent at 11/2/2020  3:32 PM EST -----  Regarding: GRAZYNA Garcia/Telephone  Patient return call    Caller's first and last name and relationship (if not the patient): n/a      Best contact number(s):[phone]      Whose call is being returned: Wendy      Details to clarify the request: [facility]

## 2020-11-23 ENCOUNTER — TELEPHONE (OUTPATIENT)
Dept: FAMILY MEDICINE CLINIC | Age: 85
End: 2020-11-23

## 2020-11-23 NOTE — TELEPHONE ENCOUNTER
GRAZYNA Garcia,    Patient called (actually did not leave his name or ) requesting an antibiotic for his appendix which is acting up?  (found him thru phone number)   He wants it sent to [facility]. He called on 20 at 9:38 am.  Please advise. His number: [phone].   Thanks, Michelle Hernandez

## 2020-11-25 ENCOUNTER — TELEPHONE (OUTPATIENT)
Dept: FAMILY MEDICINE CLINIC | Age: 85
End: 2020-11-25

## 2020-11-25 DIAGNOSIS — K35.890 ACUTE PHLEGMONOUS APPENDICITIS: Primary | ICD-10-CM

## 2020-11-25 NOTE — TELEPHONE ENCOUNTER
Returned call to patient. Name and  verified. Patient requesting abt to be called to pharmacy. Advised PCP is not in the office. He requested for Dr Mati Grigsby to send it in advised Dr Mati Grigsby is not here today either. States he wants to have one on hand in case his appendix flares up. Advised I could set him up with an appt with another provider, however I could not guarantee without sx they could prescribe one. He stated that he feels they should give him one if he requests it. Advised I would send a message to his PCP even though he is out of the office. he was appreciative of call

## 2020-11-25 NOTE — TELEPHONE ENCOUNTER
----- Message from Gareth Ambriz sent at 11/25/2020  9:38 AM EST -----  Regarding: MACRINA Garcia/ telephone  Level 1/Escalated Issue      Caller's first and last name and relationship (if not the patient): Alonzo Zhang nurse from [facility] [employer] number(s): [phone]       What are the symptoms: right abdominal pain (requesting an antibiotic to be called into his pharmacy)      Transfer successful - yes/no (include outcome): no       Transfer declined - yes/no (include reason): no       Was caller advised to seek appropriate level of care - yes/no: yes       Details to clarify the request: Caller states she was disconnected from the nurse.          Gareth Ambriz

## 2020-11-25 NOTE — TELEPHONE ENCOUNTER
GRAZYNA Quijano,    Patient call again requesting antibiotic pertaining to his appendix sent to [facility]. [phone]  Please advise.     Thanks, Damien Jc

## 2020-12-01 RX ORDER — LEVOFLOXACIN 500 MG/1
500 TABLET, FILM COATED ORAL DAILY
Qty: 7 TAB | Refills: 0 | Status: SHIPPED | OUTPATIENT
Start: 2020-12-01 | End: 2020-12-08

## 2020-12-01 RX ORDER — METRONIDAZOLE 500 MG/1
500 TABLET ORAL 3 TIMES DAILY
Qty: 21 TAB | Refills: 0 | Status: SHIPPED | OUTPATIENT
Start: 2020-12-01 | End: 2020-12-08

## 2020-12-01 NOTE — TELEPHONE ENCOUNTER
Antibiotic refilled. Please assess his symptoms. Go to ER for new or worsening symptoms. No drinking with antibiotics - Flagyl will make him very sick if combined with alcohol.

## 2020-12-02 ENCOUNTER — TELEPHONE (OUTPATIENT)
Dept: FAMILY MEDICINE CLINIC | Age: 85
End: 2020-12-02

## 2020-12-02 NOTE — TELEPHONE ENCOUNTER
----- Message from Samira Shin sent at 12/2/2020 11:20 AM EST -----  Regarding: MACRINA Garcia/ telephone  General Message/Vendor Calls    Caller's first and last name: pt       Reason for call: pt is requesting antibiotics to be called into his pharmacy so he can have it on hand if his appendix's acts up over the weekend. Pt states he is currently not experiencing any symptoms.  Pt states that provider has prescribed antibiotics to treat his appendix's pain before in the past.       Callback required yes/no and why: yes       Best contact number(s): [phone]        Details to clarify the request:      Samira Shin

## 2020-12-02 NOTE — TELEPHONE ENCOUNTER
GRAZYNA England,    I contacted patient as he called again for the antibiotics. I advised him that GRAZYNA Garcia sent two antibiotics to his pharmacy and also relayed messages of no alcohol with the antibiotics. Also to go to ER for worsening symptoms.       Thanks, Pancho Medina

## 2020-12-02 NOTE — TELEPHONE ENCOUNTER
Call placed to patient. Name and  verified. Advised patient that RX for abt was refilled. Advised of provider recommendation to go to ER if sx worsened. And advised to not drink alcohol with medication.  He expressed undestanding

## 2020-12-11 ENCOUNTER — TELEPHONE (OUTPATIENT)
Dept: FAMILY MEDICINE CLINIC | Age: 85
End: 2020-12-11

## 2020-12-11 NOTE — TELEPHONE ENCOUNTER
Pt is returning jose's call       Pt would like a call back   He is taking a nap from 3:30 to 5:30    [facility]# [phone]

## 2020-12-11 NOTE — TELEPHONE ENCOUNTER
Returned call to patient. Name and  verified. Advised patient I had spoken with him on 20 and had not reached out to him since.  He was appreciative of call

## 2021-02-03 NOTE — TELEPHONE ENCOUNTER
Last Visit: 8/20/20 NP Marisela Howell  Next Appointment: Not scheduled- due 2/2021  Previous Refill Encounter(s): 8/7/20 60 + 5    Requested Prescriptions     Pending Prescriptions Disp Refills    apixaban (Eliquis) 2.5 mg tablet 60 Tab 5     Sig: Take 1 Tab by mouth two (2) times a day.

## 2021-04-15 RX ORDER — TRAZODONE HYDROCHLORIDE 50 MG/1
50 TABLET ORAL
Qty: 30 TAB | Refills: 5 | Status: SHIPPED | OUTPATIENT
Start: 2021-04-15 | End: 2021-07-16 | Stop reason: SDUPTHER

## 2021-07-06 ENCOUNTER — OFFICE VISIT (OUTPATIENT)
Dept: FAMILY MEDICINE CLINIC | Age: 86
End: 2021-07-06
Payer: MEDICARE

## 2021-07-06 VITALS
RESPIRATION RATE: 16 BRPM | WEIGHT: 212 LBS | SYSTOLIC BLOOD PRESSURE: 146 MMHG | TEMPERATURE: 97.8 F | DIASTOLIC BLOOD PRESSURE: 69 MMHG | BODY MASS INDEX: 33.27 KG/M2 | OXYGEN SATURATION: 98 % | HEIGHT: 67 IN | HEART RATE: 58 BPM

## 2021-07-06 DIAGNOSIS — R53.83 FATIGUE, UNSPECIFIED TYPE: ICD-10-CM

## 2021-07-06 DIAGNOSIS — Z91.81 AT RISK FOR FALLS: ICD-10-CM

## 2021-07-06 DIAGNOSIS — R41.89 COGNITIVE DECLINE: ICD-10-CM

## 2021-07-06 DIAGNOSIS — Z79.01 CHRONIC ANTICOAGULATION: ICD-10-CM

## 2021-07-06 DIAGNOSIS — Z76.89 ENCOUNTER TO ESTABLISH CARE WITH NEW DOCTOR: Primary | ICD-10-CM

## 2021-07-06 DIAGNOSIS — I48.0 PAROXYSMAL ATRIAL FIBRILLATION (HCC): ICD-10-CM

## 2021-07-06 DIAGNOSIS — I10 HTN, GOAL BELOW 140/90: ICD-10-CM

## 2021-07-06 PROBLEM — R60.9 PERIPHERAL EDEMA: Status: RESOLVED | Noted: 2017-02-14 | Resolved: 2021-07-06

## 2021-07-06 PROBLEM — Z86.73 HISTORY OF STROKE: Status: ACTIVE | Noted: 2019-10-12

## 2021-07-06 PROBLEM — E66.01 SEVERE OBESITY (BMI 35.0-39.9) WITH COMORBIDITY (HCC): Status: RESOLVED | Noted: 2018-03-28 | Resolved: 2021-07-06

## 2021-07-06 PROCEDURE — 99214 OFFICE O/P EST MOD 30 MIN: CPT | Performed by: FAMILY MEDICINE

## 2021-07-06 PROCEDURE — G8427 DOCREV CUR MEDS BY ELIG CLIN: HCPCS | Performed by: FAMILY MEDICINE

## 2021-07-06 PROCEDURE — G8536 NO DOC ELDER MAL SCRN: HCPCS | Performed by: FAMILY MEDICINE

## 2021-07-06 PROCEDURE — 1101F PT FALLS ASSESS-DOCD LE1/YR: CPT | Performed by: FAMILY MEDICINE

## 2021-07-06 PROCEDURE — G8432 DEP SCR NOT DOC, RNG: HCPCS | Performed by: FAMILY MEDICINE

## 2021-07-06 PROCEDURE — G8417 CALC BMI ABV UP PARAM F/U: HCPCS | Performed by: FAMILY MEDICINE

## 2021-07-06 NOTE — PROGRESS NOTES
Patient Name: Gita Messina   MRN: [id number]    Mario Alberto Manjarrez is a [address] y.o. male who presents with the following: Transferring care from prior PCP NP [place]. Here with two sons. Sons are requesting [facility] services for nursing and OT. His wife receives care from [facility] now so would like to request the same company. He lives with one of his adult sons. Uses a Roll[employer]. Has fallen once in the shower in the past few months. Noticed that his memory is getting gradually worse over the past 4 months. Seems to be less interested in hobbies and sleeping more. Review of Systems   Constitutional: Positive for malaise/fatigue. Negative for fever and weight loss. Respiratory: Negative for cough, hemoptysis, shortness of breath and wheezing. Cardiovascular: Negative for chest pain, palpitations, leg swelling and PND. Gastrointestinal: Negative for abdominal pain, constipation, diarrhea, nausea and vomiting. Musculoskeletal: Positive for falls. Psychiatric/Behavioral: Positive for memory loss. The patient's medications, allergies, past medical history, surgical history, family history and social history were reviewed and updated where appropriate. Prior to Admission medications    Medication Sig Start Date End Date Taking? Authorizing Provider   traZODone (DESYREL) 50 mg tablet Take 1 Tab by mouth nightly. For sleep 4/15/21  Yes Brett Garcia NP   apixaban (Eliquis) 2.5 mg tablet Take 1 Tab by mouth two (2) times a day. 2/3/21  Yes Diego Prather NP   levothyroxine (SYNTHROID) 50 mcg tablet Take 1 Tab by mouth Daily (before breakfast). Patient not taking: Reported on 7/6/2021 6/10/20 7/6/21  Diego Prather NP   losartan (COZAAR) 50 mg tablet Take 0.5 Tabs by mouth daily. Patient not taking: Reported on 7/6/2021 1/9/20   Diego Prather NP   atorvastatin (LIPITOR) 20 mg tablet Take 1 Tab by mouth daily.  For cholesterol  Patient not taking: Reported on 7/6/2021 12/5/19 7/6/21  Alberto Garcia NP   albuterol (PROVENTIL HFA, VENTOLIN HFA, PROAIR HFA) 90 mcg/actuation inhaler INHALE 1-2 PUFFS EVERY 6 HOURS IF NEEDED  Patient not taking: Reported on 7/6/2021 12/5/19 7/6/21  Alberto Garcia NP   aspirin 81 mg chewable tablet Take 1 Tab by mouth daily. Patient not taking: Reported on 7/6/2021 12/5/19 7/6/21  Carlota Edouard MD   pantoprazole (PROTONIX) 40 mg tablet Take 1 Tab by mouth every twelve (12) hours. Patient not taking: Reported on 7/6/2021 12/4/19 7/6/21  Carlota Edouard MD   ketoconazole (NIZORAL) 2 % topical cream Apply 1 Each to affected area daily. apply to nose/face  Patient not taking: Reported on 7/6/2021 7/6/21  Provider, Historical   tretinoin (RETIN-A) 0.025 % topical cream Apply 1 Each to affected area nightly. apply to face  Patient not taking: Reported on 7/6/2021 7/6/21  Provider, Historical   triamcinolone acetonide (KENALOG) 0.1 % ointment Apply 1 Container to affected area two (2) times a day. use thin layer, apply to face  Patient not taking: Reported on 7/6/2021 7/6/21  Provider, Historical   coenzyme Q-10 (CO Q-10) 30 mg capsule Take 30 mg by mouth daily. Patient not taking: Reported on 7/6/2021    Provider, Historical   zinc (CHELATED ZINC) 50 mg tab tablet Take 50 mg by mouth daily. Patient not taking: Reported on 7/6/2021 7/6/21  Provider, Historical   lutein 20 mg cap Take 1 Cap by mouth daily. Patient not taking: Reported on 7/6/2021 7/6/21  Provider, Historical   lecithin/soybean oil (SOYA LECITHIN PO) Take 1 Tab by mouth daily. Patient not taking: Reported on 7/6/2021 7/6/21  Provider, Historical   multivit-min/FA/lycopen/lutein (SENTRY SENIOR PO) Take 1 Tab by mouth daily. Patient not taking: Reported on 7/6/2021 7/6/21  Provider, Historical   vitamin E (AQUA GEMS) 400 unit capsule Take 400 Units by mouth daily. Reports taking 1-2 times a week.   Patient not taking: Reported on 7/6/2021 7/6/21  Provider, Historical ascorbic acid (VITAMIN C) 500 mg tablet Take 500 mg by mouth daily. Patient not taking: Reported on 2021  Provider, Historical   SAW PALMETTO PO Take 1 Tab by mouth daily. Patient not taking: Reported on 2021  Provider, Historical   CALCIUM/MAGNESIUM (DOLOMITE PO) Take 1 Tab by mouth daily. 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       Past Surgical History:   Procedure Laterality Date    HX HEENT      detached retina    HX HIP REPLACEMENT      [facility]       Family History   Problem Relation Age of Onset    Lung Disease Father     Cancer Father         lung    Coronary Artery Disease Neg Hx        Social History     Socioeconomic History    Marital status:      Spouse name: Not on file    Number of children: Not on file    Years of education: Not on file    Highest education level: Not on file   Occupational History    Not on file   Tobacco Use    Smoking status: Former Smoker     Packs/day: 0.25     Types: Cigars     Quit date: 1978     Years since quittin.8    Smokeless tobacco: Never Used    Tobacco comment: cigar on occ   Substance and Sexual Activity    Alcohol use: Yes     Alcohol/week: 14.0 standard drinks     Types: 14 Shots of liquor per week     Comment: 2 drinks a day.     Drug use: No    Sexual activity: Yes     Partners: Female   Other Topics Concern     Service Yes    Blood Transfusions No    Caffeine Concern No    Occupational Exposure No    Hobby Hazards No    Sleep Concern Not Asked    Stress Concern No    Weight Concern Yes    Special Diet No    Back Care No    Exercise Yes    Bike Helmet No    Seat Belt Yes    Self-Exams Yes   Social History Narrative    Not on file     Social Determinants of Health     Financial Resource Strain:     Difficulty of Paying Living Expenses:    Food Insecurity:     Worried About Running Out of Food in the Last Year:     [address] in the Last Year:    Transportation Needs:     Lack of Transportation (Medical):  Lack of Transportation (Non-Medical):    Physical Activity:     Days of Exercise per Week:     Minutes of Exercise per Session:    Stress:     Feeling of Stress :    Social Connections:     Frequency of Communication with Friends and Family:     Frequency of Social Gatherings with Friends and Family:     Attends Mandaeism Services:     Active Member of Clubs or Organizations:     Attends Club or Organization Meetings:     Marital Status:    Intimate Partner Violence:     Fear of Current or Ex-Partner:     Emotionally Abused:     Physically Abused:     Sexually Abused:          OBJECTIVE    Visit Vitals  BP (!) 146/69 (BP 1 Location: Left upper arm, BP Patient Position: Sitting, BP Cuff Size: Adult)   Pulse (!) 58   Temp 97.8 °F (36.6 °C) (Temporal)   Resp 16   Ht 5' 7\" (1.702 m)   Wt 212 lb (96.2 kg)   SpO2 98%   BMI 33.20 kg/m²       Physical Exam  Vitals and nursing note reviewed. Constitutional:       General: He is not in acute distress. Appearance: He is not diaphoretic. Eyes:      Conjunctiva/sclera: Conjunctivae normal.      Pupils: Pupils are equal, round, and reactive to light. Cardiovascular:      Rate and Rhythm: Normal rate. Rhythm irregular. Heart sounds: Normal heart sounds. No murmur heard. No friction rub. No gallop. Pulmonary:      Effort: Pulmonary effort is normal. No respiratory distress.       Breath sounds: Normal breath sounds. No wheezing. Skin:     General: Skin is warm and dry. Neurological:      Mental Status: He is alert. ASSESSMENT AND PLAN  Racquel Bahena is a 80 y.o. male who presents today for:    1. Encounter to establish care with new doctor    2. HTN, goal below 140/90  Will monitor at home. Not on meds right now. - [facility]    3. Chronic anticoagulation  - CBC WITH AUTOMATED DIFF; Future  - FERRITIN; Future  - IRON PROFILE; Future  - CBC WITH AUTOMATED DIFF  - FERRITIN  - IRON PROFILE    4. At risk for falls  - [address] [facility]    5. Cognitive decline  - CBC WITH AUTOMATED DIFF; Future  - FERRITIN; Future  - IRON PROFILE; Future  - VITAMIN B12 & FOLATE; Future  - TSH 3RD GENERATION; Future  - T4 (THYROXINE); Future  - VITAMIN D, 25 HYDROXY; Future  - METABOLIC PANEL, COMPREHENSIVE; Future  - MAGNESIUM; Future  - PHOSPHORUS; Future  - CULTURE, URINE; Future  - CBC WITH AUTOMATED DIFF  - FERRITIN  - IRON PROFILE  - VITAMIN B12 & FOLATE  - TSH 3RD GENERATION  - T4 (THYROXINE)  - VITAMIN D, 25 HYDROXY  - METABOLIC PANEL, COMPREHENSIVE  - MAGNESIUM  - PHOSPHORUS  - CULTURE, URINE    6. Fatigue, unspecified type  - CBC WITH AUTOMATED DIFF; Future  - FERRITIN; Future  - IRON PROFILE; Future  - VITAMIN B12 & FOLATE; Future  - TSH 3RD GENERATION; Future  - T4 (THYROXINE); Future  - VITAMIN D, 25 HYDROXY; Future  - METABOLIC PANEL, COMPREHENSIVE; Future  - MAGNESIUM; Future  - PHOSPHORUS; Future  - [address]; Future  - CBC WITH AUTOMATED DIFF  - FERRITIN  - IRON PROFILE  - VITAMIN B12 & FOLATE  - TSH 3RD GENERATION  - T4 (THYROXINE)  - VITAMIN D, 25 HYDROXY  - METABOLIC PANEL, COMPREHENSIVE  - MAGNESIUM  - PHOSPHORUS  - CULTURE, URINE    7. Paroxysmal atrial fibrillation (HCC)  Stable, continue current treatment.        Medications Discontinued During This Encounter   Medication Reason    albuterol (PROVENTIL HFA, VENTOLIN HFA, PROAIR HFA) 90 mcg/actuation inhaler LIST CLEANUP    ascorbic acid (VITAMIN C) 500 mg tablet LIST CLEANUP    aspirin 81 mg chewable tablet LIST CLEANUP    atorvastatin (LIPITOR) 20 mg tablet LIST CLEANUP    CALCIUM/MAGNESIUM (DOLOMITE PO) LIST CLEANUP    ketoconazole (NIZORAL) 2 % topical cream LIST CLEANUP    lecithin/soybean oil (SOYA LECITHIN PO) LIST CLEANUP    levothyroxine (SYNTHROID) 50 mcg tablet LIST CLEANUP    lutein 20 mg cap LIST CLEANUP    multivit-min/FA/lycopen/lutein (SENTRY SENIOR PO) LIST CLEANUP    pantoprazole (PROTONIX) 40 mg tablet LIST CLEANUP    SAW PALMETTO PO LIST CLEANUP    tretinoin (RETIN-A) 0.025 % topical cream LIST CLEANUP    vitamin E (AQUA GEMS) 400 unit capsule LIST CLEANUP    triamcinolone acetonide (KENALOG) 0.1 % ointment LIST CLEANUP    zinc (CHELATED ZINC) 50 mg tab tablet LIST CLEANUP    losartan (COZAAR) 50 mg tablet LIST CLEANUP           Treatment risks/benefits/costs/interactions/alternatives discussed with patient. Advised patient to call back or return to office if symptoms worsen/change/persist. If patient cannot reach us or should anything more severe/urgent arise he/she should proceed directly to the nearest emergency department. Discussed expected course/resolution/complications of diagnosis in detail with patient. Patient expressed understanding with the diagnosis and plan. Todd Santizo M.D.

## 2021-07-06 NOTE — PROGRESS NOTES
Chief Complaint   Patient presents with    Other     Discuss Home health     1. Have you been to the ER, urgent care clinic since your last visit? Hospitalized since your last visit? No    2. Have you seen or consulted any other health care providers outside of the [address] [place] [address] since your last visit? Include any pap smears or colon screening.  No

## 2021-07-07 ENCOUNTER — TELEPHONE (OUTPATIENT)
Dept: FAMILY MEDICINE CLINIC | Age: 86
End: 2021-07-07

## 2021-07-07 NOTE — TELEPHONE ENCOUNTER
Called patient and discussed results of hemoglobin of 5.9. Spoke with patient and son. Advised him to go to the emergency room for further evaluation of low hemoglobin. He and his son express understanding.

## 2021-07-07 NOTE — TELEPHONE ENCOUNTER
Anjelica Hardin called and with critical lab results.  Called transferred to [address]# [phone]

## 2021-07-08 LAB
25(OH)D3+25(OH)D2 SERPL-MCNC: 19.1 NG/ML (ref 30–100)
ALBUMIN SERPL-MCNC: 3.7 G/DL (ref 3.5–4.6)
ALBUMIN/GLOB SERPL: 1.2 {RATIO} (ref 1.2–2.2)
ALP SERPL-CCNC: 98 IU/L (ref 48–121)
ALT SERPL-CCNC: 7 IU/L (ref 0–44)
AST SERPL-CCNC: 15 IU/L (ref 0–40)
BACTERIA UR CULT: NO GROWTH
BASOPHILS # BLD AUTO: 0.1 X10E3/UL (ref 0–0.2)
BASOPHILS NFR BLD AUTO: 1 %
BILIRUB SERPL-MCNC: 0.5 MG/DL (ref 0–1.2)
BUN SERPL-MCNC: 19 MG/DL (ref 10–36)
BUN/CREAT SERPL: 20 (ref 10–24)
CALCIUM SERPL-MCNC: 8.9 MG/DL (ref 8.6–10.2)
CHLORIDE SERPL-SCNC: 106 MMOL/L (ref 96–106)
CO2 SERPL-SCNC: 20 MMOL/L (ref 20–29)
CREAT SERPL-MCNC: 0.97 MG/DL (ref 0.76–1.27)
EOSINOPHIL # BLD AUTO: 0.3 X10E3/UL (ref 0–0.4)
EOSINOPHIL NFR BLD AUTO: 3 %
ERYTHROCYTE [DISTWIDTH] IN BLOOD BY AUTOMATED COUNT: 19.9 % (ref 11.6–15.4)
FERRITIN SERPL-MCNC: 9 NG/ML (ref 30–400)
FOLATE SERPL-MCNC: 4.7 NG/ML
GLOBULIN SER CALC-MCNC: 3.1 G/DL (ref 1.5–4.5)
GLUCOSE SERPL-MCNC: 105 MG/DL (ref 65–99)
HCT VFR BLD AUTO: 21.5 % (ref 37.5–51)
HGB BLD-MCNC: 5.9 G/DL (ref 13–17.7)
IMM GRANULOCYTES # BLD AUTO: 0 X10E3/UL (ref 0–0.1)
IMM GRANULOCYTES NFR BLD AUTO: 0 %
IRON SATN MFR SERPL: 4 % (ref 15–55)
IRON SERPL-MCNC: 21 UG/DL (ref 38–169)
LYMPHOCYTES # BLD AUTO: 2.5 X10E3/UL (ref 0.7–3.1)
LYMPHOCYTES NFR BLD AUTO: 29 %
MAGNESIUM SERPL-MCNC: 2.4 MG/DL (ref 1.6–2.3)
MCH RBC QN AUTO: 20.3 PG (ref 26.6–33)
MCHC RBC AUTO-ENTMCNC: 27.4 G/DL (ref 31.5–35.7)
MCV RBC AUTO: 74 FL (ref 79–97)
MONOCYTES # BLD AUTO: 0.9 X10E3/UL (ref 0.1–0.9)
MONOCYTES NFR BLD AUTO: 10 %
NEUTROPHILS # BLD AUTO: 5.1 X10E3/UL (ref 1.4–7)
NEUTROPHILS NFR BLD AUTO: 57 %
PHOSPHATE SERPL-MCNC: 2.8 MG/DL (ref 2.8–4.1)
PLATELET # BLD AUTO: 286 X10E3/UL (ref 150–450)
POTASSIUM SERPL-SCNC: 4.8 MMOL/L (ref 3.5–5.2)
PROT SERPL-MCNC: 6.8 G/DL (ref 6–8.5)
RBC # BLD AUTO: 2.9 X10E6/UL (ref 4.14–5.8)
SODIUM SERPL-SCNC: 139 MMOL/L (ref 134–144)
T4 SERPL-MCNC: 4.6 UG/DL (ref 4.5–12)
TIBC SERPL-MCNC: 473 UG/DL (ref 250–450)
TSH SERPL DL<=0.005 MIU/L-ACNC: 7.84 UIU/ML (ref 0.45–4.5)
UIBC SERPL-MCNC: 452 UG/DL (ref 111–343)
VIT B12 SERPL-MCNC: 420 PG/ML (ref 232–1245)
WBC # BLD AUTO: 8.9 X10E3/UL (ref 3.4–10.8)

## 2021-07-09 ENCOUNTER — TELEPHONE (OUTPATIENT)
Dept: FAMILY MEDICINE CLINIC | Age: 86
End: 2021-07-09

## 2021-07-09 ENCOUNTER — HOSPITAL ENCOUNTER (OUTPATIENT)
Age: 86
Setting detail: OBSERVATION
Discharge: HOSPICE | End: 2021-07-12
Attending: EMERGENCY MEDICINE | Admitting: STUDENT IN AN ORGANIZED HEALTH CARE EDUCATION/TRAINING PROGRAM
Payer: MEDICARE

## 2021-07-09 DIAGNOSIS — Z92.29 HX OF LONG TERM USE OF BLOOD THINNERS: ICD-10-CM

## 2021-07-09 DIAGNOSIS — D64.9 ANEMIA, UNSPECIFIED TYPE: Primary | ICD-10-CM

## 2021-07-09 DIAGNOSIS — R19.5 HEME POSITIVE STOOL: ICD-10-CM

## 2021-07-09 PROBLEM — K92.2 GI BLEED: Status: ACTIVE | Noted: 2021-07-09

## 2021-07-09 LAB
ALBUMIN SERPL-MCNC: 3.2 G/DL (ref 3.5–5)
ALBUMIN/GLOB SERPL: 0.7 {RATIO} (ref 1.1–2.2)
ALP SERPL-CCNC: 98 U/L (ref 45–117)
ALT SERPL-CCNC: 14 U/L (ref 12–78)
ANION GAP SERPL CALC-SCNC: 6 MMOL/L (ref 5–15)
AST SERPL-CCNC: 13 U/L (ref 15–37)
BASOPHILS # BLD: 0.1 K/UL (ref 0–0.1)
BASOPHILS NFR BLD: 1 % (ref 0–1)
BILIRUB SERPL-MCNC: 0.5 MG/DL (ref 0.2–1)
BUN SERPL-MCNC: 18 MG/DL (ref 6–20)
BUN/CREAT SERPL: 17 (ref 12–20)
CALCIUM SERPL-MCNC: 8.9 MG/DL (ref 8.5–10.1)
CHLORIDE SERPL-SCNC: 107 MMOL/L (ref 97–108)
CO2 SERPL-SCNC: 25 MMOL/L (ref 21–32)
COMMENT, HOLDF: NORMAL
CREAT SERPL-MCNC: 1.06 MG/DL (ref 0.7–1.3)
DIFFERENTIAL METHOD BLD: ABNORMAL
EOSINOPHIL # BLD: 0.3 K/UL (ref 0–0.4)
EOSINOPHIL NFR BLD: 4 % (ref 0–7)
ERYTHROCYTE [DISTWIDTH] IN BLOOD BY AUTOMATED COUNT: 23 % (ref 11.5–14.5)
GLOBULIN SER CALC-MCNC: 4.5 G/DL (ref 2–4)
GLUCOSE SERPL-MCNC: 93 MG/DL (ref 65–100)
HCT VFR BLD AUTO: 22.3 % (ref 36.6–50.3)
HEMOCCULT STL QL: POSITIVE
HGB BLD-MCNC: 6.1 G/DL (ref 12.1–17)
HISTORY CHECKED?,CKHIST: NORMAL
IMM GRANULOCYTES # BLD AUTO: 0 K/UL (ref 0–0.04)
IMM GRANULOCYTES NFR BLD AUTO: 0 % (ref 0–0.5)
LYMPHOCYTES # BLD: 3.1 K/UL (ref 0.8–3.5)
LYMPHOCYTES NFR BLD: 36 % (ref 12–49)
MCH RBC QN AUTO: 20.1 PG (ref 26–34)
MCHC RBC AUTO-ENTMCNC: 27.4 G/DL (ref 30–36.5)
MCV RBC AUTO: 73.4 FL (ref 80–99)
MONOCYTES # BLD: 0.7 K/UL (ref 0–1)
MONOCYTES NFR BLD: 8 % (ref 5–13)
NEUTS SEG # BLD: 4.4 K/UL (ref 1.8–8)
NEUTS SEG NFR BLD: 51 % (ref 32–75)
NRBC # BLD: 0 K/UL (ref 0–0.01)
NRBC BLD-RTO: 0 PER 100 WBC
PLATELET # BLD AUTO: 279 K/UL (ref 150–400)
PMV BLD AUTO: 9.8 FL (ref 8.9–12.9)
POTASSIUM SERPL-SCNC: 4.7 MMOL/L (ref 3.5–5.1)
PROT SERPL-MCNC: 7.7 G/DL (ref 6.4–8.2)
RBC # BLD AUTO: 3.04 M/UL (ref 4.1–5.7)
RBC MORPH BLD: ABNORMAL
SAMPLES BEING HELD,HOLD: NORMAL
SODIUM SERPL-SCNC: 138 MMOL/L (ref 136–145)
WBC # BLD AUTO: 8.6 K/UL (ref 4.1–11.1)
WBC MORPH BLD: ABNORMAL

## 2021-07-09 PROCEDURE — 82728 ASSAY OF FERRITIN: CPT

## 2021-07-09 PROCEDURE — 99218 HC RM OBSERVATION: CPT

## 2021-07-09 PROCEDURE — 86923 COMPATIBILITY TEST ELECTRIC: CPT

## 2021-07-09 PROCEDURE — 65660000000 HC RM CCU STEPDOWN

## 2021-07-09 PROCEDURE — P9016 RBC LEUKOCYTES REDUCED: HCPCS

## 2021-07-09 PROCEDURE — C9113 INJ PANTOPRAZOLE SODIUM, VIA: HCPCS | Performed by: STUDENT IN AN ORGANIZED HEALTH CARE EDUCATION/TRAINING PROGRAM

## 2021-07-09 PROCEDURE — 74011250636 HC RX REV CODE- 250/636: Performed by: STUDENT IN AN ORGANIZED HEALTH CARE EDUCATION/TRAINING PROGRAM

## 2021-07-09 PROCEDURE — 99285 EMERGENCY DEPT VISIT HI MDM: CPT

## 2021-07-09 PROCEDURE — 86901 BLOOD TYPING SEROLOGIC RH(D): CPT

## 2021-07-09 PROCEDURE — 36430 TRANSFUSION BLD/BLD COMPNT: CPT

## 2021-07-09 PROCEDURE — 36415 COLL VENOUS BLD VENIPUNCTURE: CPT

## 2021-07-09 PROCEDURE — 85025 COMPLETE CBC W/AUTO DIFF WBC: CPT

## 2021-07-09 PROCEDURE — 83550 IRON BINDING TEST: CPT

## 2021-07-09 PROCEDURE — 80053 COMPREHEN METABOLIC PANEL: CPT

## 2021-07-09 PROCEDURE — 93005 ELECTROCARDIOGRAM TRACING: CPT

## 2021-07-09 PROCEDURE — 82272 OCCULT BLD FECES 1-3 TESTS: CPT

## 2021-07-09 PROCEDURE — 96374 THER/PROPH/DIAG INJ IV PUSH: CPT

## 2021-07-09 PROCEDURE — 74011000250 HC RX REV CODE- 250: Performed by: STUDENT IN AN ORGANIZED HEALTH CARE EDUCATION/TRAINING PROGRAM

## 2021-07-09 RX ORDER — SODIUM CHLORIDE 9 MG/ML
250 INJECTION, SOLUTION INTRAVENOUS AS NEEDED
Status: DISCONTINUED | OUTPATIENT
Start: 2021-07-09 | End: 2021-07-12 | Stop reason: HOSPADM

## 2021-07-09 RX ORDER — ASPIRIN 325 MG
TABLET, DELAYED RELEASE (ENTERIC COATED) ORAL
Qty: 8 CAPSULE | Refills: 0 | Status: SHIPPED | OUTPATIENT
Start: 2021-07-09

## 2021-07-09 RX ADMIN — SODIUM CHLORIDE 40 MG: 9 INJECTION INTRAMUSCULAR; INTRAVENOUS; SUBCUTANEOUS at 21:00

## 2021-07-09 NOTE — ED PROVIDER NOTES
HPI the patient was referred in by his PCP for a hemoglobin of 5.9. The patient is on Eliquis because of atrial fibrillation. Approximately 3 weeks ago he had several nosebleeds and his Eliquis dose was cut in half. He denies having any nosebleeds since then. He admits to having dark stools recently, but has recently been started on iron. He complains of low energy/fatigue, intermittent shortness of breath. He denies nausea/vomiting, syncope, chest pain, abdominal pain or other complaints. 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       Past Surgical History:   Procedure Laterality Date    HX HEENT      detached retina    HX HIP REPLACEMENT      right hip, [facility]         Family History:   Problem Relation Age of Onset    Lung Disease Father     Cancer Father         lung    Coronary Artery Disease Neg Hx        Social History     Socioeconomic History    Marital status:      Spouse name: Not on file    Number of children: Not on file    Years of education: Not on file    Highest education level: Not on file   Occupational History    Not on file   Tobacco Use    Smoking status: Former Smoker     Packs/day: 0.25     Types: Cigars     Quit date: 1978     Years since quittin.8    Smokeless tobacco: Never Used    Tobacco comment: cigar on occ   Substance and Sexual Activity    Alcohol use: Yes     Alcohol/week: 14.0 standard drinks     Types: 14 Shots of liquor per week     Comment: 2 drinks a day.     Drug use: No    Sexual activity: Yes     Partners: Female   Other Topics Concern     Service Yes    Blood Transfusions No    Caffeine Concern No    Occupational Exposure No    Hobby Hazards No    Sleep Concern Not Asked    Stress Concern No    Weight Concern Yes    Special Diet No    Back Care No    Exercise Yes    Bike Helmet No    Seat Belt Yes    Self-Exams Yes   Social History Narrative    Not on file     Social Determinants of Health     Financial Resource Strain:     Difficulty of Paying Living Expenses:    Food Insecurity:     Worried About Running Out of Food in the Last Year:     [address] in the Last Year:    Transportation Needs:     Lack of Transportation (Medical):  Lack of Transportation (Non-Medical):    Physical Activity:     Days of Exercise per Week:     Minutes of Exercise per Session:    Stress:     Feeling of Stress :    Social Connections:     Frequency of Communication with Friends and Family:     Frequency of Social Gatherings with Friends and Family:     Attends Catholic Services:     Active Member of Clubs or Organizations:     Attends Club or Organization Meetings:     Marital Status:    Intimate Partner Violence:     Fear of Current or Ex-Partner:     Emotionally Abused:     Physically Abused:     Sexually Abused: ALLERGIES: Hydrocodone, Amlodipine, and Poison ivy extract    Review of Systems   Constitutional: Positive for fatigue. Negative for fever. HENT: Negative for voice change. Eyes: Negative for visual disturbance. Respiratory: Positive for shortness of breath. Negative for cough. Cardiovascular: Negative for chest pain. Gastrointestinal: Negative for abdominal pain, nausea and vomiting. Genitourinary: Negative for flank pain. Musculoskeletal: Negative for back pain. Skin: Negative for rash. Neurological: Positive for weakness. Negative for headaches. Psychiatric/Behavioral: Negative for confusion. Vitals:    07/09/21 1600   BP: (!) 125/48   Pulse: (!) 54   Resp: 16   Temp: 98.1 °F (36.7 °C)   SpO2: 99%            Physical Exam  Constitutional:       General: He is not in acute distress. Appearance: He is well-developed. HENT:      Head: Normocephalic.    Cardiovascular:      Rate and Rhythm: Normal rate. Heart sounds: No murmur heard. Pulmonary:      Effort: Pulmonary effort is normal.      Breath sounds: Normal breath sounds. Abdominal:      Palpations: Abdomen is soft. Tenderness: There is no abdominal tenderness. Genitourinary:     Comments: Stool is dark. Musculoskeletal:         General: Normal range of motion. Cervical back: Normal range of motion. Skin:     General: Skin is warm and dry. Capillary Refill: Capillary refill takes less than 2 seconds. Coloration: Skin is pale. Neurological:      Mental Status: He is alert.    Psychiatric:         Behavior: Behavior normal.          MDM       Procedures

## 2021-07-09 NOTE — ED NOTES
5:44 PM  I have evaluated the patient as the Provider in Triage. I have reviewed His vital signs and the triage nurse assessment. I have talked with the patient and any available family and advised that I am the provider in triage and have ordered the appropriate study to initiate their work up based on the clinical presentation during my assessment. I have advised that the patient will be accommodated in the Main ED as soon as possible. I have also requested to contact the triage nurse or myself immediately if the patient experiences any changes in their condition during this brief waiting period. Kristin Randall MD    Pt on eliquis with anemia. Family decreased dose to 1.25 bid after a series of nosebleeds. No other episodes of bleeding.

## 2021-07-09 NOTE — ED TRIAGE NOTES
Triage: Pt arrives ambulatory from home after being referred for a HGB of 5.9 Pt denies any active bleeding he is aware of. He is on eliquis for afib. Endorses some generalized fatigue.

## 2021-07-09 NOTE — PROGRESS NOTES
Dear  Meghan Yadira,    I wanted to follow up on your recent test results:    As discussed on the phone, it is very important for you to go to the ER given your very low blood counts as you will need a blood transfusion and evaluation of the bleeding. One of your thyroid levels is abnormal but we can monitor this over time; it might be abnormal because of your current anemia. Vitamin D levels are low. I have sent in a prescription for high dose vitamin D3 at 50,000 units once a week for 8 weeks. After 8 weeks, switch to a lower dose of over-the-counter vitamin D3 2000 units every day.

## 2021-07-09 NOTE — TELEPHONE ENCOUNTER
Followed up with pt today given prior low Hbg of 5.9 (also on Eliquis) and recommendation to go to the ER. Son, Kam Severino, was present on the phone as well. Pt states that he didn't want to go to the ER because he was admitted to the hospital in 2019 for the same thing and did not like all of the interventions. I emphasized that his anemia is more severe than his prior hospitalization and if left untreated, he could potentially die. He also has some other abnormalities such as abnormal TSH and low vitamin D. Pt finally agreed to go to the [facility] for evaluation. Family expressed understanding.      Lab Results   Component Value Date/Time    WBC 8.9 07/06/2021 12:00 AM    HGB 5.9 (<) 07/06/2021 12:00 AM    HCT 21.5 (L) 07/06/2021 12:00 AM    PLATELET 010 75/81/5224 12:00 AM    MCV 74 (L) 07/06/2021 12:00 AM

## 2021-07-10 ENCOUNTER — APPOINTMENT (OUTPATIENT)
Dept: CT IMAGING | Age: 86
End: 2021-07-10
Attending: STUDENT IN AN ORGANIZED HEALTH CARE EDUCATION/TRAINING PROGRAM
Payer: MEDICARE

## 2021-07-10 LAB
FERRITIN SERPL-MCNC: 9 NG/ML (ref 26–388)
GLUCOSE BLD STRIP.AUTO-MCNC: 141 MG/DL (ref 65–117)
HCT VFR BLD AUTO: 24.3 % (ref 36.6–50.3)
HCT VFR BLD AUTO: 24.6 % (ref 36.6–50.3)
HGB BLD-MCNC: 6.7 G/DL (ref 12.1–17)
HGB BLD-MCNC: 7.3 G/DL (ref 12.1–17)
HISTORY CHECKED?,CKHIST: NORMAL
IRON SATN MFR SERPL: 4 % (ref 20–50)
IRON SERPL-MCNC: 23 UG/DL (ref 35–150)
PERIPHERAL SMEAR,PSM: NORMAL
SERVICE CMNT-IMP: ABNORMAL
TIBC SERPL-MCNC: 514 UG/DL (ref 250–450)

## 2021-07-10 PROCEDURE — 96376 TX/PRO/DX INJ SAME DRUG ADON: CPT

## 2021-07-10 PROCEDURE — 82962 GLUCOSE BLOOD TEST: CPT

## 2021-07-10 PROCEDURE — 74011000636 HC RX REV CODE- 636: Performed by: RADIOLOGY

## 2021-07-10 PROCEDURE — 74011250636 HC RX REV CODE- 250/636: Performed by: STUDENT IN AN ORGANIZED HEALTH CARE EDUCATION/TRAINING PROGRAM

## 2021-07-10 PROCEDURE — 99218 HC RM OBSERVATION: CPT

## 2021-07-10 PROCEDURE — C9113 INJ PANTOPRAZOLE SODIUM, VIA: HCPCS | Performed by: STUDENT IN AN ORGANIZED HEALTH CARE EDUCATION/TRAINING PROGRAM

## 2021-07-10 PROCEDURE — 65660000000 HC RM CCU STEPDOWN

## 2021-07-10 PROCEDURE — 74178 CT ABD&PLV WO CNTR FLWD CNTR: CPT

## 2021-07-10 PROCEDURE — 85014 HEMATOCRIT: CPT

## 2021-07-10 PROCEDURE — 74011250637 HC RX REV CODE- 250/637: Performed by: FAMILY MEDICINE

## 2021-07-10 PROCEDURE — 36430 TRANSFUSION BLD/BLD COMPNT: CPT

## 2021-07-10 PROCEDURE — 36415 COLL VENOUS BLD VENIPUNCTURE: CPT

## 2021-07-10 PROCEDURE — 74011000250 HC RX REV CODE- 250: Performed by: STUDENT IN AN ORGANIZED HEALTH CARE EDUCATION/TRAINING PROGRAM

## 2021-07-10 PROCEDURE — 96375 TX/PRO/DX INJ NEW DRUG ADDON: CPT

## 2021-07-10 PROCEDURE — P9016 RBC LEUKOCYTES REDUCED: HCPCS

## 2021-07-10 RX ORDER — IPRATROPIUM BROMIDE AND ALBUTEROL SULFATE 2.5; .5 MG/3ML; MG/3ML
3 SOLUTION RESPIRATORY (INHALATION)
Status: DISCONTINUED | OUTPATIENT
Start: 2021-07-10 | End: 2021-07-12 | Stop reason: HOSPADM

## 2021-07-10 RX ORDER — POLYETHYLENE GLYCOL 3350 17 G/17G
17 POWDER, FOR SOLUTION ORAL DAILY PRN
Status: DISCONTINUED | OUTPATIENT
Start: 2021-07-10 | End: 2021-07-12 | Stop reason: HOSPADM

## 2021-07-10 RX ORDER — SODIUM CHLORIDE 0.9 % (FLUSH) 0.9 %
5-40 SYRINGE (ML) INJECTION AS NEEDED
Status: DISCONTINUED | OUTPATIENT
Start: 2021-07-10 | End: 2021-07-12 | Stop reason: HOSPADM

## 2021-07-10 RX ORDER — TRAZODONE HYDROCHLORIDE 50 MG/1
50 TABLET ORAL
Status: DISCONTINUED | OUTPATIENT
Start: 2021-07-10 | End: 2021-07-10 | Stop reason: SDUPTHER

## 2021-07-10 RX ORDER — SODIUM CHLORIDE 0.9 % (FLUSH) 0.9 %
5-40 SYRINGE (ML) INJECTION EVERY 8 HOURS
Status: DISCONTINUED | OUTPATIENT
Start: 2021-07-10 | End: 2021-07-12 | Stop reason: HOSPADM

## 2021-07-10 RX ORDER — LANOLIN ALCOHOL/MO/W.PET/CERES
1 CREAM (GRAM) TOPICAL 2 TIMES DAILY WITH MEALS
Status: DISCONTINUED | OUTPATIENT
Start: 2021-07-10 | End: 2021-07-12 | Stop reason: HOSPADM

## 2021-07-10 RX ORDER — ONDANSETRON 4 MG/1
4 TABLET, ORALLY DISINTEGRATING ORAL
Status: DISCONTINUED | OUTPATIENT
Start: 2021-07-10 | End: 2021-07-12 | Stop reason: HOSPADM

## 2021-07-10 RX ORDER — SODIUM CHLORIDE 9 MG/ML
250 INJECTION, SOLUTION INTRAVENOUS AS NEEDED
Status: DISCONTINUED | OUTPATIENT
Start: 2021-07-10 | End: 2021-07-12 | Stop reason: HOSPADM

## 2021-07-10 RX ORDER — HYDRALAZINE HYDROCHLORIDE 10 MG/1
10 TABLET, FILM COATED ORAL AS NEEDED
Status: DISCONTINUED | OUTPATIENT
Start: 2021-07-10 | End: 2021-07-12 | Stop reason: HOSPADM

## 2021-07-10 RX ORDER — ONDANSETRON 2 MG/ML
4 INJECTION INTRAMUSCULAR; INTRAVENOUS
Status: DISCONTINUED | OUTPATIENT
Start: 2021-07-10 | End: 2021-07-12 | Stop reason: HOSPADM

## 2021-07-10 RX ORDER — ACETAMINOPHEN 325 MG/1
650 TABLET ORAL
Status: DISCONTINUED | OUTPATIENT
Start: 2021-07-10 | End: 2021-07-12 | Stop reason: HOSPADM

## 2021-07-10 RX ORDER — ACETAMINOPHEN 650 MG/1
650 SUPPOSITORY RECTAL
Status: DISCONTINUED | OUTPATIENT
Start: 2021-07-10 | End: 2021-07-12 | Stop reason: HOSPADM

## 2021-07-10 RX ORDER — HYDRALAZINE HYDROCHLORIDE 20 MG/ML
10 INJECTION INTRAMUSCULAR; INTRAVENOUS
Status: DISCONTINUED | OUTPATIENT
Start: 2021-07-10 | End: 2021-07-12 | Stop reason: HOSPADM

## 2021-07-10 RX ORDER — TRAZODONE HYDROCHLORIDE 50 MG/1
50 TABLET ORAL
Status: DISCONTINUED | OUTPATIENT
Start: 2021-07-10 | End: 2021-07-12 | Stop reason: HOSPADM

## 2021-07-10 RX ORDER — HYDRALAZINE HYDROCHLORIDE 20 MG/ML
10 INJECTION INTRAMUSCULAR; INTRAVENOUS ONCE
Status: COMPLETED | OUTPATIENT
Start: 2021-07-10 | End: 2021-07-10

## 2021-07-10 RX ADMIN — Medication 10 ML: at 21:10

## 2021-07-10 RX ADMIN — HYDRALAZINE HYDROCHLORIDE 10 MG: 20 INJECTION INTRAMUSCULAR; INTRAVENOUS at 03:45

## 2021-07-10 RX ADMIN — TRAZODONE HYDROCHLORIDE 50 MG: 50 TABLET ORAL at 21:09

## 2021-07-10 RX ADMIN — SODIUM CHLORIDE 40 MG: 9 INJECTION INTRAMUSCULAR; INTRAVENOUS; SUBCUTANEOUS at 10:34

## 2021-07-10 RX ADMIN — IOPAMIDOL 100 ML: 755 INJECTION, SOLUTION INTRAVENOUS at 02:48

## 2021-07-10 RX ADMIN — FERROUS SULFATE TAB 325 MG (65 MG ELEMENTAL FE) 325 MG: 325 (65 FE) TAB at 18:01

## 2021-07-10 RX ADMIN — SODIUM CHLORIDE 40 MG: 9 INJECTION INTRAMUSCULAR; INTRAVENOUS; SUBCUTANEOUS at 21:09

## 2021-07-10 RX ADMIN — Medication 10 ML: at 16:49

## 2021-07-10 NOTE — PROGRESS NOTES
[facility]  Hospitalist Group                                                                                          Hospitalist Progress Note  Fabien Jorge MD  Answering service: [phone] OR [phone] from in house phone              Progress Note    Patient: Joshua Kee MRN: [id number]  SSN: [id number]    YOB: 1930  Age: 80 y.o. Sex: male      Admit Date: 7/9/2021    LOS: 1 day     Subjective:     Patient was sent to the ER by PCP for further evaluation of anemia. Patient's hemoglobin on admission was 6.1. Patient denies any blood in the stool. Denies any abdominal pain. CT does not show any bleeding. Patient denies any headache, dizziness, chest pain, shortness of breath, nausea, vomiting, abdominal pain. Objective:     Vitals:    07/10/21 0035 07/10/21 0319 07/10/21 0630 07/10/21 1033   BP: (!) 180/62 (!) 181/155  (!) 160/69   Pulse:  66  61   Resp: 20 18  16   Temp: 98.6 °F (37 °C) 98.5 °F (36.9 °C)  98.4 °F (36.9 °C)   SpO2: 98% 98%  98%   Weight:   99.6 kg (219 lb 9.6 oz)         Intake and Output:  Current Shift: No intake/output data recorded. Last three shifts: 07/08 1901 - 07/10 0700  In: 221.7   Out: 200 [Urine:200]    Physical Exam:   GENERAL: alert, cooperative, no distress, appears stated age  THROAT & NECK: normal and no erythema or exudates noted. LUNG: clear to auscultation bilaterally  HEART: regular rate and rhythm, S1, S2 normal, no murmur, click, rub or gallop  ABDOMEN: soft, non-tender. Bowel sounds normal. No masses,  no organomegaly  EXTREMITIES:  extremities normal, atraumatic, no cyanosis or edema  SKIN: no rash or abnormalities  NEUROLOGIC: AOx3. PSYCHIATRIC: non focal    Lab/Data Review: All lab results for the last 24 hours reviewed.      Recent Results (from the past 24 hour(s))   TYPE & SCREEN    Collection Time: 07/09/21  4:18 PM   Result Value Ref Range    Crossmatch Expiration 07/12/2021,2359     ABO/Rh(D) A POSITIVE Antibody screen NEG     Unit number [id number]     Blood component type [facility]     Unit division 00     Status of unit TRANSFUSED     Crossmatch result Compatible     Unit number [id number]     Blood component type [facility]     Unit division 00     Status of unit ALLOCATED     Crossmatch result Compatible    SAMPLES BEING HELD    Collection Time: 07/09/21  4:19 PM   Result Value Ref Range    SAMPLES BEING HELD 1RED, 1BLU     COMMENT        Add-on orders for these samples will be processed based on acceptable specimen integrity and analyte stability, which may vary by analyte. CBC WITH AUTOMATED DIFF    Collection Time: 07/09/21  4:19 PM   Result Value Ref Range    WBC 8.6 4.1 - 11.1 K/uL    RBC 3.04 (L) 4.10 - 5.70 M/uL    HGB 6.1 (L) 12.1 - 17.0 g/dL    HCT 22.3 (L) 36.6 - 50.3 %    MCV 73.4 (L) 80.0 - 99.0 FL    MCH 20.1 (L) 26.0 - 34.0 PG    MCHC 27.4 (L) 30.0 - 36.5 g/dL    RDW 23.0 (H) 11.5 - 14.5 %    PLATELET 315 512 - 249 K/uL    MPV 9.8 8.9 - 12.9 FL    NRBC 0.0 0  WBC    ABSOLUTE NRBC 0.00 0.00 - 0.01 K/uL    NEUTROPHILS 51 32 - 75 %    LYMPHOCYTES 36 12 - 49 %    MONOCYTES 8 5 - 13 %    EOSINOPHILS 4 0 - 7 %    BASOPHILS 1 0 - 1 %    IMMATURE GRANULOCYTES 0 0.0 - 0.5 %    ABS. NEUTROPHILS 4.4 1.8 - 8.0 K/UL    ABS. LYMPHOCYTES 3.1 0.8 - 3.5 K/UL    ABS. MONOCYTES 0.7 0.0 - 1.0 K/UL    ABS. EOSINOPHILS 0.3 0.0 - 0.4 K/UL    ABS. BASOPHILS 0.1 0.0 - 0.1 K/UL    ABS. IMM.  GRANS. 0.0 0.00 - 0.04 K/UL    DF SMEAR SCANNED      RBC COMMENTS ANISOCYTOSIS  2+        RBC COMMENTS MICROCYTOSIS  1+        RBC COMMENTS MACROCYTOSIS  1+        RBC COMMENTS HYPOCHROMIA  3+        WBC COMMENTS TARGET CELLS     METABOLIC PANEL, COMPREHENSIVE    Collection Time: 07/09/21  4:19 PM   Result Value Ref Range    Sodium 138 136 - 145 mmol/L    Potassium 4.7 3.5 - 5.1 mmol/L    Chloride 107 97 - 108 mmol/L    CO2 25 21 - 32 mmol/L    Anion gap 6 5 - 15 mmol/L    Glucose 93 65 - 100 mg/dL    BUN 18 6 - 20 MG/DL Creatinine 1.06 0.70 - 1.30 MG/DL    BUN/Creatinine ratio 17 12 - 20      GFR est AA >60 >60 ml/min/1.73m2    GFR est non-AA >60 >60 ml/min/1.73m2    Calcium 8.9 8.5 - 10.1 MG/DL    Bilirubin, total 0.5 0.2 - 1.0 MG/DL    ALT (SGPT) 14 12 - 78 U/L    AST (SGOT) 13 (L) 15 - 37 U/L    Alk. phosphatase 98 45 - 117 U/L    Protein, total 7.7 6.4 - 8.2 g/dL    Albumin 3.2 (L) 3.5 - 5.0 g/dL    Globulin 4.5 (H) 2.0 - 4.0 g/dL    A-G Ratio 0.7 (L) 1.1 - 2.2     IRON PROFILE    Collection Time: 07/09/21  4:19 PM   Result Value Ref Range    Iron 23 (L) 35 - 150 ug/dL    TIBC 514 (H) 250 - 450 ug/dL    Iron % saturation 4 (L) 20 - 50 %   FERRITIN    Collection Time: 07/09/21  4:19 PM   Result Value Ref Range    Ferritin 9 (L) 26 - 388 NG/ML   RBC, ALLOCATE    Collection Time: 07/09/21  5:45 PM   Result Value Ref Range    HISTORY CHECKED? Historical check performed    OCCULT BLOOD, STOOL    Collection Time: 07/09/21  6:10 PM   Result Value Ref Range    Occult blood, stool Positive (A) NEG     HGB & HCT    Collection Time: 07/10/21  3:34 AM   Result Value Ref Range    HGB 6.7 (L) 12.1 - 17.0 g/dL    HCT 24.3 (L) 36.6 - 50.3 %   RBC, ALLOCATE    Collection Time: 07/10/21  7:00 AM   Result Value Ref Range    HISTORY CHECKED? Historical check performed         Imaging:    CT ABD PELV W WO CONT    Result Date: 7/10/2021  INDICATION: gi bleed EXAM:  CT ABDOMEN, PELVIS WITH/WITHOUT CONTRAST (GI Bleed PROTOCOL) COMPARISON: February 27, 2020 TECHNIQUE: Noncontrast 2.5 mm axial images were obtained through the abdomen and pelvis. After the uneventful administration of IV contrast, 2.5 mm axial images were obtained through the abdomen and pelvis during arterial and portal venous phases. Delayed images were also obtained through the abdomen and pelvis. Coronal and sagittal reconstructions were generated.   CT dose reduction was achieved through use of a standardized protocol tailored for this examination and automatic exposure control for dose modulation. FINDINGS: LUNG BASES: Small pleural effusions, bibasilar atelectasis, and cardiomegaly. LIVER: Hepatic steatosis but slightly nodular morphology. Several small calcifications. Portal vein is patent. GALLBLADDER: Unremarkable. SPLEEN: Numerous calcified granulomas. PANCREAS: No mass or ductal dilatation. ADRENALS: No mass. KIDNEYS: No nephrolithiasis or hydronephrosis. Bilateral renal cysts. GI TRACT: No bowel obstruction or wall thickening. No evidence of hemorrhage. PERITONEUM: Trace free fluid. No free air. APPENDIX: Unremarkable. RETROPERITONEUM: No lymphadenopathy or aortic aneurysm. ADDITIONAL COMMENTS: N/A. URINARY BLADDER: Unremarkable. REPRODUCTIVE ORGANS: Unremarkable. LYMPH NODES: 11 mm lymph node right external iliac chain image 138 without significant change. FREE FLUID: None. BONES: Degenerative changes throughout the lumbar spine with unilateral left L5 pars defect and grade 1 anterolisthesis. No destructive bone lesion right hip arthroplasty. ADDITIONAL COMMENTS: N/A.     1. No CT evidence of acute gastrointestinal hemorrhage. 2. Small pleural effusions. Mild ascites. Slightly nodular morphology of the liver suggesting cirrhosis. Assessment and Plan:     Anemia  -Acute on chronic anemia, to evaluate for GI source of blood loss  -Noted iron deficiency on iron studies  -Transfuse 2 units PRBCs, start oral iron supplement, patient refusing IV iron  -Continue monitor H&H  -GI consult for further evaluation    Atrial fibrillation  -Persistent atrial fibrillation  -Patient not on any rate control medications  -Hold anticoagulation due to acute anemia    Hypertension  -Mild elevated blood pressure noted  -Continue monitor    Insomnia  -Trazodone nightly    Discharge disposition: Likely in the next 24 to 48 hours pending further evaluation from GI.     Signed By: Kelli Batres MD     July 10, 2021

## 2021-07-10 NOTE — PROGRESS NOTES
Bedside and Verbal shift change report given to Kapil Bowens RN (oncoming nurse) by Jerson Panda RN (offgoing nurse). Report included the following information SBAR, Kardex, ED Summary, Procedure Summary, MAR, Recent Results and Cardiac Rhythm .

## 2021-07-10 NOTE — H&P
History & Physical    Primary Care Provider: Maynor Belcher MD  Source of Information: Patient and chart review    History of Presenting Illness:   Wendy Prakash is a 80 y.o. male w/ pmh of HTN, afib on eliquis, CVA/TIA who presents to hospital on rec of pcp for anemia/low hemoglobin. Pt states he had been in his usual state of health but does endorse some increased malaise and fatigue over the last month. Patient states he was recently started on iron supplements about 2 weeks ago and reports compliance. He is unable to endorse any melena or hematochezia as he has not been attached to his stools. He denies any previous history of GI bleed. Denies any previous history of colonoscopy or EGD. The patient denies any fever, chills, chest pain, cough, congestion, recent illness, palpitations, or dysuria. Vitals on ER presentation are remarkable for HR in 50s. Labs were remarkable for hgb 6.1. FOBT Pos  Patient transfused 1 unit PRBC     Review of Systems:  A comprehensive review of systems was negative except for that written in the History of Present Illness. 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 Authorizing Provider   cholecalciferol (VITAMIN D3) (50,000 UNITS /1250 MCG) capsule Take 1 capsule once a week for the next 8 weeks then switch to OTC vitamin D3 2000 units daily 7/9/21   Maynor Belcher MD   traZODone (DESYREL) 50 mg tablet Take 1 Tab by mouth nightly.  For sleep 4/15/21   Liliam Sebastian NP   apixaban (Eliquis) 2.5 mg tablet Take 1 Tab by mouth two (2) times a day. 2/3/21   Sarwat Silverio NP     Allergies   Allergen Reactions    Hydrocodone Other (comments)     Unable to void, or have a bowel movement    Amlodipine Swelling and Other (comments)     Severe weeping from leg swelling    Poison Ivy Extract Itching      Family History   Problem Relation Age of Onset    Lung Disease Father     Cancer Father         lung    Coronary Artery Disease Neg Hx         SOCIAL HISTORY:  Patient resides:  Independently x   Assisted Living    SNF    With family care       Smoking history:   None x   Former    Chronic      Alcohol history:   None x   Social    Chronic      Ambulates:   Independently x   w/cane    w/walker    w/wc    CODE STATUS:  DNR    Full x   Other      Objective:     Physical Exam:     Visit Vitals  BP (!) 152/56   Pulse 64   Temp 98.1 °F (36.7 °C)   Resp 16   SpO2 100%      O2 Device: None (Room air)    General:  Alert, cooperative, no distress, appears stated age. Head:  Normocephalic, without obvious abnormality, atraumatic. Eyes:  Conjunctivae pale. PERRL, EOMs intact. Nose: Nares normal. Septum midline. Mucosa normal.        Neck: Supple, symmetrical, trachea midline, no carotid bruit and no JVD. Lungs:   Clear to auscultation bilaterally. Chest wall:  No tenderness or deformity. Heart:  Regular rate and rhythm, S1, S2 normal, no murmur, click, rub or gallop. Abdomen:   Soft, non-tender. Obese abdomen. Bowel sounds normal. No masses,  No organomegaly. Extremities: Extremities normal, atraumatic, no cyanosis. Trace bilateral lower extremity edema. Pulses: 2+ and symmetric all extremities. Skin: Skin color, texture, turgor normal. No rashes or lesions   Neurologic: CNII-XII intact.         Data Review:     Recent Days:  Recent Labs     07/09/21  1619   WBC 8.6   HGB 6.1*   HCT 22.3*        Recent Labs     07/09/21  1619      K 4.7      CO2 25   GLU 93   BUN 18   CREA 1.06   CA 8.9   ALB 3.2*   ALT 14     No results for input(s): PH, PCO2, PO2, HCO3, FIO2 in the last 72 hours. 24 Hour Results:  Recent Results (from the past 24 hour(s))   TYPE & SCREEN    Collection Time: 07/09/21  4:18 PM   Result Value Ref Range    Crossmatch Expiration 07/12/2021,2359     ABO/Rh(D) A POSITIVE     Antibody screen NEG     Unit number [id number]     Blood component type [facility]     Unit division 00     Status of unit ISSUED     Crossmatch result Compatible    SAMPLES BEING HELD    Collection Time: 07/09/21  4:19 PM   Result Value Ref Range    SAMPLES BEING HELD 1RED, 1BLU     COMMENT        Add-on orders for these samples will be processed based on acceptable specimen integrity and analyte stability, which may vary by analyte. CBC WITH AUTOMATED DIFF    Collection Time: 07/09/21  4:19 PM   Result Value Ref Range    WBC 8.6 4.1 - 11.1 K/uL    RBC 3.04 (L) 4.10 - 5.70 M/uL    HGB 6.1 (L) 12.1 - 17.0 g/dL    HCT 22.3 (L) 36.6 - 50.3 %    MCV 73.4 (L) 80.0 - 99.0 FL    MCH 20.1 (L) 26.0 - 34.0 PG    MCHC 27.4 (L) 30.0 - 36.5 g/dL    RDW 23.0 (H) 11.5 - 14.5 %    PLATELET 348 815 - 178 K/uL    MPV 9.8 8.9 - 12.9 FL    NRBC 0.0 0  WBC    ABSOLUTE NRBC 0.00 0.00 - 0.01 K/uL    NEUTROPHILS 51 32 - 75 %    LYMPHOCYTES 36 12 - 49 %    MONOCYTES 8 5 - 13 %    EOSINOPHILS 4 0 - 7 %    BASOPHILS 1 0 - 1 %    IMMATURE GRANULOCYTES 0 0.0 - 0.5 %    ABS. NEUTROPHILS 4.4 1.8 - 8.0 K/UL    ABS. LYMPHOCYTES 3.1 0.8 - 3.5 K/UL    ABS. MONOCYTES 0.7 0.0 - 1.0 K/UL    ABS. EOSINOPHILS 0.3 0.0 - 0.4 K/UL    ABS. BASOPHILS 0.1 0.0 - 0.1 K/UL    ABS. IMM.  GRANS. 0.0 0.00 - 0.04 K/UL    DF SMEAR SCANNED      RBC COMMENTS ANISOCYTOSIS  2+        RBC COMMENTS MICROCYTOSIS  1+        RBC COMMENTS MACROCYTOSIS  1+        RBC COMMENTS HYPOCHROMIA  3+        WBC COMMENTS TARGET CELLS     METABOLIC PANEL, COMPREHENSIVE    Collection Time: 07/09/21  4:19 PM   Result Value Ref Range    Sodium 138 136 - 145 mmol/L    Potassium 4.7 3.5 - 5.1 mmol/L Chloride 107 97 - 108 mmol/L    CO2 25 21 - 32 mmol/L    Anion gap 6 5 - 15 mmol/L    Glucose 93 65 - 100 mg/dL    BUN 18 6 - 20 MG/DL    Creatinine 1.06 0.70 - 1.30 MG/DL    BUN/Creatinine ratio 17 12 - 20      GFR est AA >60 >60 ml/min/1.73m2    GFR est non-AA >60 >60 ml/min/1.73m2    Calcium 8.9 8.5 - 10.1 MG/DL    Bilirubin, total 0.5 0.2 - 1.0 MG/DL    ALT (SGPT) 14 12 - 78 U/L    AST (SGOT) 13 (L) 15 - 37 U/L    Alk. phosphatase 98 45 - 117 U/L    Protein, total 7.7 6.4 - 8.2 g/dL    Albumin 3.2 (L) 3.5 - 5.0 g/dL    Globulin 4.5 (H) 2.0 - 4.0 g/dL    A-G Ratio 0.7 (L) 1.1 - 2.2     RBC, ALLOCATE    Collection Time: 07/09/21  5:45 PM   Result Value Ref Range    HISTORY CHECKED? Historical check performed    OCCULT BLOOD, STOOL    Collection Time: 07/09/21  6:10 PM   Result Value Ref Range    Occult blood, stool Positive (A) NEG           Imaging:     Assessment:     Michelle Perry is a 80 y.o. male w/ pmh of HTN, afib on eliquis, CVA/TIA who is admitted for symptomatic anemia and GI bleed in setting of chronic AC w/ Eliquis. Plan:       ? ? GI Bleed / Symptomatic Anemia  -Transfuse to keep hemoglobin greater than 7  -FOBT +/-given ongoing iron use  -Follow-up posttransfusion CBC  -CT abdomen pelvis  -Obtain iron profile  -GI consult. Appreciate recs    Afib on Eliquis  -Hold Eliquis given acute anemia  -We will need to revisit risk-benefits of Eliquis and anemia    HTN  -Well-controlled. Monitor    Insominia  -Continue trazodone nightly. FEN/GI -  PRBC.  NS @ 75 ml/hr  Activity - as tolerated  DVT prophylaxis - SCDs  GI prophylaxis -  NI  Disposition - Home    CODE STATUS:  Full code       Signed By: Kimberly Harrison MD     July 9, 2021

## 2021-07-10 NOTE — PROGRESS NOTES
Problem: Falls - Risk of  Goal: *Absence of Falls  Description: Document Penny Reason Fall Risk and appropriate interventions in the flowsheet. Outcome: Progressing Towards Goal  Note: Fall Risk Interventions:  Mobility Interventions: Communicate number of staff needed for ambulation/transfer, Patient to call before getting OOB         Medication Interventions: Patient to call before getting OOB, Teach patient to arise slowly    Elimination Interventions: Call light in reach, Patient to call for help with toileting needs              Problem: Patient Education: Go to Patient Education Activity  Goal: Patient/Family Education  Outcome: Progressing Towards Goal     Problem: Hypertension  Goal: *Blood pressure within specified parameters  Outcome: Progressing Towards Goal     Problem: Pressure Injury - Risk of  Goal: *Prevention of pressure injury  Description: Document Sea Scale and appropriate interventions in the flowsheet.   Outcome: Progressing Towards Goal  Note: Pressure Injury Interventions:  Sensory Interventions: Assess changes in LOC    Moisture Interventions: Absorbent underpads    Activity Interventions: PT/OT evaluation    Mobility Interventions: Pressure redistribution bed/mattress (bed type)    Nutrition Interventions: Document food/fluid/supplement intake

## 2021-07-10 NOTE — ROUTINE PROCESS
TRANSFER - OUT REPORT:    Verbal report given to Ruthann IRVIN(name) on [address]  being transferred to (unit) for routine progression of care       Report consisted of patients Situation, Background, Assessment and   Recommendations(SBAR). Information from the following report(s) SBAR, ED Summary and MAR was reviewed with the receiving nurse. Lines:   Peripheral IV 07/09/21 Left Antecubital (Active)   Site Assessment Clean, dry, & intact 07/09/21 1620   Phlebitis Assessment 0 07/09/21 1620   Infiltration Assessment 0 07/09/21 1620   Dressing Status Clean, dry, & intact 07/09/21 1620   Dressing Type Transparent 07/09/21 1620   Hub Color/Line Status Pink;Flushed;Patent 07/09/21 1620   Action Taken Blood drawn 07/09/21 1620        Opportunity for questions and clarification was provided.       Patient transported with:   Haitaobei

## 2021-07-10 NOTE — CONSULTS
GI Consultation Note Quang Pacheco)    NAME: Milad Hernandez : 1930 MRN: [id number]   ATTG: Justa Muñoz          PCP: Matthew Teixeira MD  Date/Time:  7/10/2021 2:07 PM  Subjective:   REASON FOR CONSULT:        Lorenzo Smith is a 80 y.o. male who I was asked to see for anemia. No signs of GI blood loss. MCV is 73. EGD in 2019 by Dr. 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 Denies melena, hematochezia. On eliquis (1.25mg BID) for A Fib. 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 Family History   Problem Relation Age of Onset    Lung Disease Father     Cancer Father         lung    Coronary Artery Disease Neg Hx       Allergies   Allergen Reactions    Hydrocodone Other (comments)     Unable to void, or have a bowel movement    Amlodipine Swelling and Other (comments)     Severe weeping from leg swelling    Poison Ivy Extract Itching      Home Medications:  Prior to Admission Medications   Prescriptions Last Dose Informant Patient Reported? Taking? apixaban (Eliquis) 2.5 mg tablet 7/10/2021 at Unknown time  No Yes   Sig: Take 1 Tab by mouth two (2) times a day.    cholecalciferol (VITAMIN D3) (50,000 UNITS /1250 MCG) capsule Unknown at Unknown time  No No   Sig: Take 1 capsule once a week for the next 8 weeks then switch to OTC vitamin D3 2000 units daily   traZODone (DESYREL) 50 mg tablet 7/10/2021 at Unknown time  No Yes   Sig: Take 1 Tab by mouth nightly.  For sleep      Facility-Administered Medications: None     Hospital medications:  Current Facility-Administered Medications   Medication Dose Route Frequency    sodium chloride (NS) flush 5-40 mL  5-40 mL IntraVENous Q8H    sodium chloride (NS) flush 5-40 mL  5-40 mL IntraVENous PRN    acetaminophen (TYLENOL) tablet 650 mg  650 mg Oral Q6H PRN    Or    acetaminophen (TYLENOL) suppository 650 mg  650 mg Rectal Q6H PRN    polyethylene glycol (MIRALAX) packet 17 g  17 g Oral DAILY PRN    ondansetron (ZOFRAN ODT) tablet 4 mg  4 mg Oral Q8H PRN    Or    ondansetron (ZOFRAN) injection 4 mg  4 mg IntraVENous Q6H PRN    hydrALAZINE (APRESOLINE) 20 mg/mL injection 10 mg  10 mg IntraVENous Q6H PRN    hydrALAZINE (APRESOLINE) tablet 10 mg  10 mg Oral PRN    0.9% sodium chloride infusion 250 mL  250 mL IntraVENous PRN    traZODone (DESYREL) tablet 50 mg  50 mg Oral QHS    ferrous sulfate tablet 325 mg  1 Tablet Oral BID WITH MEALS    0.9% sodium chloride infusion 250 mL  250 mL IntraVENous PRN    pantoprazole (PROTONIX) 40 mg in 0.9% sodium chloride 10 mL injection  40 mg IntraVENous Q12H     REVIEW OF SYSTEMS:     []     Unable to obtain  ROS due to  []    mental status change  []    sedated   []    intubated   []    Total of 11 systems reviewed as follows:  Const:  negative fever, negative chills, negative weight loss  Eyes:   negative diplopia or visual changes, negative eye pain  ENT:   negative coryza, negative sore throat  Resp:   negative cough, hemoptysis, dyspnea  Cards:  negative for chest pain, palpitations, lower extremity edema  :  negative for frequency, dysuria and hematuria  Skin:   negative for rash and pruritus  Heme:  negative for easy bruising and gum/nose bleeding  MS:  negative for myalgias, arthralgias, back pain and muscle weakness  Neurolo:  negative for headaches, dizziness, vertigo, memory problems   Psych:  negative for feelings of anxiety, depression     Pertinent Positives include :    Objective:   VITALS:    Visit Vitals  BP (!) 149/62 (BP 1 Location: Right upper arm, BP Patient Position: Semi fowlers)   Pulse (!) 55   Temp 97.6 °F (36.4 °C)   Resp 17   Wt 99.6 kg (219 lb 9.6 oz)   SpO2 98%   BMI 34.39 kg/m²     Temp (24hrs), Av.1 °F (36.7 °C), Min:97.6 °F (36.4 °C), Max:98.6 °F (37 °C)    PHYSICAL EXAM:   General: Alert, cooperative, no acute distress    HEENT: NC, Atraumatic. Lungs:  Aerating well on RA, no accessory muscle use, no resp distress  Heart:  Regular rate and rhythm,    Abdomen: Soft, Non distended, Non tender. no rebound. No rigidity. Extremities: No c/c/e  Neurologic:  Alert, oriented, normal speech. Skin:   Dry, intact, no jaundice. Psych:   Good insight. Not anxious nor agitated. Normal affect    LAB DATA REVIEWED:    Recent Results (from the past 48 hour(s))   TYPE & SCREEN    Collection Time: 21  4:18 PM   Result Value Ref Range    Crossmatch Expiration 2021,2359     ABO/Rh(D) A POSITIVE     Antibody screen NEG     Unit number [id number]     Blood component type [facility]     Unit division 00     Status of unit TRANSFUSED     Crossmatch result Compatible     Unit number [id number]     Blood component type [facility]     Unit division 00     Status of unit ISSUED     Crossmatch result Compatible    SAMPLES BEING HELD    Collection Time: 21  4:19 PM   Result Value Ref Range    SAMPLES BEING HELD 1RED, 1BLU     COMMENT        Add-on orders for these samples will be processed based on acceptable specimen integrity and analyte stability, which may vary by analyte.    CBC WITH AUTOMATED DIFF    Collection Time: 21  4:19 PM   Result Value Ref Range    WBC 8.6 4.1 - 11.1 K/uL    RBC 3.04 (L) 4.10 - 5.70 M/uL    HGB 6.1 (L) 12.1 - 17.0 g/dL    HCT 22.3 (L) 36.6 - 50.3 %    MCV 73.4 (L) 80.0 - 99.0 FL    MCH 20.1 (L) 26.0 - 34.0 PG    MCHC 27.4 (L) 30.0 - 36.5 g/dL    RDW 23.0 (H) 11.5 - 14.5 %    PLATELET 719 970 - 288 K/uL    MPV 9.8 8.9 - 12.9 FL    NRBC 0.0 0  WBC    ABSOLUTE NRBC 0.00 0.00 - 0.01 K/uL    NEUTROPHILS 51 32 - 75 %    LYMPHOCYTES 36 12 - 49 %    MONOCYTES 8 5 - 13 %    EOSINOPHILS 4 0 - 7 %    BASOPHILS 1 0 - 1 %    IMMATURE GRANULOCYTES 0 0.0 - 0.5 %    ABS. NEUTROPHILS 4.4 1.8 - 8.0 K/UL    ABS. LYMPHOCYTES 3.1 0.8 - 3.5 K/UL    ABS. MONOCYTES 0.7 0.0 - 1.0 K/UL    ABS. EOSINOPHILS 0.3 0.0 - 0.4 K/UL    ABS. BASOPHILS 0.1 0.0 - 0.1 K/UL    ABS. IMM. GRANS. 0.0 0.00 - 0.04 K/UL    DF SMEAR SCANNED      RBC COMMENTS ANISOCYTOSIS  2+        RBC COMMENTS MICROCYTOSIS  1+        RBC COMMENTS MACROCYTOSIS  1+        RBC COMMENTS HYPOCHROMIA  3+        WBC COMMENTS TARGET CELLS     METABOLIC PANEL, COMPREHENSIVE    Collection Time: 07/09/21  4:19 PM   Result Value Ref Range    Sodium 138 136 - 145 mmol/L    Potassium 4.7 3.5 - 5.1 mmol/L    Chloride 107 97 - 108 mmol/L    CO2 25 21 - 32 mmol/L    Anion gap 6 5 - 15 mmol/L    Glucose 93 65 - 100 mg/dL    BUN 18 6 - 20 MG/DL    Creatinine 1.06 0.70 - 1.30 MG/DL    BUN/Creatinine ratio 17 12 - 20      GFR est AA >60 >60 ml/min/1.73m2    GFR est non-AA >60 >60 ml/min/1.73m2    Calcium 8.9 8.5 - 10.1 MG/DL    Bilirubin, total 0.5 0.2 - 1.0 MG/DL    ALT (SGPT) 14 12 - 78 U/L    AST (SGOT) 13 (L) 15 - 37 U/L    Alk.  phosphatase 98 45 - 117 U/L    Protein, total 7.7 6.4 - 8.2 g/dL    Albumin 3.2 (L) 3.5 - 5.0 g/dL    Globulin 4.5 (H) 2.0 - 4.0 g/dL    A-G Ratio 0.7 (L) 1.1 - 2.2     IRON PROFILE    Collection Time: 07/09/21  4:19 PM   Result Value Ref Range    Iron 23 (L) 35 - 150 ug/dL    TIBC 514 (H) 250 - 450 ug/dL    Iron % saturation 4 (L) 20 - 50 %   FERRITIN    Collection Time: 07/09/21  4:19 PM   Result Value Ref Range    Ferritin 9 (L) 26 - 388 NG/ML PERIPHERAL SMEAR    Collection Time: 07/09/21  4:19 PM   Result Value Ref Range    PERIPHERAL SMEAR        Pathologic examination results can be viewed in [facility] Chart Review under the Pathology tab. RBC, ALLOCATE    Collection Time: 07/09/21  5:45 PM   Result Value Ref Range    HISTORY CHECKED? Historical check performed    OCCULT BLOOD, STOOL    Collection Time: 07/09/21  6:10 PM   Result Value Ref Range    Occult blood, stool Positive (A) NEG     HGB & HCT    Collection Time: 07/10/21  3:34 AM   Result Value Ref Range    HGB 6.7 (L) 12.1 - 17.0 g/dL    HCT 24.3 (L) 36.6 - 50.3 %   RBC, ALLOCATE    Collection Time: 07/10/21  7:00 AM   Result Value Ref Range    HISTORY CHECKED? Historical check performed    GLUCOSE, POC    Collection Time: 07/10/21 12:04 PM   Result Value Ref Range    Glucose (POC) 141 (H) 65 - 117 mg/dL    Performed by REDDY Coyne  PCT      IMAGING RESULTS:  Reviewed. Assessment/Plan:    80 y.o. male w/ chronic anemia. No signs of GI blood loss. MCV is 73. EGD in 2019 by Dr. 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 Denies melena, hematochezia. On eliquis (1.25mg BID) for A Fib. 1. Chronic Microcytic Anemia  2. A Fib  3. Therapeutic anti-coagulation w/ eliquis  ___________________________________________________  RECOMMENDATIONS:    - trend H/H  - transfuse to keep Hgb > 7  - maintain two large bore IVs  - would hold eliquis for now  - we will check up on him again on Monday to discuss EGD- he is high risk for this given his age    Dr. Jane Guard to assume care on Monday 7/12/21.      MBS  ________________________________  Care Plan discussed with:    []    Patient   []    Family   []    Nursing   []    Attending   ___________________________________________________  GI: Mayte Carson DO

## 2021-07-10 NOTE — ED NOTES
Called to give report and Andrea Johnson said RN is in room with another pt and will call back.  Charge RN is aware

## 2021-07-11 PROBLEM — I49.5 SSS (SICK SINUS SYNDROME) (HCC): Status: ACTIVE | Noted: 2021-07-11

## 2021-07-11 PROBLEM — I48.21 PERMANENT ATRIAL FIBRILLATION (HCC): Status: ACTIVE | Noted: 2021-07-11

## 2021-07-11 LAB
ABO + RH BLD: NORMAL
ANION GAP SERPL CALC-SCNC: 5 MMOL/L (ref 5–15)
BASOPHILS # BLD: 0.1 K/UL (ref 0–0.1)
BASOPHILS NFR BLD: 1 % (ref 0–1)
BLD PROD TYP BPU: NORMAL
BLD PROD TYP BPU: NORMAL
BLOOD GROUP ANTIBODIES SERPL: NORMAL
BPU ID: NORMAL
BPU ID: NORMAL
BUN SERPL-MCNC: 12 MG/DL (ref 6–20)
BUN/CREAT SERPL: 13 (ref 12–20)
CALCIUM SERPL-MCNC: 8.2 MG/DL (ref 8.5–10.1)
CHLORIDE SERPL-SCNC: 106 MMOL/L (ref 97–108)
CO2 SERPL-SCNC: 23 MMOL/L (ref 21–32)
CREAT SERPL-MCNC: 0.93 MG/DL (ref 0.7–1.3)
CROSSMATCH RESULT,%XM: NORMAL
CROSSMATCH RESULT,%XM: NORMAL
DIFFERENTIAL METHOD BLD: ABNORMAL
EOSINOPHIL # BLD: 0.4 K/UL (ref 0–0.4)
EOSINOPHIL NFR BLD: 4 % (ref 0–7)
ERYTHROCYTE [DISTWIDTH] IN BLOOD BY AUTOMATED COUNT: 22.2 % (ref 11.5–14.5)
GLUCOSE BLD STRIP.AUTO-MCNC: 108 MG/DL (ref 65–117)
GLUCOSE SERPL-MCNC: 85 MG/DL (ref 65–100)
HCT VFR BLD AUTO: 24.8 % (ref 36.6–50.3)
HGB BLD-MCNC: 7.2 G/DL (ref 12.1–17)
IMM GRANULOCYTES # BLD AUTO: 0.1 K/UL (ref 0–0.04)
IMM GRANULOCYTES NFR BLD AUTO: 1 % (ref 0–0.5)
LYMPHOCYTES # BLD: 3.4 K/UL (ref 0.8–3.5)
LYMPHOCYTES NFR BLD: 39 % (ref 12–49)
MCH RBC QN AUTO: 22 PG (ref 26–34)
MCHC RBC AUTO-ENTMCNC: 29 G/DL (ref 30–36.5)
MCV RBC AUTO: 75.6 FL (ref 80–99)
MONOCYTES # BLD: 0.7 K/UL (ref 0–1)
MONOCYTES NFR BLD: 8 % (ref 5–13)
NEUTS SEG # BLD: 4.1 K/UL (ref 1.8–8)
NEUTS SEG NFR BLD: 47 % (ref 32–75)
NRBC # BLD: 0 K/UL (ref 0–0.01)
NRBC BLD-RTO: 0 PER 100 WBC
PLATELET # BLD AUTO: 225 K/UL (ref 150–400)
PMV BLD AUTO: 9.7 FL (ref 8.9–12.9)
POTASSIUM SERPL-SCNC: 4.1 MMOL/L (ref 3.5–5.1)
RBC # BLD AUTO: 3.28 M/UL (ref 4.1–5.7)
RBC MORPH BLD: ABNORMAL
SERVICE CMNT-IMP: NORMAL
SODIUM SERPL-SCNC: 134 MMOL/L (ref 136–145)
SPECIMEN EXP DATE BLD: NORMAL
STATUS OF UNIT,%ST: NORMAL
STATUS OF UNIT,%ST: NORMAL
UNIT DIVISION, %UDIV: 0
UNIT DIVISION, %UDIV: 0
WBC # BLD AUTO: 8.8 K/UL (ref 4.1–11.1)

## 2021-07-11 PROCEDURE — 96376 TX/PRO/DX INJ SAME DRUG ADON: CPT

## 2021-07-11 PROCEDURE — C9113 INJ PANTOPRAZOLE SODIUM, VIA: HCPCS | Performed by: STUDENT IN AN ORGANIZED HEALTH CARE EDUCATION/TRAINING PROGRAM

## 2021-07-11 PROCEDURE — 77030029684 HC NEB SM VOL KT MONA -A

## 2021-07-11 PROCEDURE — 74011250636 HC RX REV CODE- 250/636: Performed by: STUDENT IN AN ORGANIZED HEALTH CARE EDUCATION/TRAINING PROGRAM

## 2021-07-11 PROCEDURE — 99222 1ST HOSP IP/OBS MODERATE 55: CPT | Performed by: SPECIALIST

## 2021-07-11 PROCEDURE — 74011250637 HC RX REV CODE- 250/637: Performed by: FAMILY MEDICINE

## 2021-07-11 PROCEDURE — 97530 THERAPEUTIC ACTIVITIES: CPT

## 2021-07-11 PROCEDURE — 99218 HC RM OBSERVATION: CPT

## 2021-07-11 PROCEDURE — 80048 BASIC METABOLIC PNL TOTAL CA: CPT

## 2021-07-11 PROCEDURE — 97161 PT EVAL LOW COMPLEX 20 MIN: CPT

## 2021-07-11 PROCEDURE — 82962 GLUCOSE BLOOD TEST: CPT

## 2021-07-11 PROCEDURE — 74011000250 HC RX REV CODE- 250: Performed by: STUDENT IN AN ORGANIZED HEALTH CARE EDUCATION/TRAINING PROGRAM

## 2021-07-11 PROCEDURE — 85025 COMPLETE CBC W/AUTO DIFF WBC: CPT

## 2021-07-11 PROCEDURE — 65660000000 HC RM CCU STEPDOWN

## 2021-07-11 PROCEDURE — 36415 COLL VENOUS BLD VENIPUNCTURE: CPT

## 2021-07-11 RX ADMIN — SODIUM CHLORIDE 40 MG: 9 INJECTION INTRAMUSCULAR; INTRAVENOUS; SUBCUTANEOUS at 21:32

## 2021-07-11 RX ADMIN — SODIUM CHLORIDE 40 MG: 9 INJECTION INTRAMUSCULAR; INTRAVENOUS; SUBCUTANEOUS at 08:23

## 2021-07-11 RX ADMIN — FERROUS SULFATE TAB 325 MG (65 MG ELEMENTAL FE) 325 MG: 325 (65 FE) TAB at 08:23

## 2021-07-11 RX ADMIN — Medication 10 ML: at 08:27

## 2021-07-11 RX ADMIN — Medication 10 ML: at 21:33

## 2021-07-11 RX ADMIN — FERROUS SULFATE TAB 325 MG (65 MG ELEMENTAL FE) 325 MG: 325 (65 FE) TAB at 17:47

## 2021-07-11 RX ADMIN — TRAZODONE HYDROCHLORIDE 50 MG: 50 TABLET ORAL at 21:32

## 2021-07-11 NOTE — PROGRESS NOTES
Problem: Falls - Risk of  Goal: *Absence of Falls  Description: Document [place] Fall Risk and appropriate interventions in the flowsheet. Outcome: Progressing Towards Goal  Note: Fall Risk Interventions:  Mobility Interventions: Bed/chair exit alarm, Communicate number of staff needed for ambulation/transfer         Medication Interventions: Bed/chair exit alarm, Patient to call before getting OOB    Elimination Interventions: Bed/chair exit alarm, Call light in reach, Patient to call for help with toileting needs    History of Falls Interventions: Bed/chair exit alarm         Problem: Patient Education: Go to Patient Education Activity  Goal: Patient/Family Education  Outcome: Progressing Towards Goal     Problem: Pressure Injury - Risk of  Goal: *Prevention of pressure injury  Description: Document Sea Scale and appropriate interventions in the flowsheet.   Outcome: Progressing Towards Goal  Note: Pressure Injury Interventions:  Sensory Interventions: Assess changes in LOC    Moisture Interventions: Absorbent underpads    Activity Interventions: Increase time out of bed    Mobility Interventions: Pressure redistribution bed/mattress (bed type)    Nutrition Interventions: Document food/fluid/supplement intake    Friction and Shear Interventions: Minimize layers

## 2021-07-11 NOTE — PROGRESS NOTES
[address] [facility] Adult  Hospitalist Group                                                                                          Hospitalist Progress Note  Antony Gongora MD  Answering service: [phone] OR [phone] from in house phone              Progress Note    Patient: Kojo Davis MRN: [id number]  SSN: [id number]    YOB: 1930  Age: 80 y.o. Sex: male      Admit Date: 7/9/2021    LOS: 2 days     Subjective:     Patient was sent to the ER by PCP for further evaluation of anemia. Patient's hemoglobin on admission was 6.1. Patient denies any blood in the stool. Denies any abdominal pain. CT does not show any bleeding. Patient denies any headache, dizziness, chest pain, shortness of breath, nausea, vomiting, abdominal pain. Objective:     Vitals:    07/11/21 0037 07/11/21 0445 07/11/21 0501 07/11/21 0814   BP: (!) 152/73 (!) 123/53  (!) 152/64   Pulse: 60 (!) 56  61   Resp: 18 16  16   Temp: 98.1 °F (36.7 °C) 97.3 °F (36.3 °C)  97.6 °F (36.4 °C)   SpO2: 97% 96%  97%   Weight:   96.8 kg (213 lb 6.5 oz)         Intake and Output:  Current Shift: No intake/output data recorded. Last three shifts: 07/09 1901 - 07/11 0700  In: 681.7 [P.O.:150]  Out: 750 [Urine:750]    Physical Exam:   GENERAL: alert, cooperative, no distress, appears stated age  THROAT & NECK: normal and no erythema or exudates noted. LUNG: clear to auscultation bilaterally  HEART: regular rate and rhythm, S1, S2 normal, no murmur, click, rub or gallop  ABDOMEN: soft, non-tender. Bowel sounds normal. No masses,  no organomegaly  EXTREMITIES:  extremities normal, atraumatic, no cyanosis or edema  SKIN: no rash or abnormalities  NEUROLOGIC: AOx3. PSYCHIATRIC: non focal    Lab/Data Review: All lab results for the last 24 hours reviewed.      Recent Results (from the past 24 hour(s))   GLUCOSE, POC    Collection Time: 07/10/21 12:04 PM   Result Value Ref Range    Glucose (POC) 141 (H) 65 - 117 mg/dL    Performed by REDDY Coyne  [facility]    HGB & HCT    Collection Time: 07/10/21  8:16 PM   Result Value Ref Range    HGB 7.3 (L) 12.1 - 17.0 g/dL    HCT 24.6 (L) 36.6 - 41.8 %   METABOLIC PANEL, BASIC    Collection Time: 07/11/21  4:49 AM   Result Value Ref Range    Sodium 134 (L) 136 - 145 mmol/L    Potassium 4.1 3.5 - 5.1 mmol/L    Chloride 106 97 - 108 mmol/L    CO2 23 21 - 32 mmol/L    Anion gap 5 5 - 15 mmol/L    Glucose 85 65 - 100 mg/dL    BUN 12 6 - 20 MG/DL    Creatinine 0.93 0.70 - 1.30 MG/DL    BUN/Creatinine ratio 13 12 - 20      GFR est AA >60 >60 ml/min/1.73m2    GFR est non-AA >60 >60 ml/min/1.73m2    Calcium 8.2 (L) 8.5 - 10.1 MG/DL   CBC WITH AUTOMATED DIFF    Collection Time: 07/11/21  4:49 AM   Result Value Ref Range    WBC 8.8 4.1 - 11.1 K/uL    RBC 3.28 (L) 4.10 - 5.70 M/uL    HGB 7.2 (L) 12.1 - 17.0 g/dL    HCT 24.8 (L) 36.6 - 50.3 %    MCV 75.6 (L) 80.0 - 99.0 FL    MCH 22.0 (L) 26.0 - 34.0 PG    MCHC 29.0 (L) 30.0 - 36.5 g/dL    RDW 22.2 (H) 11.5 - 14.5 %    PLATELET 790 539 - 376 K/uL    MPV 9.7 8.9 - 12.9 FL    NRBC 0.0 0  WBC    ABSOLUTE NRBC 0.00 0.00 - 0.01 K/uL    NEUTROPHILS 47 32 - 75 %    LYMPHOCYTES 39 12 - 49 %    MONOCYTES 8 5 - 13 %    EOSINOPHILS 4 0 - 7 %    BASOPHILS 1 0 - 1 %    IMMATURE GRANULOCYTES 1 (H) 0.0 - 0.5 %    ABS. NEUTROPHILS 4.1 1.8 - 8.0 K/UL    ABS. LYMPHOCYTES 3.4 0.8 - 3.5 K/UL    ABS. MONOCYTES 0.7 0.0 - 1.0 K/UL    ABS. EOSINOPHILS 0.4 0.0 - 0.4 K/UL    ABS. BASOPHILS 0.1 0.0 - 0.1 K/UL    ABS. IMM. GRANS. 0.1 (H) 0.00 - 0.04 K/UL    DF SMEAR SCANNED      RBC COMMENTS ANISOCYTOSIS  2+        RBC COMMENTS MICROCYTOSIS  1+        RBC COMMENTS HYPOCHROMIA  2+       GLUCOSE, POC    Collection Time: 07/11/21  8:22 AM   Result Value Ref Range    Glucose (POC) 108 65 - 117 mg/dL    Performed by [facility]:    No results found.        Assessment and Plan:     Anemia  -Acute on chronic anemia, to evaluate for GI source of blood loss  -Noted iron deficiency on iron studies  -S/p transfusion of 2 units PRBCs, continue oral iron supplement, patient refusing IV iron  -Continue monitor H&H  -GI evaluating the patient, possible EGD on Monday after reviewing risks and benefits    Atrial fibrillation  -Persistent atrial fibrillation, noted long pause of 4.83-second this a.m.  -Patient not on any rate control medications  -Hold anticoagulation due to acute anemia  -Cardiology consult for further evaluation    Hypertension  -Mild elevated blood pressure noted  -Continue monitor    Insomnia  -Trazodone nightly    Discharge disposition: Likely in the next 24 to 48 hours pending further evaluation from GI.     Signed By: Ursula Delgadillo MD     July 11, 2021

## 2021-07-11 NOTE — CONSULTS
Consult Note      Assessment:    Patient Active Problem List   Diagnosis Code    HTN, goal below 140/90 I10    Hypothyroidism E03.9    Paroxysmal atrial fibrillation (HCC) I48.0    Right knee DJD M17.11    ACP (advance care planning) Z71.89    History of stroke Z86.73    GI bleed K92.2    Permanent atrial fibrillation (HCC) I48.21    SSS (sick sinus syndrome) ([facility] [address] I49.5       Recommendations:    Monitor and continue off Eliquis. He had a pause last night of 4.8 seconds in AF; he does not want a pacemaker unless absolutely necessary. He has not had any dizziness or syncope in past, only one fall in past 5 years in bathroom due to loss of balance. EGD is planned. I told him we would continue to monitor him and assess need for pacemaker on a day to day basis. He has a history of CVA > 20 years ago. Marlee Portillo MD  [phone]  Rashad Correia is a 80 y.o. male who presented 7/9/2021 with complaints of shortness of breath and severe anemia due to GIB, on low dose Eliquis. The symptoms began approximately 3 days ago. He has a history of cardiac disease including atrial fib with slow ventricular response, although monitors in past have not shown any pauses > 3 seconds. Examination by the attending physician suggested the possibility of atrial fib with one pause of 4.8 seconds. At present the patient has moderately improved since initial presentation. Therapy thus far has included blood transfusions. Cardiology has been consulted to assist in the management of this patient.     Current Facility-Administered Medications   Medication Dose Route Frequency    sodium chloride (NS) flush 5-40 mL  5-40 mL IntraVENous Q8H    sodium chloride (NS) flush 5-40 mL  5-40 mL IntraVENous PRN    acetaminophen (TYLENOL) tablet 650 mg  650 mg Oral Q6H PRN    Or    acetaminophen (TYLENOL) suppository 650 mg  650 mg Rectal Q6H PRN    polyethylene glycol (MIRALAX) packet 17 g  17 g Oral DAILY PRN    ondansetron (ZOFRAN ODT) tablet 4 mg  4 mg Oral Q8H PRN    Or    ondansetron (ZOFRAN) injection 4 mg  4 mg IntraVENous Q6H PRN    hydrALAZINE (APRESOLINE) 20 mg/mL injection 10 mg  10 mg IntraVENous Q6H PRN    hydrALAZINE (APRESOLINE) tablet 10 mg  10 mg Oral PRN    0.9% sodium chloride infusion 250 mL  250 mL IntraVENous PRN    traZODone (DESYREL) tablet 50 mg  50 mg Oral QHS    ferrous sulfate tablet 325 mg  1 Tablet Oral BID WITH MEALS    albuterol-ipratropium (DUO-NEB) 2.5 MG-0.5 MG/3 ML  3 mL Nebulization Q4H PRN    0.9% sodium chloride infusion 250 mL  250 mL IntraVENous PRN    pantoprazole (PROTONIX) 40 mg in 0.9% sodium chloride 10 mL injection  40 mg IntraVENous Q12H     Past Medical History:   Diagnosis Date    History of stroke 10/12/2019    HTN, goal below 140/90 2010    Hypothyroidism 2011    Paroxysmal atrial fibrillation ([facility] [address] 2014    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     Patient Active Problem List   Diagnosis Code    HTN, goal below 140/90 I10    Hypothyroidism E03.9    Paroxysmal atrial fibrillation (HCC) I48.0    Right knee DJD M17.11    ACP (advance care planning) Z71.89    History of stroke Z86.73    GI bleed K92.2    Permanent atrial fibrillation ([facility] [address] I48.21    SSS (sick [facility] syndrome) [facility] I49.5     Allergies   Allergen Reactions    Hydrocodone Other (comments)     Unable to void, or have a bowel movement    Amlodipine Swelling and Other (comments)     Severe weeping from leg swelling    Poison Ivy Extract Itching     Social History     Tobacco Use    Smoking status: Former Smoker     Packs/day: 0.25     Types: Cigars     Quit date: 1978     Years since quittin.8    Smokeless tobacco: Never Used    Tobacco comment: cigar on occ   Substance Use Topics    Alcohol use: Yes     Alcohol/week: 14.0 standard drinks     Types: 14 Shots of liquor per week     Comment: 2 drinks a day.     Drug use: No     Family History   Problem Relation Age of Onset    Lung Disease Father     Cancer Father         lung    Coronary Artery Disease Neg Hx        Review of Symptoms:  A comprehensive review of systems was negative except for that written in the HPI. Objective:      Visit Vitals  BP (!) 161/60 (BP 1 Location: Right upper arm, BP Patient Position: Sitting; At rest)   Pulse (!) 53   Temp 97.7 °F (36.5 °C)   Resp 17   Wt 213 lb 6.5 oz (96.8 kg)   SpO2 98%   BMI 33.42 kg/m²      Physical Exam    Visit Vitals  BP (!) 161/60 (BP 1 Location: Right upper arm, BP Patient Position: Sitting; At rest)   Pulse (!) 53   Temp 97.7 °F (36.5 °C)   Resp 17   Wt 213 lb 6.5 oz (96.8 kg)   SpO2 98%   BMI 33.42 kg/m²     General Appearance:  Well developed, well nourished,alert and oriented x 3,  individual in no acute distress. Ears/Nose/Mouth/Throat:   Hearing grossly normal.         Neck: Supple. Chest:   Lungs clear to auscultation bilaterally. Cardiovascular:  Irregular rate and rhythm, S1, S2 normal, no murmur. Abdomen:   Soft, non-tender, bowel sounds are active. Extremities: No edema bilaterally. Skin: Warm and dry, pale.                Cardiographics    Telemetry: AFIB  ECG: atrial fibrillation, rate 62  Echocardiogram: Not done, has shown normal EF in past    Labs:   Recent Results (from the past 24 hour(s))   HGB & HCT    Collection Time: 07/10/21  8:16 PM   Result Value Ref Range    HGB 7.3 (L) 12.1 - 17.0 g/dL    HCT 24.6 (L) 36.6 - 03.5 %   METABOLIC PANEL, BASIC    Collection Time: 07/11/21  4:49 AM   Result Value Ref Range    Sodium 134 (L) 136 - 145 mmol/L    Potassium 4.1 3.5 - 5.1 mmol/L    Chloride 106 97 - 108 mmol/L    CO2 23 21 - 32 mmol/L    Anion gap 5 5 - 15 mmol/L    Glucose 85 65 - 100 mg/dL    BUN 12 6 - 20 MG/DL    Creatinine 0.93 0.70 - 1.30 MG/DL    BUN/Creatinine ratio 13 12 - 20      GFR est AA >60 >60 ml/min/1.73m2    GFR est non-AA >60 >60 ml/min/1.73m2    Calcium 8.2 (L) 8.5 - 10.1 MG/DL   CBC WITH AUTOMATED DIFF    Collection Time: 07/11/21  4:49 AM   Result Value Ref Range    WBC 8.8 4.1 - 11.1 K/uL    RBC 3.28 (L) 4.10 - 5.70 M/uL    HGB 7.2 (L) 12.1 - 17.0 g/dL    HCT 24.8 (L) 36.6 - 50.3 %    MCV 75.6 (L) 80.0 - 99.0 FL    MCH 22.0 (L) 26.0 - 34.0 PG    MCHC 29.0 (L) 30.0 - 36.5 g/dL    RDW 22.2 (H) 11.5 - 14.5 %    PLATELET 093 087 - 863 K/uL    MPV 9.7 8.9 - 12.9 FL    NRBC 0.0 0  WBC    ABSOLUTE NRBC 0.00 0.00 - 0.01 K/uL    NEUTROPHILS 47 32 - 75 %    LYMPHOCYTES 39 12 - 49 %    MONOCYTES 8 5 - 13 %    EOSINOPHILS 4 0 - 7 %    BASOPHILS 1 0 - 1 %    IMMATURE GRANULOCYTES 1 (H) 0.0 - 0.5 %    ABS. NEUTROPHILS 4.1 1.8 - 8.0 K/UL    ABS. LYMPHOCYTES 3.4 0.8 - 3.5 K/UL    ABS. MONOCYTES 0.7 0.0 - 1.0 K/UL    ABS. EOSINOPHILS 0.4 0.0 - 0.4 K/UL    ABS. BASOPHILS 0.1 0.0 - 0.1 K/UL    ABS. IMM.  GRANS. 0.1 (H) 0.00 - 0.04 K/UL    DF SMEAR SCANNED      RBC COMMENTS ANISOCYTOSIS  2+        RBC COMMENTS MICROCYTOSIS  1+        RBC COMMENTS HYPOCHROMIA  2+       GLUCOSE, POC    Collection Time: 07/11/21  8:22 AM   Result Value Ref Range    Glucose (POC) 108 65 - 117 mg/dL    Performed by Lizbeth Pruitt MD

## 2021-07-11 NOTE — PROGRESS NOTES
Pt is running into Afib with a long pause of 4.83s at around 7.10 am.  GRAZYNA Ochoa notified thought perfect serve. 7.23 am GRAZYNA Ochoa messaged back that she will let Dr. Hartley Client know. In coming shift RN will be notified as well.

## 2021-07-11 NOTE — PROGRESS NOTES
Problem: Mobility Impaired (Adult and Pediatric)  Goal: *Acute Goals and Plan of Care (Insert Text)  Description: FUNCTIONAL STATUS PRIOR TO ADMISSION: patient ambulated with rollator at baseline reports 1 fall in the last few months. Has assist from son his caregiver for mobility and ADLs    HOME SUPPORT PRIOR TO ADMISSION: The patient lived with wife who has Alzheimer son lives with them and is their full time caregiver. Physical Therapy Goals  Initiated 7/11/2021  1. Patient will move from supine to sit and sit to supine  in bed with modified independence within 7 day(s). 2.  Patient will transfer from bed to chair and chair to bed with modified independence using the least restrictive device within 7 day(s). 3.  Patient will perform sit to stand with modified independence within 7 day(s). 4.  Patient will ambulate with supervision/set-up for 150 feet with the least restrictive device within 7 day(s). 5.  Patient will ascend/descend 6 stairs with 1 handrail(s) with minimal assistance/contact guard assist within 7 day(s). Outcome: Progressing Towards Goal     PHYSICAL THERAPY EVALUATION  Patient: Alen Edwards (89 y.o. male)  Date: 7/11/2021  Primary Diagnosis: GI bleed [K92.2]        Precautions:   Fall      ASSESSMENT  Based on the objective data described below, the patient presents with generalized weakness impaired balance, decreased activity tolerance and decreased safety awareness d/t admission for low Hgb. Current Level of Function Impacting Discharge (mobility/balance): Ronald-CGA with RW    Functional Outcome Measure: The patient scored Total: 65/100 on the Barthel Index which is indicative of 35% impaired ability to care for basic self needs/dependency on others. Other factors to consider for discharge: safety     Patient will benefit from skilled therapy intervention to address the above noted impairments.        PLAN :  Recommendations and Planned Interventions: bed mobility training, transfer training, gait training, therapeutic exercises, patient and family training/education, and therapeutic activities      Frequency/Duration: Patient will be followed by physical therapy:  5 times a week to address goals. Recommendation for discharge: (in order for the patient to meet his/her long term goals)  Physical therapy at least 2 days/week in the home     This discharge recommendation:  Has not yet been discussed the attending provider and/or case management    IF patient discharges home will need the following DME: patient owns DME required for discharge         SUBJECTIVE:   Patient stated We all have to be somewhere. 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     Past Surgical History:   Procedure Laterality Date    HX HEENT      detached retina    HX HIP REPLACEMENT  2008    right hip, [facility]       Personal factors and/or comorbidities impacting plan of care: PMH, FAll    Home Situation  Home Environment: Private residence  # Steps to Enter: 6  Rails to Enter: Yes  One/Two Story Residence: One story  Living Alone: No  Support Systems: Child(brandan)  Patient Expects to be Discharged to[de-identified] [employer]  Current DME Used/Available at Home: jaky Combs    EXAMINATION/PRESENTATION/DECISION MAKING:   Critical Behavior:     Orientation Level: Oriented X4 (periodic confusion)        Hearing:   Auditory  Auditory Impairment: Hard of hearing, bilateral  Skin:  NT  Edema: NT  Range Of Motion:  AROM: Generally decreased, functional                       Strength:    Strength: Generally decreased, functional                    Tone & Sensation:   Tone: Normal                              Coordination:  Coordination: Generally decreased, functional  Vision:      Functional Mobility:  Bed Mobility:     Supine to Sit: Minimum assistance     Scooting: Minimum assistance  Transfers:  Sit to Stand: Minimum assistance  Stand to Sit: Minimum assistance                       Balance:   Sitting: Impaired  Sitting - Static: Good (unsupported)  Sitting - Dynamic: Fair (occasional)  Standing: Impaired  Standing - Static: Good;Constant support  Standing - Dynamic : Fair;Unsupported  Ambulation/Gait Training:                 Gait Abnormalities: Decreased step clearance;Shuffling gait (flexed posture)        Base of Support: Narrowed     Speed/[place]: Slow;Shuffled         Functional Measure:  Barthel Index:    Bathin  Bladder: 10  Bowels: 10  Groomin  Dressin  Feeding: 10  Mobility: 10  Stairs: 0  Toilet Use: 5  Transfer (Bed to Chair and Back): 10  Total: 65/100       The Barthel ADL Index: Guidelines  1. The index should be used as a record of what a patient does, not as a record of what a patient could do. 2. The main aim is to establish degree of independence from any help, physical or verbal, however minor and for whatever reason. 3. The need for supervision renders the patient not independent. 4. A patient's performance should be established using the best available evidence. Asking the patient, friends/relatives and nurses are the usual sources, but direct observation and common sense are also important. However direct testing is not needed. 5. Usually the patient's performance over the preceding 24-48 hours is important, but occasionally longer periods will be relevant. 6. Middle categories imply that the patient supplies over 50 per cent of the effort. 7. Use of aids to be independent is allowed. Hermila Mcnamara., Barthel, D.W. (3174). Functional evaluation: the Barthel Index. [address] [facility] (14)2. DEQUAN Mak, Juarez Dawn., Porsche Philip, [address] [facility] ().  Measuring the change indisability after inpatient rehabilitation; comparison of the responsiveness of the Barthel Index and Functional Nobles Measure. Journal of Neurology, Neurosurgery, and Psychiatry, 66(4), 753-329. ANGELA Guzman.A, REG Marrufo, & Gladys Ayers M.A. (2004.) Assessment of post-stroke quality of life in cost-effectiveness studies: The usefulness of the Barthel Index and the EuroQoL-5D. Quality of Life Research, 15, 940-75        Physical Therapy Evaluation Charge Determination   History Examination Presentation Decision-Making   HIGH Complexity :3+ comorbidities / personal factors will impact the outcome/ POC  MEDIUM Complexity : 3 Standardized tests and measures addressing body structure, function, activity limitation and / or participation in recreation  LOW Complexity : Stable, uncomplicated  MEDIUM Complexity : FOTO score of 26-74      Based on the above components, the patient evaluation is determined to be of the following complexity level: LOW     Pain Rating:  No pain reported    Activity Tolerance:   Good HR 56-70's with activity    After treatment patient left in no apparent distress:   Sitting in chair, Call bell within reach, and Bed / chair alarm activated    COMMUNICATION/EDUCATION:   The patients plan of care was discussed with: Registered nurse. Fall prevention education was provided and the patient/caregiver indicated understanding., Patient/family have participated as able in goal setting and plan of care. , and Patient/family agree to work toward stated goals and plan of care.     Thank you for this referral.  Gaby Arevalo, PT   Time Calculation: 21 mins

## 2021-07-11 NOTE — PROGRESS NOTES
Bedside and Verbal shift change report given to BRANDEE Douglas (oncoming nurse) by Bere Hernandez (offgoing nurse). Report included the following information SBAR, Kardex, ED Summary, OR Summary, Procedure Summary, Intake/Output, MAR, Accordion, Recent Results and Med Rec Status.

## 2021-07-11 NOTE — PROGRESS NOTES
GI Progress Note Deb Rodriges  NAME:Prem Ruffin  GVY:[birth year] KMX:[id number]   ATTG: [unfilled]  PCP: Irish Price MD  Date/Time:  7/11/2021 2:05 PM   Assessment:      80 y.o. male w/ chronic anemia. No signs of GI blood loss. MCV is 73. EGD in 2019 by Dr. 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 Denies melena, hematochezia. On eliquis (1.25mg BID) for A Fib.      1. Chronic Microcytic Anemia  2. A Fib  3. Therapeutic anti-coagulation w/ eliquis     Plan:   ·   - trend H/H  - transfuse to keep Hgb > 7  - maintain two large bore IVs  - would hold eliquis for now  - we will check up on him again on Monday to discuss EGD- he is high risk for this given his age     Dr. Gomez Labs to assume care on Monday 7/12/21.      MBS     Subjective:   No bleeding. Hgb stable at 7. Objective:   VITALS:   Last 24hrs VS reviewed since prior progress note. Most recent are:  Visit Vitals  BP (!) 161/60 (BP 1 Location: Right upper arm, BP Patient Position: Sitting; At rest)   Pulse (!) 53   Temp 97.7 °F (36.5 °C)   Resp 17   Wt 96.8 kg (213 lb 6.5 oz)   SpO2 98%   BMI 33.42 kg/m²       Intake/Output Summary (Last 24 hours) at 7/11/2021 1405  Last data filed at 7/11/2021 0037  Gross per 24 hour   Intake 150 ml   Output 550 ml   Net -400 ml     PHYSICAL EXAM:  General: Alert, cooperative, no acute distress    HEENT: NC, Atraumatic. Lungs:  Aerating well on RA, no accessory muscle use, no resp distress  Heart:  Regular rate and rhythm,    Abdomen: Soft, Non distended, Non tender. no rebound. No rigidity. Extremities: No c/c/e  Neurologic:  Alert, oriented, normal speech. Skin:   Dry, intact, no jaundice. Psych:   Good insight. Not anxious nor agitated.  Normal affect    Lab and Radiology Data Reviewed: (see below)    Medications Reviewed: (see below)  PMH/SH reviewed - no change compared to H&P  ________________________________________________________________________  Care Plan discussed with:  Patient X   Family  X   RN               Consultant:       David Otero,      Procedures: see electronic medical records for all procedures/Xrays and details which were not copied into this note but were reviewed prior to creation of Plan. LABS:  Recent Labs     07/11/21  0449 07/10/21  2016 07/10/21  0334 07/09/21  1619   WBC 8.8  --   --  8.6   HGB 7.2* 7.3*   < > 6.1*   HCT 24.8* 24.6*   < > 22.3*     --   --  279    < > = values in this interval not displayed. Recent Labs     07/11/21 0449 07/09/21  1619   * 138   K 4.1 4.7    107   CO2 23 25   BUN 12 18   CREA 0.93 1.06   GLU 85 93   CA 8.2* 8.9     Recent Labs     07/09/21  1619   AP 98   TP 7.7   ALB 3.2*   GLOB 4.5*     No results for input(s): INR, PTP, APTT, INREXT in the last 72 hours. Recent Labs     07/09/21  1619   TIBC 514*   PSAT 4*   FERR 9*      Lab Results   Component Value Date/Time    Folate 4.7 07/06/2021 12:00 AM     No results for input(s): PH, PCO2, PO2 in the last 72 hours. No results for input(s): CPK, CKMB in the last 72 hours.     No lab exists for component: TROPONINI  Lab Results   Component Value Date/Time    Color DARK YELLOW 01/21/2020 07:50 PM    Appearance CLEAR 01/21/2020 07:50 PM    Specific gravity 1.025 01/21/2020 07:50 PM    pH (UA) 6.0 01/21/2020 07:50 PM    Protein 30 (A) 01/21/2020 07:50 PM    Glucose NEGATIVE  01/21/2020 07:50 PM    Ketone TRACE (A) 01/21/2020 07:50 PM    Bilirubin NEGATIVE  12/02/2019 01:27 PM    Urobilinogen 1.0 01/21/2020 07:50 PM    Nitrites NEGATIVE  01/21/2020 07:50 PM    Leukocyte Esterase SMALL (A) 01/21/2020 07:50 PM    Epithelial cells FEW 01/21/2020 07:50 PM    Bacteria NEGATIVE  01/21/2020 07:50 PM    WBC 0-4 01/21/2020 07:50 PM    RBC 0-5 01/21/2020 07:50 PM       MEDICATIONS:  Current Facility-Administered Medications   Medication Dose Route Frequency    sodium chloride (NS) flush 5-40 mL  5-40 mL IntraVENous Q8H    sodium chloride (NS) flush 5-40 mL  5-40 mL IntraVENous PRN    acetaminophen (TYLENOL) tablet 650 mg  650 mg Oral Q6H PRN    Or    acetaminophen (TYLENOL) suppository 650 mg  650 mg Rectal Q6H PRN    polyethylene glycol (MIRALAX) packet 17 g  17 g Oral DAILY PRN    ondansetron (ZOFRAN ODT) tablet 4 mg  4 mg Oral Q8H PRN    Or    ondansetron (ZOFRAN) injection 4 mg  4 mg IntraVENous Q6H PRN    hydrALAZINE (APRESOLINE) 20 mg/mL injection 10 mg  10 mg IntraVENous Q6H PRN    hydrALAZINE (APRESOLINE) tablet 10 mg  10 mg Oral PRN    0.9% sodium chloride infusion 250 mL  250 mL IntraVENous PRN    traZODone (DESYREL) tablet 50 mg  50 mg Oral QHS    ferrous sulfate tablet 325 mg  1 Tablet Oral BID WITH MEALS    albuterol-ipratropium (DUO-NEB) 2.5 MG-0.5 MG/3 ML  3 mL Nebulization Q4H PRN    0.9% sodium chloride infusion 250 mL  250 mL IntraVENous PRN    pantoprazole (PROTONIX) 40 mg in 0.9% sodium chloride 10 mL injection  40 mg IntraVENous Q12H

## 2021-07-11 NOTE — PROGRESS NOTES
Bedside and Verbal shift change report given to Kitty Singletary RN (oncoming nurse) by Tonya Villela RN (offgoing nurse). Report included the following information SBAR, Kardex, ED Summary, Procedure Summary, MAR, Recent Results and Cardiac Rhythm Afib.

## 2021-07-11 NOTE — PROGRESS NOTES
I was asked to see pt for bradycardia but he is followed by Dr. Tania Moreno and associates. I asked pt's nurse to consult them.

## 2021-07-12 ENCOUNTER — APPOINTMENT (OUTPATIENT)
Dept: GENERAL RADIOLOGY | Age: 86
End: 2021-07-12
Attending: FAMILY MEDICINE
Payer: MEDICARE

## 2021-07-12 VITALS
HEART RATE: 100 BPM | WEIGHT: 212.52 LBS | RESPIRATION RATE: 16 BRPM | OXYGEN SATURATION: 98 % | DIASTOLIC BLOOD PRESSURE: 74 MMHG | BODY MASS INDEX: 33.29 KG/M2 | SYSTOLIC BLOOD PRESSURE: 167 MMHG | TEMPERATURE: 98.3 F

## 2021-07-12 LAB
ANION GAP SERPL CALC-SCNC: 6 MMOL/L (ref 5–15)
BASOPHILS # BLD: 0.1 K/UL (ref 0–0.1)
BASOPHILS NFR BLD: 1 % (ref 0–1)
BUN SERPL-MCNC: 10 MG/DL (ref 6–20)
BUN/CREAT SERPL: 11 (ref 12–20)
CALCIUM SERPL-MCNC: 8.4 MG/DL (ref 8.5–10.1)
CHLORIDE SERPL-SCNC: 106 MMOL/L (ref 97–108)
CO2 SERPL-SCNC: 23 MMOL/L (ref 21–32)
CREAT SERPL-MCNC: 0.95 MG/DL (ref 0.7–1.3)
DIFFERENTIAL METHOD BLD: ABNORMAL
EOSINOPHIL # BLD: 0.3 K/UL (ref 0–0.4)
EOSINOPHIL NFR BLD: 3 % (ref 0–7)
ERYTHROCYTE [DISTWIDTH] IN BLOOD BY AUTOMATED COUNT: 23.3 % (ref 11.5–14.5)
GLUCOSE BLD STRIP.AUTO-MCNC: 102 MG/DL (ref 65–117)
GLUCOSE BLD STRIP.AUTO-MCNC: 98 MG/DL (ref 65–117)
GLUCOSE SERPL-MCNC: 91 MG/DL (ref 65–100)
HCT VFR BLD AUTO: 25.4 % (ref 36.6–50.3)
HGB BLD-MCNC: 7.4 G/DL (ref 12.1–17)
IMM GRANULOCYTES # BLD AUTO: 0.1 K/UL (ref 0–0.04)
IMM GRANULOCYTES NFR BLD AUTO: 1 % (ref 0–0.5)
LYMPHOCYTES # BLD: 2.8 K/UL (ref 0.8–3.5)
LYMPHOCYTES NFR BLD: 28 % (ref 12–49)
MCH RBC QN AUTO: 22.2 PG (ref 26–34)
MCHC RBC AUTO-ENTMCNC: 29.1 G/DL (ref 30–36.5)
MCV RBC AUTO: 76 FL (ref 80–99)
MONOCYTES # BLD: 0.9 K/UL (ref 0–1)
MONOCYTES NFR BLD: 9 % (ref 5–13)
NEUTS SEG # BLD: 5.8 K/UL (ref 1.8–8)
NEUTS SEG NFR BLD: 58 % (ref 32–75)
NRBC # BLD: 0 K/UL (ref 0–0.01)
NRBC BLD-RTO: 0 PER 100 WBC
PLATELET # BLD AUTO: 228 K/UL (ref 150–400)
PMV BLD AUTO: 9.5 FL (ref 8.9–12.9)
POTASSIUM SERPL-SCNC: 4.1 MMOL/L (ref 3.5–5.1)
RBC # BLD AUTO: 3.34 M/UL (ref 4.1–5.7)
RBC MORPH BLD: ABNORMAL
SERVICE CMNT-IMP: NORMAL
SERVICE CMNT-IMP: NORMAL
SODIUM SERPL-SCNC: 135 MMOL/L (ref 136–145)
TSH SERPL DL<=0.05 MIU/L-ACNC: 7.24 UIU/ML (ref 0.36–3.74)
WBC # BLD AUTO: 10 K/UL (ref 4.1–11.1)

## 2021-07-12 PROCEDURE — C9113 INJ PANTOPRAZOLE SODIUM, VIA: HCPCS | Performed by: STUDENT IN AN ORGANIZED HEALTH CARE EDUCATION/TRAINING PROGRAM

## 2021-07-12 PROCEDURE — 36415 COLL VENOUS BLD VENIPUNCTURE: CPT

## 2021-07-12 PROCEDURE — 80048 BASIC METABOLIC PNL TOTAL CA: CPT

## 2021-07-12 PROCEDURE — 74011250636 HC RX REV CODE- 250/636: Performed by: STUDENT IN AN ORGANIZED HEALTH CARE EDUCATION/TRAINING PROGRAM

## 2021-07-12 PROCEDURE — 99218 HC RM OBSERVATION: CPT

## 2021-07-12 PROCEDURE — 97116 GAIT TRAINING THERAPY: CPT

## 2021-07-12 PROCEDURE — 99232 SBSQ HOSP IP/OBS MODERATE 35: CPT | Performed by: SPECIALIST

## 2021-07-12 PROCEDURE — 97535 SELF CARE MNGMENT TRAINING: CPT | Performed by: OCCUPATIONAL THERAPIST

## 2021-07-12 PROCEDURE — 85025 COMPLETE CBC W/AUTO DIFF WBC: CPT

## 2021-07-12 PROCEDURE — 74011250637 HC RX REV CODE- 250/637: Performed by: FAMILY MEDICINE

## 2021-07-12 PROCEDURE — 82962 GLUCOSE BLOOD TEST: CPT

## 2021-07-12 PROCEDURE — 71045 X-RAY EXAM CHEST 1 VIEW: CPT

## 2021-07-12 PROCEDURE — 84443 ASSAY THYROID STIM HORMONE: CPT

## 2021-07-12 PROCEDURE — 96376 TX/PRO/DX INJ SAME DRUG ADON: CPT

## 2021-07-12 PROCEDURE — 97165 OT EVAL LOW COMPLEX 30 MIN: CPT | Performed by: OCCUPATIONAL THERAPIST

## 2021-07-12 PROCEDURE — 74011000250 HC RX REV CODE- 250: Performed by: STUDENT IN AN ORGANIZED HEALTH CARE EDUCATION/TRAINING PROGRAM

## 2021-07-12 RX ORDER — PANTOPRAZOLE SODIUM 40 MG/1
40 TABLET, DELAYED RELEASE ORAL DAILY
Qty: 30 TABLET | Refills: 0 | Status: SHIPPED | OUTPATIENT
Start: 2021-07-12 | End: 2021-07-16 | Stop reason: SDUPTHER

## 2021-07-12 RX ORDER — LANOLIN ALCOHOL/MO/W.PET/CERES
325 CREAM (GRAM) TOPICAL 2 TIMES DAILY WITH MEALS
Qty: 60 TABLET | Refills: 0 | Status: SHIPPED | OUTPATIENT
Start: 2021-07-12 | End: 2021-07-16 | Stop reason: SDUPTHER

## 2021-07-12 RX ADMIN — FERROUS SULFATE TAB 325 MG (65 MG ELEMENTAL FE) 325 MG: 325 (65 FE) TAB at 09:41

## 2021-07-12 RX ADMIN — SODIUM CHLORIDE 40 MG: 9 INJECTION INTRAMUSCULAR; INTRAVENOUS; SUBCUTANEOUS at 09:40

## 2021-07-12 NOTE — DISCHARGE INSTRUCTIONS

## 2021-07-12 NOTE — DISCHARGE SUMMARY
Discharge Summary       PATIENT ID: Dionen Dave  MRN: [id number]   YOB: 1930    DATE OF ADMISSION: 7/9/2021  5:38 PM    DATE OF DISCHARGE: 07/12/2021   PRIMARY CARE PROVIDER: Irish Price MD     ATTENDING PHYSICIAN: Mena Bourgeois  DISCHARGING PROVIDER: Judy Neal MD    To contact this individual call [phone] and ask the  to page. If unavailable ask to be transferred the Adult Hospitalist Department. CONSULTATIONS: IP CONSULT TO HOSPITALIST  IP CONSULT TO GASTROENTEROLOGY  IP CONSULT TO CARDIOLOGY    PROCEDURES/SURGERIES: * No surgery found *    ADMITTING [address] [facility] COURSE:     HPI  Dionne Dave is a 80 y.o. male w/ pmh of HTN, afib on eliquis, CVA/TIA who presents to hospital on rec of pcp for anemia/low hemoglobin. Pt states he had been in his usual state of health but does endorse some increased malaise and fatigue over the last month. Patient states he was recently started on iron supplements about 2 weeks ago and reports compliance. He is unable to endorse any melena or hematochezia as he has not been attached to his stools. He denies any previous history of GI bleed. Denies any previous history of colonoscopy or EGD. Hospital Course  Patient presents with acute on chronic anemia. On initial presentation, patient's hemoglobin was 6.1, patient was given 2 units of PRBC transfusion. Posttransfusion patient's hemoglobin remained stable. GI evaluated the patient and initially was considered for EGD however after weighing risks and benefit, it was recommended that patient continues with conservative management. Patient has iron deficiency and was started on oral iron. Patient will be on Protonix upon discharge. Patient also has atrial fibrillation and takes Eliquis at home which was held due to concern of GI source of blood loss. Patient also was noted to have frequent pauses on the cardiac monitor. Cardiology further evaluated the patient.   Patient decided that he does not want the pacemaker placement if needed. Patient was also assessed by physical therapy and recommended home health. However upon talking to the patient and the family, patient does not want any home health to follow. Patient's son who is the main caregiver helps patient 24/7. They are in the process of enrolling patient into hospice care. DISCHARGE DIAGNOSES / PLAN:      1. Anemia  2. Atrial fibrillation  3. Hypertension  4. Insomnia     ADDITIONAL CARE RECOMMENDATIONS:     Follow-up with PCP in 1 week. PENDING TEST RESULTS:   At the time of discharge the following test results are still pending: None    FOLLOW UP APPOINTMENTS:    Follow-up Information     Follow up With Specialties Details Why Contact Info    Matthew Teixeira MD Family Medicine   [address] [place] [address] [id number]               DIET: Cardiac Diet  Oral Nutritional Supplements: No Oral Supplement prescribed    ACTIVITY: Activity as tolerated    WOUND CARE: None    EQUIPMENT needed: None      DISCHARGE MEDICATIONS:  Current Discharge Medication List      START taking these medications    Details   ferrous sulfate 325 mg (65 mg iron) tablet Take 1 Tablet by mouth two (2) times daily (with meals). Qty: 60 Tablet, Refills: 0  Start date: 7/12/2021      pantoprazole (Protonix) 40 mg tablet Take 1 Tablet by mouth daily. Qty: 30 Tablet, Refills: 0  Start date: 7/12/2021         CONTINUE these medications which have NOT CHANGED    Details   traZODone (DESYREL) 50 mg tablet Take 1 Tab by mouth nightly.  For sleep  Qty: 30 Tab, Refills: 5      cholecalciferol (VITAMIN D3) (50,000 UNITS /1250 MCG) capsule Take 1 capsule once a week for the next 8 weeks then switch to OTC vitamin D3 2000 units daily  Qty: 8 Capsule, Refills: 0  Start date: 7/9/2021         STOP taking these medications       apixaban (Eliquis) 2.5 mg tablet Comments:   Reason for Stopping:                 NOTIFY [address] [facility] [address] THE FOLLOWING:   Fever over 101 degrees for 24 hours. Chest pain, shortness of breath, fever, chills, nausea, vomiting, diarrhea, change in mentation, falling, weakness, bleeding. Severe pain or pain not relieved by medications. Or, any other signs or symptoms that you may have questions about. DISPOSITION:    Home With:   OT  PT  HH  RN       Long term SNF/Inpatient Rehab    Independent/assisted living    Hospice    Other:       PATIENT CONDITION AT DISCHARGE:     Functional status    Poor     Deconditioned     Independent      Cognition     Lucid     Forgetful     Dementia      Catheters/lines (plus indication)    Iniguez     PICC     PEG     None      Code status     Full code     DNR      PHYSICAL EXAMINATION AT DISCHARGE:  General:          Alert, cooperative, no distress, appears stated age. HEENT:           Atraumatic, anicteric sclerae, pink conjunctivae                          No oral ulcers, mucosa moist, throat clear, dentition fair  Neck:               Supple, symmetrical  Lungs:             Clear to auscultation bilaterally. No Wheezing or Rhonchi. No rales. Chest wall:      No tenderness  No Accessory muscle use. Heart:              Regular  rhythm,  No  murmur   No edema  Abdomen:        Soft, non-tender. Not distended. Bowel sounds normal  Extremities:     No cyanosis. No clubbing,                            Skin turgor normal, Capillary refill normal  Skin:                Not pale. Not Jaundiced  No rashes   Psych:             Not anxious or agitated.   Neurologic:      Alert, moves all extremities, answers questions appropriately and responds to commands       CHRONIC MEDICAL DIAGNOSES:  Problem List as of 7/12/2021 Date Reviewed: 7/11/2021        Codes Class Noted - Resolved    Permanent atrial fibrillation ([facility] ICD-10-CM: I48.21  ICD-9-CM: 427.31  7/11/2021 - Present        SSS (sick sinus syndrome) ([facility] ICD-10-CM: I49.5  ICD-9-CM: 427.81  7/11/2021 - Present        GI bleed ICD-10-CM: K92.2  ICD-9-CM: 578.9  7/9/2021 - Present        History of stroke ICD-10-CM: Z86.73  ICD-9-CM: V12.54  10/12/2019 - Present        ACP (advance care planning) ICD-10-CM: Z71.89  ICD-9-CM: V65.49  2/14/2017 - Present        Right knee DJD ICD-10-CM: M17.11  ICD-9-CM: 715.96  12/15/2014 - Present        Paroxysmal atrial fibrillation (H[facility]) ICD-10-CM: I48.0  ICD-9-CM: 427.31  7/17/2014 - Present        Hypothyroidism ICD-10-CM: E03.9  ICD-9-CM: 244.9  1/8/2011 - Present        HTN, goal below 140/90 ICD-10-CM: I10  ICD-9-CM: 401.9  12/2/2010 - Present        RESOLVED: Acute phlegmonous appendicitis ICD-10-CM: K35.890  ICD-9-CM: 540.9  1/21/2020 - 8/20/2020        RESOLVED: Severe obesity (BMI 35.0-39. 9) with comorbidity ([address] ICD-10-CM: E66.01  ICD-9-CM: 278.01  3/28/2018 - 7/6/2021        RESOLVED: Peripheral edema ICD-10-CM: R60.9  ICD-9-CM: 782.3  2/14/2017 - 7/6/2021        RESOLVED: Encounter for monitoring Coumadin therapy ICD-10-CM: Z51.81, Z79.01  ICD-9-CM: V58.83, V58.61  7/29/2014 - 7/6/2021              Greater than 60 minutes were spent with the patient on counseling and coordination of care    Signed:   Oscar Kemp MD  7/12/2021  2:22 PM

## 2021-07-12 NOTE — PROGRESS NOTES
Problem: Self Care Deficits Care Plan (Adult)  Goal: *Acute Goals and Plan of Care (Insert Text)  Description:   FUNCTIONAL STATUS PRIOR TO ADMISSION: Patient was modified independent using a rollator for functional mobility. HOME SUPPORT: The patient lived with his wife who has Alzheimer's and their son who is their primary caretaker. Per chart 1 fall in 5 years. Occupational Therapy Goals  Initiated 7/12/2021  1. Patient will perform grooming with modified independence standing at the sink within 7 day(s). 2.  Patient will perform lower body dressing with supervision/set-up within 7 day(s). 3.  Patient will perform toilet transfers with supervision/set-up using rolling walker and grabbar within 7 day(s). 4.  Patient will perform all aspects of toileting with supervision/set-up within 7 day(s). Outcome: Not Met    OCCUPATIONAL THERAPY EVALUATION  Patient: Rudy Gutierrez (48 y.o. male)  Date: 7/12/2021  Primary Diagnosis: GI bleed [K92.2]        Precautions:   Fall    ASSESSMENT  Based on the objective data described below, the patient presents with increased activity tolerance, shortness of breath with exertion and intermittent cough which he reports having for \"awhile\". Patient has support at home and likely will benefit from continued OT in this setting, discussed potential need to use rolling walker at home versus rollator for increased support. Current Level of Function Impacting Discharge (ADLs/self-care): min to mod assist LE ADLs, min assist toileting, CGA to min assist ADL transfers    Functional Outcome Measure: The patient scored 60/100 on the Barthel Index outcome measure     Other factors to consider for discharge: son \"Thomas\", is primary caretaker      Patient will benefit from skilled therapy intervention to address the above noted impairments.        PLAN :  Recommendations and Planned Interventions: self care training, functional mobility training, therapeutic exercise, balance training, therapeutic activities, endurance activities, patient education, home safety training, and family training/education    Frequency/Duration: Patient will be followed by occupational therapy 5 times a week to address goals. Recommendation for discharge: (in order for the patient to meet his/her long term goals)  Occupational therapy at least 2 days/week in the home AND ensure assist and/or supervision for safety with basic ADL's and functional mobility    This discharge recommendation:  Has not yet been discussed the attending provider and/or case management    IF patient discharges home will need the following DME: none       SUBJECTIVE:   Patient stated I think I'm going home today. 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     Past Surgical History:   Procedure Laterality Date    HX HEENT      detached retina    HX HIP REPLACEMENT  2008    right hip, [facility]       Expanded or extensive additional review of patient history:     Home Situation  Home Environment: Private residence  # Steps to Enter: 6  Rails to Enter: Yes  One/Two Story Residence: One story  Living Alone: No  Support Systems: Child(brandan)  Patient Expects to be Discharged to[de-identified] [employer]  Current DME Used/Available at Home: Walker, rollator    Hand dominance: Right    EXAMINATION OF PERFORMANCE DEFICITS:  Cognitive/Behavioral Status:  Neurologic State: Alert  Orientation Level: Oriented X4  Cognition: Appropriate decision making; Appropriate for age attention/concentration; Appropriate safety awareness  Perception: Appears intact (noted left LE external rotation and cues to position in walk)  Perseveration: No perseveration noted  Safety/Judgement: Awareness of environment; Fall prevention;Home safety; Insight into deficits; Decreased awareness of need for assistance    Skin: educated on skin protection    Edema: none noted    Hearing: Auditory  Auditory Impairment: Hard of hearing, bilateral    Vision/Perceptual:         Range of Motion:  AROM: Generally decreased, functional              Strength:  Strength: Generally decreased, functional        Coordination:  Coordination: Within functional limits  Fine Motor Skills-Upper: Left Intact; Right Intact         Tone & Sensation:  Tone: Normal        Balance:  Sitting: Intact; Without support  Sitting - Static: Good (unsupported)  Sitting - Dynamic: Good (unsupported)  Standing: Impaired  Standing - Static: Constant support;Good  Standing - Dynamic : Fair;Constant support    Functional Mobility and Transfers for ADLs:  Bed Mobility:  Sit to Supine: Minimum assistance  Scooting: Minimum assistance    Transfers:  Sit to Stand: Contact guard assistance;Minimum assistance  Stand to Sit: Contact guard assistance;Minimum assistance  Bed to Chair: Contact guard assistance; Additional time; Adaptive equipment;Assist x1  Bathroom Mobility: Contact guard assistance (mod cues for rolling walker/left foot placement)  Toilet Transfer : Minimum assistance; Other (comment) (instructed on positioning of walker over toilet to stand)    ADL Assessment:  Feeding: Modified independent    Oral Facial Hygiene/Grooming: Contact guard assistance       Upper Body Dressing: Setup    Lower Body Dressing: Minimum assistance; Moderate assistance; Additional time    Toileting: Contact guard assistance;Minimum assistance                ADL Intervention and task modifications:   Educated on role of OT, educated on fall prevention related to use of rolling walker versus rollator for increased support, need for son to walk next to him initially at home, frequent toileting ~every  minutes to prevent need to rush to bathroom, verbalized good understanding of all    Patient instructed and indicated understanding the benefits of maintaining activity tolerance, functional mobility, and independence with self care tasks during acute stay  to ensure safe return home and to baseline. Encouraged patient to increase frequency and duration OOB, be out of bed for all meals, perform daily ADLs (as approved by RN/MD regarding bathing etc), and performing functional mobility to/from bathroom. Cognitive Retraining  Safety/Judgement: Awareness of environment; Fall prevention;Home safety; Insight into deficits; Decreased awareness of need for assistance       Functional Measure:  Barthel Index:    Bathin  Bladder: 5  Bowels: 10  Groomin  Dressin  Feeding: 10  Mobility: 10  Stairs: 0  Toilet Use: 5  Transfer (Bed to Chair and Back): 10  Total: 60/100        The Barthel ADL Index: Guidelines  1. The index should be used as a record of what a patient does, not as a record of what a patient could do. 2. The main aim is to establish degree of independence from any help, physical or verbal, however minor and for whatever reason. 3. The need for supervision renders the patient not independent. 4. A patient's performance should be established using the best available evidence. Asking the patient, friends/relatives and nurses are the usual sources, but direct observation and common sense are also important. However direct testing is not needed. 5. Usually the patient's performance over the preceding 24-48 hours is important, but occasionally longer periods will be relevant. 6. Middle categories imply that the patient supplies over 50 per cent of the effort. 7. Use of aids to be independent is allowed. Kristina aLne., Barthel, D.W. (4684). Functional evaluation: the Barthel Index. [facility] (14)2. DEQUAN Justin, Paul Black.Flores Killen, [facility] (). Measuring the change indisability after inpatient rehabilitation; comparison of the responsiveness of the Barthel Index and Functional [place] Measure. Journal of Neurology, Neurosurgery, and Psychiatry, 66(4), 177-796. ANGELA Riley.EFRAIN, REG Marrufo, & Lord Tashi M.A. (2004.) Assessment of post-stroke quality of life in cost-effectiveness studies: The usefulness of the Barthel Index and the EuroQoL-5D. Quality of Life Research, 15, 902-37         Occupational Therapy Evaluation Charge Determination   History Examination Decision-Making   LOW Complexity : Brief history review  LOW Complexity : 1-3 performance deficits relating to physical, cognitive , or psychosocial skils that result in activity limitations and / or participation restrictions  MEDIUM Complexity : Patient may present with comorbidities that affect occupational performnce. Miniml to moderate modification of tasks or assistance (eg, physical or verbal ) with assesment(s) is necessary to enable patient to complete evaluation       Based on the above components, the patient evaluation is determined to be of the following complexity level: LOW   Pain Rating:  No complaint of pain    Activity Tolerance:   Good, Fair, observed SOB with activity, and coughing intermittently throughout    After treatment patient left in no apparent distress:    Supine in bed, Call bell within reach, Bed / chair alarm activated, and Side rails x 3    COMMUNICATION/EDUCATION:   The patients plan of care was discussed with: Physical therapist and Registered nurse. Home safety education was provided and the patient/caregiver indicated understanding. and Patient/family have participated as able in goal setting and plan of care. This patients plan of care is appropriate for delegation to [facility].     Thank you for this referral.  LINA Underwood/RE  Time Calculation: 21 mins

## 2021-07-12 NOTE — PROGRESS NOTES
Isa Naik [address]  [address] [facility], [address]       GI PROGRESS NOTE  Angela Null, [facility] office  [phone] NP in-hospital cell phone M-F until 4:30  After 5pm or on weekends, please call  for physician on call      NAME: Rudy Gutierrez   :  1930   MRN:  [id number]       Subjective:   He complains of bronchial issues, cough with deep breaths. He denies GI complaints, had dark stool last night (on iron). 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 Objective:     VITALS:   Last 24hrs VS reviewed since prior progress note. Most recent are:  Visit Vitals  BP (!) 151/61 (BP 1 Location: Right upper arm, BP Patient Position: At rest)   Pulse 67   Temp 98.4 °F (36.9 °C)   Resp 18   Wt 96.4 kg (212 lb 8.4 oz)   SpO2 97%   BMI 33.29 kg/m²       PHYSICAL EXAM:  General: Cooperative, no acute distress    Neurologic:  Alert and oriented X 3. HEENT: EOMI, no scleral icterus   Lungs:  No increased WOB  Heart:  regular rate  Abdomen: Soft, non-distended, no tenderness. Extremities: No edema  Psych:   Good insight. Not anxious or agitated.     Lab Data Reviewed:     Recent Results (from the past 24 hour(s))   TSH 3RD GENERATION    Collection Time: 21  1:18 AM   Result Value Ref Range    TSH 7.24 (H) 0.36 - 0.62 uIU/mL   METABOLIC PANEL, BASIC    Collection Time: 21  1:19 AM   Result Value Ref Range    Sodium 135 (L) 136 - 145 mmol/L    Potassium 4.1 3.5 - 5.1 mmol/L    Chloride 106 97 - 108 mmol/L    CO2 23 21 - 32 mmol/L    Anion gap 6 5 - 15 mmol/L    Glucose 91 65 - 100 mg/dL    BUN 10 6 - 20 MG/DL    Creatinine 0.95 0.70 - 1.30 MG/DL    BUN/Creatinine ratio 11 (L) 12 - 20      GFR est AA >60 >60 ml/min/1.73m2    GFR est non-AA >60 >60 ml/min/1.73m2    Calcium 8.4 (L) 8.5 - 10.1 MG/DL   CBC WITH AUTOMATED DIFF    Collection Time: 21  1:19 AM   Result Value Ref Range    WBC 10.0 4.1 - 11.1 K/uL    RBC 3.34 (L) 4.10 - 5.70 M/uL    HGB 7.4 (L) 12.1 - 17.0 g/dL HCT 25.4 (L) 36.6 - 50.3 %    MCV 76.0 (L) 80.0 - 99.0 FL    MCH 22.2 (L) 26.0 - 34.0 PG    MCHC 29.1 (L) 30.0 - 36.5 g/dL    RDW 23.3 (H) 11.5 - 14.5 %    PLATELET 335 225 - 629 K/uL    MPV 9.5 8.9 - 12.9 FL    NRBC 0.0 0  WBC    ABSOLUTE NRBC 0.00 0.00 - 0.01 K/uL    NEUTROPHILS 58 32 - 75 %    LYMPHOCYTES 28 12 - 49 %    MONOCYTES 9 5 - 13 %    EOSINOPHILS 3 0 - 7 %    BASOPHILS 1 0 - 1 %    IMMATURE GRANULOCYTES 1 (H) 0.0 - 0.5 %    ABS. NEUTROPHILS 5.8 1.8 - 8.0 K/UL    ABS. LYMPHOCYTES 2.8 0.8 - 3.5 K/UL    ABS. MONOCYTES 0.9 0.0 - 1.0 K/UL    ABS. EOSINOPHILS 0.3 0.0 - 0.4 K/UL    ABS. BASOPHILS 0.1 0.0 - 0.1 K/UL    ABS. IMM. GRANS. 0.1 (H) 0.00 - 0.04 K/UL    DF SMEAR SCANNED      RBC COMMENTS ANISOCYTOSIS  2+        RBC COMMENTS HYPOCHROMIA  2+        RBC COMMENTS MICROCYTOSIS  1+        RBC COMMENTS POLYCHROMASIA  1+        RBC COMMENTS OVALOCYTES  1+        RBC COMMENTS SCHISTOCYTES  PRESENT       GLUCOSE, POC    Collection Time: 07/12/21  8:17 AM   Result Value Ref Range    Glucose (POC) 98 65 - 117 mg/dL    Performed by Dewayne Armenta      EGD in 2019 by Dr. 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 Assessment:   · Chronic iron deficiency anemia: hgb 5.9-->7.4 status post 2 units PRBC's 7/9 and 7/10, stable  · Atrial fibrillation: On Eliquis      Patient Active Problem List   Diagnosis Code    HTN, goal below 140/90 I10    Hypothyroidism E03.9    Paroxysmal atrial fibrillation (HCC) I48.0    Right knee DJD M17.11    ACP (advance care planning) Z71.89    History of stroke Z86.73    GI bleed K92.2    Permanent atrial fibrillation ([address] I48.21    SSS (sick sinus syndrome) ([facility] I49.5     Plan:   · PPI  · Iron supplementation   · Diet as tolerated  · Will arrange outpatient follow-up with Dr. Lott Left By: Dez Langford NP     7/12/2021  9:19 AM     GI Attending: Agree with above plan.  Will sign off. Follow up with Dr. Alise Decker. Antwan Amezquita MD

## 2021-07-12 NOTE — PROGRESS NOTES
Problem: Mobility Impaired (Adult and Pediatric)  Goal: *Acute Goals and Plan of Care (Insert Text)  Description: FUNCTIONAL STATUS PRIOR TO ADMISSION: patient ambulated with rollator at baseline reports 1 fall in the last few months. Has assist from son his caregiver for mobility and ADLs    HOME SUPPORT PRIOR TO ADMISSION: The patient lived with wife who has Alzheimer son lives with them and is their full time caregiver. Physical Therapy Goals  Initiated 7/11/2021  1. Patient will move from supine to sit and sit to supine  in bed with modified independence within 7 day(s). 2.  Patient will transfer from bed to chair and chair to bed with modified independence using the least restrictive device within 7 day(s). 3.  Patient will perform sit to stand with modified independence within 7 day(s). 4.  Patient will ambulate with supervision/set-up for 150 feet with the least restrictive device within 7 day(s). 5.  Patient will ascend/descend 6 stairs with 1 handrail(s) with minimal assistance/contact guard assist within 7 day(s). Outcome: Progressing Towards Goal   PHYSICAL THERAPY TREATMENT  Patient: Henna Robison (85 y.o. male)  Date: 7/12/2021  Diagnosis: GI bleed [K92.2] <principal problem not specified>       Precautions: Fall  Chart, physical therapy assessment, plan of care and goals were reviewed. ASSESSMENT  Patient continues with skilled PT services and is progressing towards goals. Patient continues to be limited by fatigue, DAN, and impaired dynamic balance. Patient received sitting in chair with VSS, agreed to participate in therapy. Performed sit to stand with min A and ambulated 110 feet with CGA and RW, gait stable but with flexed trunk posture and required verbal cues for object negotiation and path deviations. Patient reported feeling\"lazy\", requested to return to room with observed DAN despite stable O2 sats in the 90%'s throughout the entire visit.  Left patient sitting in chair with all needs in reach. Anticipate steady gains and continue to recommend home with HHPT as discharge destination. Current Level of Function Impacting Discharge (mobility/balance): min A for transfers, CGA for ambulation    Other factors to consider for discharge: lives with wife with Alzheimer's and her son, hearing deficits, 6 DENISE, previous stroke         PLAN :  Patient continues to benefit from skilled intervention to address the above impairments. Continue treatment per established plan of care. to address goals. Recommendation for discharge: (in order for the patient to meet his/her long term goals)  Physical therapy at least 2 days/week in the home AND ensure assist and/or supervision for safety with mobility    This discharge recommendation:  A follow-up discussion with the attending provider and/or case management is planned    IF patient discharges home will need the following DME: patient owns DME required for discharge       SUBJECTIVE:   Patient stated Steffanie Mcdaniel but I got right back up.  -when asked if he's had a fall recently    OBJECTIVE DATA SUMMARY:   Chart checked, patient cleared by nursing  Critical Behavior:  Neurologic State: Alert  Orientation Level: Oriented X4  Cognition: Appropriate decision making, Appropriate for age attention/concentration, Appropriate safety awareness     Functional Mobility Training:  Bed Mobility:                    Transfers:  Sit to Stand: Minimum assistance  Stand to Sit: Minimum assistance                             Balance:  Sitting: Intact; Without support  Sitting - Static: Good (unsupported)  Sitting - Dynamic: Good (unsupported)  Standing: Impaired  Standing - Static: Constant support;Good  Standing - Dynamic : Fair;Constant support  Ambulation/Gait Training:  Distance (ft): 110 Feet (ft)  Assistive Device: Gait belt;Walker, rolling  Ambulation - Level of Assistance: Contact guard assistance        Gait Abnormalities: Decreased step clearance; Path deviations (flexed posture)        Base of Support: Widened     Speed/[place]: Slow;Shuffled  Step Length: Left shortened;Right shortened                    Stairs: Therapeutic Exercises:     Pain Rating:  Not rated    Activity Tolerance:   Good    After treatment patient left in no apparent distress:   Sitting in chair and Call bell within reach    COMMUNICATION/COLLABORATION:   The patients plan of care was discussed with: Physical therapist and Registered nurse. Elida Art, SPT   Time Calculation: 17 mins       Regarding student involvement in patient care:  A student participated in this treatment session. Per CMS Medicare statements and APTA guidelines I certify that the following was true:  1. I was present and directly observed the entire session. 2. I made all skilled judgments and clinical decisions regarding care. 3. I am the practitioner responsible for assessment, treatment, and documentation.

## 2021-07-12 NOTE — PROGRESS NOTES
Bedside and Verbal shift change report given to [address] [place] [address] (oncoming nurse) by Ricki Lal RN (offgoing nurse). Report included the following information SBAR, Kardex, Intake/Output, MAR, Recent Results and Cardiac Rhythm A fib .

## 2021-07-12 NOTE — PROGRESS NOTES
Problem: Falls - Risk of  Goal: *Absence of Falls  Description: Document Clydene Bone Fall Risk and appropriate interventions in the flowsheet.   Outcome: Progressing Towards Goal  Note: Fall Risk Interventions:  Mobility Interventions: Bed/chair exit alarm, Communicate number of staff needed for ambulation/transfer, OT consult for ADLs, Patient to call before getting OOB, PT Consult for mobility concerns, Utilize walker, cane, or other assistive device    Mentation Interventions: Adequate sleep, hydration, pain control, Bed/chair exit alarm, Door open when patient unattended, Evaluate medications/consider consulting pharmacy, More frequent rounding, Increase mobility, Toileting rounds    Medication Interventions: Bed/chair exit alarm, Evaluate medications/consider consulting pharmacy, Patient to call before getting OOB, Teach patient to arise slowly    Elimination Interventions: Bed/chair exit alarm, Call light in reach, Patient to call for help with toileting needs, Toileting schedule/hourly rounds    History of Falls Interventions: Bed/chair exit alarm, Door open when patient unattended, Evaluate medications/consider consulting pharmacy, Utilize gait belt for transfer/ambulation, Vital signs minimum Q4HRs X 24 hrs (comment for end date)         Problem: Patient Education: Go to Patient Education Activity  Goal: Patient/Family Education  Outcome: Progressing Towards Goal

## 2021-07-12 NOTE — PROGRESS NOTES
Hospital follow-up Virtual PCP transitional care appointment has been scheduled with Dr. Jose Wolfe for Friday, 7/16/21 at 10:00 a.m. Pending patient discharge.  barbara Metcalf, Care Management Specialist.

## 2021-07-12 NOTE — PROGRESS NOTES
Reason for Admission:  Anemia ad low hemoglobin                     RUR Score:     12% Low                Plan for utilizing home health:    [facility]      PCP: First and Last name:  Marilin Pemberton MD     Name of Practice: [facility]   Are you a current patient: Yes/No: Yes   Approximate date of last visit: 7/6/21   Can you participate in a virtual visit with your PCP: NA                    Current Advanced Directive/Advance Care Plan: Full Code      Healthcare Decision Maker:  [address] [phone]  Click here to [address] including selection of the Healthcare Decision Maker Relationship (ie \"Primary\")                             Transition of Care Plan:     CM met with patient to inform of CM role and to assess needs. Patient lives at home with his wife and son Zuleima Polanco in a one story home with six steps to enter . Patient reports that he is independent at home and uses a walker. CM to monitor and assist with transitional care planning needs. Siobhan Hennessy MS                 1:55 Cm met with patient and son Zuleima Polanco. Zuleima Polanco states that prior to hospitalization, the plan was for patient to be admitted to [facility] wife is currently opened to them. CM updated attending and requested further discussion of goals of care. Patient is declining HHPT. Medicare pt has received, reviewed, and signed 2nd IM letter informing them of their right to appeal the discharge. Signed copy has been placed on pt bedside chart. CM to monitor. Siobhan Hennessy MS    2:15 Attending confirmed patient will DC home today with son and will [facility] with [facility]. Patient is declining HH.      Siobhan Hennessy MS

## 2021-07-12 NOTE — PROGRESS NOTES
Faculty or Preceptor Review of Student Work    7/12/2021  - Shift times - 0700  to 1300  The student documentation of patient care for Hiwot Black has been reviewed and approved. All medications have been administered under the direct supervision of the faculty or preceptor.     Erika Rogers RN

## 2021-07-12 NOTE — PROGRESS NOTES
Cardiology Progress Note            Admit Date: 7/9/2021  Admit Diagnosis: GI bleed [K92.2]  Date: 7/12/2021     Time: 1:09 PM    Subjective:   Pt denies chest pain,  dizziness. States he had syncopal episode 2-3 years ago but he isn't sure he had any evaluation. Assessment and Plan     1. History of afib, persistent with 4.8 second pause on 7/10.   -Longest pause noted on tele review was 2.18 seconds   -Asymtpomatic pause   -Remains afib   -discussed with pt and son- pt would not consider PPM at this time anyway. He has been asymtomatic anyway.    -Avoid rate control medications   -[id number]=0. Eliquis held due to severe anemia on presentation and heme +stool. Introduced watchman, (although not sure he would be candidate). Nonetheless, pt indicates, he would not be interested in this procedure anyway.   -Will arrange follow up with Dr. Angelita Roth rather than Dr. Mahsa Valladares per pt request.      2. Severe Anemia, iron deficient and heme + stool   -GI following   -No plans for EGD at this time per GI. GI aware of heme + stool. Conservative management. -HGB now stable at 7.4 s/p 2 units PRBCs for hgb 5.9 on admission.    -Eliquis on hold as above. 3. History of AVM  EGD in 2019 by Dr. Lukas Bonilla showing 3cm hiatal hernia, mild esophagitis, erosions in the antrum, non-bleeding AVM in duodenal sweep s/p APC. 4. History HTN   -BP elevated.   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      Social Hx  Social History     Socioeconomic History    Marital status:      Spouse name: Not on file    Number of children: Not on file    Years of education: Not on file    Highest education level: Not on file   Occupational History  Not on file   Tobacco Use    Smoking status: Former Smoker     Packs/day: 0.25     Types: Cigars     Quit date: 1978     Years since quittin.8    Smokeless tobacco: Never Used    Tobacco comment: cigar on occ   Substance and Sexual Activity    Alcohol use: Yes     Alcohol/week: 14.0 standard drinks     Types: 14 Shots of liquor per week     Comment: 2 drinks a day.  Drug use: No    Sexual activity: Yes     Partners: Female   Other Topics Concern     Service Yes    Blood Transfusions No    Caffeine Concern No    Occupational Exposure No    Hobby Hazards No    Sleep Concern Not Asked    Stress Concern No    Weight Concern Yes    Special Diet No    Back Care No    Exercise Yes    Bike Helmet No    Seat Belt Yes    Self-Exams Yes   Social History Narrative    Not on file     Social Determinants of Health     Financial Resource Strain:     Difficulty of Paying Living Expenses:    Food Insecurity:     Worried About Running Out of Food in the Last Year:     Ran Out of Food in the Last Year:    Transportation Needs:     Lack of Transportation (Medical):      Lack of Transportation (Non-Medical):    Physical Activity:     Days of Exercise per Week:     Minutes of Exercise per Session:    Stress:     Feeling of Stress :    Social Connections:     Frequency of Communication with Friends and Family:     Frequency of Social Gatherings with Friends and Family:     Attends Jain Services:     Active Member of Clubs or Organizations:     Attends Club or Organization Meetings:     Marital Status:    Intimate Partner Violence:     Fear of Current or Ex-Partner:     Emotionally Abused:     Physically Abused:     Sexually Abused:      Objective:      Physical Exam:                Visit Vitals  BP (!) 167/74 (BP 1 Location: Left upper arm, BP Patient Position: Sitting)   Pulse 100   Temp 98.3 °F (36.8 °C)   Resp 16   Wt 212 lb 8.4 oz (96.4 kg)   SpO2 98%   BMI 33.29 kg/m² General Appearance:   Well developed, well nourished,alert and oriented x 3, and   individual in no acute distress. Ears/Nose/Mouth/Throat:    Hearing grossly normal.         Neck:  Supple. Chest:    Lungs clear  to auscultation bilaterally. Cardiovascular:   Irregularly irregular rate and rhythm, S1, S2 normal, no murmur. Abdomen:    Soft, non-tender, bowel sounds are active. Extremities:  trace edema bilaterally. Skin:  Warm and dry. Telemetry: afib, longest pause 2.18 seconds overnight. Data Review:    Labs:    Recent Results (from the past 24 hour(s))   TSH 3RD GENERATION    Collection Time: 07/12/21  1:18 AM   Result Value Ref Range    TSH 7.24 (H) 0.36 - 0.75 uIU/mL   METABOLIC PANEL, BASIC    Collection Time: 07/12/21  1:19 AM   Result Value Ref Range    Sodium 135 (L) 136 - 145 mmol/L    Potassium 4.1 3.5 - 5.1 mmol/L    Chloride 106 97 - 108 mmol/L    CO2 23 21 - 32 mmol/L    Anion gap 6 5 - 15 mmol/L    Glucose 91 65 - 100 mg/dL    BUN 10 6 - 20 MG/DL    Creatinine 0.95 0.70 - 1.30 MG/DL    BUN/Creatinine ratio 11 (L) 12 - 20      GFR est AA >60 >60 ml/min/1.73m2    GFR est non-AA >60 >60 ml/min/1.73m2    Calcium 8.4 (L) 8.5 - 10.1 MG/DL   CBC WITH AUTOMATED DIFF    Collection Time: 07/12/21  1:19 AM   Result Value Ref Range    WBC 10.0 4.1 - 11.1 K/uL    RBC 3.34 (L) 4.10 - 5.70 M/uL    HGB 7.4 (L) 12.1 - 17.0 g/dL    HCT 25.4 (L) 36.6 - 50.3 %    MCV 76.0 (L) 80.0 - 99.0 FL    MCH 22.2 (L) 26.0 - 34.0 PG    MCHC 29.1 (L) 30.0 - 36.5 g/dL    RDW 23.3 (H) 11.5 - 14.5 %    PLATELET 469 341 - 804 K/uL    MPV 9.5 8.9 - 12.9 FL    NRBC 0.0 0  WBC    ABSOLUTE NRBC 0.00 0.00 - 0.01 K/uL    NEUTROPHILS 58 32 - 75 %    LYMPHOCYTES 28 12 - 49 %    MONOCYTES 9 5 - 13 %    EOSINOPHILS 3 0 - 7 %    BASOPHILS 1 0 - 1 %    IMMATURE GRANULOCYTES 1 (H) 0.0 - 0.5 %    ABS. NEUTROPHILS 5.8 1.8 - 8.0 K/UL    ABS. LYMPHOCYTES 2.8 0.8 - 3.5 K/UL    ABS.  MONOCYTES 0.9 0.0 - 1.0 K/UL ABS. EOSINOPHILS 0.3 0.0 - 0.4 K/UL    ABS. BASOPHILS 0.1 0.0 - 0.1 K/UL    ABS. IMM. GRANS. 0.1 (H) 0.00 - 0.04 K/UL    DF SMEAR SCANNED      RBC COMMENTS ANISOCYTOSIS  2+        RBC COMMENTS HYPOCHROMIA  2+        RBC COMMENTS MICROCYTOSIS  1+        RBC COMMENTS POLYCHROMASIA  1+        RBC COMMENTS OVALOCYTES  1+        RBC COMMENTS SCHISTOCYTES  PRESENT       GLUCOSE, POC    Collection Time: 07/12/21  8:17 AM   Result Value Ref Range    Glucose (POC) 98 65 - 117 mg/dL    Performed by Johnny Shah, POC    Collection Time: 07/12/21 11:51 AM   Result Value Ref Range    Glucose (POC) 102 65 - 117 mg/dL    Performed by Lucien Lim           Radiology:        Current Facility-Administered Medications   Medication Dose Route Frequency    sodium chloride (NS) flush 5-40 mL  5-40 mL IntraVENous Q8H    sodium chloride (NS) flush 5-40 mL  5-40 mL IntraVENous PRN    acetaminophen (TYLENOL) tablet 650 mg  650 mg Oral Q6H PRN    Or    acetaminophen (TYLENOL) suppository 650 mg  650 mg Rectal Q6H PRN    polyethylene glycol (MIRALAX) packet 17 g  17 g Oral DAILY PRN    ondansetron (ZOFRAN ODT) tablet 4 mg  4 mg Oral Q8H PRN    Or    ondansetron (ZOFRAN) injection 4 mg  4 mg IntraVENous Q6H PRN    hydrALAZINE (APRESOLINE) 20 mg/mL injection 10 mg  10 mg IntraVENous Q6H PRN    hydrALAZINE (APRESOLINE) tablet 10 mg  10 mg Oral PRN    0.9% sodium chloride infusion 250 mL  250 mL IntraVENous PRN    traZODone (DESYREL) tablet 50 mg  50 mg Oral QHS    ferrous sulfate tablet 325 mg  1 Tablet Oral BID WITH MEALS    albuterol-ipratropium (DUO-NEB) 2.5 MG-0.5 MG/3 ML  3 mL Nebulization Q4H PRN    0.9% sodium chloride infusion 250 mL  250 mL IntraVENous PRN    pantoprazole (PROTONIX) 40 mg in 0.9% sodium chloride 10 mL injection  40 mg IntraVENous Q12H          Adam Hicks NP     [facility] [address] [place] [address] Drive, [address] [place] [address] [address] [place] [address]   [phone]

## 2021-07-13 LAB
ATRIAL RATE: 234 BPM
CALCULATED R AXIS, ECG10: -53 DEGREES
CALCULATED T AXIS, ECG11: 22 DEGREES
DIAGNOSIS, 93000: NORMAL
Q-T INTERVAL, ECG07: 484 MS
QRS DURATION, ECG06: 94 MS
QTC CALCULATION (BEZET), ECG08: 446 MS
VENTRICULAR RATE, ECG03: 51 BPM

## 2021-07-15 NOTE — PROGRESS NOTES
Physician Progress Note      Serge Solano  CSN #:                  [id number]  :                       1930  ADMIT DATE:       2021 5:38 PM  [address] "[employer] DATE:        2021 4:17 PM  RESPONDING  PROVIDER #:        Caity Recinos MD          QUERY TEXT:    The patient was admitted with anemia. Noted that he had a history of afib on Eliquis. The patient was given 2 uPRBC?s during the hospital stay. Patient states he was recently started on iron supplements about 2 weeks ago and reports compliance. If possible, please document in progress notes and discharge summary further specificity regarding the acuity and type of anemia:      The medical record reflects the following:  Risk Factors: chronic afib on Eliquis  Clinical Indicators: Hgb 6.1 on 21, 7/10-Hgb 6.7  ct abd IMPRESSION   1. No CT evidence of acute gastrointestinal hemorrhage. 2. Small pleural effusions. Mild ascites. Slightly nodular morphology of the liver suggesting cirrhosis. Per H&P-Prem Dahl is a 80 y.o. male w/ pmh of HTN, afib on eliquis, CVA/TIA who is admitted for symptomatic anemia and GI bleed in setting of chronic AC w/ Eliquis. ? ?GI Bleed / Symptomatic Anemia  Afib on Eliquis -Hold Eliquis given acute anemia -We will need to revisit risk-benefits of Eliquis and anemia  -per gi- Chronic iron deficiency anemia: hgb 5.9-->7.4 status post 2 units PRBC's  and 7/10, stable  Per dc summary- GI evaluated the patient and initially was considered for EGD however after weighing risks and benefit, it was recommended that patient continues with conservative management. Patient has iron deficiency and was started on oral iron. Patient will be on Protonix upon discharge.   Patient also has atrial fibrillation and takes Eliquis at home which was held due to concern of GI source of blood loss  Treatment: Monitor vital signs, labwork, imaging, GI consult, 2 uPRBC?s, hold Eliquis, protonix 40 mg q12 hours IV    Thank you    Madan Steele RN, BSN  Clinical documentation Improvement  [phone]  Options provided:  -- Acute blood loss Anemia due to Eliquis  -- Acute blood loss Anemia due to GI bleeding  -- Acute blood loss Anemia due to, please specify cause. -- Anemia due to iron deficiency  -- Other - I will add my own diagnosis  -- Disagree - Not applicable / Not valid  -- Disagree - Clinically unable to determine / Unknown  -- Refer to Clinical Documentation Reviewer    PROVIDER RESPONSE TEXT:    This patient has acute blood loss anemia due to Eliquis.     Query created by: Jimmie Alexander on 7/15/2021 9:15 AM      Electronically signed by:  Clara Price MD 7/15/2021 11:24 AM

## 2021-07-16 ENCOUNTER — VIRTUAL VISIT (OUTPATIENT)
Dept: FAMILY MEDICINE CLINIC | Age: 86
End: 2021-07-16
Payer: MEDICARE

## 2021-07-16 DIAGNOSIS — D62 ACUTE BLOOD LOSS ANEMIA: ICD-10-CM

## 2021-07-16 DIAGNOSIS — Z09 HOSPITAL DISCHARGE FOLLOW-UP: Primary | ICD-10-CM

## 2021-07-16 PROCEDURE — G8432 DEP SCR NOT DOC, RNG: HCPCS | Performed by: FAMILY MEDICINE

## 2021-07-16 PROCEDURE — 99213 OFFICE O/P EST LOW 20 MIN: CPT | Performed by: FAMILY MEDICINE

## 2021-07-16 PROCEDURE — 1101F PT FALLS ASSESS-DOCD LE1/YR: CPT | Performed by: FAMILY MEDICINE

## 2021-07-16 PROCEDURE — 1111F DSCHRG MED/CURRENT MED MERGE: CPT | Performed by: FAMILY MEDICINE

## 2021-07-16 PROCEDURE — G8427 DOCREV CUR MEDS BY ELIG CLIN: HCPCS | Performed by: FAMILY MEDICINE

## 2021-07-16 RX ORDER — PANTOPRAZOLE SODIUM 40 MG/1
40 TABLET, DELAYED RELEASE ORAL DAILY
Qty: 90 TABLET | Refills: 0 | Status: SHIPPED | OUTPATIENT
Start: 2021-07-16

## 2021-07-16 RX ORDER — TRAZODONE HYDROCHLORIDE 50 MG/1
50 TABLET ORAL
Qty: 90 TABLET | Refills: 1 | Status: SHIPPED | OUTPATIENT
Start: 2021-07-16

## 2021-07-16 RX ORDER — LANOLIN ALCOHOL/MO/W.PET/CERES
325 CREAM (GRAM) TOPICAL 2 TIMES DAILY WITH MEALS
Qty: 180 TABLET | Refills: 0 | Status: SHIPPED | OUTPATIENT
Start: 2021-07-16

## 2021-07-16 NOTE — PROGRESS NOTES
Racquel Bahena, who was evaluated through a synchronous (real-time) audio-video encounter, and/or his healthcare decision maker, is aware that it is a billable service, with coverage as determined by his insurance carrier. He provided verbal consent to proceed: YES, and patient identification was verified. It was conducted pursuant to the emergency declaration under the [facility], [facility] authority and the [employer] Act. A caregiver was present when appropriate. Ability to conduct physical exam was limited. I was at home. The patient was at home. This virtual visit was conducted via [employer]. Pursuant to the emergency declaration under the [address] [place] [address], [address] [place] [address] authority and the [employer] and [employer] Act, this Virtual  Visit was conducted to reduce the patient's risk of exposure to COVID-19 and provide continuity of care for an established patient. Services were provided through a video synchronous discussion virtually to substitute for in-person clinic visit. Due to this being a TeleHealth evaluation, many elements of the physical examination are unable to be assessed. Total Time: minutes: 5-10 minutes. Agustin Grewal MD    [phone]  Subjective:   Racquel Bahena was seen for:    Admitted to [facility] from 7/9-7/12. Admitted for acute anemia on Eliquis. EGD was deferred as pt is high risk. CBC stabilized s/p 2 units of pRBC transfusion. Eliquis was stopped. Pt has several pauses on cardiac monitor and cardiology recommend pacemaker which pt declined. Since being, pt has felt well. He has upcoming appt with cardiology. Family is interested in enrolling pt into hospice care with the same agency that is caring for pt's wife.           Current Outpatient Medications:     ferrous sulfate 325 mg (65 mg iron) tablet, Take 1 Tablet by mouth two (2) times daily (with meals). , Disp: 60 Tablet, Rfl: 0    pantoprazole (Protonix) 40 mg tablet, Take 1 Tablet by mouth daily. , Disp: 30 Tablet, Rfl: 0    cholecalciferol (VITAMIN D3) (50,000 UNITS /1250 MCG) capsule, Take 1 capsule once a week for the next 8 weeks then switch to OTC vitamin D3 2000 units daily, Disp: 8 Capsule, Rfl: 0    traZODone (DESYREL) 50 mg tablet, Take 1 Tab by mouth nightly. For sleep, Disp: 30 Tab, Rfl: 5    Allergies   Allergen Reactions    Hydrocodone Other (comments)     Unable to void, or have a bowel movement    Amlodipine Swelling and Other (comments)     Severe weeping from leg swelling    Poison Ivy Extract Itching       Review of Systems   Constitutional: Negative for fever, malaise/fatigue and weight loss. Respiratory: Negative for cough, hemoptysis, shortness of breath and wheezing. Cardiovascular: Negative for chest pain, palpitations, leg swelling and PND. Gastrointestinal: Negative for abdominal pain, constipation, diarrhea, nausea and vomiting. Physical Exam:     No flowsheet data found. General: alert, cooperative, no distress   Mental  status: normal mood, behavior, speech, dress, motor activity, and thought processes, able to follow commands   HENT: NCAT   Neck: no visualized mass   Resp: no respiratory distress   Neuro: no gross deficits   Skin: no discoloration or lesions of concern on visible areas   Psychiatric: normal affect, consistent with stated mood, no evidence of hallucinations       Assessment & Plan:     Noreen Benton is a 80 y.o. male who presents today for:    1. Hospital discharge follow-up  Clinically improving. 2. Acute blood loss anemia  Pt to have repeat CBC in a week; either can set up lab appt or have home health/hospice do it. Pt's son will keep us updated. - CBC W/O DIFF;  Future  - CBC W/O DIFF       Medications Discontinued During This Encounter   Medication Reason    traZODone (DESYREL) 50 mg tablet REORDER  ferrous sulfate 325 mg (65 mg iron) tablet REORDER    pantoprazole (Protonix) 40 mg tablet REORDER           Treatment risks/benefits/costs/interactions/alternatives discussed with patient. Advised patient to call back or return to office if symptoms worsen/change/persist. If patient cannot reach us or should anything more severe/urgent arise he/she should proceed directly to the nearest emergency department. Discussed expected course/resolution/complications of diagnosis in detail with patient. Patient expressed understanding with the diagnosis and plan. Todd Dean M.D.

## 2021-07-21 ENCOUNTER — APPOINTMENT (OUTPATIENT)
Dept: FAMILY MEDICINE CLINIC | Age: 86
End: 2021-07-21

## 2021-07-22 LAB
ERYTHROCYTE [DISTWIDTH] IN BLOOD BY AUTOMATED COUNT: 23.2 % (ref 11.6–15.4)
HCT VFR BLD AUTO: 25.9 % (ref 37.5–51)
HGB BLD-MCNC: 7.9 G/DL (ref 13–17.7)
MCH RBC QN AUTO: 23.9 PG (ref 26.6–33)
MCHC RBC AUTO-ENTMCNC: 30.5 G/DL (ref 31.5–35.7)
MCV RBC AUTO: 78 FL (ref 79–97)
PLATELET # BLD AUTO: 339 X10E3/UL (ref 150–450)
RBC # BLD AUTO: 3.31 X10E6/UL (ref 4.14–5.8)
WBC # BLD AUTO: 8.3 X10E3/UL (ref 3.4–10.8)

## 2021-07-22 NOTE — PROGRESS NOTES
Dear Mr. Ezequiel Campuzano,    I wanted to follow up on your recent test results:    Good news, your blood counts remain stable since your hospital stay. Continue off of the blood thinner and hopefully your blood counts will continue to improve on its own.

## 2021-10-07 ENCOUNTER — TELEPHONE (OUTPATIENT)
Dept: FAMILY MEDICINE CLINIC | Age: 86
End: 2021-10-07

## 2021-10-07 NOTE — TELEPHONE ENCOUNTER
Juanjose called from hospice and requested call back from Dr. Lola Rachel to discuss whether or not patient is appropriate for hospice.     [facility]# [phone]

## 2021-10-12 NOTE — TELEPHONE ENCOUNTER
Can you call pt to schedule a VV to discuss if pt/family asking for hospice? I haven't seen him since July. VV would be fine. I called Juanjose; nevaeh ESPOSITO.

## 2021-10-15 ENCOUNTER — TELEPHONE (OUTPATIENT)
Dept: FAMILY MEDICINE CLINIC | Age: 86
End: 2021-10-15

## 2021-10-15 NOTE — TELEPHONE ENCOUNTER
Called they patient has been evaluated and for hospice at this time.     Best call back #[phone]

## 2021-10-18 NOTE — TELEPHONE ENCOUNTER
Attempted to call Coleen Rodriguez. [facility] to call back clarifying whether patient was officially enrolled in hospice or not.

## 2021-10-19 NOTE — TELEPHONE ENCOUNTER
Called Juanjose - she stated that patient is recovering from a stroke slowly. Has had no recurring strokes and was not a good candidate for hospice.

## 2021-10-19 NOTE — TELEPHONE ENCOUNTER
Juanjose [facility]) (EC) [phone]     Pt is not admittible to Hospice. [place] nurse would still like a call back from nurse. Please call back when available.

## 2022-03-18 PROBLEM — Z71.89 ACP (ADVANCE CARE PLANNING): Status: ACTIVE | Noted: 2017-02-14

## 2022-03-19 PROBLEM — I48.21 PERMANENT ATRIAL FIBRILLATION (HCC): Status: ACTIVE | Noted: 2021-07-11

## 2022-03-19 PROBLEM — Z86.73 HISTORY OF STROKE: Status: ACTIVE | Noted: 2019-10-12

## 2022-03-19 PROBLEM — K92.2 GI BLEED: Status: ACTIVE | Noted: 2021-07-09

## 2022-03-19 PROBLEM — I49.5 SSS (SICK SINUS SYNDROME) (HCC): Status: ACTIVE | Noted: 2021-07-11

## 2023-05-10 RX ORDER — FERROUS SULFATE 325(65) MG
TABLET ORAL 2 TIMES DAILY WITH MEALS
COMMUNITY
Start: 2021-07-16

## 2023-05-10 RX ORDER — TRAZODONE HYDROCHLORIDE 50 MG/1
1 TABLET ORAL NIGHTLY
COMMUNITY
Start: 2021-07-16

## 2023-05-10 RX ORDER — PANTOPRAZOLE SODIUM 40 MG/1
1 TABLET, DELAYED RELEASE ORAL DAILY
COMMUNITY
Start: 2021-07-16

## (undated) DEVICE — REM POLYHESIVE ADULT PATIENT RETURN ELECTRODE: Brand: VALLEYLAB

## (undated) DEVICE — FORCEPS BX L240CM JAW DIA2.8MM L CAP W/ NDL MIC MESH TOOTH

## (undated) DEVICE — TUBING HYDR IRR --

## (undated) DEVICE — FIAPC® PROBE W/ FILTER 2200 A OD 2.3MM/6.9FR; L 2.2M/7.2FT: Brand: ERBE